# Patient Record
Sex: MALE | Race: WHITE | Employment: OTHER | URBAN - METROPOLITAN AREA
[De-identification: names, ages, dates, MRNs, and addresses within clinical notes are randomized per-mention and may not be internally consistent; named-entity substitution may affect disease eponyms.]

---

## 2017-01-31 ENCOUNTER — APPOINTMENT (OUTPATIENT)
Dept: RADIATION ONCOLOGY | Age: 72
End: 2017-01-31

## 2017-02-07 ENCOUNTER — HOSPITAL ENCOUNTER (OUTPATIENT)
Dept: RADIATION ONCOLOGY | Age: 72
Discharge: HOME | End: 2017-02-07
Payer: MEDICARE

## 2017-02-07 VITALS
HEART RATE: 62 BPM | TEMPERATURE: 97.6 F | WEIGHT: 255 LBS | OXYGEN SATURATION: 99 % | RESPIRATION RATE: 18 BRPM | BODY MASS INDEX: 31.87 KG/M2 | DIASTOLIC BLOOD PRESSURE: 91 MMHG | SYSTOLIC BLOOD PRESSURE: 179 MMHG

## 2017-02-07 DIAGNOSIS — C61 PROSTATE CANCER (HCC): Primary | ICD-10-CM

## 2017-02-07 PROCEDURE — 99211 OFF/OP EST MAY X REQ PHY/QHP: CPT

## 2017-02-07 NOTE — CONSULTS
Patient: Samanta Munoz MRN: [id number]  SSN: [id number]    YOB: 1945  Age: 66 y.o. Sex: male      Other Providers:  Brianda Walters DO    CHIEF COMPLAINT: prostate cancer    DIAGNOSIS: adenocarcinoma of the prostate, T1c, [place] score 3+4 = 7, PSA 5.5      HISTORY OF PRESENT ILLNESS:  Samanta Munoz is a 66 y.o. male who I am seeing at the request of Dr. Barrera Casas regarding the role of radiation therapy and treatment of this favorable intermediate risk prostate cancer. The patient was followed by Dr. Ulysses Angelo and noted to have a rising PSA. PSA on 04/01/16 was 4.7. He had had a prior negative prostate biopsy on 08/29/11. Final pathology showed high-grade PIN in course from the left lateral mid gland, right medial apex and left lateral apex. Atypia was seen in the core from the left medial apex. His prebiopsy PSA was 3.8 and his prostate volume was 52 g. He has a family history of prostate cancer with his father undergoing prostate seed brachytherapy at age 61. PSA on 11/10/16 was 5.5. TRUS guided biopsy on 12/14/16 demonstrated a 49 cc prostate for a PSA density of 0.11. Pathology demonstrated 3/12 cores to contain adenocarcinoma. These included [place] score 3+4 = 7 disease in the left lateral mid gland (30%) and the right base (50%) perineural invasion was identified. In addition, [place] score 3+3 = 6 disease was seen in the left apex (<5%). The patient discussed management options with Dr. Barrera Casas including surgery, radiation therapy, cryotherapy, HIFU, ADT and active surveillance. The patient was leaning toward a radiation based option and is here for discussion of those options. At this time, Mr. Cristian Bennett is doing very well. He has an AUA symptom score of 8/35. He has nocturia ×2-3. He has urinary frequency about half the time. He has occasional urgency and incomplete voiding. He denies urinary leakage or incontinence. He has good bowel function.   He has poor erectile function and considers this to be \"a moderate problem. \"  His overall health is excellent. PAST MEDICAL HISTORY:    Past Medical History   Diagnosis Date    Cancer ([address]      basal cell    Elevated PSA     Erectile dysfunction     Hypertension      daily meds    MVP (mitral valve prolapse)      diagnosed many years ago, pt states does not see cardiologist, in youth    Osteoarthritis      thumbs    S/P colonoscopy      2020       The patient denies history of collagen vascular diseases, pacemaker insertion, prior radiation or prior chemotherapy. PAST SURGICAL HISTORY:   Past Surgical History   Procedure Laterality Date    Hx appendectomy      Biopsy prostate         MEDICATIONS:     Current Outpatient Prescriptions:     amLODIPine (NORVASC) 10 mg tablet, Take 1 Tab by mouth daily. , Disp: 90 Tab, Rfl: 3    tadalafil (CIALIS) 20 mg tablet, Take 1 Tab by mouth as needed. , Disp: 12 Tab, Rfl: 4    atenolol (TENORMIN) 50 mg tablet, Take 1 Tab by mouth two (2) times a day., Disp: 180 Tab, Rfl: 3    ciprofloxacin HCl (CIPRO) 500 mg tablet, Take 500 mg by mouth two (2) times a day., Disp: , Rfl:     OTHER,NON-FORMULARY,, Take  by mouth daily. nicotinamide adenine dinucleotide supplement, Disp: , Rfl:     cloNIDine HCl (CATAPRES) 0.2 mg tablet, Take 1 Tab by mouth three (3) times daily as needed. , Disp: 270 Tab, Rfl: 3    hydrochlorothiazide (HYDRODIURIL) 25 mg tablet, Take 1 Tab by mouth daily. , Disp: 90 Tab, Rfl: 3    olmesartan (BENICAR) 40 mg tablet, Take 1 Tab by mouth daily. am (Patient taking differently: Take 20 mg by mouth two (2) times a day.), Disp: 90 Tab, Rfl: 3    ALLERGIES:   Allergies   Allergen Reactions    Amoxicillin Swelling     Diff breathing, lip swelling       SOCIAL HISTORY:   Social History     Social History    Marital status: SINGLE     Spouse name: N/A    Number of children: N/A    Years of education: N/A     Occupational History    Not on file.      Social History Main Topics    Smoking status: Former Smoker     Quit date: 8/19/1997    Smokeless tobacco: Never Used    Alcohol use Yes      Comment: 15 drinks per wk    Drug use: No    Sexual activity: Not on file     Other Topics Concern    Not on file     Social History Narrative       FAMILY HISTORY:   Family History   Problem Relation Age of Onset    Cancer Father      prostate    Stroke Father     Cancer Paternal Uncle     Heart Disease Mother     Heart Attack Maternal Uncle     Prostate Cancer Sister        REVIEW OF SYSTEMS: Please see the completed review of systems sheet in the chart that I have reviewed today. PHYSICAL EXAMINATION:   ECOG Performance status 0  VITAL SIGNS:   Visit Vitals    BP (!) 179/91    Pulse 62    Temp 97.6 °F (36.4 °C)    Resp 18    Wt 115.7 kg (255 lb)    SpO2 99%    BMI 31.87 kg/m2        GENERAL: The patient is well-developed, ambulatory, alert and in no acute distress. HEENT: Head is normocephalic, atraumatic. Pupils are equal, round and reactive to light and accommodation. Extraocular movement intact. NECK: Neck is supple with no masses. CARDIOVASCULAR: Heart has regular rate and rhythm. There are no murmurs, rubs or gallops. Radial pulses are 2+ RESPIRATORY: Lungs are clear to auscultation and percussion. There is normal respiratory effort. GASTROINTESTINAL: The abdomen is soft, non-tender, nondistended with no hepatospelnomagaly. Digital rectal examination: deferred at patient's request. LYMPHATIC: There is no cervical or supraclavicular  lymphadenopathy bilaterally. MUSCULOSKELETAL: Extremities reveal no cyanosis, clubbing or edema.  is 5+/5. NEURO:  Cranial nerves II-XII grossly intact. Muscular strength and sensation are intact throughout all four extremities. PATHOLOGY:    12/14/16:    DIAGNOSIS   A: \"PROSTATE, RIGHT BASE, BIOPSY\":   PROSTATIC ADENOCARCINOMA, NEREYDA SCORE 3 + 4 = 7 DISCONTINUOUSLY   INVOLVING 50% OF ONE CORE.    B: \"PROSTATE, RIGHT MID, BIOPSY\":   HIGH GRADE PROSTATIC INTRAEPITHELIAL NEOPLASIA. C: \"PROSTATE, RIGHT APEX, BIOPSY\":   HIGH GRADE PROSTATIC INTRAEPITHELIAL NEOPLASIA. D: \"PROSTATE, RIGHT LATERAL BASE, BIOPSY\":   NO CARCINOMA IDENTIFIED. E: \"PROSTATE, RIGHT LATERAL MID, BIOPSY\":   NO CARCINOMA IDENTIFIED. F: \"PROSTATE, RIGHT LATERAL APEX, BIOPSY\":   HIGH GRADE PROSTATIC INTRAEPITHELIAL NEOPLASIA. G: \"PROSTATE, LEFT BASE, BIOPSY\":   NO CARCINOMA IDENTIFIED. H: \"PROSTATE, LEFT MID, BIOPSY\":   NO CARCINOMA IDENTIFIED. I: \"PROSTATE, LEFT APEX, BIOPSY\":   PROSTATIC ADENOCARCINOMA, NEREYDA SCORE 3 + 3 = 6 INVOLVING LESS   THAN 5% OF ONE CORE. HIGH GRADE PROSTATIC INTRAEPITHELIAL NEOPLASIA. J: \"PROSTATE, LEFT LATERAL BASE, BIOPSY\":   NO CARCINOMA IDENTIFIED. K: \"PROSTATE, LEFT LATERAL MID, BIOPSY\":   PROSTATIC ADENOCARCINOMA, NEREYDA SCORE 3 + 4 = 7 INVOLVING 30%   OF ONE CORE. PERINEURAL INVASION IS IDENTIFIED. L: \"PROSTATE, LEFT LATERAL APEX, BIOPSY\":   NO CARCINOMA IDENTIFIED. LABORATORY:   PSA history as detailed in HPI. RADIOLOGY:    No results found. IMPRESSION:  Gena Dandy is a 66 y.o. male with adenocarcinoma of the prostate, T1c, [place] score 3+4 = 7, PSA 5.5. COUNSELING AND COORDINATION OF CARE: I have had a 90 minute consultation with Mr. Ahsan King of which greater than half has been spent counseling him about management options for his favorable intermediate risk prostate cancer. Diagnostically, I have recommended an MRI of the pelvis for completion of staging. According to the [facility] Prediction Tool, his probability of organ confined disease is 51%, of extracapsular extension 49%, of seminal vesicle invasion is 3% and of lymph node involvement is 2%. I have discussed various treatment options with Mr. Ahsan King including active surveillance, surgery, and radiation including external beam and brachytherapy, as well as cryotherapy, HIFU and ADT.  He has a life expectancy of at least 10 years. We have discussed active surveillance in detail. We have also discussed surgery and Mr. Ary Pagan has discussed this in detail with Dr. Rob Pineda. He was told that he is a reasonable surgical candidate and the risks and benefits were covered in detail. However, he is not interested in surgery. We have discussed radiation in its various forms. He is a good candidate for IMRT/IGRT with or without 6 months of ADT, although given his very low burden of Breana score 7 disease, I lean toward omission of ADT. He is also a good candidate for hypofractionated radiation using SBRT. We have discussed recent data demonstrating excellent biochemical control for patients with Breana score 7 prostate cancer treated with this approach. In addition, he is potentially a good brachytherapy candidate, although he was less interested in this approach than SBRT. We also discussed proton therapy, but he is not interested in a consultation and a proton therapy center. We have had a detailed discussion about the use of androgen deprivation therapy as a component of definitive treatment of prostate cancer when combined with radiation. We discussed androgen deprivation therapy and the potential side effects associated with it. These include, but are not limited to, hot flashes, fatigue, weight gain, loss of libido, cognitive and mood changes, decreased bone density and increased fracture risk, altered lipid metabolism, decreased insulin sensitivity, and potential for early death, from cardiac or other unexplained causes. He is adamantly opposed to the use of ADT as a component of his therapy. I have also discussed with him RTOG protocol 0815, a phase 3 prospective randomized trial of dose escalated radiotherapy with or without short-term androgen deprivation therapy for patients with intermediate risk prostate cancer. This trial has closed to accrual, but no results have yet been published.  Given his relatively low burden of pattern 4 disease, I have not encouraged him to pursue ADT. We have discussed the potential side effects of radiation therapy as they pertain to urinary function, bowel function and erectile function. We have also discussed the potential use of SpaceOAR injectable hydrogel in conjunction with radiation therapy in order to protect the rectum from radiation dose. He is very interested in this approach. I will present Mr. Denzel Dixon case at the multidisciplinary conference for discussion of management options. At this time, he is leaning toward treatment with CyberKnife SBRT in conjunction with SpaceOAR. He has also requested pathology review at [facility]. He will return for follow-up with me at [facility] after his MRI, pathology review an presentation at [place] [id number]. I appreciate the opportunity to participate in Mr. Denzel Dixon care.       Ross Angulo MD   February 7, 2017

## 2017-02-07 NOTE — PROGRESS NOTES
Pt here today for initial RT consult for prostate cancer with Dr. Da Cross. 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 and asked if he will be receiving IMRT or SBRT treatment. A brief overview of the process for RT here was given. Pt's PSA on 4/2/16 was 4.7. He has a fairly low AUA symptom  score of 8, and is c/o urinary frequency and nocturia. Pt's urologist is Dr. Smitha Mendez, whom he sees 2/27/17. Per Dr. Da Cross, orders placed for MRI Pelvis to be completed ASAP, forms faxed for both [facility] and [facility], and request faxed to [facility] to send malignant slides from prostate biopsy dated 12/14/16 to [facility] for second review. Pt to have FUP at [facility] on 2/27/17 for probable Cyberknife treatment--pt is aware of appt and new patient packet will be sent from [address] [place] [address].  Per [facility] pathology dept the results on the biopsy slides can take several weeks to come back from [facility]. Pt aware.

## 2017-02-15 ENCOUNTER — HOSPITAL ENCOUNTER (OUTPATIENT)
Dept: MRI IMAGING | Age: 72
Discharge: HOME | End: 2017-02-15
Attending: RADIOLOGY
Payer: MEDICARE

## 2017-02-15 DIAGNOSIS — C61 PROSTATE CANCER (HCC): ICD-10-CM

## 2017-02-15 LAB — CREAT BLD-MCNC: 0.9 MG/DL (ref 0.6–1)

## 2017-02-15 PROCEDURE — 72197 MRI PELVIS W/O & W/DYE: CPT

## 2017-02-15 PROCEDURE — 74011000258 HC RX REV CODE- 258: Performed by: RADIOLOGY

## 2017-02-15 PROCEDURE — 74011250636 HC RX REV CODE- 250/636: Performed by: RADIOLOGY

## 2017-02-15 PROCEDURE — 82565 ASSAY OF CREATININE: CPT

## 2017-02-15 PROCEDURE — A9577 INJ MULTIHANCE: HCPCS | Performed by: RADIOLOGY

## 2017-02-15 RX ORDER — SODIUM CHLORIDE 0.9 % (FLUSH) 0.9 %
10 SYRINGE (ML) INJECTION
Status: COMPLETED | OUTPATIENT
Start: 2017-02-15 | End: 2017-02-15

## 2017-02-15 RX ADMIN — SODIUM CHLORIDE 100 ML: 900 INJECTION, SOLUTION INTRAVENOUS at 13:54

## 2017-02-15 RX ADMIN — Medication 10 ML: at 13:54

## 2017-02-15 RX ADMIN — GADOBENATE DIMEGLUMINE 10 ML: 529 INJECTION, SOLUTION INTRAVENOUS at 13:54

## 2018-05-14 ENCOUNTER — APPOINTMENT (OUTPATIENT)
Dept: GENERAL RADIOLOGY | Age: 73
DRG: 291 | End: 2018-05-14
Attending: EMERGENCY MEDICINE
Payer: MEDICARE

## 2018-05-14 ENCOUNTER — HOSPITAL ENCOUNTER (INPATIENT)
Age: 73
LOS: 3 days | Discharge: HOME | DRG: 291 | End: 2018-05-17
Attending: EMERGENCY MEDICINE | Admitting: HOSPITALIST
Payer: MEDICARE

## 2018-05-14 DIAGNOSIS — I50.9 ACUTE ON CHRONIC CONGESTIVE HEART FAILURE, UNSPECIFIED HEART FAILURE TYPE (HCC): Primary | ICD-10-CM

## 2018-05-14 DIAGNOSIS — I48.91 ATRIAL FIBRILLATION, UNSPECIFIED TYPE (HCC): ICD-10-CM

## 2018-05-14 PROBLEM — I44.7 LBBB (LEFT BUNDLE BRANCH BLOCK): Status: ACTIVE | Noted: 2018-05-14

## 2018-05-14 PROBLEM — I48.0 PAROXYSMAL A-FIB (HCC): Status: ACTIVE | Noted: 2018-05-14

## 2018-05-14 PROBLEM — J96.00 ACUTE RESPIRATORY FAILURE (HCC): Status: ACTIVE | Noted: 2018-05-14

## 2018-05-14 PROBLEM — E87.20 LACTIC ACID ACIDOSIS: Status: ACTIVE | Noted: 2018-05-14

## 2018-05-14 PROBLEM — D72.829 LEUKOCYTOSIS: Status: ACTIVE | Noted: 2018-05-14

## 2018-05-14 LAB
ALBUMIN SERPL-MCNC: 3.7 G/DL (ref 3.2–4.6)
ALBUMIN/GLOB SERPL: 0.9 {RATIO} (ref 1.2–3.5)
ALP SERPL-CCNC: 82 U/L (ref 50–136)
ALT SERPL-CCNC: 64 U/L (ref 12–65)
ANION GAP SERPL CALC-SCNC: 14 MMOL/L (ref 7–16)
ARTERIAL PATENCY WRIST A: POSITIVE
AST SERPL-CCNC: 52 U/L (ref 15–37)
BASE DEFICIT BLDA-SCNC: 0.8 MMOL/L (ref 0–2)
BDY SITE: ABNORMAL
BILIRUB SERPL-MCNC: 0.6 MG/DL (ref 0.2–1.1)
BNP SERPL-MCNC: 281 PG/ML
BUN SERPL-MCNC: 21 MG/DL (ref 8–23)
CALCIUM SERPL-MCNC: 8.7 MG/DL (ref 8.3–10.4)
CHLORIDE SERPL-SCNC: 104 MMOL/L (ref 98–107)
CO2 SERPL-SCNC: 21 MMOL/L (ref 21–32)
COHGB MFR BLD: 0.5 % (ref 0.5–1.5)
CREAT SERPL-MCNC: 1.54 MG/DL (ref 0.8–1.5)
CRP SERPL-MCNC: 1.3 MG/DL (ref 0–0.9)
DO-HGB BLD-MCNC: 2 % (ref 0–5)
ERYTHROCYTE [DISTWIDTH] IN BLOOD BY AUTOMATED COUNT: 13.8 % (ref 11.9–14.6)
GLOBULIN SER CALC-MCNC: 4.2 G/DL (ref 2.3–3.5)
GLUCOSE SERPL-MCNC: 174 MG/DL (ref 65–100)
HCO3 BLDA-SCNC: 21 MMOL/L (ref 22–26)
HCT VFR BLD AUTO: 48.1 % (ref 41.1–50.3)
HGB BLD-MCNC: 16.7 G/DL (ref 13.6–17.2)
HGB BLDMV-MCNC: 15.5 GM/DL (ref 11.7–15)
LACTATE BLD-SCNC: 2.6 MMOL/L (ref 0.5–1.9)
LACTATE SERPL-SCNC: 1.9 MMOL/L (ref 0.4–2)
MAGNESIUM SERPL-MCNC: 1.9 MG/DL (ref 1.8–2.4)
MAGNESIUM SERPL-MCNC: 2.1 MG/DL (ref 1.8–2.4)
MCH RBC QN AUTO: 31.4 PG (ref 26.1–32.9)
MCHC RBC AUTO-ENTMCNC: 34.7 G/DL (ref 31.4–35)
MCV RBC AUTO: 90.4 FL (ref 79.6–97.8)
METHGB MFR BLD: 0.4 % (ref 0–1.5)
OXYHGB MFR BLDA: 96.7 % (ref 94–97)
PCO2 BLDA: 26 MMHG (ref 35–45)
PH BLDA: 7.51 [PH] (ref 7.35–7.45)
PHOSPHATE SERPL-MCNC: 4.4 MG/DL (ref 2.3–3.7)
PLATELET # BLD AUTO: 339 K/UL (ref 150–450)
PMV BLD AUTO: 9.9 FL (ref 10.8–14.1)
POTASSIUM SERPL-SCNC: 3.9 MMOL/L (ref 3.5–5.1)
PROCALCITONIN SERPL-MCNC: 0.1 NG/ML
PROT SERPL-MCNC: 7.9 G/DL (ref 6.3–8.2)
RBC # BLD AUTO: 5.32 M/UL (ref 4.23–5.67)
SAO2 % BLD: 98 % (ref 92–98.5)
SODIUM SERPL-SCNC: 139 MMOL/L (ref 136–145)
TROPONIN I BLD-MCNC: 0 NG/ML (ref 0.02–0.05)
TROPONIN I SERPL-MCNC: <0.02 NG/ML (ref 0.02–0.05)
VENTILATION MODE VENT: ABNORMAL
WBC # BLD AUTO: 13 K/UL (ref 4.3–11.1)

## 2018-05-14 PROCEDURE — 94660 CPAP INITIATION&MGMT: CPT

## 2018-05-14 PROCEDURE — 84484 ASSAY OF TROPONIN QUANT: CPT

## 2018-05-14 PROCEDURE — 74011000258 HC RX REV CODE- 258: Performed by: HOSPITALIST

## 2018-05-14 PROCEDURE — 74011250636 HC RX REV CODE- 250/636: Performed by: HOSPITALIST

## 2018-05-14 PROCEDURE — 71045 X-RAY EXAM CHEST 1 VIEW: CPT

## 2018-05-14 PROCEDURE — 83605 ASSAY OF LACTIC ACID: CPT

## 2018-05-14 PROCEDURE — 82803 BLOOD GASES ANY COMBINATION: CPT

## 2018-05-14 PROCEDURE — 83605 ASSAY OF LACTIC ACID: CPT | Performed by: HOSPITALIST

## 2018-05-14 PROCEDURE — 74011250636 HC RX REV CODE- 250/636: Performed by: EMERGENCY MEDICINE

## 2018-05-14 PROCEDURE — 36600 WITHDRAWAL OF ARTERIAL BLOOD: CPT

## 2018-05-14 PROCEDURE — 84145 PROCALCITONIN (PCT): CPT | Performed by: HOSPITALIST

## 2018-05-14 PROCEDURE — 96375 TX/PRO/DX INJ NEW DRUG ADDON: CPT | Performed by: EMERGENCY MEDICINE

## 2018-05-14 PROCEDURE — 83880 ASSAY OF NATRIURETIC PEPTIDE: CPT | Performed by: EMERGENCY MEDICINE

## 2018-05-14 PROCEDURE — 96365 THER/PROPH/DIAG IV INF INIT: CPT | Performed by: EMERGENCY MEDICINE

## 2018-05-14 PROCEDURE — 74011250637 HC RX REV CODE- 250/637: Performed by: HOSPITALIST

## 2018-05-14 PROCEDURE — 74011000258 HC RX REV CODE- 258: Performed by: EMERGENCY MEDICINE

## 2018-05-14 PROCEDURE — 86140 C-REACTIVE PROTEIN: CPT | Performed by: HOSPITALIST

## 2018-05-14 PROCEDURE — 80053 COMPREHEN METABOLIC PANEL: CPT | Performed by: EMERGENCY MEDICINE

## 2018-05-14 PROCEDURE — 83735 ASSAY OF MAGNESIUM: CPT | Performed by: EMERGENCY MEDICINE

## 2018-05-14 PROCEDURE — 84100 ASSAY OF PHOSPHORUS: CPT | Performed by: HOSPITALIST

## 2018-05-14 PROCEDURE — 99285 EMERGENCY DEPT VISIT HI MDM: CPT | Performed by: EMERGENCY MEDICINE

## 2018-05-14 PROCEDURE — 65660000000 HC RM CCU STEPDOWN

## 2018-05-14 PROCEDURE — 85027 COMPLETE CBC AUTOMATED: CPT | Performed by: EMERGENCY MEDICINE

## 2018-05-14 PROCEDURE — 87040 BLOOD CULTURE FOR BACTERIA: CPT | Performed by: EMERGENCY MEDICINE

## 2018-05-14 PROCEDURE — 36415 COLL VENOUS BLD VENIPUNCTURE: CPT | Performed by: HOSPITALIST

## 2018-05-14 PROCEDURE — 77010033678 HC OXYGEN DAILY

## 2018-05-14 PROCEDURE — 93005 ELECTROCARDIOGRAM TRACING: CPT | Performed by: EMERGENCY MEDICINE

## 2018-05-14 RX ORDER — LOPERAMIDE HYDROCHLORIDE 2 MG/1
2 CAPSULE ORAL
Status: DISCONTINUED | OUTPATIENT
Start: 2018-05-14 | End: 2018-05-17 | Stop reason: HOSPADM

## 2018-05-14 RX ORDER — FUROSEMIDE 10 MG/ML
40 INJECTION INTRAMUSCULAR; INTRAVENOUS ONCE
Status: ACTIVE | OUTPATIENT
Start: 2018-05-14 | End: 2018-05-15

## 2018-05-14 RX ORDER — NITROGLYCERIN 0.4 MG/1
0.4 TABLET SUBLINGUAL
Status: DISCONTINUED | OUTPATIENT
Start: 2018-05-14 | End: 2018-05-17 | Stop reason: HOSPADM

## 2018-05-14 RX ORDER — AMIODARONE HYDROCHLORIDE 200 MG/1
200 TABLET ORAL 2 TIMES DAILY
Status: DISCONTINUED | OUTPATIENT
Start: 2018-05-15 | End: 2018-05-15

## 2018-05-14 RX ORDER — LEVOFLOXACIN 750 MG/1
750 TABLET ORAL DAILY
COMMUNITY
End: 2018-05-17

## 2018-05-14 RX ORDER — ATENOLOL 25 MG/1
50 TABLET ORAL DAILY
Status: DISCONTINUED | OUTPATIENT
Start: 2018-05-15 | End: 2018-05-16

## 2018-05-14 RX ORDER — VANCOMYCIN 2 GRAM/500 ML IN 0.9 % SODIUM CHLORIDE INTRAVENOUS
2000 ONCE
Status: COMPLETED | OUTPATIENT
Start: 2018-05-14 | End: 2018-05-17

## 2018-05-14 RX ORDER — UREA 10 %
1 LOTION (ML) TOPICAL DAILY
Status: DISCONTINUED | OUTPATIENT
Start: 2018-05-15 | End: 2018-05-17 | Stop reason: HOSPADM

## 2018-05-14 RX ORDER — VANCOMYCIN/0.9 % SOD CHLORIDE 1.5G/250ML
1500 PLASTIC BAG, INJECTION (ML) INTRAVENOUS
Status: DISCONTINUED | OUTPATIENT
Start: 2018-05-15 | End: 2018-05-16

## 2018-05-14 RX ORDER — DOCUSATE SODIUM 100 MG/1
100 CAPSULE, LIQUID FILLED ORAL AS NEEDED
Status: DISCONTINUED | OUTPATIENT
Start: 2018-05-14 | End: 2018-05-17 | Stop reason: HOSPADM

## 2018-05-14 RX ORDER — FUROSEMIDE 10 MG/ML
40 INJECTION INTRAMUSCULAR; INTRAVENOUS
Status: COMPLETED | OUTPATIENT
Start: 2018-05-14 | End: 2018-05-14

## 2018-05-14 RX ORDER — ACETAMINOPHEN 325 MG/1
650 TABLET ORAL
Status: DISCONTINUED | OUTPATIENT
Start: 2018-05-14 | End: 2018-05-16

## 2018-05-14 RX ORDER — AMLODIPINE BESYLATE 10 MG/1
10 TABLET ORAL DAILY
Status: DISCONTINUED | OUTPATIENT
Start: 2018-05-15 | End: 2018-05-17 | Stop reason: HOSPADM

## 2018-05-14 RX ORDER — FUROSEMIDE 10 MG/ML
40 INJECTION INTRAMUSCULAR; INTRAVENOUS EVERY 12 HOURS
Status: DISCONTINUED | OUTPATIENT
Start: 2018-05-14 | End: 2018-05-15

## 2018-05-14 RX ORDER — DIPHENHYDRAMINE HCL 25 MG
25 CAPSULE ORAL DAILY PRN
Status: DISCONTINUED | OUTPATIENT
Start: 2018-05-14 | End: 2018-05-17 | Stop reason: HOSPADM

## 2018-05-14 RX ORDER — ONDANSETRON 2 MG/ML
4 INJECTION INTRAMUSCULAR; INTRAVENOUS
Status: DISCONTINUED | OUTPATIENT
Start: 2018-05-14 | End: 2018-05-17 | Stop reason: HOSPADM

## 2018-05-14 RX ORDER — OLMESARTAN MEDOXOMIL 40 MG/1
40 TABLET ORAL DAILY
Status: DISCONTINUED | OUTPATIENT
Start: 2018-05-15 | End: 2018-05-17 | Stop reason: HOSPADM

## 2018-05-14 RX ADMIN — VANCOMYCIN HYDROCHLORIDE 2000 MG: 10 INJECTION, POWDER, LYOPHILIZED, FOR SOLUTION INTRAVENOUS at 23:06

## 2018-05-14 RX ADMIN — TOBRAMYCIN 480 MG: 40 INJECTION, SOLUTION INTRAMUSCULAR; INTRAVENOUS at 21:30

## 2018-05-14 RX ADMIN — FUROSEMIDE 40 MG: 10 INJECTION, SOLUTION INTRAVENOUS at 23:08

## 2018-05-14 RX ADMIN — CEFTRIAXONE SODIUM 2 G: 2 INJECTION, POWDER, FOR SOLUTION INTRAMUSCULAR; INTRAVENOUS at 17:04

## 2018-05-14 RX ADMIN — FUROSEMIDE 40 MG: 10 INJECTION, SOLUTION INTRAMUSCULAR; INTRAVENOUS at 17:44

## 2018-05-14 RX ADMIN — APIXABAN 5 MG: 5 TABLET, FILM COATED ORAL at 23:54

## 2018-05-14 NOTE — IP AVS SNAPSHOT
Summary of Care Report The Summary of Care report has been created to help improve care coordination. Users with access to [employer] or [facility] [address] (Web-based application) may access additional patient information including the Discharge Summary. If you are not currently a [address] user and need more information, please call the number listed below in the [place] [address] section and ask to be connected with Medical Records. Facility Information Name Address Phone [address] [address] [address] [phone] Patient Information Patient Name Sex LIAM Tucker ([id number]) Male 1945 Discharge Information Admitting Provider Service Area Unit Beti Sears MD / [address] Telemetry / [phone] Discharge Provider Discharge Date/Time Discharge Disposition Destination (none) 2018 (Pending) AHR (none) Patient Language Language ENGLISH [13] Hospital Problems as of 2018  Reviewed: 2017 11:17 AM by Tyshawn Latif DO Class Noted - Resolved Last Modified POA Active Problems Hypertension, essential  2016 - Present 2018 by Beti Sears MD Yes Entered by Jeremy Perez MD  
  * (Principal)Acute respiratory failure [facility])  2018 - Present 2018 by Beti Sears MD Yes Entered by Beti Sears MD  
  Acute CHF (congestive heart failure) ([facility] [address]  2018 - Present 2018 by Beti Sears MD Yes Entered by Beti Sears MD  
  Paroxysmal A-fib [facility])  2018 - Present 2018 by Beti Sears MD Yes Entered by Beti Sears MD  
  Lactic acid acidosis  2018 - Present 2018 by Beti Sears MD Yes   Entered by Beti Sears MD  
 LBBB (left bundle branch block)  5/14/2018 - Present 5/14/2018 by Sharon Moore MD Yes Entered by Sharon Moore MD  
  Leukocytosis  5/14/2018 - Present 5/14/2018 by Sharon Moore MD Yes Entered by Sharon Moore MD  
  HAP (hospital-acquired pneumonia)  5/16/2018 - Present 5/16/2018 by Jay Altman NP Unknown Entered by Jay Altman NP Non-Hospital Problems as of 5/17/2018  Reviewed: 12/29/2017 11:17 AM by Mert Burciaga DO Class Noted - Resolved Last Modified Active Problems Medicare annual wellness visit, subsequent (Chronic)  4/1/2016 - Present 4/1/2016 by Demetra Dasilva MD  
  Entered by Demetra Dasilva MD  
  
You are allergic to the following Allergen Reactions Amoxicillin Swelling Diff breathing, lip swelling Levaquin (Levofloxacin) Shortness of Breath Current Discharge Medication List  
  
START taking these medications Dose & Instructions Dispensing Information Comments  
 doxycycline 100 mg tablet Commonly known as:  ADOXA Dose:  100 mg Take 1 Tab by mouth two (2) times a day for 4 days. Quantity:  8 Tab Refills:  0 CONTINUE these medications which have CHANGED Dose & Instructions Dispensing Information Comments  
 amiodarone 100 mg tablet Commonly known as:  Laurie Reynolds What changed:   
- medication strength 
- how much to take Dose:  100 mg Take 1 Tab by mouth two (2) times a day. Quantity:  60 Tab Refills:  0  
   
 atenolol 25 mg tablet Commonly known as:  TENORMIN What changed:   
- medication strength 
- how much to take - when to take this Dose:  25 mg Take 1 Tab by mouth two (2) times a day. Quantity:  60 Tab Refills:  0 CONTINUE these medications which have NOT CHANGED Dose & Instructions Dispensing Information Comments  
 amLODIPine 10 mg tablet Commonly known as:  Marquette Cordial  Dose:  10 mg  
 Take 1 Tab by mouth daily. Quantity:  90 Tab Refills:  3 ANTIHISTAMINE PO Dose:  1 Tab Take 1 Tab by mouth as needed. Refills:  0  
   
 apixaban 5 mg tablet Commonly known as:  Pattricia Boom Dose:  5 mg Take 5 mg by mouth. Refills:  0  
   
 cloNIDine HCl 0.2 mg tablet Commonly known as:  CATAPRES Dose:  0.2 mg Take 1 Tab by mouth three (3) times daily as needed. Quantity:  270 Tab Refills:  3  
   
 olmesartan 40 mg tablet Commonly known as:  [employer] Dose:  40 mg Take 1 Tab by mouth daily. am  
 Quantity:  90 Tab Refills:  3 PROBIOTIC 4X PO Dose:  1 Tab Take 1 Tab by mouth as needed. Refills:  0 STOP taking these medications Comments  
 hydroCHLOROthiazide 25 mg tablet Commonly known as:  HYDRODIURIL  
   
   
 LASIX 20 mg tablet Generic drug:  furosemide  
   
   
 levoFLOXacin 750 mg tablet Commonly known as:  LEVAQUIN  
   
   
 loperamide 2 mg tablet Commonly known as:  IMMODIUM Current Immunizations Name Date Influenza High Dose Vaccine PF 11/11/2016 Pneumococcal Conjugate (PCV-13) 11/10/2016 Pneumococcal Polysaccharide (PPSV-23) 5/19/2011 Follow-up Information Follow up With Details Comments Contact Info [facility]ji Ward MD [facility] [address] [facility] [address] [facility] [address] 
[phone] Jennifer Sol MD On 5/22/2018 at 4:30pm , Hospital follow up [address] [facility] [address] [facility] [address] 
[phone] Discharge Instructions Pneumonia: Care Instructions Your Care Instructions Pneumonia is an infection of the lungs. Most cases are caused by infections from bacteria or viruses. Pneumonia may be mild or very severe. If it is caused by bacteria, you will be treated with antibiotics.  It may take a few weeks to a few months to recover fully from pneumonia, depending on how sick you were and whether your overall health is good. Follow-up care is a key part of your treatment and safety. Be sure to make and go to all appointments, and call your doctor if you are having problems. It's also a good idea to know your test results and keep a list of the medicines you take. How can you care for yourself at home? · Take your antibiotics exactly as directed. Do not stop taking the medicine just because you are feeling better. You need to take the full course of antibiotics. · Take your medicines exactly as prescribed. Call your doctor if you think you are having a problem with your medicine. · Get plenty of rest and sleep. You may feel weak and tired for a while, but your energy level will improve with time. · To prevent dehydration, drink plenty of fluids, enough so that your urine is light yellow or clear like water. Choose water and other caffeine-free clear liquids until you feel better. If you have kidney, heart, or liver disease and have to limit fluids, talk with your doctor before you increase the amount of fluids you drink. · Take care of your cough so you can rest. A cough that brings up mucus from your lungs is common with pneumonia. It is one way your body gets rid of the infection. But if coughing keeps you from resting or causes severe fatigue and chest-wall pain, talk to your doctor. He or she may suggest that you take a medicine to reduce the cough. · Use a vaporizer or humidifier to add moisture to your bedroom. Follow the directions for cleaning the machine. · Do not smoke or allow others to smoke around you. Smoke will make your cough last longer. If you need help quitting, talk to your doctor about stop-smoking programs and medicines. These can increase your chances of quitting for good.  
· Take an over-the-counter pain medicine, such as acetaminophen (Tylenol), ibuprofen (Advil, Motrin), or naproxen (Aleve). Read and follow all instructions on the label. · Do not take two or more pain medicines at the same time unless the doctor told you to. Many pain medicines have acetaminophen, which is Tylenol. Too much acetaminophen (Tylenol) can be harmful. · If you were given a spirometer to measure how well your lungs are working, use it as instructed. This can help your doctor tell how your recovery is going. · To prevent pneumonia in the future, talk to your doctor about getting a flu vaccine (once a year) and a pneumococcal vaccine (one time only for most people). When should you call for help? Call 911 anytime you think you may need emergency care. For example, call if: 
? · You have severe trouble breathing. ?Call your doctor now or seek immediate medical care if: 
? · You cough up dark brown or bloody mucus (sputum). ? · You have new or worse trouble breathing. ? · You are dizzy or lightheaded, or you feel like you may faint. ? Watch closely for changes in your health, and be sure to contact your doctor if: 
? · You have a new or higher fever. ? · You are coughing more deeply or more often. ? · You are not getting better after 2 days (48 hours). ? · You do not get better as expected. Where can you learn more? Go to http://juan carlos-sherry.info/. Enter [id number] in the search box to learn more about \"Pneumonia: Care Instructions. \" Current as of: May 12, 2017 Content Version: 11.4 © 9954-8071 [employer]. Care instructions adapted under license by [employer] (which disclaims liability or warranty for this information). If you have questions about a medical condition or this instruction, always ask your healthcare professional. [address] any warranty or liability for your use of this information. DISCHARGE SUMMARY from Nurse PATIENT INSTRUCTIONS: 
 
 
F-face looks uneven A-arms unable to move or move unevenly S-speech slurred or non-existent T-time-call 911 as soon as signs and symptoms begin-DO NOT go Back to bed or wait to see if you get better-TIME IS BRAIN. Warning Signs of HEART ATTACK Call 911 if you have these symptoms: 
? Chest discomfort. Most heart attacks involve discomfort in the center of the chest that lasts more than a few minutes, or that goes away and comes back. It can feel like uncomfortable pressure, squeezing, fullness, or pain. ? Discomfort in other areas of the upper body. Symptoms can include pain or discomfort in one or both arms, the back, neck, jaw, or stomach. ? Shortness of breath with or without chest discomfort. ? Other signs may include breaking out in a cold sweat, nausea, or lightheadedness. Don't wait more than five minutes to call [address]! Fast action can save your life. Calling 911 is almost always the fastest way to get lifesaving treatment. Emergency Medical Services staff can begin treatment when they arrive  up to an hour sooner than if someone gets to the hospital by car. The discharge information has been reviewed with the patient. The patient verbalized understanding. Discharge medications reviewed with the patient and appropriate educational materials and side effects teaching were provided. ___________________________________________________________________________________________________________________________________ Learning About Atrial Fibrillation What is atrial fibrillation?  
 
Atrial fibrillation (say \"AY-tree-davy uxr-nsnq-WLE-shun\") is the most common type of irregular heartbeat (arrhythmia). Normally, the heart beats in a strong, steady rhythm. In atrial fibrillation, a problem with the heart's electrical system causes the two upper parts of the heart (the atria) to quiver, or fibrillate. Your heart rate also may be faster than normal. 
Atrial fibrillation can be dangerous because if the heartbeat isn't strong and steady, blood can collect, or pool, in the atria. And pooled blood is more likely to form clots. Clots can travel to the brain, block blood flow, and cause a stroke. Atrial fibrillation can also lead to heart failure. Treatment for atrial fibrillation helps prevent stroke and heart failure. It also helps relieve symptoms. Atrial fibrillation is often caused by another heart problem. It may happen after heart surgery. It may also be caused by other problems, such as an overactive thyroid gland or lung disease. Many people with atrial fibrillation are able to live full and active lives. What are the symptoms? Some people feel symptoms when they have episodes of atrial fibrillation. But other people don't notice any symptoms. If you have symptoms, you may feel: · A fluttering, racing, or pounding feeling in your chest called palpitations. · Weak or tired. · Dizzy or lightheaded. · Short of breath. · Chest pain. · Confused. You may notice signs of atrial fibrillation when you check your pulse. Your pulse may seem uneven or fast. 
What can you expect when you have atrial fibrillation? At first, spells of atrial fibrillation may come on suddenly and last a short time. It may go away on its own or it goes away after treatment. This is called paroxysmal atrial fibrillation. Over time, the spells may last longer and occur more often. They often don't go away on their own. How is it treated? Treatments can help you feel better and prevent future problems, especially stroke and heart failure. The main types of treatment slow the heart rate, control the heart rhythm, and help prevent stroke. Your treatment will depend on the cause of your atrial fibrillation, your symptoms, and your risk for stroke. · Heart rate treatment. Medicine may be used to slow your heart rate. Your heartbeat may still be irregular. But these medicines keep your heart from beating too fast. They may also help relieve your symptoms. · Heart rhythm treatment. Different treatments may be used to try to stop atrial fibrillation and keep it from returning. They can also relieve symptoms. These treatments include medicine, electrical cardioversion to shock the heart back to a normal rhythm, a procedure called catheter ablation, and heart surgery. · Stroke prevention. You and your doctor can decide how to lower your risk. You may decide to take a blood-thinning medicine, such as aspirin or an anticoagulant. How can you live well with it? You can live well and help manage atrial fibrillation by having a heart-healthy lifestyle. To have a heart-healthy lifestyle: · Don't smoke. · Eat heart-healthy foods. · Be active. Talk to your doctor about what type and level of exercise is safe for you. · Stay at a healthy weight. Lose weight if you need to. · Manage stress. · Avoid alcohol if it triggers symptoms. · Manage other health problems such as high blood pressure, high cholesterol, and diabetes. · Avoid getting sick from the flu. Get a flu shot every year. Where can you learn more? Go to http://juan carlos-sherry.info/. Enter [phone] in the search box to learn more about \"Learning About Atrial Fibrillation. \" Current as of: September 21, 2016 Content Version: 11.4 © 9027-0068 [employer]. Care instructions adapted under license by [employer] (which disclaims liability or warranty for this information).  If you have questions about a medical condition or this instruction, always ask your healthcare professional. [place] [address] any warranty or liability for your use of this information. Chart Review Routing History Recipient Method Report Sent By Chalo Mckeon DO Phone: [phone] In [employer] IP Auto Routed saperatec, DO [3063] 12/15/2016  8:36 AM 12/15/2016

## 2018-05-14 NOTE — IP AVS SNAPSHOT
[address] 
[phone] Patient: Danilo Diaz MRN: [id number] About your hospitalization You were admitted on:  May 14, 2018 You last received care in the:  [facility] 3 TELEMETRY You were discharged on:  May 17, 2018 Why you were hospitalized Your primary diagnosis was:  Acute Respiratory Failure (Hcc) Your diagnoses also included:  Acute Chf (Congestive Heart Failure) (Hcc), Hypertension, Essential, Paroxysmal A-Fib (Hcc), Lactic Acid Acidosis, Lbbb (Left Bundle Branch Block), Leukocytosis, Hap (Hospital-Acquired Pneumonia) Follow-up Information Follow up With Details Comments Contact Info Audie Day MD Go on 5/29/2018 at 1800 [facility] office [facility] [address] [facility] [address] 
[phone] Erika Lechuga MD On 5/22/2018 at 4:30pm , Hospital follow up [address] [place] [address] 
[phone] Your Scheduled Appointments Tuesday May 22, 2018  4:30 PM EDT Office Visit with Erika Lechuga MD  
[address] [place] [address] ([address] [place] [address]) [address] [place] [address]  
[phone] Tuesday May 29, 2018  1:00 PM EDT HOSPITAL FOLLOW-UP with Audie Day MD  
[address] [facility] ([facility]) 2 [facility] 
[address] [place] [address]  
[phone] Wednesday May 30, 2018 11:15 AM EDT Office Visit with Erika Lechuga MD  
855 [facility] ([address] [place] [address]) [address] [place] [address]  
[phone] Tuesday July 03, 2018  9:15 AM EDT  
BLOOD DRAW with VNW11 BLOOD DRAW [facility] Urology 52 ([facility] UROLOGY) [address] [facility] [address]  
[phone]  Wednesday July 11, 2018 10:10 AM EDT  
 Office Visit with Patrice Berrios DO [facility] Urology 52 ([facility] UROLOGY) [address] [place] [address]  
[phone] Discharge Orders None A check sivakumar indicates which time of day the medication should be taken. My Medications START taking these medications Instructions Each Dose to Equal  
 Morning Noon Evening Bedtime  
 doxycycline 100 mg tablet Commonly known as:  ADOXA Take 1 Tab by mouth two (2) times a day for 4 days. 100 mg CHANGE how you take these medications Instructions Each Dose to Equal  
 Morning Noon Evening Bedtime  
 amiodarone 100 mg tablet Commonly known as:  Almita Paci What changed:   
- medication strength 
- how much to take Your last dose was:  5/17/18 @ 0900 Take 1 Tab by mouth two (2) times a day. 100 mg  
    
  
   
   
  
   
  
 atenolol 25 mg tablet Commonly known as:  TENORMIN What changed:   
- medication strength 
- how much to take - when to take this Take 1 Tab by mouth two (2) times a day. 25 mg CONTINUE taking these medications Instructions Each Dose to Equal  
 Morning Noon Evening Bedtime  
 amLODIPine 10 mg tablet Commonly known as:  Hope Bambi Your last dose was:  5/17/18 @ 0900 Take 1 Tab by mouth daily. 10 mg ANTIHISTAMINE PO Take 1 Tab by mouth as needed. 1 Tab  
    
   
   
   
  
 apixaban 5 mg tablet Commonly known as:  Artice Lucinda Your last dose was:  5/17/18 @ 0900 Take 5 mg by mouth. 5 mg  
    
  
   
   
   
  
 cloNIDine HCl 0.2 mg tablet Commonly known as:  CATAPRES Take 1 Tab by mouth three (3) times daily as needed. 0.2 mg  
    
   
   
   
  
 olmesartan 40 mg tablet Commonly known as:  [employer]ands Your last dose was:  5/17/18 @ 0900 Take 1 Tab by mouth daily.  am  
 40 mg  
    
  
 PROBIOTIC 4X PO Take 1 Tab by mouth as needed. 1 Tab STOP taking these medications   
 hydroCHLOROthiazide 25 mg tablet Commonly known as:  HYDRODIURIL  
   
  
 LASIX 20 mg tablet Generic drug:  furosemide  
   
  
 levoFLOXacin 750 mg tablet Commonly known as:  LEVAQUIN  
   
  
 loperamide 2 mg tablet Commonly known as:  IMMODIUM Where to Get Your Medications These medications were sent to [facility] # [address]ly [address], [address], [address] [place] [address] Phone:  [phone]  
  amiodarone 100 mg tablet  
 atenolol 25 mg tablet  
 doxycycline 100 mg tablet Discharge Instructions Pneumonia: Care Instructions Your Care Instructions Pneumonia is an infection of the lungs. Most cases are caused by infections from bacteria or viruses. Pneumonia may be mild or very severe. If it is caused by bacteria, you will be treated with antibiotics. It may take a few weeks to a few months to recover fully from pneumonia, depending on how sick you were and whether your overall health is good. Follow-up care is a key part of your treatment and safety. Be sure to make and go to all appointments, and call your doctor if you are having problems. It's also a good idea to know your test results and keep a list of the medicines you take. How can you care for yourself at home? · Take your antibiotics exactly as directed. Do not stop taking the medicine just because you are feeling better. You need to take the full course of antibiotics. · Take your medicines exactly as prescribed. Call your doctor if you think you are having a problem with your medicine. · Get plenty of rest and sleep. You may feel weak and tired for a while, but your energy level will improve with time.  
· To prevent dehydration, drink plenty of fluids, enough so that your urine is light yellow or clear like water. Choose water and other caffeine-free clear liquids until you feel better. If you have kidney, heart, or liver disease and have to limit fluids, talk with your doctor before you increase the amount of fluids you drink. · Take care of your cough so you can rest. A cough that brings up mucus from your lungs is common with pneumonia. It is one way your body gets rid of the infection. But if coughing keeps you from resting or causes severe fatigue and chest-wall pain, talk to your doctor. He or she may suggest that you take a medicine to reduce the cough. · Use a vaporizer or humidifier to add moisture to your bedroom. Follow the directions for cleaning the machine. · Do not smoke or allow others to smoke around you. Smoke will make your cough last longer. If you need help quitting, talk to your doctor about stop-smoking programs and medicines. These can increase your chances of quitting for good. · Take an over-the-counter pain medicine, such as acetaminophen (Tylenol), ibuprofen (Advil, Motrin), or naproxen (Aleve). Read and follow all instructions on the label. · Do not take two or more pain medicines at the same time unless the doctor told you to. Many pain medicines have acetaminophen, which is Tylenol. Too much acetaminophen (Tylenol) can be harmful. · If you were given a spirometer to measure how well your lungs are working, use it as instructed. This can help your doctor tell how your recovery is going. · To prevent pneumonia in the future, talk to your doctor about getting a flu vaccine (once a year) and a pneumococcal vaccine (one time only for most people). When should you call for help? Call 911 anytime you think you may need emergency care. For example, call if: 
? · You have severe trouble breathing. ?Call your doctor now or seek immediate medical care if: 
? · You cough up dark brown or bloody mucus (sputum). ? · You have new or worse trouble breathing. ? · You are dizzy or lightheaded, or you feel like you may faint. ? Watch closely for changes in your health, and be sure to contact your doctor if: 
? · You have a new or higher fever. ? · You are coughing more deeply or more often. ? · You are not getting better after 2 days (48 hours). ? · You do not get better as expected. Where can you learn more? Go to http://juan carlos-sherry.info/. Enter [id number] in the search box to learn more about \"Pneumonia: Care Instructions. \" Current as of: May 12, 2017 Content Version: 11.4 © 4852-6023 NAVX. Care instructions adapted under license by [employer]pye (which disclaims liability or warranty for this information). If you have questions about a medical condition or this instruction, always ask your healthcare professional. [place] [address] any warranty or liability for your use of this information. DISCHARGE SUMMARY from Nurse PATIENT INSTRUCTIONS: 
 
 
F-face looks uneven A-arms unable to move or move unevenly S-speech slurred or non-existent T-time-call 911 as soon as signs and symptoms begin-DO NOT go Back to bed or wait to see if you get better-TIME IS BRAIN. Warning Signs of HEART ATTACK Call 911 if you have these symptoms: 
? Chest discomfort. Most heart attacks involve discomfort in the center of the chest that lasts more than a few minutes, or that goes away and comes back. It can feel like uncomfortable pressure, squeezing, fullness, or pain. ? Discomfort in other areas of the upper body. Symptoms can include pain or discomfort in one or both arms, the back, neck, jaw, or stomach. ? Shortness of breath with or without chest discomfort. ? Other signs may include breaking out in a cold sweat, nausea, or lightheadedness. Don't wait more than five minutes to call [address]! Fast action can save your life. Calling 911 is almost always the fastest way to get lifesaving treatment. Emergency Medical Services staff can begin treatment when they arrive  up to an hour sooner than if someone gets to the hospital by car. The discharge information has been reviewed with the patient. The patient verbalized understanding. Discharge medications reviewed with the patient and appropriate educational materials and side effects teaching were provided. ___________________________________________________________________________________________________________________________________ Learning About Atrial Fibrillation What is atrial fibrillation? Atrial fibrillation (say \"AY-tree-davy pzq-ofvv-AKY-shun\") is the most common type of irregular heartbeat (arrhythmia). Normally, the heart beats in a strong, steady rhythm. In atrial fibrillation, a problem with the heart's electrical system causes the two upper parts of the heart (the atria) to quiver, or fibrillate. Your heart rate also may be faster than normal. 
Atrial fibrillation can be dangerous because if the heartbeat isn't strong and steady, blood can collect, or pool, in the atria. And pooled blood is more likely to form clots. Clots can travel to the brain, block blood flow, and cause a stroke. Atrial fibrillation can also lead to heart failure. Treatment for atrial fibrillation helps prevent stroke and heart failure. It also helps relieve symptoms. Atrial fibrillation is often caused by another heart problem. It may happen after heart surgery. It may also be caused by other problems, such as an overactive thyroid gland or lung disease. Many people with atrial fibrillation are able to live full and active lives. What are the symptoms? Some people feel symptoms when they have episodes of atrial fibrillation. But other people don't notice any symptoms. If you have symptoms, you may feel: · A fluttering, racing, or pounding feeling in your chest called palpitations. · Weak or tired. · Dizzy or lightheaded. · Short of breath. · Chest pain. · Confused. You may notice signs of atrial fibrillation when you check your pulse. Your pulse may seem uneven or fast. 
What can you expect when you have atrial fibrillation? At first, spells of atrial fibrillation may come on suddenly and last a short time. It may go away on its own or it goes away after treatment. This is called paroxysmal atrial fibrillation. Over time, the spells may last longer and occur more often. They often don't go away on their own. How is it treated? Treatments can help you feel better and prevent future problems, especially stroke and heart failure. The main types of treatment slow the heart rate, control the heart rhythm, and help prevent stroke. Your treatment will depend on the cause of your atrial fibrillation, your symptoms, and your risk for stroke. · Heart rate treatment. Medicine may be used to slow your heart rate. Your heartbeat may still be irregular. But these medicines keep your heart from beating too fast. They may also help relieve your symptoms. · Heart rhythm treatment. Different treatments may be used to try to stop atrial fibrillation and keep it from returning. They can also relieve symptoms. These treatments include medicine, electrical cardioversion to shock the heart back to a normal rhythm, a procedure called catheter ablation, and heart surgery. · Stroke prevention. You and your doctor can decide how to lower your risk. You may decide to take a blood-thinning medicine, such as aspirin or an anticoagulant. How can you live well with it?  
You can live well and help manage atrial fibrillation by having a heart-healthy lifestyle. To have a heart-healthy lifestyle: · Don't smoke. · Eat heart-healthy foods. · Be active. Talk to your doctor about what type and level of exercise is safe for you. · Stay at a healthy weight. Lose weight if you need to. · Manage stress. · Avoid alcohol if it triggers symptoms. · Manage other health problems such as high blood pressure, high cholesterol, and diabetes. · Avoid getting sick from the flu. Get a flu shot every year. Where can you learn more? Go to http://juan carlosLockheed Martinsherry.info/. Enter [phone] in the search box to learn more about \"Learning About Atrial Fibrillation. \" Current as of: September 21, 2016 Content Version: 11.4 © 5769-3548 Joule Unlimited. Care instructions adapted under license by T-Networks (which disclaims liability or warranty for this information). If you have questions about a medical condition or this instruction, always ask your healthcare professional. [place] [address] any warranty or liability for your use of this information. ACO Transitions of Care Introducing [employer] [address] offers a voluntary care coordination program to provide high quality service and care to [facility] fee-for-service beneficiaries. Orly Sudhir was designed to help you enhance your health and well-being through the following services: ? Transitions of Care  support for individuals who are transitioning from one care setting to another (example: Hospital to home). ? Chronic and Complex Care Coordination  support for individuals and caregivers of those with serious or chronic illnesses or with more than one chronic (ongoing) condition and those who take a number of different medications.   
 
 
If you meet specific medical criteria, a [address] [place] [address] Rd may call you directly to coordinate your care with your primary care physician and your other care providers. For questions about the [facility] programs, please, contact your physicians office. For general questions or additional information about Accountable Care Organizations: 
Please visit www.medicare.gov/acos. html or call 1-800-MEDICARE (7-752.519.7364) TTY users should call 1-933.798.2117. [employer] Announcement We are excited to announce that we are making your provider's discharge notes available to you in [employer]. You will see these notes when they are completed and signed by the physician that discharged you from your recent hospital stay. If you have any questions or concerns about any information you see in [employer], please call the Health Information Department where you were seen or reach out to your Primary Care Provider for more information about your plan of care. Introducing [facility]! Dear Iona Gardner: 
Thank you for requesting a [employer] account. Our records indicate that you already have an active [employer] account. You can access your account anytime at [email or web address] Did you know that you can access your hospital and ER discharge instructions at any time in [employer]? You can also review all of your test results from your hospital stay or ER visit. Additional Information If you have questions, please visit the Frequently Asked Questions section of the [employer] website at [email or web address] Remember, [employer] is NOT to be used for urgent needs. For medical emergencies, dial 911. Now available from your iPhone and Android! Introducing Simeon Valenzuela As a [email or web address] patient, I wanted to make you aware of our electronic visit tool called Simeon Valenzuela. [address]Native [address] 24/7 allows you to connect within minutes with a medical provider 24 hours a day, seven days a week via a mobile device or tablet or logging into a secure website from your computer. You can access ieCrowd from anywhere in the [place]. A virtual visit might be right for you when you have a simple condition and feel like you just dont want to get out of bed, or cant get away from work for an appointment, when your regular [facility] provider is not available (evenings, weekends or holidays), or when youre out of town and need minor care. Electronic visits cost only $49 and if the ePrivateHire 24/[facility] provider determines a prescription is needed to treat your condition, one can be electronically transmitted to a nearby pharmacy*. Please take a moment to enroll today if you have not already done so. The enrollment process is free and takes just a few minutes. To enroll, please download the [employer] brandan to your tablet or phone, or visit [email or web address] to enroll on your computer. And, as an [address] [place] [address] patient with a "[employer] account, the results of your visits will be scanned into your electronic medical record and your primary care provider will be able to view the scanned results. We urge you to continue to see your regular [facility] provider for your ongoing medical care. And while your primary care provider may not be the one available when you seek a ieCrowd virtual visit, the peace of mind you get from getting a real diagnosis real time can be priceless. For more information on ieCrowd, view our Frequently Asked Questions (FAQs) at [email or web address]. Sincerely, 
 
Cherelle Kramer MD 
Chief Medical Officer Juliana8 Thais Glez *:  certain medications cannot be prescribed via ieCrowd Unresulted Labs-Please follow up with your PCP about these lab tests Order Current Status CULTURE, BLOOD Preliminary result CULTURE, BLOOD Preliminary result Providers Seen During Your Hospitalization Provider Specialty Primary office phone Don Driver MD Emergency Medicine [phone] Krishan Roth MD Internal Medicine [phone] Your Primary Care Physician (PCP) Primary Care Physician Office Phone Office Fax Sylvia Saleem [phone] You are allergic to the following Allergen Reactions Amoxicillin Swelling Diff breathing, lip swelling Levaquin (Levofloxacin) Shortness of Breath Recent Documentation Height Weight BMI Smoking Status 1.905 m 108.4 kg 29.87 kg/m2 Former Smoker Emergency Contacts Name Discharge Info Relation Home Work Mobile Evelyn Bryant DISCHARGE CAREGIVER [3] Life Partner [7] [phone] Jimbo Singletary  Son [22] [phone] Patient Belongings The following personal items are in your possession at time of discharge: 
  Dental Appliances: None  Visual Aid: Glasses, At bedside      Home Medications: Kept at bedside   Jewelry: None  Clothing: Shirt, Pants, Socks, Undergarments, Footwear    Other Valuables: None Please provide this summary of care documentation to your next provider. Signatures-by signing, you are acknowledging that this After Visit Summary has been reviewed with you and you have received a copy. Patient Signature:  ____________________________________________________________ Date:  ____________________________________________________________  
  
Arna Jayne Provider Signature:  ____________________________________________________________ Date:  ____________________________________________________________

## 2018-05-14 NOTE — IP AVS SNAPSHOT
[address] 
[phone] Patient: Bahman Cary MRN: [id number] A check sivakumar indicates which time of day the medication should be taken. My Medications START taking these medications Instructions Each Dose to Equal  
 Morning Noon Evening Bedtime  
 doxycycline 100 mg tablet Commonly known as:  ADOXA Take 1 Tab by mouth two (2) times a day for 4 days. 100 mg CHANGE how you take these medications Instructions Each Dose to Equal  
 Morning Noon Evening Bedtime  
 amiodarone 100 mg tablet Commonly known as:  Efraín Metz What changed:   
- medication strength 
- how much to take Your last dose was:  5/17/18 @ 0900 Take 1 Tab by mouth two (2) times a day. 100 mg  
    
  
   
   
  
   
  
 atenolol 25 mg tablet Commonly known as:  TENORMIN What changed:   
- medication strength 
- how much to take - when to take this Take 1 Tab by mouth two (2) times a day. 25 mg CONTINUE taking these medications Instructions Each Dose to Equal  
 Morning Noon Evening Bedtime  
 amLODIPine 10 mg tablet Commonly known as:  Sundra Terral Your last dose was:  5/17/18 @ 0900 Take 1 Tab by mouth daily. 10 mg ANTIHISTAMINE PO Take 1 Tab by mouth as needed. 1 Tab  
    
   
   
   
  
 apixaban 5 mg tablet Commonly known as:  Jorge Alberto Franz Your last dose was:  5/17/18 @ 0900 Take 5 mg by mouth. 5 mg  
    
  
   
   
   
  
 cloNIDine HCl 0.2 mg tablet Commonly known as:  CATAPRES Take 1 Tab by mouth three (3) times daily as needed. 0.2 mg  
    
   
   
   
  
 olmesartan 40 mg tablet Commonly known as:  Limited [employer]ands Your last dose was:  5/17/18 @ 0900 Take 1 Tab by mouth daily. am  
 40 mg  PROBIOTIC 4X PO  
   
 Take 1 Tab by mouth as needed. 1 Tab STOP taking these medications   
 hydroCHLOROthiazide 25 mg tablet Commonly known as:  HYDRODIURIL  
   
  
 LASIX 20 mg tablet Generic drug:  furosemide  
   
  
 levoFLOXacin 750 mg tablet Commonly known as:  LEVAQUIN  
   
  
 loperamide 2 mg tablet Commonly known as:  IMMODIUM Where to Get Your Medications These medications were sent to [facility] # [address], [address], [address] [place], [place] [address] Phone:  [phone]  
  amiodarone 100 mg tablet  
 atenolol 25 mg tablet  
 doxycycline 100 mg tablet

## 2018-05-14 NOTE — ED NOTES
TRANSFER - OUT REPORT:    Verbal report given to Elvira Espinoza RN (name) on Berna Resides  being transferred to 323 (unit) for routine progression of care       Report consisted of patients Situation, Background, Assessment and   Recommendations(SBAR). Information from the following report(s) SBAR, ED Summary, Intake/Output, MAR, Recent Results and Cardiac Rhythm A Fib  was reviewed with the receiving nurse. Lines:   Peripheral IV 05/14/18 Right Hand (Active)   Site Assessment Clean, dry, & intact 5/14/2018  3:35 PM   Phlebitis Assessment 0 5/14/2018  3:35 PM   Infiltration Assessment 0 5/14/2018  3:35 PM   Dressing Status Clean, dry, & intact 5/14/2018  3:35 PM       Peripheral IV 05/14/18 Right Antecubital (Active)   Site Assessment Clean, dry, & intact 5/14/2018  3:36 PM   Phlebitis Assessment 0 5/14/2018  3:36 PM   Infiltration Assessment 0 5/14/2018  3:36 PM   Dressing Status Clean, dry, & intact 5/14/2018  3:36 PM       Peripheral IV 05/14/18 Right Antecubital (Active)   Site Assessment Clean, dry, & intact 5/14/2018  4:25 PM   Phlebitis Assessment 0 5/14/2018  4:25 PM   Infiltration Assessment 0 5/14/2018  4:25 PM   Dressing Status Clean, dry, & intact 5/14/2018  4:25 PM   Dressing Type Transparent 5/14/2018  4:25 PM        Opportunity for questions and clarification was provided.       Patient transported with:   Monitor  Registered Nurse   O2 @ 3L

## 2018-05-14 NOTE — ED TRIAGE NOTES
Patient brought in by [facility]. Picked up at home with difficulty breathing (around 40 respirations per minute). Patient did not lose consciousness. EMS heard wet lung sounds and placed him on CPAP with duo-neb. Currently breathing around 30 respirations per minute. Nitro paste placed by EMS, 1 inch L chest. Pt recently dx with pneumonia. Pt is non compliant with antibiotics at home. Pt also recently had cardioversion for A Fib.

## 2018-05-14 NOTE — ED PROVIDER NOTES
HPI Comments: Patient is a 68-year-old male who reports 6 weeks ago he was hospitalized with pneumonia and what appears to be congestive heart failure. States he was diagnosed with A. Fib at this time. He has since been started on Eliquis. He reports today he was not feeling well and somewhat short of breath. EMS was called and found himwith severe difficulty breathing. They gave him a DuoNeb and placed him on BiPAP. As a placed Nitropaste. Patient denying any significant chest pain. Patient is mostly taking the Levaquin but states he is not taking it. He also states he only occasionally takes Lasix. Patient  Recently had cardioversion for A. Fib and states he is due for another cardioversion soon. Denies any significant abdominal pain, fevers or leg swelling. Patient is a 74 y.o. male presenting with shortness of breath. The history is provided by the patient. No  was used. Shortness of Breath   Pertinent negatives include no fever, no headaches, no sore throat, no cough, no vomiting, no abdominal pain and no leg swelling. Past Medical History:   Diagnosis Date    Cancer ([address]     basal cell    Elevated PSA     Erectile dysfunction     Hypertension     daily meds    MVP (mitral valve prolapse)     diagnosed many years ago, pt states does not see cardiologist, in youth    Osteoarthritis     thumbs    Prostate cancer ([address]     S/P colonoscopy     2020       Past Surgical History:   Procedure Laterality Date    BIOPSY PROSTATE      HX APPENDECTOMY           Family History:   Problem Relation Age of Onset    Cancer Father      prostate    Stroke Father     Cancer Paternal Uncle     Heart Disease Mother     Heart Attack Maternal Uncle     Prostate Cancer Sister        Social History     Social History    Marital status: SINGLE     Spouse name: N/A    Number of children: N/A    Years of education: N/A     Occupational History    Not on file.      Social History Main Topics    Smoking status: Former Smoker     Quit date: 8/19/1997    Smokeless tobacco: Never Used    Alcohol use Yes      Comment: 15 drinks per wk    Drug use: No    Sexual activity: Not on file     Other Topics Concern    Not on file     Social History Narrative         ALLERGIES: Amoxicillin    Review of Systems   Constitutional: Negative for fatigue and fever. HENT: Negative for sore throat. Respiratory: Positive for shortness of breath. Negative for cough and chest tightness. Cardiovascular: Negative for leg swelling. Gastrointestinal: Negative for abdominal pain, nausea and vomiting. Genitourinary: Negative for dysuria. Musculoskeletal: Negative for back pain. Neurological: Negative for syncope and headaches. Psychiatric/Behavioral: Negative for confusion. Vitals:    05/14/18 1546 05/14/18 1603 05/14/18 1616 05/14/18 1617   BP: 119/63 95/54 (!) 84/52    Pulse: 64 64 66    Resp: 19 24 18    Temp:    97.7 °F (36.5 °C)   SpO2: 97% 97% 97%    Weight:       Height:                Physical Exam   Constitutional: He is oriented to person, place, and time. He appears well-developed. Patient appears distressed and is on BiPAP    HENT:   Head: Normocephalic and atraumatic. Eyes: Conjunctivae and EOM are normal. Pupils are equal, round, and reactive to light. Neck: Normal range of motion. Neck supple. Cardiovascular: Normal rate, regular rhythm and normal heart sounds. Pulmonary/Chest: He is in respiratory distress. He has no wheezes. He has no rales. decreased breath sounds bilaterally   Abdominal: Soft. He exhibits no distension. There is no tenderness. There is no rebound. Musculoskeletal: Normal range of motion. He exhibits no edema or tenderness. Neurological: He is alert and oriented to person, place, and time. Skin: Skin is warm and dry. No rash noted. He is not diaphoretic. Psychiatric: He has a normal mood and affect.  His behavior is normal. Vitals reviewed. MDM  Number of Diagnoses or Management Options  Acute on chronic congestive heart failure, unspecified heart failure type [facility]): new and requires workup  Atrial fibrillation, unspecified type [facility]): new and requires workup  Diagnosis management comments: Patient improved significantly with BiPap, lasix and was transitioned off bipap. Patient given ceftriaxone here in the ED for possible pneumonia elevated white blood cell count elevated lactic. Patient currently nontoxic candidate for fluid resuscitation given evidence of significant fluid overload. Patient will be admitted and stable condition. Cardiology consult. Shiv Rollins MD; 5/14/2018 @11:31 PM Voice dictation software was used during the making of this note. This software is not perfect and grammatical and other typographical errors may be present.   This note has not been proofread for errors.  ===================================================================         Amount and/or Complexity of Data Reviewed  Clinical lab tests: reviewed and ordered (Results for orders placed or performed during the hospital encounter of 05/14/18  -MAGNESIUM       Result                                            Value                         Ref Range                       Magnesium                                         2.1                           1.8 - 2.4 mg/dL            -BNP       Result                                            Value                         Ref Range                       BNP                                               281                           pg/mL                      -CBC W/O DIFF       Result                                            Value                         Ref Range                       WBC                                               13.0 (H)                      4.3 - 11.1 K/uL                 RBC                                               5.32                          4.23 - 5.67 M/uL                HGB                                               16.7                          13.6 - 17.2 g/dL                HCT                                               48.1                          41.1 - 50.3 %                   MCV                                               90.4                          79.6 - 97.8 FL                  MCH                                               31.4                          26.1 - 32.9 PG                  MCHC                                              34.7                          31.4 - 35.0 g/dL                RDW                                               13.8                          11.9 - 14.6 %                   PLATELET                                          339                           150 - 450 K/uL                  MPV                                               9.9 (L)                       10.8 - 14.1 FL             -METABOLIC PANEL, COMPREHENSIVE       Result                                            Value                         Ref Range                       Sodium                                            139                           136 - 145 mmol/L                Potassium                                         3.9                           3.5 - 5.1 mmol/L                Chloride                                          104                           98 - 107 mmol/L                 CO2                                               21                            21 - 32 mmol/L                  Anion gap                                         14                            7 - 16 mmol/L                   Glucose                                           174 (H)                       65 - 100 mg/dL                  BUN                                               21                            8 - 23 MG/DL                    Creatinine                                        1. 54 (H)                      0.8 - 1.5 MG/DL GFR est AA                                        57 (L)                        >60 ml/min/1.73m2               GFR est non-AA                                    47 (L)                        >60 ml/min/1.73m2               Calcium                                           8.7                           8.3 - 10.4 MG/DL                Bilirubin, total                                  0.6                           0.2 - 1.1 MG/DL                 ALT (SGPT)                                        64                            12 - 65 U/L                     AST (SGOT)                                        52 (H)                        15 - 37 U/L                     Alk. phosphatase                                  82                            50 - 136 U/L                    Protein, total                                    7.9                           6.3 - 8.2 g/dL                  Albumin                                           3.7                           3.2 - 4.6 g/dL                  Globulin                                          4.2 (H)                       2.3 - 3.5 g/dL                  A-G Ratio                                         0.9 (L)                       1.2 - 3.5                  -MAGNESIUM       Result                                            Value                         Ref Range                       Magnesium                                         1.9                           1.8 - 2.4 mg/dL            -BLOOD GAS, ARTERIAL       Result                                            Value                         Ref Range                       pH                                                7.51 (H)                      7.35 - 7.45                     PCO2                                              26 (L)                        35 - 45 mmHg                    BICARBONATE                                       21 (L)                        22 - 26 mmol/L                  BASE DEFICIT                                      0.8                           0 - 2 mmol/L                    TOTAL HEMOGLOBIN                                  15.5 (H)                      11.7 - 15.0 GM/DL               O2 SAT                                            98                            92 - 98.5 %                     Arterial O2 Hgb                                   96.7                          94 - 97 %                       CARBOXYHEMOGLOBIN                                 0.5                           0.5 - 1.5 %                     METHEMOGLOBIN                                     0.4                           0.0 - 1.5 %                     DEOXYHEMOGLOBIN                                   2                             0.0 - 5.0 %                     SITE                                              RR                                                            ALLENS TEST                                       POSITIVE                                                      MODE                                              BIPAP 12 6                                               -PROCALCITONIN       Result                                            Value                         Ref Range                       Procalcitonin                                     0.1                           ng/mL                      -C REACTIVE PROTEIN, QT       Result                                            Value                         Ref Range                       C-Reactive protein                                1.3 (H)                       0.0 - 0.9 mg/dL            -PHOSPHORUS       Result                                            Value                         Ref Range                       Phosphorus                                        4.4 (H)                       2.3 - 3.7 MG/DL            -TROPONIN I       Result                                            Value                         Ref Range Troponin-I, Qt.                                   <0.02 (L)                     0.02 - 0.05 NG/ML          -LACTIC ACID       Result                                            Value                         Ref Range                       Lactic acid                                       1.9                           0.4 - 2.0 MMOL/L           -POC TROPONIN-I       Result                                            Value                         Ref Range                       Troponin-I (POC)                                  0 (L)                         0.02 - 0.05 ng/ml          -POC LACTIC ACID       Result                                            Value                         Ref Range                       Lactic Acid (POC)                                 2.6 (H)                       0.5 - 1.9 mmol/L           -EKG, 12 LEAD, INITIAL       Result                                            Value                         Ref Range                       Ventricular Rate                                  62                            BPM                             Atrial Rate                                       250                           BPM                             QRS Duration                                      170                           ms                              Q-T Interval                                      480                           ms                              QTC Calculation (Bezet)                           487                           ms                              Calculated R Axis                                 79                            degrees                         Calculated T Axis                                 135                           degrees                         Diagnosis                                                                                                   !! AGE AND GENDER SPECIFIC ECG ANALYSIS !!    Atrial fibrillation   Left bundle branch block   Abnormal ECG   No previous ECGs available     )  Tests in the radiology section of CPT®: ordered and reviewed (Xr Chest Port    Result Date: 5/14/2018  CHEST X-RAY, single portable view  5/14/2018 History: Difficulty breathing. Technique: Single frontal view of the chest. Comparison: 8/23/2016 Findings: The cardiac silhouette is normal in respect to size. Evolving basilar infiltrates are seen. IMPRESSION: 1. Evolving bibasilar infiltrates which may represent pulmonary edema or sequela of atypical pneumonia.     )  Review and summarize past medical records: yes  Discuss the patient with other providers: yes  Independent visualization of images, tracings, or specimens: yes    Risk of Complications, Morbidity, and/or Mortality  Presenting problems: high  Diagnostic procedures: moderate  Management options: moderate    Patient Progress  Patient progress: improved        ED Course   Comment By Time   Patient's blood pressure dropped significantly. Instructed nurses to remove Nitropaste. Lou Vizcaino MD 05/14 1883   Elevated lactic but hesitant to give fluids at this time because chest x-ray demonstrates what appears pulmonary edema as well as elevated BNP.  Lou Vizcaino MD 05/14 6191       Procedures

## 2018-05-15 LAB
ALBUMIN SERPL-MCNC: 3.4 G/DL (ref 3.2–4.6)
ALBUMIN/GLOB SERPL: 1 {RATIO} (ref 1.2–3.5)
ALP SERPL-CCNC: 70 U/L (ref 50–136)
ALT SERPL-CCNC: 56 U/L (ref 12–65)
ANION GAP SERPL CALC-SCNC: 13 MMOL/L (ref 7–16)
AST SERPL-CCNC: 32 U/L (ref 15–37)
ATRIAL RATE: 250 BPM
BILIRUB SERPL-MCNC: 0.7 MG/DL (ref 0.2–1.1)
BUN SERPL-MCNC: 22 MG/DL (ref 8–23)
CALCIUM SERPL-MCNC: 8.2 MG/DL (ref 8.3–10.4)
CALCULATED R AXIS, ECG10: 79 DEGREES
CALCULATED T AXIS, ECG11: 135 DEGREES
CHLORIDE SERPL-SCNC: 103 MMOL/L (ref 98–107)
CO2 SERPL-SCNC: 25 MMOL/L (ref 21–32)
CREAT SERPL-MCNC: 1.28 MG/DL (ref 0.8–1.5)
DIAGNOSIS, 93000: NORMAL
GLOBULIN SER CALC-MCNC: 3.4 G/DL (ref 2.3–3.5)
GLUCOSE SERPL-MCNC: 111 MG/DL (ref 65–100)
MAGNESIUM SERPL-MCNC: 1.8 MG/DL (ref 1.8–2.4)
POTASSIUM SERPL-SCNC: 3.1 MMOL/L (ref 3.5–5.1)
PROT SERPL-MCNC: 6.8 G/DL (ref 6.3–8.2)
Q-T INTERVAL, ECG07: 480 MS
QRS DURATION, ECG06: 170 MS
QTC CALCULATION (BEZET), ECG08: 487 MS
SODIUM SERPL-SCNC: 141 MMOL/L (ref 136–145)
TROPONIN I SERPL-MCNC: <0.02 NG/ML (ref 0.02–0.05)
VENTRICULAR RATE, ECG03: 62 BPM

## 2018-05-15 PROCEDURE — 74011250637 HC RX REV CODE- 250/637: Performed by: NURSE PRACTITIONER

## 2018-05-15 PROCEDURE — 83735 ASSAY OF MAGNESIUM: CPT | Performed by: HOSPITALIST

## 2018-05-15 PROCEDURE — 74011000250 HC RX REV CODE- 250: Performed by: INTERNAL MEDICINE

## 2018-05-15 PROCEDURE — 65660000000 HC RM CCU STEPDOWN

## 2018-05-15 PROCEDURE — 74011000250 HC RX REV CODE- 250

## 2018-05-15 PROCEDURE — 84484 ASSAY OF TROPONIN QUANT: CPT | Performed by: HOSPITALIST

## 2018-05-15 PROCEDURE — 80053 COMPREHEN METABOLIC PANEL: CPT | Performed by: HOSPITALIST

## 2018-05-15 PROCEDURE — 74011250636 HC RX REV CODE- 250/636

## 2018-05-15 PROCEDURE — 36415 COLL VENOUS BLD VENIPUNCTURE: CPT | Performed by: HOSPITALIST

## 2018-05-15 PROCEDURE — 74011250636 HC RX REV CODE- 250/636: Performed by: HOSPITALIST

## 2018-05-15 PROCEDURE — 74011250637 HC RX REV CODE- 250/637: Performed by: HOSPITALIST

## 2018-05-15 RX ORDER — CLONIDINE HYDROCHLORIDE 0.1 MG/1
0.2 TABLET ORAL
Status: DISCONTINUED | OUTPATIENT
Start: 2018-05-15 | End: 2018-05-17 | Stop reason: HOSPADM

## 2018-05-15 RX ORDER — FUROSEMIDE 40 MG/1
40 TABLET ORAL
Status: DISCONTINUED | OUTPATIENT
Start: 2018-05-15 | End: 2018-05-17 | Stop reason: HOSPADM

## 2018-05-15 RX ORDER — POTASSIUM CHLORIDE 20 MEQ/1
40 TABLET, EXTENDED RELEASE ORAL EVERY 4 HOURS
Status: COMPLETED | OUTPATIENT
Start: 2018-05-15 | End: 2018-05-15

## 2018-05-15 RX ORDER — SODIUM CHLORIDE 0.9 % (FLUSH) 0.9 %
5 SYRINGE (ML) INJECTION AS NEEDED
Status: DISCONTINUED | OUTPATIENT
Start: 2018-05-15 | End: 2018-05-17 | Stop reason: HOSPADM

## 2018-05-15 RX ORDER — ENALAPRILAT 1.25 MG/ML
0.62 INJECTION INTRAVENOUS
Status: DISCONTINUED | OUTPATIENT
Start: 2018-05-15 | End: 2018-05-17 | Stop reason: HOSPADM

## 2018-05-15 RX ORDER — AMIODARONE HYDROCHLORIDE 200 MG/1
100 TABLET ORAL 2 TIMES DAILY
Status: DISCONTINUED | OUTPATIENT
Start: 2018-05-15 | End: 2018-05-17 | Stop reason: HOSPADM

## 2018-05-15 RX ADMIN — POTASSIUM CHLORIDE 40 MEQ: 20 TABLET, EXTENDED RELEASE ORAL at 11:34

## 2018-05-15 RX ADMIN — ACETAMINOPHEN 650 MG: 325 TABLET, FILM COATED ORAL at 14:36

## 2018-05-15 RX ADMIN — AMIODARONE HYDROCHLORIDE 100 MG: 200 TABLET ORAL at 17:11

## 2018-05-15 RX ADMIN — OLMESARTAN MEDOXOMIL 40 MG: 40 TABLET, FILM COATED ORAL at 08:56

## 2018-05-15 RX ADMIN — Medication 5 ML: at 05:49

## 2018-05-15 RX ADMIN — LACTOBACILLUS TAB 1 TABLET: TAB at 08:56

## 2018-05-15 RX ADMIN — ENALAPRILAT 0.62 MG: 1.25 INJECTION INTRAVENOUS at 22:24

## 2018-05-15 RX ADMIN — POTASSIUM CHLORIDE 40 MEQ: 20 TABLET, EXTENDED RELEASE ORAL at 21:54

## 2018-05-15 RX ADMIN — FUROSEMIDE 40 MG: 40 TABLET ORAL at 16:10

## 2018-05-15 RX ADMIN — APIXABAN 5 MG: 5 TABLET, FILM COATED ORAL at 08:55

## 2018-05-15 RX ADMIN — AMIODARONE HYDROCHLORIDE 200 MG: 200 TABLET ORAL at 08:55

## 2018-05-15 RX ADMIN — AMLODIPINE BESYLATE 10 MG: 10 TABLET ORAL at 08:55

## 2018-05-15 RX ADMIN — POTASSIUM CHLORIDE 40 MEQ: 20 TABLET, EXTENDED RELEASE ORAL at 16:10

## 2018-05-15 RX ADMIN — FUROSEMIDE 40 MG: 10 INJECTION, SOLUTION INTRAVENOUS at 08:56

## 2018-05-15 RX ADMIN — VANCOMYCIN HYDROCHLORIDE 1500 MG: 10 INJECTION, POWDER, LYOPHILIZED, FOR SOLUTION INTRAVENOUS at 15:24

## 2018-05-15 RX ADMIN — ATENOLOL 50 MG: 25 TABLET ORAL at 08:55

## 2018-05-15 RX ADMIN — APIXABAN 5 MG: 5 TABLET, FILM COATED ORAL at 21:54

## 2018-05-15 NOTE — PROGRESS NOTES
Problem: Falls - Risk of  Goal: *Absence of Falls  Document Helene Fall Risk and appropriate interventions in the flowsheet.    Outcome: Progressing Towards Goal  Fall Risk Interventions:  Mobility Interventions: Patient to call before getting OOB         Medication Interventions: Teach patient to arise slowly, Patient to call before getting OOB    Elimination Interventions: Call light in reach, Patient to call for help with toileting needs, Urinal in reach

## 2018-05-15 NOTE — PROGRESS NOTES
Pharmacokinetic Consult to Pharmacist    Sunny Reba is a 74 y.o. male being treated for sepsis with vancomycin and tobramycin    Allergies   Allergen Reactions    Amoxicillin Swelling     Diff breathing, lip swelling       Height: 6' 3\" (190.5 cm)  Weight: 111.6 kg (246 lb)  Lab Results   Component Value Date/Time    BUN 21 05/14/2018 03:34 PM    Creatinine 1.54 (H) 05/14/2018 03:34 PM    WBC 13.0 (H) 05/14/2018 03:34 PM    Lactic Acid (POC) 2.6 (H) 05/14/2018 04:07 PM      Estimated Creatinine Clearance: 56.7 mL/min (based on Cr of 1.54). CULTURES:  5/14 BCx: pending    Day 1 of vancomycin. Goal trough is 15-30. Vancomycin dose initiated at 2g x1 load, then 1.5g q 18 hours. Will check trough before 4th dose. Day 1 of tobramycin. Tobra initiated at 480mg q 36hr (5 mg/kg). Will check 10 hour random levels and plot against standard nomogram for dosage adjustments. Will continue to follow patient.       Thank you,  Jessica Elias, PharmD  Clinical Pharmacist  [phone]

## 2018-05-15 NOTE — PROGRESS NOTES
Care Management Interventions  PCP Verified by CM: Yes (month ago )  Palliative Care Criteria Met (RRAT>21 & CHF Dx)?:  (RRAT 12 Dx CHF)  Transition of Care Consult (CM Consult): Discharge Planning  Discharge Durable Medical Equipment: No  Physical Therapy Consult: No  Occupational Therapy Consult: No  Speech Therapy Consult: No  Current Support Network: Lives with Spouse  Confirm Follow Up Transport: Self  Plan discussed with Pt/Family/Caregiver: Yes  Freedom of Choice Offered: Yes  Discharge Location  Discharge Placement: Home  Met with patient for d/c planning. Patient alert and oriented x 3, independent of ADL's and lives with his wife. He is able to drive and requires no DME at home. He obtains his medications at [facility] without difficulty. PCP is Dr. Carson Love who he saw last month. He plans to return home when medically stable. D/C plan is home with wife.

## 2018-05-15 NOTE — PROGRESS NOTES
IP consult to Cardiac Rehab. Chart review completed. I will follow the chart for a qualifying EF result and see the pt as appropriate.

## 2018-05-15 NOTE — PROGRESS NOTES
Problem: Patient Education: Go to Patient Education Activity  Goal: Patient/Family Education  Nutrition:  Nutrition Consult for diet education. (Dr. Sarah Maki)  Assessment:  Anthropometrics:   Ht - 6'3\", wgt - 108.9 kg (3rd floor SFD standing scale 5/15/18), BMI 30 c/w overweight in a person >72years of age, edema - trace BLEs. Macronutrient Needs Assessment:  Estimated calorie needs - 4781-9178 carlton/day (15-20 carlton/kg/day)   Estimated protein needs -  gm pro/day (0.8-1.2 gm pro/kgIBW/day) (GFR 58 ml/min)  Intake/Comparative Standards:   No data recorded. Pertinent Labs:   Potassium 3.1. Pertinent Medications:              Lasix. Diet:   Cardiac CCHO. Food/Nutrition History:   74year old gentleman with a h/o CHF admitted with acute respiratory failure. The patient and his wife are interviewed in room 323. The patient interacted minimally, however the wife reports that she reads labels and chooses low sodium food items when there is a choice. She is an educated food  and preparer. Diagnosis:  Food and nutrition related knowledge deficit related to lack of prior exposure to information as evidenced by new diagnosis of CHF. Intervention:  Meals and Snacks: Continue cardiac diet, discontinue CCHO (no h/o diabetes). Nutrition Education: The patient and wife were educated on a sodium restriction and how it related to CHF. They were given the following information: Salt: Where does the sodium in our diet come from? And Heart Failure and Sodium. Nutrition Discharge Plan: Too soon to determine. Jennifer Chau.  Maurizio Daisha  [phone]

## 2018-05-15 NOTE — PROGRESS NOTES
TRANSFER - IN REPORT:    Verbal report received from [address] RN(name) on Jolene Acosta  being received from ED(unit) for routine progression of care      Report consisted of patients Situation, Background, Assessment and   Recommendations(SBAR). Information from the following report(s) SBAR, ED Summary and Cardiac Rhythm Atrial Fibrillation, controlled rate, 50's was reviewed with the receiving nurse. Opportunity for questions and clarification was provided. Assessment completed upon patients arrival to unit and care assumed. Received to CV-Telemetry Unit, via stretcher from Emergency Room. Telemetry Skyler applied. Admitted to room 323, oriented to room, surroundings and staff, voiced understanding. Instructed and demonstrated use of hand held call-light and Bed rail controls. Assisted out of street clothing and dressed in [facility] [address], applied nonskid socks to feet. Head to toe skin assessment completed. Skin warm, dry and intact. No skin abrasions, no skin tears or break-down noted.

## 2018-05-15 NOTE — PROGRESS NOTES
Progress Note    5/15/2018  Admit Date: 2018  3:33 PM   NAME: Seng Khan   :  1945   MRN:  [id number]   Attending: Delmer Wasserman MD  PCP:  Homero Van MD  Treatment Team: Attending Provider: Delmer Wasserman MD; Consulting Provider: Rodney Smith MD; Utilization Review: Maria Silverman RN; Care Manager: Margaret Guadarrama RN    Full Code   SUBJECTIVE:   Mr. Aguila Hicks is a 75 yo male with newly recently dx PAF, MVP, HTN, prostate cancer who presented with c/o SOB. He was at [facility] 3 weeks ago for afib and underwent cardioversion but only stayed in afib for approx 8 hours. He was started on amiodarone 400mg BID originally at [facility] however didn't like the way it made him feel therefore he decreased it to 200mg BID. He was found to have wet lung sounds, increased respirations and was started on CPAP. He also was recently treated with levaquin for pneumonia. Pt found to have elevated lactic acid 2.6 which has resolved. CXR showings evolving bibasilar infiltrates which may represent pulmonary edema or sequela of atypical pneumonia. Pt does report had increased in LE edema over the last few days prior to admission but currently resolved. He reports a trip to [facility], returning in February after having a respiratory illness and has not been well since returning home. Also c/o several year hx of diarrhea worse over the last 4 months, takes immodium daily. Denies CP, SOB, n/v/d, abd pain currently. Started on Vanc and Tobramycin on admission.          Past Medical History:   Diagnosis Date    Cancer ([address]     basal cell    Elevated PSA     Erectile dysfunction     Hypertension     daily meds    MVP (mitral valve prolapse)     diagnosed many years ago, pt states does not see cardiologist, in youth    Osteoarthritis     thumbs    Prostate cancer ([address]     S/P colonoscopy          Recent Results (from the past 24 hour(s))   POC LACTIC ACID    Collection Time: 18  4:07 PM Result Value Ref Range    Lactic Acid (POC) 2.6 (H) 0.5 - 1.9 mmol/L   CULTURE, BLOOD    Collection Time: 05/14/18  5:05 PM   Result Value Ref Range    Special Requests: RIGHT  HAND        Culture result: NO GROWTH AFTER 13 HOURS     CULTURE, BLOOD    Collection Time: 05/14/18  5:05 PM   Result Value Ref Range    Special Requests: RIGHT  Antecubital        Culture result: NO GROWTH AFTER 13 HOURS     BLOOD GAS, ARTERIAL    Collection Time: 05/14/18  6:10 PM   Result Value Ref Range    pH 7.51 (H) 7.35 - 7.45      PCO2 26 (L) 35 - 45 mmHg    BICARBONATE 21 (L) 22 - 26 mmol/L    BASE DEFICIT 0.8 0 - 2 mmol/L    TOTAL HEMOGLOBIN 15.5 (H) 11.7 - 15.0 GM/DL    O2 SAT 98 92 - 98.5 %    Arterial O2 Hgb 96.7 94 - 97 %    CARBOXYHEMOGLOBIN 0.5 0.5 - 1.5 %    METHEMOGLOBIN 0.4 0.0 - 1.5 %    DEOXYHEMOGLOBIN 2 0.0 - 5.0 %    SITE RR     ALLENS TEST POSITIVE      MODE BIPAP 12 6    PROCALCITONIN    Collection Time: 05/14/18  9:15 PM   Result Value Ref Range    Procalcitonin 0.1 ng/mL   C REACTIVE PROTEIN, QT    Collection Time: 05/14/18  9:15 PM   Result Value Ref Range    C-Reactive protein 1.3 (H) 0.0 - 0.9 mg/dL   PHOSPHORUS    Collection Time: 05/14/18  9:15 PM   Result Value Ref Range    Phosphorus 4.4 (H) 2.3 - 3.7 MG/DL   TROPONIN I    Collection Time: 05/14/18  9:15 PM   Result Value Ref Range    Troponin-I, Qt. <0.02 (L) 0.02 - 0.05 NG/ML   LACTIC ACID    Collection Time: 05/14/18  9:15 PM   Result Value Ref Range    Lactic acid 1.9 0.4 - 2.0 MMOL/L   METABOLIC PANEL, COMPREHENSIVE    Collection Time: 05/15/18  3:50 AM   Result Value Ref Range    Sodium 141 136 - 145 mmol/L    Potassium 3.1 (L) 3.5 - 5.1 mmol/L    Chloride 103 98 - 107 mmol/L    CO2 25 21 - 32 mmol/L    Anion gap 13 7 - 16 mmol/L    Glucose 111 (H) 65 - 100 mg/dL    BUN 22 8 - 23 MG/DL    Creatinine 1.28 0.8 - 1.5 MG/DL    GFR est AA >60 >60 ml/min/1.73m2    GFR est non-AA 58 (L) >60 ml/min/1.73m2    Calcium 8.2 (L) 8.3 - 10.4 MG/DL    Bilirubin, total 0.7 0.2 - 1.1 MG/DL    ALT (SGPT) 56 12 - 65 U/L    AST (SGOT) 32 15 - 37 U/L    Alk.  phosphatase 70 50 - 136 U/L    Protein, total 6.8 6.3 - 8.2 g/dL    Albumin 3.4 3.2 - 4.6 g/dL    Globulin 3.4 2.3 - 3.5 g/dL    A-G Ratio 1.0 (L) 1.2 - 3.5     MAGNESIUM    Collection Time: 05/15/18  3:50 AM   Result Value Ref Range    Magnesium 1.8 1.8 - 2.4 mg/dL   TROPONIN I    Collection Time: 05/15/18  3:50 AM   Result Value Ref Range    Troponin-I, Qt. <0.02 (L) 0.02 - 0.05 NG/ML     Allergies   Allergen Reactions    Amoxicillin Swelling     Diff breathing, lip swelling     Current Facility-Administered Medications   Medication Dose Route Frequency Provider Last Rate Last Dose    saline peripheral flush soln 5 mL  5 mL InterCATHeter PRN Maribell Borja MD   5 mL at 05/15/18 0549    potassium chloride (K-DUR, KLOR-CON) SR tablet 40 mEq  40 mEq Oral Q4H See Cardenas NP   40 mEq at 05/15/18 1134    furosemide (LASIX) tablet 40 mg  40 mg Oral ACB&D See Cardenas NP        amiodarone (CORDARONE) tablet 200 mg  200 mg Oral BID Maribell Borja MD   200 mg at 05/15/18 2135    apixaban (ELIQUIS) tablet 5 mg  5 mg Oral BID Maribell Borja MD   5 mg at 05/15/18 6423    diphenhydrAMINE (BENADRYL) capsule 25 mg  25 mg Oral DAILY PRN Maribell Borja MD        atenolol (TENORMIN) tablet 50 mg  50 mg Oral DAILY Maribell Borja MD   50 mg at 05/15/18 2638    Lactobacillus Acidoph & Bulgar [facility]) tablet 1 Tab  1 Tab Oral DAILY Maribell Borja MD   1 Tab at 05/15/18 0856    amLODIPine (NORVASC) tablet 10 mg  10 mg Oral DAILY Maribell Borja MD   10 mg at 05/15/18 0855    olmesartan (BENICAR) tablet 40 mg  40 mg Oral DAILY Maribell Borja MD   40 mg at 05/15/18 6597    loperamide (IMODIUM) capsule 2 mg  2 mg Oral QID PRN Maribell Borja MD        ondansetron [facility]) injection 4 mg  4 mg IntraVENous Q6H PRN Maribell Borja MD        acetaminophen (TYLENOL) tablet 650 mg  650 mg Oral Q6H PRN Antoni Donnelly MD   650 mg at 05/15/18 1436    nitroglycerin (NITROSTAT) tablet 0.4 mg  0.4 mg SubLINGual Q5MIN PRN Antoni Donnelly MD        docusate sodium (COLACE) capsule 100 mg  100 mg Oral PRN Antoni Donnelly MD        tobramycin (NEBCIN) 480 mg in 0.9% sodium chloride 100 mL IVPB  480 mg IntraVENous Q36H Antoni Donnelly  mL/hr at 18 2130 480 mg at 18 2130    vancomycin (VANCOCIN) 1500 mg in  ml infusion  1,500 mg IntraVENous Q18H Antoni Donnelly .3 mL/hr at 05/15/18 1524 1,500 mg at 05/15/18 1524       Review of Systems negative with exception of pertinent positives noted above  PHYSICAL EXAM     Visit Vitals    /79    Pulse 74    Temp 97.6 °F (36.4 °C)    Resp 18    Ht 6' 3\" (1.905 m)    Wt 108.9 kg (240 lb)    SpO2 98%    BMI 30 kg/m2      Temp (24hrs), Av.8 °F (36.6 °C), Min:97 °F (36.1 °C), Max:98.6 °F (37 °C)    Oxygen Therapy  O2 Sat (%): 98 % (05/15/18 1327)  Pulse via Oximetry: 61 beats per minute (18 1931)  O2 Device: Nasal cannula (05/15/18 0855)  O2 Flow Rate (L/min): 2 l/min (05/15/18 0855)  FIO2 (%): 28 % (18 1827)    Intake/Output Summary (Last 24 hours) at 05/15/18 1605  Last data filed at 05/15/18 0320   Gross per 24 hour   Intake                0 ml   Output             2425 ml   Net            -2425 ml      General: No acute distress    Lungs: Very scant left lower lobe crackles otherwise CTA bilaterally. Resp even and non labored  Heart:  Irregularly irregular. Rate controlled. No edema. 2/6 CARMEN  Abdomen: Soft, non tender, non distended. BS present  Extremities: No cyanosis, moves ext spontaneously  Neurologic:  A/O X4.  No focal deficits     Results summary of Diagnostic Studies/Procedures copied from within [facility] EMR:         Garcia Carmona 96 Problems    Diagnosis Date Noted    Acute respiratory failure ([facility] 2018    Acute CHF (congestive heart failure) (HCC) 2018    Paroxysmal A-fib ([facility] 05/14/2018    Lactic acid acidosis 05/14/2018    LBBB (left bundle branch block) 05/14/2018    Leukocytosis 05/14/2018    Hypertension, essential 01/13/2016     Plan:    Acute respiratory failure: PCT 0.1. Lactic acidosis cleared. Continue Vanc and Tobramycin for now. Wean O2 to keep sats >95%. CBC in AM    Acute CHF:  Awaiting echo. Continue Lasix.  -2425 output yesterday. Cardiology following    PAF:  Awaiting echo. Rate controlled currently. Continue amiodarone, atenolol. Cardiology following.  On eliquis    HTN:  Chronic, controlled, continue current regimen      Notes, labs, VS, diagnostic testing reviewed  Case discussed with pt, care team, Dr. Reggie Little, wife at bedside  Time spent with pt 20 min        DVT Prophylaxis: vinny Dickinson, ARIC

## 2018-05-15 NOTE — H&P
Hospitalist H&P/Consult Note     Admit Date:  2018  3:33 PM   Name:  Danilo Diaz   Age:  74 y.o.  :  1945   MRN:  [id number]   PCP:  Erika Lechuga MD  Treatment Team: Attending Provider: Angeli Gallo MD    HPI:   Mr Patricio Calderón is a [pleasant 75 y/o gentleman with recent diagnosis of paroxysmal atrial fibrillation, MVP, HTN and prostate cancer. He underwent a cardioversion at [facility] about 3 weeks ago but he only stayed in sinus rhythm for 8 hours before going back into Afib. Recently had new Cardiology appointment with Dr Marino Silverman a week ago and was started on amiodarone. Was supposed to be on 400 mg BID but patient cut back to 200 BID because he didn't like the way it made him feel. He became particularly short of breath today and had to call EMS. He had increased RR of 40 per minute, wet sounding lungs and was started on CPAP. He was also recently treated for pneumonia with levaquin. He denies any chest pain but can tell he is still in afib as confirmed by his EKG. BNP elevated to 281, lactic acid elevated to 2.6. Chest xray reveals evolving bibasilar infiltrates which may represent pulmonary edema or sequela of atypical pneumonia. He states he has been ill ever since returning from a trip to [facility] in February where he had a respiratory illness as well as two bouts of food poisoning. He had no prior history of cardiac problems before then. Will admit him to telemetry for further treatment of CHF as well as possibility of HCAP. Cardiology to be consulted. 10 systems reviewed and negative except as noted in HPI.   Past Medical History:   Diagnosis Date    Cancer ([address]     basal cell    Elevated PSA     Erectile dysfunction     Hypertension     daily meds    MVP (mitral valve prolapse)     diagnosed many years ago, pt states does not see cardiologist, in youth    Osteoarthritis     thumbs    Prostate cancer ([address]     S/P colonoscopy           Past Surgical History: Procedure Laterality Date    BIOPSY PROSTATE      HX APPENDECTOMY        Prior to Admission Medications   Prescriptions Last Dose Informant Patient Reported? Taking? B infantis/B ani/B taco/B bifid (PROBIOTIC 4X PO)   Yes No   Sig: Take 1 Tab by mouth as needed. amLODIPine (NORVASC) 10 mg tablet   No No   Sig: Take 1 Tab by mouth daily. amiodarone (PACERONE) 200 mg tablet   No No   Sig: Take 1 Tab by mouth two (2) times a day. apixaban (ELIQUIS) 5 mg tablet   Yes No   Sig: Take 5 mg by mouth. atenolol (TENORMIN) 50 mg tablet   Yes No   Sig: Take 50 mg by mouth daily. cloNIDine HCl (CATAPRES) 0.2 mg tablet   No No   Sig: Take 1 Tab by mouth three (3) times daily as needed. diphenhydramine HCl (ANTIHISTAMINE PO)   Yes No   Sig: Take 1 Tab by mouth as needed. furosemide (LASIX) 20 mg tablet   Yes No   Sig: Take 20 mg by mouth daily as needed. hydroCHLOROthiazide (HYDRODIURIL) 25 mg tablet   No No   Sig: Take 1 Tab by mouth daily. levoFLOXacin (LEVAQUIN) 750 mg tablet   Yes Yes   Sig: Take 750 mg by mouth daily. loperamide (IMMODIUM) 2 mg tablet   Yes No   Sig: Take 2 mg by mouth four (4) times daily as needed for Diarrhea. olmesartan (BENICAR) 40 mg tablet   No No   Sig: Take 1 Tab by mouth daily.  am      Facility-Administered Medications: None     Allergies   Allergen Reactions    Amoxicillin Swelling     Diff breathing, lip swelling      Social History   Substance Use Topics    Smoking status: Former Smoker     Quit date: 8/19/1997    Smokeless tobacco: Never Used    Alcohol use Yes      Comment: 15 drinks per wk      Family History   Problem Relation Age of Onset    Cancer Father      prostate    Stroke Father     Cancer Paternal Uncle     Heart Disease Mother     Heart Attack Maternal Uncle     Prostate Cancer Sister       Immunization History   Administered Date(s) Administered    Influenza High Dose Vaccine PF 11/11/2016    Pneumococcal Conjugate (PCV-13) 11/10/2016    Pneumococcal Polysaccharide (PPSV-23) 05/19/2011       Objective:     Patient Vitals for the past 24 hrs:   Temp Pulse Resp BP SpO2   05/14/18 2005 97 °F (36.1 °C) 61 16 147/78 95 %   05/14/18 1931 - 63 16 141/72 92 %   05/14/18 1916 - 60 12 157/72 92 %   05/14/18 1901 - (!) 56 12 156/77 94 %   05/14/18 1846 - 71 18 158/87 95 %   05/14/18 1830 - (!) 56 11 157/85 93 %   05/14/18 1827 - - - - 95 %   05/14/18 1816 - (!) 55 12 146/78 100 %   05/14/18 1801 - (!) 59 13 122/66 98 %   05/14/18 1746 - 66 12 125/71 100 %   05/14/18 1731 - (!) 59 25 120/72 98 %   05/14/18 1716 - (!) 58 13 117/66 100 %   05/14/18 1701 - 64 10 107/63 99 %   05/14/18 1645 - 67 11 101/59 97 %   05/14/18 1631 - 71 19 (!) 85/52 96 %   05/14/18 1617 97.7 °F (36.5 °C) - - - -   05/14/18 1616 - 66 18 (!) 84/52 97 %   05/14/18 1603 - 64 24 95/54 97 %   05/14/18 1546 - 64 19 119/63 97 %   05/14/18 1543 - 67 22 123/76 97 %   05/14/18 1531 - 89 (!) 36 186/86 92 %     Oxygen Therapy  O2 Sat (%): 95 % (on 3liters of O2) (05/14/18 2005)  Pulse via Oximetry: 61 beats per minute (05/14/18 1931)  O2 Device: Nasal cannula (05/14/18 1827)  O2 Flow Rate (L/min): 2 l/min (05/14/18 1827)  FIO2 (%): 28 % (05/14/18 1827)    Intake/Output Summary (Last 24 hours) at 05/14/18 2100  Last data filed at 05/14/18 1900   Gross per 24 hour   Intake                0 ml   Output              300 ml   Net             -300 ml       Physical Exam:  General:    Well nourished. Alert. Appears ill/dyspneic   Eyes:   Normal sclera. Extraocular movements intact. ENT:  Normocephalic, atraumatic. Moist mucous membranes  CV:   Irregular, bradycardic. No murmur, rub, or gallop. Lungs:  Bilateral rales  Abdomen: Soft, nontender, nondistended. Bowel sounds normal.   Extremities: Warm and dry. No cyanosis or edema. Neurologic: CN II-XII grossly intact. Sensation intact. Skin:     No rashes or jaundice. No wounds. Psych:  Normal mood and affect.     I reviewed the labs, imaging, EKGs, telemetry, and other studies done this admission. Data Review:   Recent Results (from the past 24 hour(s))   POC TROPONIN-I    Collection Time: 05/14/18  3:32 PM   Result Value Ref Range    Troponin-I (POC) 0 (L) 0.02 - 0.05 ng/ml   MAGNESIUM    Collection Time: 05/14/18  3:34 PM   Result Value Ref Range    Magnesium 2.1 1.8 - 2.4 mg/dL   BNP    Collection Time: 05/14/18  3:34 PM   Result Value Ref Range     pg/mL   CBC W/O DIFF    Collection Time: 05/14/18  3:34 PM   Result Value Ref Range    WBC 13.0 (H) 4.3 - 11.1 K/uL    RBC 5.32 4.23 - 5.67 M/uL    HGB 16.7 13.6 - 17.2 g/dL    HCT 48.1 41.1 - 50.3 %    MCV 90.4 79.6 - 97.8 FL    MCH 31.4 26.1 - 32.9 PG    MCHC 34.7 31.4 - 35.0 g/dL    RDW 13.8 11.9 - 14.6 %    PLATELET 609 183 - 033 K/uL    MPV 9.9 (L) 10.8 - 03.3 FL   METABOLIC PANEL, COMPREHENSIVE    Collection Time: 05/14/18  3:34 PM   Result Value Ref Range    Sodium 139 136 - 145 mmol/L    Potassium 3.9 3.5 - 5.1 mmol/L    Chloride 104 98 - 107 mmol/L    CO2 21 21 - 32 mmol/L    Anion gap 14 7 - 16 mmol/L    Glucose 174 (H) 65 - 100 mg/dL    BUN 21 8 - 23 MG/DL    Creatinine 1.54 (H) 0.8 - 1.5 MG/DL    GFR est AA 57 (L) >60 ml/min/1.73m2    GFR est non-AA 47 (L) >60 ml/min/1.73m2    Calcium 8.7 8.3 - 10.4 MG/DL    Bilirubin, total 0.6 0.2 - 1.1 MG/DL    ALT (SGPT) 64 12 - 65 U/L    AST (SGOT) 52 (H) 15 - 37 U/L    Alk.  phosphatase 82 50 - 136 U/L    Protein, total 7.9 6.3 - 8.2 g/dL    Albumin 3.7 3.2 - 4.6 g/dL    Globulin 4.2 (H) 2.3 - 3.5 g/dL    A-G Ratio 0.9 (L) 1.2 - 3.5     MAGNESIUM    Collection Time: 05/14/18  3:34 PM   Result Value Ref Range    Magnesium 1.9 1.8 - 2.4 mg/dL   EKG, 12 LEAD, INITIAL    Collection Time: 05/14/18  3:46 PM   Result Value Ref Range    Ventricular Rate 62 BPM    Atrial Rate 250 BPM    QRS Duration 170 ms    Q-T Interval 480 ms    QTC Calculation (Bezet) 487 ms    Calculated R Axis 79 degrees    Calculated T Axis 135 degrees    Diagnosis       !! AGE AND GENDER SPECIFIC ECG ANALYSIS !! Atrial fibrillation  Left bundle branch block  Abnormal ECG  No previous ECGs available     POC LACTIC ACID    Collection Time: 05/14/18  4:07 PM   Result Value Ref Range    Lactic Acid (POC) 2.6 (H) 0.5 - 1.9 mmol/L   BLOOD GAS, ARTERIAL    Collection Time: 05/14/18  6:10 PM   Result Value Ref Range    pH 7.51 (H) 7.35 - 7.45      PCO2 26 (L) 35 - 45 mmHg    BICARBONATE 21 (L) 22 - 26 mmol/L    BASE DEFICIT 0.8 0 - 2 mmol/L    TOTAL HEMOGLOBIN 15.5 (H) 11.7 - 15.0 GM/DL    O2 SAT 98 92 - 98.5 %    Arterial O2 Hgb 96.7 94 - 97 %    CARBOXYHEMOGLOBIN 0.5 0.5 - 1.5 %    METHEMOGLOBIN 0.4 0.0 - 1.5 %    DEOXYHEMOGLOBIN 2 0.0 - 5.0 %    SITE RR     ALLENS TEST POSITIVE      MODE BIPAP 12 6        Imaging Studies:  CXR Results  (Last 48 hours)               05/14/18 1619  XR CHEST PORT Final result    Impression:  IMPRESSION:    1. Evolving bibasilar infiltrates which may represent pulmonary edema or   sequela of atypical pneumonia. Narrative:  CHEST X-RAY, single portable view  5/14/2018       History: Difficulty breathing. Technique: Single frontal view of the chest.       Comparison: 8/23/2016       Findings: The cardiac silhouette is normal in respect to size. Evolving basilar   infiltrates are seen.                CT Results  (Last 48 hours)    None          Assessment and Plan:     Hospital Problems as of 5/14/2018  Date Reviewed: 12/29/2017          Codes Class Noted - Resolved POA    * (Principal)Acute respiratory failure ([facility] [address] ICD-10-CM: J96.00  ICD-9-CM: 518.81  5/14/2018 - Present Yes        Acute CHF (congestive heart failure) ([facility] [address] ICD-10-CM: I50.9  ICD-9-CM: 428.0  5/14/2018 - Present Yes        Paroxysmal A-fib (HCC) ICD-10-CM: I48.0  ICD-9-CM: 427.31  5/14/2018 - Present Yes        Lactic acid acidosis ICD-10-CM: E87.2  ICD-9-CM: 276.2  5/14/2018 - Present Yes        LBBB (left bundle branch block) ICD-10-CM: I44.7  ICD-9-CM: 426.3  5/14/2018 - Present Yes        Leukocytosis ICD-10-CM: X48.953  ICD-9-CM: 288.60  5/14/2018 - Present Yes        Hypertension, essential ICD-10-CM: I10  ICD-9-CM: 401.9  1/13/2016 - Present Yes              PLAN:  · Admit to telemetry, inpatient  · CHF careset utilized  · Serial troponin. Re-check BMP in am  · Diurese with IV lasix BID  · Daily BMP. Check Mg, Phos  · Cannot r/o HCAP with xray, lactic acidosis and elevated WBC ct,  · Will check procalcitonin, CRP.  In meantime start vancomycin/tobramycin  · Consult Cardiology--He was scheduled for cardioversion tomorrow but in light of acute CHF may need to be postponed  · Will continue amiodarone and anticoagulation with Eliquis  · Discussed with spouse at bedside      Estimated LOS: 2 or more midnights     Signed:  Jalen Villela MD

## 2018-05-15 NOTE — CONSULTS
[facility] Cardiology Consult    Attending Cardiologist:DR Cristhian Simons    Primary Cardiologist:Dr. Chayito Kemp    Primary Care Physician:Dr. Christian                             Referring--Dr. Archana Knight --CHF, AFib    Subjective:     Ari Moore is a 74 y.o. male with several week history of shortness of breath ever since he went to [facility]. He was seen and admitted for sepsis, pneumonia, afib at [facility] in March. He underwent cardioversion but only stayed in NSR for 8 hours. He saw Dr. Chayito Kemp on 5/8 and was placed on amiodarone 200 mg bid but he cut it to 100 mg bid due to intolerance. He presented to ER last night with acute respiratory distress, bibasilar infiltrates, elevated lactic acid, mildly elevated BNP. Pt relates he has not felt well really since he returned from [facility] with recurrent diarrhea, abdominal bloating.  Noted significant ETOH use in past.     Past Medical History:   Diagnosis Date    Cancer ([facility] [address]     basal cell    Elevated PSA     Erectile dysfunction     Hypertension     daily meds    MVP (mitral valve prolapse)     diagnosed many years ago, pt states does not see cardiologist, in youth    Osteoarthritis     thumbs    Prostate cancer ([facility] [address]     S/P colonoscopy     2020      Past Surgical History:   Procedure Laterality Date    BIOPSY PROSTATE      HX APPENDECTOMY        Current Facility-Administered Medications   Medication Dose Route Frequency    saline peripheral flush soln 5 mL  5 mL InterCATHeter PRN    amiodarone (CORDARONE) tablet 200 mg  200 mg Oral BID    apixaban (ELIQUIS) tablet 5 mg  5 mg Oral BID    diphenhydrAMINE (BENADRYL) capsule 25 mg  25 mg Oral DAILY PRN    atenolol (TENORMIN) tablet 50 mg  50 mg Oral DAILY    Lactobacillus Acidoph & Bulgar (FLORANEX) tablet 1 Tab  1 Tab Oral DAILY    amLODIPine (NORVASC) tablet 10 mg  10 mg Oral DAILY    olmesartan (BENICAR) tablet 40 mg  40 mg Oral DAILY    loperamide (IMODIUM) capsule 2 mg  2 mg Oral QID PRN    ondansetron [facility]) injection 4 mg  4 mg IntraVENous Q6H PRN    acetaminophen (TYLENOL) tablet 650 mg  650 mg Oral Q6H PRN    nitroglycerin (NITROSTAT) tablet 0.4 mg  0.4 mg SubLINGual Q5MIN PRN    furosemide (LASIX) injection 40 mg  40 mg IntraVENous Q12H    docusate sodium (COLACE) capsule 100 mg  100 mg Oral PRN    tobramycin (NEBCIN) 480 mg in 0.9% sodium chloride 100 mL IVPB  480 mg IntraVENous Q36H    vancomycin (VANCOCIN) 1500 mg in  ml infusion  1,500 mg IntraVENous Q18H     Allergies   Allergen Reactions    Amoxicillin Swelling     Diff breathing, lip swelling      Social History   Substance Use Topics    Smoking status: Former Smoker     Quit date: 8/19/1997    Smokeless tobacco: Never Used    Alcohol use Yes      Comment: 15 drinks per wk      Family History   Problem Relation Age of Onset    Cancer Father      prostate    Stroke Father     Cancer Paternal Uncle     Heart Disease Mother     Heart Attack Maternal Uncle     Prostate Cancer Sister         Review of Systems  Gen: Denies fever, chills,+++ malaise or fatigue. Appetite good. HEENT: Denies frequent headaches, dizzyness, visual disturbances, Neck pain or swallowing difficulty  Lungs: as above, quit smoking 1997--no hx of COPD, no hx of recurrent pneumonia   Cardiovascular: irregular HB   GI: recurrent diarrhea   : Denies dysuria, no complaints of frequency, nocturia  Heme: No prior bleeding disorders, no prior Cancer  Neuro: Denies prior CVA, TIA. Endocrine: no diabetes, thyroid disorders  Psychiatric: Denies anxiety, or other psychiatric illnesses. Objective:     Visit Vitals    /77    Pulse 71    Temp 97.6 °F (36.4 °C)    Resp 18    Ht 6' 3\" (1.905 m)    Wt 108.9 kg (240 lb)    SpO2 98%    BMI 30 kg/m2     General:ill appearing   Head: Normocephalic, without obvious abnormality, atraumatic. Eyes: Conjunctivae/corneas clear. PERRL, EOMs intact  Nose:Nares normal. Septum midline.  Mucosa normal. No drainage or sinus tenderness. Throat: Lips, mucosa, and tongue normal. Teeth and gums normal.   Neck: Supple, symmetrical, trachea midline,  no carotid bruit and no JVD. Lungs:Clear to auscultation bilaterally. Chest wall: No tenderness or deformity. Heart: irregular, irregular, + CARMEN. Abdomen:Soft, non-tender. Bowel sounds normal. No masses, No organomegaly. Extremities: Extremities normal, atraumatic, no cyanosis or edema. Pulses: 2+ and symmetric all extremities. Skin: Skin color, texture, turgor normal. No rashes or lesions  Lymph nodes: Cervical, supraclavicular, and axillary nodes normal  Neurologic:No focal deficits identified                 ECG: afib with controlled vent response.      Data Review:     Recent Results (from the past 24 hour(s))   POC TROPONIN-I    Collection Time: 05/14/18  3:32 PM   Result Value Ref Range    Troponin-I (POC) 0 (L) 0.02 - 0.05 ng/ml   MAGNESIUM    Collection Time: 05/14/18  3:34 PM   Result Value Ref Range    Magnesium 2.1 1.8 - 2.4 mg/dL   BNP    Collection Time: 05/14/18  3:34 PM   Result Value Ref Range     pg/mL   CBC W/O DIFF    Collection Time: 05/14/18  3:34 PM   Result Value Ref Range    WBC 13.0 (H) 4.3 - 11.1 K/uL    RBC 5.32 4.23 - 5.67 M/uL    HGB 16.7 13.6 - 17.2 g/dL    HCT 48.1 41.1 - 50.3 %    MCV 90.4 79.6 - 97.8 FL    MCH 31.4 26.1 - 32.9 PG    MCHC 34.7 31.4 - 35.0 g/dL    RDW 13.8 11.9 - 14.6 %    PLATELET 347 773 - 804 K/uL    MPV 9.9 (L) 10.8 - 21.9 FL   METABOLIC PANEL, COMPREHENSIVE    Collection Time: 05/14/18  3:34 PM   Result Value Ref Range    Sodium 139 136 - 145 mmol/L    Potassium 3.9 3.5 - 5.1 mmol/L    Chloride 104 98 - 107 mmol/L    CO2 21 21 - 32 mmol/L    Anion gap 14 7 - 16 mmol/L    Glucose 174 (H) 65 - 100 mg/dL    BUN 21 8 - 23 MG/DL    Creatinine 1.54 (H) 0.8 - 1.5 MG/DL    GFR est AA 57 (L) >60 ml/min/1.73m2    GFR est non-AA 47 (L) >60 ml/min/1.73m2    Calcium 8.7 8.3 - 10.4 MG/DL    Bilirubin, total 0.6 0.2 - 1.1 MG/DL ALT (SGPT) 64 12 - 65 U/L    AST (SGOT) 52 (H) 15 - 37 U/L    Alk. phosphatase 82 50 - 136 U/L    Protein, total 7.9 6.3 - 8.2 g/dL    Albumin 3.7 3.2 - 4.6 g/dL    Globulin 4.2 (H) 2.3 - 3.5 g/dL    A-G Ratio 0.9 (L) 1.2 - 3.5     MAGNESIUM    Collection Time: 05/14/18  3:34 PM   Result Value Ref Range    Magnesium 1.9 1.8 - 2.4 mg/dL   EKG, 12 LEAD, INITIAL    Collection Time: 05/14/18  3:46 PM   Result Value Ref Range    Ventricular Rate 62 BPM    Atrial Rate 250 BPM    QRS Duration 170 ms    Q-T Interval 480 ms    QTC Calculation (Bezet) 487 ms    Calculated R Axis 79 degrees    Calculated T Axis 135 degrees    Diagnosis       !! AGE AND GENDER SPECIFIC ECG ANALYSIS !!   Atrial fibrillation  Left bundle branch block  Abnormal ECG  No previous ECGs available     POC LACTIC ACID    Collection Time: 05/14/18  4:07 PM   Result Value Ref Range    Lactic Acid (POC) 2.6 (H) 0.5 - 1.9 mmol/L   CULTURE, BLOOD    Collection Time: 05/14/18  5:05 PM   Result Value Ref Range    Special Requests: RIGHT  HAND        Culture result: NO GROWTH AFTER 13 HOURS     CULTURE, BLOOD    Collection Time: 05/14/18  5:05 PM   Result Value Ref Range    Special Requests: RIGHT  Antecubital        Culture result: NO GROWTH AFTER 13 HOURS     BLOOD GAS, ARTERIAL    Collection Time: 05/14/18  6:10 PM   Result Value Ref Range    pH 7.51 (H) 7.35 - 7.45      PCO2 26 (L) 35 - 45 mmHg    BICARBONATE 21 (L) 22 - 26 mmol/L    BASE DEFICIT 0.8 0 - 2 mmol/L    TOTAL HEMOGLOBIN 15.5 (H) 11.7 - 15.0 GM/DL    O2 SAT 98 92 - 98.5 %    Arterial O2 Hgb 96.7 94 - 97 %    CARBOXYHEMOGLOBIN 0.5 0.5 - 1.5 %    METHEMOGLOBIN 0.4 0.0 - 1.5 %    DEOXYHEMOGLOBIN 2 0.0 - 5.0 %    SITE RR     ALLENS TEST POSITIVE      MODE BIPAP 12 6    PROCALCITONIN    Collection Time: 05/14/18  9:15 PM   Result Value Ref Range    Procalcitonin 0.1 ng/mL   C REACTIVE PROTEIN, QT    Collection Time: 05/14/18  9:15 PM   Result Value Ref Range    C-Reactive protein 1.3 (H) 0.0 - 0.9 mg/dL   PHOSPHORUS    Collection Time: 05/14/18  9:15 PM   Result Value Ref Range    Phosphorus 4.4 (H) 2.3 - 3.7 MG/DL   TROPONIN I    Collection Time: 05/14/18  9:15 PM   Result Value Ref Range    Troponin-I, Qt. <0.02 (L) 0.02 - 0.05 NG/ML   LACTIC ACID    Collection Time: 05/14/18  9:15 PM   Result Value Ref Range    Lactic acid 1.9 0.4 - 2.0 MMOL/L   METABOLIC PANEL, COMPREHENSIVE    Collection Time: 05/15/18  3:50 AM   Result Value Ref Range    Sodium 141 136 - 145 mmol/L    Potassium 3.1 (L) 3.5 - 5.1 mmol/L    Chloride 103 98 - 107 mmol/L    CO2 25 21 - 32 mmol/L    Anion gap 13 7 - 16 mmol/L    Glucose 111 (H) 65 - 100 mg/dL    BUN 22 8 - 23 MG/DL    Creatinine 1.28 0.8 - 1.5 MG/DL    GFR est AA >60 >60 ml/min/1.73m2    GFR est non-AA 58 (L) >60 ml/min/1.73m2    Calcium 8.2 (L) 8.3 - 10.4 MG/DL    Bilirubin, total 0.7 0.2 - 1.1 MG/DL    ALT (SGPT) 56 12 - 65 U/L    AST (SGOT) 32 15 - 37 U/L    Alk. phosphatase 70 50 - 136 U/L    Protein, total 6.8 6.3 - 8.2 g/dL    Albumin 3.4 3.2 - 4.6 g/dL    Globulin 3.4 2.3 - 3.5 g/dL    A-G Ratio 1.0 (L) 1.2 - 3.5     MAGNESIUM    Collection Time: 05/15/18  3:50 AM   Result Value Ref Range    Magnesium 1.8 1.8 - 2.4 mg/dL   TROPONIN I    Collection Time: 05/15/18  3:50 AM   Result Value Ref Range    Troponin-I, Qt. <0.02 (L) 0.02 - 0.05 NG/ML         Assessment / Plan     Principal Problem:    Acute respiratory failure (HCC) (5/14/2018)--with noted bibasilar infiltrates --on tobramycin, vanco.    Active Problems:    Hypertension, essential (1/13/2016)--monitor on current meds. Acute CHF (congestive heart failure) ([facility]) (5/14/2018)--will repeat echo with prior study of poor quality, + CARMEN      Paroxysmal A-fib ([facility]) (5/14/2018)--will rate control, anticoagulate with eliquis, hold off on CV for now given acute illness.        Lactic acid acidosis (5/14/2018)      LBBB (left bundle branch block) (5/14/2018)      Leukocytosis (5/14/2018)              Kathi Dotson, NP

## 2018-05-16 PROBLEM — J18.9 HAP (HOSPITAL-ACQUIRED PNEUMONIA): Status: ACTIVE | Noted: 2018-05-16
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
## 2018-05-16 LAB
ANION GAP SERPL CALC-SCNC: 12 MMOL/L (ref 7–16)
BASOPHILS # BLD: 0 K/UL (ref 0–0.2)
BASOPHILS NFR BLD: 0 % (ref 0–2)
BUN SERPL-MCNC: 16 MG/DL (ref 8–23)
CALCIUM SERPL-MCNC: 8.8 MG/DL (ref 8.3–10.4)
CHLORIDE SERPL-SCNC: 105 MMOL/L (ref 98–107)
CO2 SERPL-SCNC: 23 MMOL/L (ref 21–32)
CREAT SERPL-MCNC: 1.19 MG/DL (ref 0.8–1.5)
DIFFERENTIAL METHOD BLD: ABNORMAL
EOSINOPHIL # BLD: 0.1 K/UL (ref 0–0.8)
EOSINOPHIL NFR BLD: 1 % (ref 0.5–7.8)
ERYTHROCYTE [DISTWIDTH] IN BLOOD BY AUTOMATED COUNT: 13.5 % (ref 11.9–14.6)
GLUCOSE SERPL-MCNC: 123 MG/DL (ref 65–100)
HCT VFR BLD AUTO: 45.5 % (ref 41.1–50.3)
HGB BLD-MCNC: 15.8 G/DL (ref 13.6–17.2)
IMM GRANULOCYTES # BLD: 0 K/UL (ref 0–0.5)
IMM GRANULOCYTES NFR BLD AUTO: 0 % (ref 0–5)
LYMPHOCYTES # BLD: 2.6 K/UL (ref 0.5–4.6)
LYMPHOCYTES NFR BLD: 24 % (ref 13–44)
MAGNESIUM SERPL-MCNC: 1.9 MG/DL (ref 1.8–2.4)
MCH RBC QN AUTO: 30.7 PG (ref 26.1–32.9)
MCHC RBC AUTO-ENTMCNC: 34.7 G/DL (ref 31.4–35)
MCV RBC AUTO: 88.3 FL (ref 79.6–97.8)
MONOCYTES # BLD: 1.2 K/UL (ref 0.1–1.3)
MONOCYTES NFR BLD: 11 % (ref 4–12)
NEUTS SEG # BLD: 7 K/UL (ref 1.7–8.2)
NEUTS SEG NFR BLD: 64 % (ref 43–78)
PLATELET # BLD AUTO: 234 K/UL (ref 150–450)
PMV BLD AUTO: 10.5 FL (ref 10.8–14.1)
POTASSIUM SERPL-SCNC: 3.9 MMOL/L (ref 3.5–5.1)
RBC # BLD AUTO: 5.15 M/UL (ref 4.23–5.67)
SODIUM SERPL-SCNC: 140 MMOL/L (ref 136–145)
WBC # BLD AUTO: 11 K/UL (ref 4.3–11.1)

## 2018-05-16 PROCEDURE — 80048 BASIC METABOLIC PNL TOTAL CA: CPT | Performed by: NURSE PRACTITIONER

## 2018-05-16 PROCEDURE — 83735 ASSAY OF MAGNESIUM: CPT | Performed by: NURSE PRACTITIONER

## 2018-05-16 PROCEDURE — 74011250637 HC RX REV CODE- 250/637: Performed by: NURSE PRACTITIONER

## 2018-05-16 PROCEDURE — 85025 COMPLETE CBC W/AUTO DIFF WBC: CPT | Performed by: NURSE PRACTITIONER

## 2018-05-16 PROCEDURE — 74011250637 HC RX REV CODE- 250/637: Performed by: INTERNAL MEDICINE

## 2018-05-16 PROCEDURE — 74011250637 HC RX REV CODE- 250/637: Performed by: HOSPITALIST

## 2018-05-16 PROCEDURE — 74011000258 HC RX REV CODE- 258: Performed by: HOSPITALIST

## 2018-05-16 PROCEDURE — 65660000000 HC RM CCU STEPDOWN

## 2018-05-16 PROCEDURE — 74011250636 HC RX REV CODE- 250/636: Performed by: HOSPITALIST

## 2018-05-16 PROCEDURE — 36415 COLL VENOUS BLD VENIPUNCTURE: CPT | Performed by: NURSE PRACTITIONER

## 2018-05-16 RX ORDER — ACETAMINOPHEN 325 MG/1
650 TABLET ORAL
Status: DISCONTINUED | OUTPATIENT
Start: 2018-05-16 | End: 2018-05-17 | Stop reason: HOSPADM

## 2018-05-16 RX ORDER — ATENOLOL 25 MG/1
25 TABLET ORAL 2 TIMES DAILY
Status: DISCONTINUED | OUTPATIENT
Start: 2018-05-16 | End: 2018-05-17 | Stop reason: HOSPADM

## 2018-05-16 RX ADMIN — ATENOLOL 25 MG: 25 TABLET ORAL at 12:23

## 2018-05-16 RX ADMIN — OLMESARTAN MEDOXOMIL 40 MG: 40 TABLET, FILM COATED ORAL at 12:23

## 2018-05-16 RX ADMIN — ATENOLOL 25 MG: 25 TABLET ORAL at 16:38

## 2018-05-16 RX ADMIN — FUROSEMIDE 40 MG: 40 TABLET ORAL at 16:38

## 2018-05-16 RX ADMIN — TOBRAMYCIN 480 MG: 40 INJECTION, SOLUTION INTRAMUSCULAR; INTRAVENOUS at 08:44

## 2018-05-16 RX ADMIN — LACTOBACILLUS TAB 1 TABLET: TAB at 08:14

## 2018-05-16 RX ADMIN — AMLODIPINE BESYLATE 10 MG: 10 TABLET ORAL at 12:22

## 2018-05-16 RX ADMIN — VANCOMYCIN HYDROCHLORIDE 1500 MG: 10 INJECTION, POWDER, LYOPHILIZED, FOR SOLUTION INTRAVENOUS at 10:07

## 2018-05-16 RX ADMIN — ACETAMINOPHEN 650 MG: 325 TABLET, FILM COATED ORAL at 20:39

## 2018-05-16 RX ADMIN — APIXABAN 5 MG: 5 TABLET, FILM COATED ORAL at 21:17

## 2018-05-16 RX ADMIN — AMIODARONE HYDROCHLORIDE 100 MG: 200 TABLET ORAL at 17:45

## 2018-05-16 RX ADMIN — FUROSEMIDE 40 MG: 40 TABLET ORAL at 08:14

## 2018-05-16 RX ADMIN — Medication 5 ML: at 21:20

## 2018-05-16 RX ADMIN — CLONIDINE HYDROCHLORIDE 0.2 MG: 0.1 TABLET ORAL at 08:21

## 2018-05-16 RX ADMIN — Medication 5 ML: at 08:13

## 2018-05-16 RX ADMIN — AMIODARONE HYDROCHLORIDE 100 MG: 200 TABLET ORAL at 08:14

## 2018-05-16 RX ADMIN — APIXABAN 5 MG: 5 TABLET, FILM COATED ORAL at 08:14

## 2018-05-16 RX ADMIN — LEVOFLOXACIN 750 MG: 500 TABLET, FILM COATED ORAL at 13:28

## 2018-05-16 NOTE — PROGRESS NOTES
Progress Note    2018  Admit Date: 2018  3:33 PM   NAME: Elizabeth Rdorigues   :  1945   MRN:  [id number]   Attending: Henrietta Hester MD  PCP:  Guy Durant MD  Treatment Team: Attending Provider: Henrietta Hester MD; Consulting Provider: Viridiana Monroy MD; Utilization Review: Velvet Cortés RN; Care Manager: Jose Finn RN    Full Code   SUBJECTIVE:   Mr. Kristen Mccarthy is a 75 yo male with newly recently dx PAF, MVP, HTN, prostate cancer who presented with c/o SOB. He was at [facility] 3 weeks ago for afib and underwent cardioversion but only stayed in afib for approx 8 hours. He was started on amiodarone 400mg BID originally at [facility] however didn't like the way it made him feel therefore he decreased it to 200mg BID. He was found to have wet lung sounds, increased respirations and was started on CPAP. He also was recently treated with levaquin for pneumonia X3 days per pt. Pt found to have elevated lactic acid 2.6 which has resolved. CXR showings evolving bibasilar infiltrates which may represent pulmonary edema or sequela of atypical pneumonia. Pt does report had increased in LE edema over the last few days prior to admission but currently resolved. He reports a trip to [facility], returning in February after having a respiratory illness and has not been well since returning home. Also c/o several year hx of diarrhea worse over the last 4 months, takes immodium daily. Denies CP, SOB, n/v/d, abd pain currently. Started on Vanc and Tobramycin on admission. Although documented on 1 L NC pt reports took O2 off last night around midnight and has been on RA since. Cardiology following, plans for cardioversion once acute illness resolved. Is on eliquis.  Echo showing EF  50-55%.         Past Medical History:   Diagnosis Date    Cancer ([facility] [address]     basal cell    Elevated PSA     Erectile dysfunction     Hypertension     daily meds    MVP (mitral valve prolapse)     diagnosed many years ago, pt states does not see cardiologist, in youth    Osteoarthritis     thumbs    Prostate cancer ([facility] [address]     S/P colonoscopy     2020     Recent Results (from the past 24 hour(s))   CBC WITH AUTOMATED DIFF    Collection Time: 05/16/18  4:20 AM   Result Value Ref Range    WBC 11.0 4.3 - 11.1 K/uL    RBC 5.15 4.23 - 5.67 M/uL    HGB 15.8 13.6 - 17.2 g/dL    HCT 45.5 41.1 - 50.3 %    MCV 88.3 79.6 - 97.8 FL    MCH 30.7 26.1 - 32.9 PG    MCHC 34.7 31.4 - 35.0 g/dL    RDW 13.5 11.9 - 14.6 %    PLATELET 606 649 - 590 K/uL    MPV 10.5 (L) 10.8 - 14.1 FL    DF AUTOMATED      NEUTROPHILS 64 43 - 78 %    LYMPHOCYTES 24 13 - 44 %    MONOCYTES 11 4.0 - 12.0 %    EOSINOPHILS 1 0.5 - 7.8 %    BASOPHILS 0 0.0 - 2.0 %    IMMATURE GRANULOCYTES 0 0.0 - 5.0 %    ABS. NEUTROPHILS 7.0 1.7 - 8.2 K/UL    ABS. LYMPHOCYTES 2.6 0.5 - 4.6 K/UL    ABS. MONOCYTES 1.2 0.1 - 1.3 K/UL    ABS. EOSINOPHILS 0.1 0.0 - 0.8 K/UL    ABS. BASOPHILS 0.0 0.0 - 0.2 K/UL    ABS. IMM.  GRANS. 0.0 0.0 - 0.5 K/UL   METABOLIC PANEL, BASIC    Collection Time: 05/16/18  4:20 AM   Result Value Ref Range    Sodium 140 136 - 145 mmol/L    Potassium 3.9 3.5 - 5.1 mmol/L    Chloride 105 98 - 107 mmol/L    CO2 23 21 - 32 mmol/L    Anion gap 12 7 - 16 mmol/L    Glucose 123 (H) 65 - 100 mg/dL    BUN 16 8 - 23 MG/DL    Creatinine 1.19 0.8 - 1.5 MG/DL    GFR est AA >60 >60 ml/min/1.73m2    GFR est non-AA >60 >60 ml/min/1.73m2    Calcium 8.8 8.3 - 10.4 MG/DL   MAGNESIUM    Collection Time: 05/16/18  4:20 AM   Result Value Ref Range    Magnesium 1.9 1.8 - 2.4 mg/dL     Allergies   Allergen Reactions    Amoxicillin Swelling     Diff breathing, lip swelling     Current Facility-Administered Medications   Medication Dose Route Frequency Provider Last Rate Last Dose    [START ON 5/17/2018] Vancomycin trough reminder   Other Yuliana Amador MD        saline peripheral flush soln 5 mL  5 mL InterCATHeter PRN Krishan Roth MD   5 mL at 05/16/18 0813    furosemide (LASIX) tablet 40 mg  40 mg Oral ACB&D Adalberto Valladares NP   40 mg at 05/16/18 3964    amiodarone (CORDARONE) tablet 100 mg  100 mg Oral BID Adalberto Valladares NP   100 mg at 05/16/18 1322    enalaprilat (VASOTEC) injection 0.625 mg  0.625 mg IntraVENous Q6H PRN Davina Quijano MD   0.625 mg at 05/15/18 2224    cloNIDine HCl (CATAPRES) tablet 0.2 mg  0.2 mg Oral TID PRN Davina Quijano MD   0.2 mg at 05/16/18 8216    apixaban (ELIQUIS) tablet 5 mg  5 mg Oral BID Blue Griffith MD   5 mg at 05/16/18 5591    diphenhydrAMINE (BENADRYL) capsule 25 mg  25 mg Oral DAILY PRN Blue Griffith MD        atenolol (TENORMIN) tablet 50 mg  50 mg Oral DAILY Blue Griffith MD   50 mg at 05/15/18 0728    Lactobacillus Acidoph & Bulgar [facility]) tablet 1 Tab  1 Tab Oral DAILY Blue Griffith MD   1 Tab at 05/16/18 9639    amLODIPine (NORVASC) tablet 10 mg  10 mg Oral DAILY Blue Griffith MD   10 mg at 05/15/18 0855    olmesartan (BENICAR) tablet 40 mg  40 mg Oral DAILY Blue Griffith MD   40 mg at 05/15/18 7527    loperamide (IMODIUM) capsule 2 mg  2 mg Oral QID PRN Blue Griffith MD        ondansetron [facility]) injection 4 mg  4 mg IntraVENous Q6H PRN Blue Griffith MD        acetaminophen (TYLENOL) tablet 650 mg  650 mg Oral Q6H PRN Blue Griffith MD   650 mg at 05/15/18 1436    nitroglycerin (NITROSTAT) tablet 0.4 mg  0.4 mg SubLINGual Q5MIN PRN Blue Griffith MD        docusate sodium (COLACE) capsule 100 mg  100 mg Oral PRN Blue Griffith MD        tobramycin (NEBCIN) 480 mg in 0.9% sodium chloride 100 mL IVPB  480 mg IntraVENous Q36H Blue Griffith  mL/hr at 05/16/18 0844 480 mg at 05/16/18 0844    vancomycin (VANCOCIN) 1500 mg in  ml infusion  1,500 mg IntraVENous Q18H Blue Griffith .3 mL/hr at 05/16/18 1007 1,500 mg at 05/16/18 1007       Review of Systems negative with exception of pertinent positives noted above  PHYSICAL EXAM     Visit Vitals    BP 128/70    Pulse 71    Temp 97.2 °F (36.2 °C)    Resp 18    Ht 6' 3\" (1.905 m)    Wt 108 kg (238 lb)    SpO2 93%    BMI 29.75 kg/m2      Temp (24hrs), Av.8 °F (36.6 °C), Min:97.2 °F (36.2 °C), Max:98.6 °F (37 °C)    Oxygen Therapy  O2 Sat (%): 93 % (18 0900)  Pulse via Oximetry: 61 beats per minute (18 1931)  O2 Device: Nasal cannula (18 1130)  O2 Flow Rate (L/min): 1 l/min (18 1130)  FIO2 (%): 28 % (18 1827)    Intake/Output Summary (Last 24 hours) at 18 1155  Last data filed at 18 1012   Gross per 24 hour   Intake              650 ml   Output             3200 ml   Net            -2550 ml      General:                 No acute distress    Lungs:                     CTA bilaterally. Resp even and non labored  Heart:                                Irregularly irregular. Rate controlled. No edema. 2/6 CARMEN  Abdomen:              Soft, non tender, non distended. BS present  Extremities:           No cyanosis, moves ext spontaneously  Neurologic:            A/O X4. No focal deficits     Results summary of Diagnostic Studies/Procedures copied from within [facility] EMR:         [address] Problems    Diagnosis Date Noted    Acute respiratory failure ([facility] [address] 2018    Acute CHF (congestive heart failure) ([facility] [address] 2018    Paroxysmal A-fib (HCC) 2018    Lactic acid acidosis 2018    LBBB (left bundle branch block) 2018    Leukocytosis 2018    Hypertension, essential 2016     Plan:  Acute respiratory failure: PCT 0.1. Lactic acidosis cleared. stop Vanc and Tobra. Start PO Levaquin. on RA currently,  O2 to keep sats >95%.    Acute CHF:  Echo showing EF 50-55%. Continue Lasix. Cardiology following     PAF:  Rate controlled currently. Continue amiodarone, atenolol. Cardiology following.  On eliquis     HTN:  Chronic, controlled, continue current regimen        Likely DC in AM      Notes, labs, VS, diagnostic testing reviewed  Case discussed with pt, care team, Dr. Kevin Connelly, wife at bedside  Time spent with pt 20 min           DVT Prophylaxis: vinny Brock, NP

## 2018-05-16 NOTE — PROGRESS NOTES
Bedside and Verbal shift change report given to Pavel Jeffrey RN (oncoming nurse) by self Creig Peacemaker nurse). Report included the following information SBAR, Kardex, MAR and Recent Results.

## 2018-05-16 NOTE — PROGRESS NOTES
IP consult to Cardiac Rehab. Pt EF=55-60% on Echo completed 5/15/18. EF must be 35% or less to qualify for Cardiac Rehab with diagnosis of chronic heart failure. We will contact the patient if a qualifying referral is received.      Thank you,  BENJAMIN Branham, RN  Cardiac Rehab Nurse Liaison

## 2018-05-16 NOTE — PROGRESS NOTES
Patient resting quietly in bed. Slept at long intervals throughout the shift, no distress noted during hourly bedside checks. No request or needs at present. Shift change report given to oncoming Primary Nurse Franny Velez RN.

## 2018-05-16 NOTE — PROGRESS NOTES
Bedside and Verbal shift change report given to self (oncoming nurse) by CLAUDIA Moran (offgoing nurse). Report included the following information SBAR, Kardex, MAR and Recent Results.

## 2018-05-16 NOTE — PROGRESS NOTES
Bedside and Verbal shift change report given to Esteban Aviles RN(oncoming nurse) by self (offgoing nurse). Report included the following information SBAR, Kardex, MAR and Recent Results.

## 2018-05-16 NOTE — CDMP QUERY
Please clarify if this patient is being treated/managed for:    PNEUMONIA in the setting of  71yo with recent pneumonia and admitted with acute respiratory failure/CHF, cxr with evolving bibasilar infiltrates,  leukocytosis, noted \"possible HCAP\" on H&P, Cardiology documented \"PNA\" being managed with Nebcin IV, Vanco IV=> Levaquin PO.      =>Other Explanation of clinical findings  =>Unable to Determine (no explanation of clinical findings)    The medical record reflects the following:    Risk Factors:   71yo with recent pneumonia, acute respiratory failure    Clinical Indicators: cxr with evolving bibasilar infiltrates,  leukocytosis, noted \"possible HCAP\" on H&P, Cardiology documented \"PNA\"    Treatment: Nebcin IV, Vanco IV=> Levaquin PO    Please clarify and document your clinical opinion in the progress notes and discharge summary including the definitive and/or presumptive diagnosis, (suspected or probable), related to the above clinical findings. Please include clinical findings supporting your diagnosis.     Thank you,  Elyse Flores RN, [address] [facility], [facility]

## 2018-05-16 NOTE — PROGRESS NOTES
Bedside and Verbal shift change report given to self (oncoming nurse) by Lj Cox RN (offgoing nurse). Report included the following information SBAR, Kardex, MAR and Recent Results.

## 2018-05-16 NOTE — PROGRESS NOTES
Bedside and Verbal shift change report given to Self (oncoming nurse) by Vidyha Schulte RN (offgoing nurse). Report included the following information Kardex, Recent Results and Cardiac Rhythm A-fib, controlled rate. Michelle Cano

## 2018-05-16 NOTE — PROGRESS NOTES
Bedside and Verbal shift change report given to Kev Pires RN (oncoming nurse) by Natalie Albright (offgoing nurse). Report included the following information Kardex, Intake/Output, MAR and Cardiac Rhythm a-fib, controlled rate. Nikunj Taylor

## 2018-05-16 NOTE — PROGRESS NOTES
Problem: Falls - Risk of  Goal: *Absence of Falls  Document Helene Fall Risk and appropriate interventions in the flowsheet.    Outcome: Progressing Towards Goal  Fall Risk Interventions:  Mobility Interventions: Patient to call before getting OOB         Medication Interventions: Patient to call before getting OOB    Elimination Interventions: Call light in reach

## 2018-05-17 VITALS
HEART RATE: 67 BPM | RESPIRATION RATE: 19 BRPM | TEMPERATURE: 98.1 F | SYSTOLIC BLOOD PRESSURE: 139 MMHG | OXYGEN SATURATION: 97 % | WEIGHT: 239 LBS | BODY MASS INDEX: 29.72 KG/M2 | HEIGHT: 75 IN | DIASTOLIC BLOOD PRESSURE: 75 MMHG

## 2018-05-17 PROCEDURE — 74011250637 HC RX REV CODE- 250/637: Performed by: NURSE PRACTITIONER

## 2018-05-17 PROCEDURE — 74011250637 HC RX REV CODE- 250/637: Performed by: INTERNAL MEDICINE

## 2018-05-17 PROCEDURE — 74011250637 HC RX REV CODE- 250/637: Performed by: HOSPITALIST

## 2018-05-17 RX ORDER — DOXYCYCLINE 100 MG/1
100 TABLET ORAL 2 TIMES DAILY
Qty: 8 TAB | Refills: 0 | Status: SHIPPED | OUTPATIENT
Start: 2018-05-17 | End: 2018-05-21

## 2018-05-17 RX ORDER — AMIODARONE HYDROCHLORIDE 100 MG/1
100 TABLET ORAL 2 TIMES DAILY
Qty: 60 TAB | Refills: 0 | Status: SHIPPED | OUTPATIENT
Start: 2018-05-17 | End: 2018-07-23 | Stop reason: SDUPTHER

## 2018-05-17 RX ORDER — ATENOLOL 25 MG/1
25 TABLET ORAL 2 TIMES DAILY
Qty: 60 TAB | Refills: 0 | Status: SHIPPED | OUTPATIENT
Start: 2018-05-17 | End: 2018-07-06 | Stop reason: SDUPTHER

## 2018-05-17 RX ADMIN — AMLODIPINE BESYLATE 10 MG: 10 TABLET ORAL at 09:14

## 2018-05-17 RX ADMIN — APIXABAN 5 MG: 5 TABLET, FILM COATED ORAL at 09:14

## 2018-05-17 RX ADMIN — CLONIDINE HYDROCHLORIDE 0.2 MG: 0.1 TABLET ORAL at 09:19

## 2018-05-17 RX ADMIN — Medication 5 ML: at 05:20

## 2018-05-17 RX ADMIN — FUROSEMIDE 40 MG: 40 TABLET ORAL at 07:30

## 2018-05-17 RX ADMIN — AMIODARONE HYDROCHLORIDE 100 MG: 200 TABLET ORAL at 09:14

## 2018-05-17 RX ADMIN — LACTOBACILLUS TAB 1 TABLET: TAB at 09:15

## 2018-05-17 RX ADMIN — OLMESARTAN MEDOXOMIL 40 MG: 40 TABLET, FILM COATED ORAL at 09:14

## 2018-05-17 NOTE — PROGRESS NOTES
Discharge instructions reviewed with patient. Prescriptions sent to [address] [place], [place] [address] and med info sheets provided for all new medications. Opportunity for questions provided. Patient voiced understanding of all discharge instructions. IV and telemetry monitor removed by primary RN.

## 2018-05-17 NOTE — PROGRESS NOTES
Care Management Interventions  PCP Verified by CM: Yes (month ago )  Palliative Care Criteria Met (RRAT>21 & CHF Dx)?:  (RRAT 12 Dx CHF)  Mode of Transport at Discharge: Other (see comment) (wife)  Transition of Care Consult (CM Consult): Discharge Planning  Discharge Durable Medical Equipment: No  Physical Therapy Consult: No  Occupational Therapy Consult: No  Speech Therapy Consult: No  Current Support Network: Lives with Spouse  Confirm Follow Up Transport: Self  Plan discussed with Pt/Family/Caregiver: Yes  Freedom of Choice Offered: Yes  Discharge Location  Discharge Placement: Home  Patient ready for d/c today. Important Medicare letter signed and copy placed in chart about d/c. Patient agreeable to d/c and states \"ready to get out of here\". Patient declined home health but was agreeable to [facility] following up. Provided with her name and number and also notified Nancy of referral. Patient voices no needs or concerns. Patient d/c home with wife.

## 2018-05-17 NOTE — DISCHARGE INSTRUCTIONS
Pneumonia: Care Instructions  Your Care Instructions    Pneumonia is an infection of the lungs. Most cases are caused by infections from bacteria or viruses. Pneumonia may be mild or very severe. If it is caused by bacteria, you will be treated with antibiotics. It may take a few weeks to a few months to recover fully from pneumonia, depending on how sick you were and whether your overall health is good. Follow-up care is a key part of your treatment and safety. Be sure to make and go to all appointments, and call your doctor if you are having problems. It's also a good idea to know your test results and keep a list of the medicines you take. How can you care for yourself at home? · Take your antibiotics exactly as directed. Do not stop taking the medicine just because you are feeling better. You need to take the full course of antibiotics. · Take your medicines exactly as prescribed. Call your doctor if you think you are having a problem with your medicine. · Get plenty of rest and sleep. You may feel weak and tired for a while, but your energy level will improve with time. · To prevent dehydration, drink plenty of fluids, enough so that your urine is light yellow or clear like water. Choose water and other caffeine-free clear liquids until you feel better. If you have kidney, heart, or liver disease and have to limit fluids, talk with your doctor before you increase the amount of fluids you drink. · Take care of your cough so you can rest. A cough that brings up mucus from your lungs is common with pneumonia. It is one way your body gets rid of the infection. But if coughing keeps you from resting or causes severe fatigue and chest-wall pain, talk to your doctor. He or she may suggest that you take a medicine to reduce the cough. · Use a vaporizer or humidifier to add moisture to your bedroom. Follow the directions for cleaning the machine. · Do not smoke or allow others to smoke around you.  Smoke will make your cough last longer. If you need help quitting, talk to your doctor about stop-smoking programs and medicines. These can increase your chances of quitting for good. · Take an over-the-counter pain medicine, such as acetaminophen (Tylenol), ibuprofen (Advil, Motrin), or naproxen (Aleve). Read and follow all instructions on the label. · Do not take two or more pain medicines at the same time unless the doctor told you to. Many pain medicines have acetaminophen, which is Tylenol. Too much acetaminophen (Tylenol) can be harmful. · If you were given a spirometer to measure how well your lungs are working, use it as instructed. This can help your doctor tell how your recovery is going. · To prevent pneumonia in the future, talk to your doctor about getting a flu vaccine (once a year) and a pneumococcal vaccine (one time only for most people). When should you call for help? Call 911 anytime you think you may need emergency care. For example, call if:  ? · You have severe trouble breathing. ?Call your doctor now or seek immediate medical care if:  ? · You cough up dark brown or bloody mucus (sputum). ? · You have new or worse trouble breathing. ? · You are dizzy or lightheaded, or you feel like you may faint. ? Watch closely for changes in your health, and be sure to contact your doctor if:  ? · You have a new or higher fever. ? · You are coughing more deeply or more often. ? · You are not getting better after 2 days (48 hours). ? · You do not get better as expected. Where can you learn more? Go to http://juan carlos-sherry.info/. Enter [id number] in the search box to learn more about \"Pneumonia: Care Instructions. \"  Current as of: May 12, 2017  Content Version: 11.4  © 6312-1878 [employer]. Care instructions adapted under license by [employer] (which disclaims liability or warranty for this information).  If you have questions about a medical condition or this instruction, always ask your healthcare professional. [place] [address] any warranty or liability for your use of this information. DISCHARGE SUMMARY from Nurse    PATIENT INSTRUCTIONS:    After general anesthesia or intravenous sedation, for 24 hours or while taking prescription Narcotics:  · Limit your activities  · Do not drive and operate hazardous machinery  · Do not make important personal or business decisions  · Do  not drink alcoholic beverages  · If you have not urinated within 8 hours after discharge, please contact your surgeon on call. Report the following to your surgeon:  · Excessive pain, swelling, redness or odor of or around the surgical area  · Temperature over 100.5  · Nausea and vomiting lasting longer than 4 hours or if unable to take medications  · Any signs of decreased circulation or nerve impairment to extremity: change in color, persistent  numbness, tingling, coldness or increase pain  · Any questions    What to do at Home:  Recommended activity: Activity as tolerated    If you experience any of the following symptoms chest pain, shortness of breath, feeling faint or dizzy, please follow up with [facility] cardiology. *  Please give a list of your current medications to your Primary Care Provider. *  Please update this list whenever your medications are discontinued, doses are      changed, or new medications (including over-the-counter products) are added. *  Please carry medication information at all times in case of emergency situations. These are general instructions for a healthy lifestyle:    No smoking/ No tobacco products/ Avoid exposure to second hand smoke  Surgeon General's Warning:  Quitting smoking now greatly reduces serious risk to your health.     Obesity, smoking, and sedentary lifestyle greatly increases your risk for illness    A healthy diet, regular physical exercise & weight monitoring are important for maintaining a healthy lifestyle    You may be retaining fluid if you have a history of heart failure or if you experience any of the following symptoms:  Weight gain of 3 pounds or more overnight or 5 pounds in a week, increased swelling in our hands or feet or shortness of breath while lying flat in bed. Please call your doctor as soon as you notice any of these symptoms; do not wait until your next office visit. Recognize signs and symptoms of STROKE:    F-face looks uneven    A-arms unable to move or move unevenly    S-speech slurred or non-existent    T-time-call 911 as soon as signs and symptoms begin-DO NOT go       Back to bed or wait to see if you get better-TIME IS BRAIN. Warning Signs of HEART ATTACK     Call 911 if you have these symptoms:   Chest discomfort. Most heart attacks involve discomfort in the center of the chest that lasts more than a few minutes, or that goes away and comes back. It can feel like uncomfortable pressure, squeezing, fullness, or pain.  Discomfort in other areas of the upper body. Symptoms can include pain or discomfort in one or both arms, the back, neck, jaw, or stomach.  Shortness of breath with or without chest discomfort.  Other signs may include breaking out in a cold sweat, nausea, or lightheadedness. Don't wait more than five minutes to call 911 - MINUTES MATTER! Fast action can save your life. Calling 911 is almost always the fastest way to get lifesaving treatment. Emergency Medical Services staff can begin treatment when they arrive -- up to an hour sooner than if someone gets to the hospital by car. The discharge information has been reviewed with the patient. The patient verbalized understanding. Discharge medications reviewed with the patient and appropriate educational materials and side effects teaching were provided.   ___________________________________________________________________________________________________________________________________  Learning About Atrial Fibrillation  What is atrial fibrillation? Atrial fibrillation (say \"AY-tree-davy can-xcxy-DIK-shun\") is the most common type of irregular heartbeat (arrhythmia). Normally, the heart beats in a strong, steady rhythm. In atrial fibrillation, a problem with the heart's electrical system causes the two upper parts of the heart (the atria) to quiver, or fibrillate. Your heart rate also may be faster than normal.  Atrial fibrillation can be dangerous because if the heartbeat isn't strong and steady, blood can collect, or pool, in the atria. And pooled blood is more likely to form clots. Clots can travel to the brain, block blood flow, and cause a stroke. Atrial fibrillation can also lead to heart failure. Treatment for atrial fibrillation helps prevent stroke and heart failure. It also helps relieve symptoms. Atrial fibrillation is often caused by another heart problem. It may happen after heart surgery. It may also be caused by other problems, such as an overactive thyroid gland or lung disease. Many people with atrial fibrillation are able to live full and active lives. What are the symptoms? Some people feel symptoms when they have episodes of atrial fibrillation. But other people don't notice any symptoms. If you have symptoms, you may feel:  · A fluttering, racing, or pounding feeling in your chest called palpitations. · Weak or tired. · Dizzy or lightheaded. · Short of breath. · Chest pain. · Confused. You may notice signs of atrial fibrillation when you check your pulse. Your pulse may seem uneven or fast.  What can you expect when you have atrial fibrillation? At first, spells of atrial fibrillation may come on suddenly and last a short time. It may go away on its own or it goes away after treatment. This is called paroxysmal atrial fibrillation. Over time, the spells may last longer and occur more often. They often don't go away on their own. How is it treated?   Treatments can help you feel better and prevent future problems, especially stroke and heart failure. The main types of treatment slow the heart rate, control the heart rhythm, and help prevent stroke. Your treatment will depend on the cause of your atrial fibrillation, your symptoms, and your risk for stroke. · Heart rate treatment. Medicine may be used to slow your heart rate. Your heartbeat may still be irregular. But these medicines keep your heart from beating too fast. They may also help relieve your symptoms. · Heart rhythm treatment. Different treatments may be used to try to stop atrial fibrillation and keep it from returning. They can also relieve symptoms. These treatments include medicine, electrical cardioversion to shock the heart back to a normal rhythm, a procedure called catheter ablation, and heart surgery. · Stroke prevention. You and your doctor can decide how to lower your risk. You may decide to take a blood-thinning medicine, such as aspirin or an anticoagulant. How can you live well with it? You can live well and help manage atrial fibrillation by having a heart-healthy lifestyle. To have a heart-healthy lifestyle:  · Don't smoke. · Eat heart-healthy foods. · Be active. Talk to your doctor about what type and level of exercise is safe for you. · Stay at a healthy weight. Lose weight if you need to. · Manage stress. · Avoid alcohol if it triggers symptoms. · Manage other health problems such as high blood pressure, high cholesterol, and diabetes. · Avoid getting sick from the flu. Get a flu shot every year. Where can you learn more? Go to http://juan carlos-sherry.info/. Enter [phone] in the search box to learn more about \"Learning About Atrial Fibrillation. \"  Current as of: September 21, 2016  Content Version: 11.4  © 0767-2710 [employer]. Care instructions adapted under license by [employer] (which disclaims liability or warranty for this information).  If you have questions about a medical condition or this instruction, always ask your healthcare professional. [place] [address] any warranty or liability for your use of this information.

## 2018-05-17 NOTE — PROGRESS NOTES
Bedside and Verbal shift change report given to Dominique Gonzalez RN (oncoming nurse) by self (offgoing nurse). Report included the following information SBAR, Kardex, MAR and Recent Results.

## 2018-05-17 NOTE — DISCHARGE SUMMARY
Discharge Summary   Patient ID:  Gwen Fisher  [id number]  43 y.o.  1945  Admit date: 5/14/2018  3:33 PM  Discharge date and time: 5/17/2018  Attending: Naila Hammond MD  PCP:  Lillian Bryson MD  Treatment Team: Attending Provider: Naila Hammond MD; Utilization Review: Yue Rivera RN; Care Manager: Negrita Núñez RN  Principal Diagnosis Acute respiratory failure [facility])   Principal Problem:    Acute respiratory failure ([address] (5/14/2018)    Active Problems:    Hypertension, essential (1/13/2016)      Acute CHF (congestive heart failure) ([address] (5/14/2018)      Paroxysmal A-fib ([facility] (5/14/2018)      Lactic acid acidosis (5/14/2018)      LBBB (left bundle branch block) (5/14/2018)      Leukocytosis (5/14/2018)      HAP (hospital-acquired pneumonia) (5/16/2018)       * Admission Diagnoses: Acute respiratory failure ([facility]  Acute CHF (congestive heart failure) ([facility]  * Discharge Diagnoses:    Hospital Problems as of 5/17/2018  Date Reviewed: 12/29/2017          Codes Class Noted - Resolved POA    HAP (hospital-acquired pneumonia) ICD-10-CM: J18.9  ICD-9-CM: 486  5/16/2018 - Present Unknown        * (Principal)Acute respiratory failure ([facility] ICD-10-CM: J96.00  ICD-9-CM: 518.81  5/14/2018 - Present Yes        Acute CHF (congestive heart failure) ([facility] ICD-10-CM: I50.9  ICD-9-CM: 428.0  5/14/2018 - Present Yes        Paroxysmal A-fib ([facility] ICD-10-CM: I48.0  ICD-9-CM: 427.31  5/14/2018 - Present Yes        Lactic acid acidosis ICD-10-CM: E87.2  ICD-9-CM: 276.2  5/14/2018 - Present Yes        LBBB (left bundle branch block) ICD-10-CM: I44.7  ICD-9-CM: 426.3  5/14/2018 - Present Yes        Leukocytosis ICD-10-CM: D86.393  ICD-9-CM: 288.60  5/14/2018 - Present Yes        Hypertension, essential ICD-10-CM: I10  ICD-9-CM: 401.9  1/13/2016 - Present Yes                Hospital Course:    Mr. Fidencio Groves is a 75 yo male with newly recently dx PAF, MVP, HTN, prostate cancer who presented with c/o SOB.  He was at [facility] 3 weeks ago for afib and underwent cardioversion but only stayed in afib for approx 8 hours. He was started on amiodarone 400mg BID originally at [facility] however didn't like the way it made him feel therefore he decreased it to 200mg BID.  He was found to have wet lung sounds, increased respirations and was started on CPAP on arrival. Shabana Enamorado also was recently treated with levaquin for pneumonia X3 days per pt.  Pt found to have elevated lactic acid 2.6 which has resolved. Parag Flash evolving bibasilar infiltrates which may represent pulmonary edema or sequela of atypical pneumonia.  Pt does report had increased in LE edema over the last few days prior to admission but currently resolved. Shabana Enamorado reports a trip to [facility], returning in February after having a respiratory illness and has not been well since returning home. Wannetta Other c/o several year hx of diarrhea worse over the last 4 months, takes immodium daily.   Denies CP, SOB, n/v/d, abd pain currently. Started on Vanc and Tobramycin on admission. Changed to Levaquin PO however reports SOB and \"feeling funny\" after taking levaquin  Cardiology following, plans for cardioversion once acute illness resolved. Is on eliquis. Echo showing EF  50-55%.      Diagnostic Study/Procedure results summary copied from within [facility] EMR:    CHEST X-RAY, single portable view  5/14/2018     History: Difficulty breathing.     Technique: Single frontal view of the chest.     Comparison: 8/23/2016     Findings: The cardiac silhouette is normal in respect to size. Evolving basilar  infiltrates are seen.     IMPRESSION  IMPRESSION:   1.   Evolving bibasilar infiltrates which may represent pulmonary edema or  sequela of atypical pneumonia.            Labs: Results:       Chemistry Recent Labs      05/16/18   0420  05/15/18   0350  05/14/18   1534   GLU  123*  111*  174*   NA  140  141  139   K  3.9  3.1*  3.9   CL  105  103  104   CO2  23  25  21   BUN  16  22  21   CREA  1.19 1. 28  1.54*   CA  8.8  8.2*  8.7   AGAP  12  13  14   TBILI   --   0.7  0.6   ALT   --   56  64   AP   --   70  82   TP   --   6.8  7.9   ALB   --   3.4  3.7   GLOB   --   3.4  4.2*   AGRAT   --   1.0*  0.9*      CBC w/Diff Recent Labs      05/16/18   0420  05/14/18   1534   WBC  11.0  13.0*   RBC  5.15  5.32   HGB  15.8  16.7   HCT  45.5  48.1   PLT  234  339   GRANS  64   --    LYMPH  24   --    EOS  1   --       Cardiac Enzymes No results for input(s): CPK, CKND1, AKIN in the last 72 hours. No lab exists for component: CKRMB, TROIP   Coagulation No results for input(s): PTP, INR, APTT in the last 72 hours. No lab exists for component: INREXT    Lipid Panel @BRIEFLAB(CHOL,CHOLPOCT,209051,515098,[id number],CHOLX,CHOLP,CHLST,CHOLV,702802,HDL,HDLPOC,HDLPOCT,023264,NHDLCT,[id number],HDLC,HDLP,LDL,LDLPOCT,LDLCPOC,126297,NLDLCT,DLDL,LDLC,DLDLP,285252,VLDLC,VLDL,TGL,TGLX,TRIGL,[id number],TRIGP,TGLPOCT,290416,253816,CHHD,CHHDX)@   BNP No results for input(s): BNPP in the last 72 hours. Liver Enzymes Recent Labs      05/15/18   0350   TP  6.8   ALB  3.4   AP  70   SGOT  32      Thyroid Studies No results found for: T4, T3U, TSH, TSHEXT         Discharge Exam:  Visit Vitals    /85    Pulse 80    Temp 98.2 °F (36.8 °C)    Resp 18    Ht 6' 3\" (1.905 m)    Wt 108.4 kg (239 lb)    SpO2 94%    BMI 29.87 kg/m2     General:                 No acute distress    Lungs:                     CTA bilaterally. Resp even and non labored  Heart:                                Irregularly irregular.  Rate controlled. No edema.  2/6 CARMEN  Abdomen:              Soft, non tender, non distended. BS present  Extremities:           No cyanosis, moves ext spontaneously  Neurologic:            A/O X4.  No focal deficits         Disposition: home  Discharge Condition: stable  Patient Instructions:   Current Discharge Medication List      START taking these medications    Details   doxycycline (ADOXA) 100 mg tablet Take 1 Tab by mouth two (2) times a day for 4 days. Qty: 8 Tab, Refills: 0         CONTINUE these medications which have CHANGED    Details   amiodarone (PACERONE) 100 mg tablet Take 1 Tab by mouth two (2) times a day. Qty: 60 Tab, Refills: 0      atenolol (TENORMIN) 25 mg tablet Take 1 Tab by mouth two (2) times a day. Qty: 60 Tab, Refills: 0         CONTINUE these medications which have NOT CHANGED    Details   B infantis/B ani/B taco/B bifid (PROBIOTIC 4X PO) Take 1 Tab by mouth as needed. diphenhydramine HCl (ANTIHISTAMINE PO) Take 1 Tab by mouth as needed. apixaban (ELIQUIS) 5 mg tablet Take 5 mg by mouth.      cloNIDine HCl (CATAPRES) 0.2 mg tablet Take 1 Tab by mouth three (3) times daily as needed. Qty: 270 Tab, Refills: 3    Associated Diagnoses: Benign essential hypertension      olmesartan (BENICAR) 40 mg tablet Take 1 Tab by mouth daily. am  Qty: 90 Tab, Refills: 3    Associated Diagnoses: Benign essential hypertension      amLODIPine (NORVASC) 10 mg tablet Take 1 Tab by mouth daily.   Qty: 90 Tab, Refills: 3         STOP taking these medications       levoFLOXacin (LEVAQUIN) 750 mg tablet Comments:   Reason for Stopping:         furosemide (LASIX) 20 mg tablet Comments:   Reason for Stopping:         loperamide (IMMODIUM) 2 mg tablet Comments:   Reason for Stopping:         hydroCHLOROthiazide (HYDRODIURIL) 25 mg tablet Comments:   Reason for Stopping:               Activity: Activity as tolerated  Diet: Cardiac Diet  Wound Care: None needed      Full Code   Surrogate decision maker:  Wife    Pneumonia and flu vaccine to be administered at discharge per hospital protocol   Follow-up  ·   FU PCP 2 days  · FU Cardiology as scheduled  · Rx for Doxycycline to complete abx course  · Levaquin added to allergy list      Notes, labs, VS, diagnostic testing reviewed  Case discussed with pt, care team, Dr. Raffi Carrillo      Time spent to discharge patient 45  min  Signed:  Roseann Peres NP  5/17/2018  11:02 AM

## 2018-05-17 NOTE — PROGRESS NOTES
Pt administered PO Levaquin at 1328. Pt states he started to feel bad around 1530 with symptoms of SOB, headache, overall just not feeling good. States he felt good while on IV antibiotics but once started on PO Levaquin, symptoms started. Pt states was prescribed PO Levaquin at Urgent care, after taking 3 doses, pt went to ED at [facility] and was admitted. Pt states he does not want to be prescribed PO levaquin at discharge.

## 2018-05-17 NOTE — PROGRESS NOTES
Problem: Falls - Risk of  Goal: *Absence of Falls  Document Helene Fall Risk and appropriate interventions in the flowsheet. Outcome: Progressing Towards Goal  Pt progressing towards goal. No falls since admission. Bed low and locked. Call light within reach. Side rails x 2. Gripper socks applied. Personal belongings within reach. Pt verbalizes understanding to call for assistance.      Fall Risk Interventions:  Mobility Interventions: Patient to call before getting OOB         Medication Interventions: Patient to call before getting OOB    Elimination Interventions: Call light in reach

## 2018-05-17 NOTE — PROGRESS NOTES
5/17/2018 8:45 AM    Admit Date: 5/14/2018    Admit Diagnosis: Acute respiratory failure ([facility] Acute CHF (congestive heart*      Subjective:   No cp or sob      Objective:      Visit Vitals    /79    Pulse 70    Temp 98.3 °F (36.8 °C)    Resp 18    Ht 6' 3\" (1.905 m)    Wt 108.4 kg (239 lb)    SpO2 92%    BMI 29.87 kg/m2       Physical Exam:  Anne-Marie Lighter, Well Nourished, No Acute Distress, Alert & Oriented x 3, appropriate mood. Neck- supple, no JVD  CV- irregular rate and rhythm no MRG  Lung- clear bilaterally  Abd- soft, nontender, nondistended  Ext- no edema bilaterally. Skin- warm and dry        Data Review:   Recent Labs      05/16/18   0420   NA  140   K  3.9   BUN  16   CREA  1.19   WBC  11.0   HGB  15.8   HCT  45.5   PLT  234       Assessment/Plan:     Principal Problem:    Acute respiratory failure ([facility] (5/14/2018)    Active Problems:    Hypertension, essential (1/13/2016)Stable. Continue current medical therapy.       Acute CHF (congestive heart failure) ([facility] (5/14/2018)      Paroxysmal A-fib ([facility] (5/14/2018)- in af- rate ok- on appropriate meds - we will arrange OP fu with us- will sign off      Lactic acid acidosis (5/14/2018)      LBBB (left bundle branch block) (5/14/2018)      Leukocytosis (5/14/2018)      HAP (hospital-acquired pneumonia) (5/16/2018)

## 2018-05-17 NOTE — PROGRESS NOTES
Bedside and Verbal shift change report given to self (oncoming nurse) by Elsi Reyes RN (offgoing nurse). Report included the following information SBAR, Kardex, MAR and Recent Results.

## 2018-05-18 ENCOUNTER — PATIENT OUTREACH (OUTPATIENT)
Dept: CASE MANAGEMENT | Age: 73
End: 2018-05-18

## 2018-05-18 NOTE — PROGRESS NOTES
This note will not be viewable in [address] Date/Time of Call:   05/18/2018 10:34am   What was the patient hospitalized for? Acute Respiratory Failure  Acute CHF    Hx :admission to [facility] 3 weeks prior for Dx AFib with cardioversion  HX :PAF/MVP/HTN/Prostate Cancer   Does the patient understand his/her diagnosis and/or treatment and what happened during the hospitalization? Spoke with patient, who verbalized understanding of Dx and Tx plan. Reports he is doing well, denies any SOB, CP or other complaints. Did the patient receive discharge instructions? yes   CM Assessed Risk for Readmission:       Patient stated Risk for Readmission:       Moderate to High risk for readmission due to complications from multiple comorbidities. Per patient, problems with my heart or breathing. Review any discharge instructions (see discharge instructions/AVS in [facility]). Ask patient if they understand these. Do they have any questions? Discharge instructions reviewed with patient, who verbalized understanding. Focused on education, follow up appointments and med compliance. Were home services ordered (nursing, PT, OT, ST, etc.)? No, patient refused HH. If so, has the first visit occurred? If not, why? (Assist with coordination of services if necessary.)   N/A   Was any DME ordered? No   If so, has it been received? If not, why?  (Assist patient in obtaining DME orders &/or equipment if necessary.) N/A   Complete a review of all medications (new, continued and discontinued meds per the D/C instructions and medication tab in [facility]). Medications reviewed with patient, who verbalized understanding. Were all new prescriptions filled? If not, why?  (Assist patient in obtaining medications if necessary  escalate for CCM &/or SW if ongoing issues are verbalized by pt or anticipated)   Yes, new prescriptions were filled and in the home.     Patient denies any problems with obtaining medications. Does the patient understand the purpose and dosing instructions for all medications? (If patient has questions, provide explanation and education.)   Patient was able to verbalize purpose and dosing for medications. Does the patient have any problems in performing ADLs? (If patient is unable to perform ADLs  what is the limiting factor(s)? Do they have a support system that can assist? If no support system is present, discuss possible assistance that they may be able to obtain. Escalate for CCM/SW if ongoing issues are verbalized by pt or anticipated)   Patient is normally independent with ADLs and drives. Lives with life partner who is available to assist if needed. Does the patient have all follow-up appointments scheduled? 7 day f/up with PCP?   (f/up with PCP may be w/in 14 days if patient has a f/up with their specialist w/in 7 days)    7-14 day f/up with specialist?   (or per discharge instructions)    If f/up has not been made  what actions has the care coordinator made to accomplish this? Has transportation been arranged? Patient has follow up appointment with Dr. Kiki Ordonez on 5/22/18    Also has follow up with Dr. Michelle Velez on 5/29/18        Patient denies any problems with transportation to visits. Any other questions or concerns expressed by the patient? No other questions or concerns verbalized. Schedule next appointment with JORDEN HATCH Coordinator or refer to RN Case Manager/ per the workflow guidelines. When is care coordinators next follow-up call scheduled? If referred for CCM  what RN care manager was the referral assigned? Verified per Sarah Beth Hardy, patient was referred to [facility] prior to discharge and will be followed by Israel Ortega with planned call today to schedule home visit. No further LUANNE outreach by Care Coordinator indicated.     Patient has Care Coordinator contact information and was advised to call for any new concerns or needs.    LUANNE Call Completed By: John Smith LPN, [facility] Coordinator

## 2018-05-18 NOTE — PROGRESS NOTES
HC calls pt to follow up post discharge CHF. Inpatient he agreed to [address] Program to [place]. Pt answers phone, after introducing myself pt states that this is not a good time, please call back Monday. Will follow up next week. Joy Figueroa, Paramedic, Health .

## 2018-05-19 LAB
BACTERIA SPEC CULT: NORMAL
BACTERIA SPEC CULT: NORMAL
SERVICE CMNT-IMP: NORMAL
SERVICE CMNT-IMP: NORMAL

## 2018-05-22 ENCOUNTER — HOSPITAL ENCOUNTER (OUTPATIENT)
Dept: GENERAL RADIOLOGY | Age: 73
Discharge: HOME | End: 2018-05-22
Attending: FAMILY MEDICINE
Payer: MEDICARE

## 2018-05-22 DIAGNOSIS — R07.9 CHEST PAIN, UNSPECIFIED TYPE: ICD-10-CM

## 2018-05-22 DIAGNOSIS — J18.9 PNEUMONIA OF BOTH LOWER LOBES DUE TO INFECTIOUS ORGANISM: ICD-10-CM

## 2018-05-22 PROCEDURE — 71046 X-RAY EXAM CHEST 2 VIEWS: CPT

## 2018-06-06 NOTE — PROGRESS NOTES
Patient pre-assessment complete for Cardioversion with Dr Romy Shelley scheduled for 18 at 8am, arrival time 7am. Patient verified using . Patient instructed to bring all home medications in labeled bottles on the day of procedure. NPO status reinforced. Instructed they can take all other medications excluding vitamins & supplements. Patient verbalizes understanding of all instructions & denies any questions at this time.

## 2018-06-06 NOTE — PROGRESS NOTES
Called to pre-assess for Cardioversion with Dr Arin Reynolds , Scheduled 6/7/18. No answer & message left.

## 2018-06-07 ENCOUNTER — HOSPITAL ENCOUNTER (OUTPATIENT)
Dept: CARDIAC CATH/INVASIVE PROCEDURES | Age: 73
Discharge: HOME | End: 2018-06-07
Attending: INTERNAL MEDICINE | Admitting: INTERNAL MEDICINE
Payer: MEDICARE

## 2018-06-07 VITALS
OXYGEN SATURATION: 93 % | HEIGHT: 75 IN | WEIGHT: 230 LBS | SYSTOLIC BLOOD PRESSURE: 101 MMHG | DIASTOLIC BLOOD PRESSURE: 64 MMHG | HEART RATE: 50 BPM | BODY MASS INDEX: 28.6 KG/M2 | RESPIRATION RATE: 24 BRPM

## 2018-06-07 LAB
ANION GAP SERPL CALC-SCNC: 12 MMOL/L (ref 7–16)
ATRIAL RATE: 138 BPM
ATRIAL RATE: 49 BPM
BUN SERPL-MCNC: 17 MG/DL (ref 8–23)
CALCIUM SERPL-MCNC: 8.6 MG/DL (ref 8.3–10.4)
CALCULATED P AXIS, ECG09: 46 DEGREES
CALCULATED R AXIS, ECG10: 18 DEGREES
CALCULATED R AXIS, ECG10: 5 DEGREES
CALCULATED T AXIS, ECG11: 102 DEGREES
CALCULATED T AXIS, ECG11: 98 DEGREES
CHLORIDE SERPL-SCNC: 107 MMOL/L (ref 98–107)
CO2 SERPL-SCNC: 22 MMOL/L (ref 21–32)
CREAT SERPL-MCNC: 1.28 MG/DL (ref 0.8–1.5)
DIAGNOSIS, 93000: NORMAL
DIAGNOSIS, 93000: NORMAL
ERYTHROCYTE [DISTWIDTH] IN BLOOD BY AUTOMATED COUNT: 13.5 % (ref 11.9–14.6)
GLUCOSE SERPL-MCNC: 128 MG/DL (ref 65–100)
HCT VFR BLD AUTO: 42.2 % (ref 41.1–50.3)
HGB BLD-MCNC: 14.3 G/DL (ref 13.6–17.2)
INR PPP: 1.4
MAGNESIUM SERPL-MCNC: 2.1 MG/DL (ref 1.8–2.4)
MCH RBC QN AUTO: 30.1 PG (ref 26.1–32.9)
MCHC RBC AUTO-ENTMCNC: 33.9 G/DL (ref 31.4–35)
MCV RBC AUTO: 88.8 FL (ref 79.6–97.8)
P-R INTERVAL, ECG05: 222 MS
PLATELET # BLD AUTO: 264 K/UL (ref 150–450)
PMV BLD AUTO: 9.6 FL (ref 10.8–14.1)
POTASSIUM SERPL-SCNC: 4.3 MMOL/L (ref 3.5–5.1)
PROTHROMBIN TIME: 17 SEC (ref 11.5–14.5)
Q-T INTERVAL, ECG07: 494 MS
Q-T INTERVAL, ECG07: 548 MS
QRS DURATION, ECG06: 166 MS
QRS DURATION, ECG06: 168 MS
QTC CALCULATION (BEZET), ECG08: 495 MS
QTC CALCULATION (BEZET), ECG08: 501 MS
RBC # BLD AUTO: 4.75 M/UL (ref 4.23–5.67)
SODIUM SERPL-SCNC: 141 MMOL/L (ref 136–145)
VENTRICULAR RATE, ECG03: 49 BPM
VENTRICULAR RATE, ECG03: 62 BPM
WBC # BLD AUTO: 6.4 K/UL (ref 4.3–11.1)

## 2018-06-07 PROCEDURE — 99152 MOD SED SAME PHYS/QHP 5/>YRS: CPT

## 2018-06-07 PROCEDURE — 85610 PROTHROMBIN TIME: CPT | Performed by: INTERNAL MEDICINE

## 2018-06-07 PROCEDURE — 80048 BASIC METABOLIC PNL TOTAL CA: CPT | Performed by: INTERNAL MEDICINE

## 2018-06-07 PROCEDURE — 83735 ASSAY OF MAGNESIUM: CPT | Performed by: INTERNAL MEDICINE

## 2018-06-07 PROCEDURE — 74011250636 HC RX REV CODE- 250/636

## 2018-06-07 PROCEDURE — 93005 ELECTROCARDIOGRAM TRACING: CPT | Performed by: INTERNAL MEDICINE

## 2018-06-07 PROCEDURE — 92960 CARDIOVERSION ELECTRIC EXT: CPT

## 2018-06-07 PROCEDURE — 85027 COMPLETE CBC AUTOMATED: CPT | Performed by: INTERNAL MEDICINE

## 2018-06-07 RX ORDER — FENTANYL CITRATE 50 UG/ML
25-100 INJECTION, SOLUTION INTRAMUSCULAR; INTRAVENOUS
Status: DISCONTINUED | OUTPATIENT
Start: 2018-06-07 | End: 2018-06-07 | Stop reason: HOSPADM

## 2018-06-07 RX ORDER — MIDAZOLAM HYDROCHLORIDE 1 MG/ML
.5-5 INJECTION, SOLUTION INTRAMUSCULAR; INTRAVENOUS
Status: DISCONTINUED | OUTPATIENT
Start: 2018-06-07 | End: 2018-06-07 | Stop reason: HOSPADM

## 2018-06-07 RX ORDER — SODIUM CHLORIDE 9 MG/ML
75 INJECTION, SOLUTION INTRAVENOUS CONTINUOUS
Status: DISCONTINUED | OUTPATIENT
Start: 2018-06-07 | End: 2018-06-07 | Stop reason: HOSPADM

## 2018-06-07 RX ADMIN — MIDAZOLAM HYDROCHLORIDE 2 MG: 1 INJECTION, SOLUTION INTRAMUSCULAR; INTRAVENOUS at 07:58

## 2018-06-07 RX ADMIN — MIDAZOLAM HYDROCHLORIDE 2 MG: 1 INJECTION, SOLUTION INTRAMUSCULAR; INTRAVENOUS at 08:02

## 2018-06-07 RX ADMIN — FENTANYL CITRATE 50 MCG: 50 INJECTION, SOLUTION INTRAMUSCULAR; INTRAVENOUS at 08:00

## 2018-06-07 RX ADMIN — MIDAZOLAM HYDROCHLORIDE 2 MG: 1 INJECTION, SOLUTION INTRAMUSCULAR; INTRAVENOUS at 08:00

## 2018-06-07 NOTE — DISCHARGE INSTRUCTIONS
Electrical Cardioversion: Care Instructions  Your Care Instructions    Electrical cardioversion is a treatment for an abnormal heartbeat. For example, it may be used to treat atrial fibrillation. In cardioversion, a brief electric shock is given to the heart to reset its rhythm. The shock comes through patches that are put on the outside of your chest. Cardioversion most often restores the heartbeat to normal.  After the procedure, you may have redness where the patches were. (This may look like a sunburn.) Do not drive until the day after a cardioversion. You can eat and drink when you feel ready to. Your doctor may have you take medicines daily to help the heart beat in a normal way and to prevent blood clots. Your doctor may give you medicine before and after cardioversion. This is to help keep your heart in a normal rhythm after the procedure. Instead of electric cardioversion, your doctor may try to change your heartbeat to a normal rhythm by giving you medicine. This is most often done in a clinic or hospital.  You may have had a sedative to help you relax. You may be unsteady after having sedation. It can take a few hours for the medicine's effects to wear off. Common side effects of sedation include nausea, vomiting, and feeling sleepy or tired. The doctor has checked you carefully, but problems can develop later. If you notice any problems or new symptoms, get medical treatment right away. Follow-up care is a key part of your treatment and safety. Be sure to make and go to all appointments, and call your doctor if you are having problems. It's also a good idea to know your test results and keep a list of the medicines you take. How can you care for yourself at home? Medicines  ? · If the doctor gave you a sedative:  ¨ For 24 hours, don't do anything that requires attention to detail. It takes time for the medicine's effects to completely wear off.   ¨ For your safety, do not drive or operate any machinery that could be dangerous. Wait until the medicine wears off and you can think clearly and react easily. ? · Take your medicines exactly as prescribed. Call your doctor if you think you are having a problem with your medicine. You may take some of the following medicines:  ¨ Antiarrhythmic medicines such as amiodarone (Cordarone). ¨ Beta-blockers such as propranolol (Inderal), metoprolol (Lopressor), or carvedilol (Coreg). ¨ Calcium channel blockers such as diltiazem (Cardizem) or verapamil (Calan). ¨ Digoxin (Lanoxin). You will get more details on the specific medicines your doctor prescribes. ? · If you take a blood thinner, be sure you get instructions about how to take your medicine safely. Blood thinners can cause serious bleeding problems. ? · Do not take any vitamins, over-the-counter medicines, or herbal products without talking to your doctor first.   ? Lifestyle changes  ? · Do not smoke. Smoking increases your risk of stroke and heart attack. If you need help quitting, talk to your doctor about stop-smoking programs and medicines. These can increase your chances of quitting for good. ? · Eat heart-healthy foods. ? · Limit alcohol to 2 drinks a day for men and 1 drink a day for women. Activity  ? · If your doctor recommends it, get more exercise. Walking is a good choice. Bit by bit, increase the amount you walk every day. Try for 30 minutes on most days of the week. You also may want to swim, bike, or do other activities. ? · When you exercise, watch for signs that your heart is working too hard. You are pushing too hard if you cannot talk while you are exercising. If you become short of breath or dizzy or have chest pain, sit down and rest immediately. ? · Check your pulse regularly. Place two fingers on the artery at the palm side of your wrist in line with your thumb. If your heartbeat seems uneven or fast, talk to your doctor. When should you call for help?   Call [phone] anytime you think you may need emergency care. For example, call if:  ? · You have trouble breathing. ? · You passed out (lost consciousness). ? · You cough up pink, foamy mucus and you have trouble breathing. ? · You have symptoms of a heart attack. These may include:  ¨ Chest pain or pressure, or a strange feeling in the chest.  ¨ Sweating. ¨ Shortness of breath. ¨ Nausea or vomiting. ¨ Pain, pressure, or a strange feeling in the back, neck, jaw, or upper belly or in one or both shoulders or arms. ¨ Lightheadedness or sudden weakness. ¨ A fast or irregular heartbeat. After you call 911, the  may tell you to chew 1 adult-strength or 2 to 4 low-dose aspirin. Wait for an ambulance. Do not try to drive yourself. ? · You have symptoms of a stroke. These may include:  ¨ Sudden numbness, tingling, weakness, or loss of movement in your face, arm, or leg, especially on only one side of your body. ¨ Sudden vision changes. ¨ Sudden trouble speaking. ¨ Sudden confusion or trouble understanding simple statements. ¨ Sudden problems with walking or balance. ¨ A sudden, severe headache that is different from past headaches. ?Call your doctor now or seek immediate medical care if:  ? · You have new or worse nausea or vomiting. ? · You have new or increased shortness of breath. ? · You are dizzy or lightheaded, or you feel like you may faint. ? · You have sudden weight gain, such as more than 2 to 3 pounds in a day or 5 pounds in a week. ? · You have increased swelling in your legs, ankles, or feet. ? Watch closely for changes in your health, and be sure to contact your doctor if you have any problems. Where can you learn more? Go to http://juan carlos-sherry.info/. Enter S279 in the search box to learn more about \"Electrical Cardioversion: Care Instructions. \"  Current as of: September 21, 2016  Content Version: 11.4  © 1909-6381 [employer].  Care instructions adapted under license by [employer] (which disclaims liability or warranty for this information). If you have questions about a medical condition or this instruction, always ask your healthcare professional. [address] any warranty or liability for your use of this information.

## 2018-06-07 NOTE — PROGRESS NOTES
Patient received to [address] [place] [address] room # 8  Ambulatory from [facility]. Patient scheduled for CVN today with Dr Nicolasa SALAZAR [facility]. Procedure reviewed & questions answered, voiced good understanding consent obtained & placed on chart. All medications and medical history reviewed. Will prep patient per orders. Patient & family updated on plan of care. The patient has a fraility score of 3-MANAGING WELL, based on patient's ability to perform ADL's independently, patient A&Ox3, patient able to ambulate to room without difficulty.

## 2018-06-07 NOTE — PROGRESS NOTES
Report received from [address] [place] [address] for continue of care post cardioversion. Patient easily arousible with no complaints. Wife at bedside.

## 2018-06-07 NOTE — IP AVS SNAPSHOT
[place] [facility] 
 
 
 [address] [facility] [address] [facility] 
[phone] Patient: Pierce Dave MRN: [id number] YOF:[id number] Discharge Summary 6/7/2018 Pierce Dave MRN[de-identified]  [id number] Admission Information Provider Pager Service Admission Date Expected D/C Date Agnes Lancaster MD  CARDIAC CATH LAB 6/7/2018 Actual LOS Patient Class 0 days OUTPATIENT Follow-up Information Follow up With Details Comments Contact Info Agnes Lancaster MD On 6/13/2018 at 12:15 in the [place] office for cardioversion recheck [address] [facility] [address] 
[phone] My Medications ASK your physician about these medications Instructions Each Dose to Equal  
 Morning Noon Evening Bedtime  
 amiodarone 100 mg tablet Commonly known as:  Pat Mass Your last dose was: Your next dose is: Take 1 Tab by mouth two (2) times a day. 100 mg  
    
   
   
   
  
 amLODIPine 10 mg tablet Commonly known as:  Sidney Katie Your last dose was: Your next dose is: Take 1 Tab by mouth daily. 10 mg ANTIHISTAMINE PO Your last dose was: Your next dose is: Take 1 Tab by mouth as needed. 1 Tab  
    
   
   
   
  
 apixaban 5 mg tablet Commonly known as:  Verl Nailer Your last dose was: Your next dose is: Take 5 mg by mouth two (2) times a day. 5 mg  
    
   
   
   
  
 atenolol 25 mg tablet Commonly known as:  TENORMIN Your last dose was: Your next dose is: Take 1 Tab by mouth two (2) times a day. 25 mg  
    
   
   
   
  
 cloNIDine HCl 0.2 mg tablet Commonly known as:  CATAPRES Your last dose was: Your next dose is: Take 0.2 mg by mouth three (3) times daily as needed.   
 0.2 mg  
    
   
   
   
  
 olmesartan 40 mg tablet Commonly known as:  [employer] Your last dose was: Your next dose is: Take 1 Tab by mouth daily. am  
 40 mg PROBIOTIC 4X PO Your last dose was: Your next dose is: Take 1 Tab by mouth as needed. 1 Tab General Information Please provide this summary of care documentation to your next provider. Allergies Unspecified:  Amoxicillin;  Levaquin [Levofloxacin] Current Immunizations  Reviewed on 11/11/2016 Name Date Influenza High Dose Vaccine PF 11/11/2016 Pneumococcal Conjugate (PCV-13) 11/10/2016 Pneumococcal Polysaccharide (PPSV-23) 5/19/2011 Discharge Instructions Discharge Instructions Electrical Cardioversion: Care Instructions Your Care Instructions Electrical cardioversion is a treatment for an abnormal heartbeat. For example, it may be used to treat atrial fibrillation. In cardioversion, a brief electric shock is given to the heart to reset its rhythm. The shock comes through patches that are put on the outside of your chest. Cardioversion most often restores the heartbeat to normal. 
After the procedure, you may have redness where the patches were. (This may look like a sunburn.) Do not drive until the day after a cardioversion. You can eat and drink when you feel ready to. Your doctor may have you take medicines daily to help the heart beat in a normal way and to prevent blood clots. Your doctor may give you medicine before and after cardioversion. This is to help keep your heart in a normal rhythm after the procedure. Instead of electric cardioversion, your doctor may try to change your heartbeat to a normal rhythm by giving you medicine. This is most often done in a clinic or hospital. 
You may have had a sedative to help you relax.  You may be unsteady after having sedation. It can take a few hours for the medicine's effects to wear off. Common side effects of sedation include nausea, vomiting, and feeling sleepy or tired. The doctor has checked you carefully, but problems can develop later. If you notice any problems or new symptoms, get medical treatment right away. Follow-up care is a key part of your treatment and safety. Be sure to make and go to all appointments, and call your doctor if you are having problems. It's also a good idea to know your test results and keep a list of the medicines you take. How can you care for yourself at home? Medicines ? · If the doctor gave you a sedative: ¨ For 24 hours, don't do anything that requires attention to detail. It takes time for the medicine's effects to completely wear off. ¨ For your safety, do not drive or operate any machinery that could be dangerous. Wait until the medicine wears off and you can think clearly and react easily. ? · Take your medicines exactly as prescribed. Call your doctor if you think you are having a problem with your medicine. You may take some of the following medicines: ¨ Antiarrhythmic medicines such as amiodarone (Cordarone). ¨ Beta-blockers such as propranolol (Inderal), metoprolol (Lopressor), or carvedilol (Coreg). ¨ Calcium channel blockers such as diltiazem (Cardizem) or verapamil (Calan). ¨ Digoxin (Lanoxin). You will get more details on the specific medicines your doctor prescribes. ? · If you take a blood thinner, be sure you get instructions about how to take your medicine safely. Blood thinners can cause serious bleeding problems. ? · Do not take any vitamins, over-the-counter medicines, or herbal products without talking to your doctor first. ? Lifestyle changes ? · Do not smoke. Smoking increases your risk of stroke and heart attack.  If you need help quitting, talk to your doctor about stop-smoking programs and medicines. These can increase your chances of quitting for good. ? · Eat heart-healthy foods. ? · Limit alcohol to 2 drinks a day for men and 1 drink a day for women. Activity ? · If your doctor recommends it, get more exercise. Walking is a good choice. Bit by bit, increase the amount you walk every day. Try for 30 minutes on most days of the week. You also may want to swim, bike, or do other activities. ? · When you exercise, watch for signs that your heart is working too hard. You are pushing too hard if you cannot talk while you are exercising. If you become short of breath or dizzy or have chest pain, sit down and rest immediately. ? · Check your pulse regularly. Place two fingers on the artery at the palm side of your wrist in line with your thumb. If your heartbeat seems uneven or fast, talk to your doctor. When should you call for help? Call 911 anytime you think you may need emergency care. For example, call if: 
? · You have trouble breathing. ? · You passed out (lost consciousness). ? · You cough up pink, foamy mucus and you have trouble breathing. ? · You have symptoms of a heart attack. These may include: ¨ Chest pain or pressure, or a strange feeling in the chest. 
¨ Sweating. ¨ Shortness of breath. ¨ Nausea or vomiting. ¨ Pain, pressure, or a strange feeling in the back, neck, jaw, or upper belly or in one or both shoulders or arms. ¨ Lightheadedness or sudden weakness. ¨ A fast or irregular heartbeat. After you call 911, the  may tell you to chew 1 adult-strength or 2 to 4 low-dose aspirin. Wait for an ambulance. Do not try to drive yourself. ? · You have symptoms of a stroke. These may include: 
¨ Sudden numbness, tingling, weakness, or loss of movement in your face, arm, or leg, especially on only one side of your body. ¨ Sudden vision changes. ¨ Sudden trouble speaking. ¨ Sudden confusion or trouble understanding simple statements. ¨ Sudden problems with walking or balance. ¨ A sudden, severe headache that is different from past headaches. ?Call your doctor now or seek immediate medical care if: 
? · You have new or worse nausea or vomiting. ? · You have new or increased shortness of breath. ? · You are dizzy or lightheaded, or you feel like you may faint. ? · You have sudden weight gain, such as more than 2 to 3 pounds in a day or 5 pounds in a week. ? · You have increased swelling in your legs, ankles, or feet. ? Watch closely for changes in your health, and be sure to contact your doctor if you have any problems. Where can you learn more? Go to http://juan carlos-sherry.info/. Enter R658 in the search box to learn more about \"Electrical Cardioversion: Care Instructions. \" Current as of: September 21, 2016 Content Version: 11.4 © 1759-1074 [employer]. Care instructions adapted under license by [employer] (which disclaims liability or warranty for this information). If you have questions about a medical condition or this instruction, always ask your healthcare professional. [address] any warranty or liability for your use of this information. Discharge Orders None  
  
` Patient Signature:  ____________________________________________________________ Date:  ____________________________________________________________  
  
 [place] Provider Signature:  ____________________________________________________________ Date:  ____________________________________________________________

## 2018-06-07 NOTE — PROGRESS NOTES
Cardioversion done by Dr Lucas Humphrey. ASA 2 Mallampati 2. Pt now SB. Versed 6mg fentanyl 50mcg.  Pt tolerated procedure well

## 2018-07-25 ENCOUNTER — HOSPITAL ENCOUNTER (OUTPATIENT)
Dept: SLEEP MEDICINE | Age: 73
Discharge: HOME | End: 2018-07-25
Payer: MEDICARE

## 2018-07-25 PROBLEM — A04.72 C. DIFFICILE COLITIS: Status: ACTIVE | Noted: 2018-07-25

## 2018-07-25 PROCEDURE — 95810 POLYSOM 6/> YRS 4/> PARAM: CPT

## 2018-09-11 ENCOUNTER — HOSPITAL ENCOUNTER (OUTPATIENT)
Dept: SLEEP MEDICINE | Age: 73
Discharge: HOME | End: 2018-09-11
Attending: INTERNAL MEDICINE
Payer: MEDICARE

## 2018-09-11 PROCEDURE — 95811 POLYSOM 6/>YRS CPAP 4/> PARM: CPT

## 2018-11-15 PROBLEM — G47.33 OSA (OBSTRUCTIVE SLEEP APNEA): Status: ACTIVE | Noted: 2018-11-15

## 2018-11-15 PROBLEM — Z72.821 INADEQUATE SLEEP HYGIENE: Status: ACTIVE | Noted: 2018-11-15

## 2019-01-11 ENCOUNTER — HOSPITAL ENCOUNTER (OUTPATIENT)
Dept: GENERAL RADIOLOGY | Age: 74
Discharge: HOME | End: 2019-01-11
Attending: FAMILY MEDICINE
Payer: MEDICARE

## 2019-01-11 DIAGNOSIS — M25.552 LEFT HIP PAIN: ICD-10-CM

## 2019-01-11 PROCEDURE — 73502 X-RAY EXAM HIP UNI 2-3 VIEWS: CPT

## 2019-11-09 ENCOUNTER — HOSPITAL ENCOUNTER (INPATIENT)
Age: 74
LOS: 1 days | Discharge: HOME | DRG: 981 | End: 2019-11-10
Attending: EMERGENCY MEDICINE | Admitting: INTERNAL MEDICINE
Payer: MEDICARE

## 2019-11-09 ENCOUNTER — APPOINTMENT (OUTPATIENT)
Dept: GENERAL RADIOLOGY | Age: 74
DRG: 981 | End: 2019-11-09
Attending: EMERGENCY MEDICINE
Payer: MEDICARE

## 2019-11-09 DIAGNOSIS — R09.02 HYPOXEMIA: ICD-10-CM

## 2019-11-09 DIAGNOSIS — I50.9 ACUTE ON CHRONIC CONGESTIVE HEART FAILURE, UNSPECIFIED HEART FAILURE TYPE (HCC): Primary | ICD-10-CM

## 2019-11-09 PROBLEM — I50.42 CHRONIC COMBINED SYSTOLIC AND DIASTOLIC CONGESTIVE HEART FAILURE (HCC): Status: ACTIVE | Noted: 2018-05-14

## 2019-11-09 PROBLEM — G47.33 OSA (OBSTRUCTIVE SLEEP APNEA): Chronic | Status: ACTIVE | Noted: 2018-11-15

## 2019-11-09 PROBLEM — I48.0 PAROXYSMAL A-FIB (HCC): Chronic | Status: ACTIVE | Noted: 2018-05-14

## 2019-11-09 PROBLEM — I50.42 CHRONIC COMBINED SYSTOLIC AND DIASTOLIC CONGESTIVE HEART FAILURE (HCC): Chronic | Status: ACTIVE | Noted: 2018-05-14

## 2019-11-09 PROBLEM — J96.01 ACUTE RESPIRATORY FAILURE WITH HYPOXIA (HCC): Status: ACTIVE | Noted: 2019-11-09

## 2019-11-09 LAB
ALBUMIN SERPL-MCNC: 3.2 G/DL (ref 3.2–4.6)
ALBUMIN/GLOB SERPL: 0.8 {RATIO} (ref 1.2–3.5)
ALP SERPL-CCNC: 83 U/L (ref 50–136)
ALT SERPL-CCNC: 22 U/L (ref 12–65)
ANION GAP SERPL CALC-SCNC: 11 MMOL/L (ref 7–16)
AST SERPL-CCNC: 18 U/L (ref 15–37)
ATRIAL RATE: 288 BPM
BASOPHILS # BLD: 0.1 K/UL (ref 0–0.2)
BASOPHILS NFR BLD: 1 % (ref 0–2)
BILIRUB SERPL-MCNC: 0.4 MG/DL (ref 0.2–1.1)
BNP SERPL-MCNC: 741 PG/ML
BUN SERPL-MCNC: 22 MG/DL (ref 8–23)
CALCIUM SERPL-MCNC: 8.5 MG/DL (ref 8.3–10.4)
CALCULATED R AXIS, ECG10: 64 DEGREES
CALCULATED T AXIS, ECG11: -171 DEGREES
CHLORIDE SERPL-SCNC: 102 MMOL/L (ref 98–107)
CO2 SERPL-SCNC: 25 MMOL/L (ref 21–32)
CREAT SERPL-MCNC: 1.2 MG/DL (ref 0.8–1.5)
DIAGNOSIS, 93000: NORMAL
DIFFERENTIAL METHOD BLD: ABNORMAL
EOSINOPHIL # BLD: 0.3 K/UL (ref 0–0.8)
EOSINOPHIL NFR BLD: 3 % (ref 0.5–7.8)
ERYTHROCYTE [DISTWIDTH] IN BLOOD BY AUTOMATED COUNT: 13.3 % (ref 11.9–14.6)
GLOBULIN SER CALC-MCNC: 3.9 G/DL (ref 2.3–3.5)
GLUCOSE SERPL-MCNC: 170 MG/DL (ref 65–100)
HCT VFR BLD AUTO: 40.1 % (ref 41.1–50.3)
HGB BLD-MCNC: 13.8 G/DL (ref 13.6–17.2)
IMM GRANULOCYTES # BLD AUTO: 0.1 K/UL (ref 0–0.5)
IMM GRANULOCYTES NFR BLD AUTO: 1 % (ref 0–5)
LACTATE BLD-SCNC: 1.82 MMOL/L (ref 0.5–1.9)
LYMPHOCYTES # BLD: 1.5 K/UL (ref 0.5–4.6)
LYMPHOCYTES NFR BLD: 18 % (ref 13–44)
MCH RBC QN AUTO: 31.4 PG (ref 26.1–32.9)
MCHC RBC AUTO-ENTMCNC: 34.4 G/DL (ref 31.4–35)
MCV RBC AUTO: 91.1 FL (ref 79.6–97.8)
MONOCYTES # BLD: 0.7 K/UL (ref 0.1–1.3)
MONOCYTES NFR BLD: 8 % (ref 4–12)
NEUTS SEG # BLD: 6 K/UL (ref 1.7–8.2)
NEUTS SEG NFR BLD: 70 % (ref 43–78)
NRBC # BLD: 0 K/UL (ref 0–0.2)
PLATELET # BLD AUTO: 292 K/UL (ref 150–450)
PMV BLD AUTO: 9.4 FL (ref 9.4–12.3)
POTASSIUM SERPL-SCNC: 3.7 MMOL/L (ref 3.5–5.1)
PROCALCITONIN SERPL-MCNC: <0.1 NG/ML
PROT SERPL-MCNC: 7.1 G/DL (ref 6.3–8.2)
Q-T INTERVAL, ECG07: 432 MS
QRS DURATION, ECG06: 162 MS
QTC CALCULATION (BEZET), ECG08: 486 MS
RBC # BLD AUTO: 4.4 M/UL (ref 4.23–5.6)
SODIUM SERPL-SCNC: 138 MMOL/L (ref 136–145)
TROPONIN I BLD-MCNC: 0.03 NG/ML (ref 0.02–0.05)
VENTRICULAR RATE, ECG03: 76 BPM
WBC # BLD AUTO: 8.5 K/UL (ref 4.3–11.1)

## 2019-11-09 PROCEDURE — 96374 THER/PROPH/DIAG INJ IV PUSH: CPT | Performed by: EMERGENCY MEDICINE

## 2019-11-09 PROCEDURE — 93005 ELECTROCARDIOGRAM TRACING: CPT | Performed by: EMERGENCY MEDICINE

## 2019-11-09 PROCEDURE — 74011000250 HC RX REV CODE- 250: Performed by: EMERGENCY MEDICINE

## 2019-11-09 PROCEDURE — 99285 EMERGENCY DEPT VISIT HI MDM: CPT | Performed by: EMERGENCY MEDICINE

## 2019-11-09 PROCEDURE — 94640 AIRWAY INHALATION TREATMENT: CPT

## 2019-11-09 PROCEDURE — 77030013032 HC MSK BPAP/CPAP FISP -B

## 2019-11-09 PROCEDURE — 94660 CPAP INITIATION&MGMT: CPT

## 2019-11-09 PROCEDURE — 74011250637 HC RX REV CODE- 250/637: Performed by: INTERNAL MEDICINE

## 2019-11-09 PROCEDURE — 65270000029 HC RM PRIVATE

## 2019-11-09 PROCEDURE — 83880 ASSAY OF NATRIURETIC PEPTIDE: CPT

## 2019-11-09 PROCEDURE — 85025 COMPLETE CBC W/AUTO DIFF WBC: CPT

## 2019-11-09 PROCEDURE — 94664 DEMO&/EVAL PT USE INHALER: CPT

## 2019-11-09 PROCEDURE — 94760 N-INVAS EAR/PLS OXIMETRY 1: CPT

## 2019-11-09 PROCEDURE — 84484 ASSAY OF TROPONIN QUANT: CPT

## 2019-11-09 PROCEDURE — 77030021668 HC NEB PREFIL KT VYRM -A

## 2019-11-09 PROCEDURE — 77030012793 HC CIRC VNTLTR FISP -B

## 2019-11-09 PROCEDURE — 74011000258 HC RX REV CODE- 258: Performed by: INTERNAL MEDICINE

## 2019-11-09 PROCEDURE — 77030020256 HC SOL INJ NACL 0.9%  500ML

## 2019-11-09 PROCEDURE — 80053 COMPREHEN METABOLIC PANEL: CPT

## 2019-11-09 PROCEDURE — 83605 ASSAY OF LACTIC ACID: CPT

## 2019-11-09 PROCEDURE — 74011250636 HC RX REV CODE- 250/636: Performed by: INTERNAL MEDICINE

## 2019-11-09 PROCEDURE — 87040 BLOOD CULTURE FOR BACTERIA: CPT

## 2019-11-09 PROCEDURE — 71045 X-RAY EXAM CHEST 1 VIEW: CPT

## 2019-11-09 PROCEDURE — 74011250636 HC RX REV CODE- 250/636: Performed by: EMERGENCY MEDICINE

## 2019-11-09 PROCEDURE — 74011000250 HC RX REV CODE- 250: Performed by: INTERNAL MEDICINE

## 2019-11-09 PROCEDURE — 84145 PROCALCITONIN (PCT): CPT

## 2019-11-09 RX ORDER — OLMESARTAN MEDOXOMIL 20 MG/1
40 TABLET ORAL DAILY
Status: DISCONTINUED | OUTPATIENT
Start: 2019-11-10 | End: 2019-11-10 | Stop reason: HOSPADM

## 2019-11-09 RX ORDER — AMLODIPINE BESYLATE 10 MG/1
10 TABLET ORAL DAILY
Status: DISCONTINUED | OUTPATIENT
Start: 2019-11-10 | End: 2019-11-10 | Stop reason: HOSPADM

## 2019-11-09 RX ORDER — CLONIDINE HYDROCHLORIDE 0.1 MG/1
0.1 TABLET ORAL 4 TIMES DAILY
COMMUNITY
End: 2019-11-25

## 2019-11-09 RX ORDER — CLONIDINE HYDROCHLORIDE 0.1 MG/1
0.2 TABLET ORAL 2 TIMES DAILY
Status: DISCONTINUED | OUTPATIENT
Start: 2019-11-09 | End: 2019-11-10 | Stop reason: HOSPADM

## 2019-11-09 RX ORDER — GUAIFENESIN 600 MG/1
1200 TABLET, EXTENDED RELEASE ORAL 2 TIMES DAILY
Status: DISCONTINUED | OUTPATIENT
Start: 2019-11-09 | End: 2019-11-10 | Stop reason: HOSPADM

## 2019-11-09 RX ORDER — FUROSEMIDE 40 MG/1
40 TABLET ORAL 2 TIMES DAILY
Status: DISCONTINUED | OUTPATIENT
Start: 2019-11-09 | End: 2019-11-10 | Stop reason: HOSPADM

## 2019-11-09 RX ORDER — CARVEDILOL 6.25 MG/1
6.25 TABLET ORAL 2 TIMES DAILY WITH MEALS
Status: DISCONTINUED | OUTPATIENT
Start: 2019-11-10 | End: 2019-11-10 | Stop reason: HOSPADM

## 2019-11-09 RX ORDER — ONDANSETRON 2 MG/ML
4 INJECTION INTRAMUSCULAR; INTRAVENOUS
Status: DISCONTINUED | OUTPATIENT
Start: 2019-11-09 | End: 2019-11-10 | Stop reason: HOSPADM

## 2019-11-09 RX ORDER — HYDROCODONE BITARTRATE AND ACETAMINOPHEN 5; 325 MG/1; MG/1
1 TABLET ORAL
Status: DISCONTINUED | OUTPATIENT
Start: 2019-11-09 | End: 2019-11-10 | Stop reason: HOSPADM

## 2019-11-09 RX ORDER — AZITHROMYCIN 250 MG/1
250 TABLET, FILM COATED ORAL DAILY
Status: DISCONTINUED | OUTPATIENT
Start: 2019-11-10 | End: 2019-11-10 | Stop reason: HOSPADM

## 2019-11-09 RX ORDER — FUROSEMIDE 10 MG/ML
80 INJECTION INTRAMUSCULAR; INTRAVENOUS
Status: COMPLETED | OUTPATIENT
Start: 2019-11-09 | End: 2019-11-09

## 2019-11-09 RX ORDER — SODIUM CHLORIDE 0.9 % (FLUSH) 0.9 %
5-40 SYRINGE (ML) INJECTION EVERY 8 HOURS
Status: DISCONTINUED | OUTPATIENT
Start: 2019-11-09 | End: 2019-11-10 | Stop reason: HOSPADM

## 2019-11-09 RX ORDER — BISACODYL 5 MG
10 TABLET, DELAYED RELEASE (ENTERIC COATED) ORAL DAILY PRN
Status: DISCONTINUED | OUTPATIENT
Start: 2019-11-09 | End: 2019-11-10 | Stop reason: HOSPADM

## 2019-11-09 RX ORDER — DIPHENHYDRAMINE HCL 25 MG
25 CAPSULE ORAL
Status: DISCONTINUED | OUTPATIENT
Start: 2019-11-09 | End: 2019-11-10 | Stop reason: HOSPADM

## 2019-11-09 RX ORDER — IPRATROPIUM BROMIDE AND ALBUTEROL SULFATE 2.5; .5 MG/3ML; MG/3ML
3 SOLUTION RESPIRATORY (INHALATION)
Status: COMPLETED | OUTPATIENT
Start: 2019-11-09 | End: 2019-11-09

## 2019-11-09 RX ORDER — SODIUM CHLORIDE 0.9 % (FLUSH) 0.9 %
5-40 SYRINGE (ML) INJECTION AS NEEDED
Status: DISCONTINUED | OUTPATIENT
Start: 2019-11-09 | End: 2019-11-10 | Stop reason: HOSPADM

## 2019-11-09 RX ORDER — ZOLPIDEM TARTRATE 5 MG/1
5 TABLET ORAL
Status: DISCONTINUED | OUTPATIENT
Start: 2019-11-09 | End: 2019-11-10 | Stop reason: HOSPADM

## 2019-11-09 RX ORDER — LORAZEPAM 0.5 MG/1
0.5 TABLET ORAL
Status: DISCONTINUED | OUTPATIENT
Start: 2019-11-09 | End: 2019-11-10 | Stop reason: HOSPADM

## 2019-11-09 RX ORDER — ACETAMINOPHEN 325 MG/1
650 TABLET ORAL
Status: DISCONTINUED | OUTPATIENT
Start: 2019-11-09 | End: 2019-11-10 | Stop reason: HOSPADM

## 2019-11-09 RX ORDER — BUDESONIDE 0.5 MG/2ML
500 INHALANT ORAL
Status: DISCONTINUED | OUTPATIENT
Start: 2019-11-09 | End: 2019-11-10 | Stop reason: HOSPADM

## 2019-11-09 RX ORDER — IPRATROPIUM BROMIDE AND ALBUTEROL SULFATE 2.5; .5 MG/3ML; MG/3ML
3 SOLUTION RESPIRATORY (INHALATION)
Status: DISCONTINUED | OUTPATIENT
Start: 2019-11-09 | End: 2019-11-10 | Stop reason: HOSPADM

## 2019-11-09 RX ORDER — AZITHROMYCIN 250 MG/1
500 TABLET, FILM COATED ORAL
Status: COMPLETED | OUTPATIENT
Start: 2019-11-09 | End: 2019-11-09

## 2019-11-09 RX ORDER — NALOXONE HYDROCHLORIDE 0.4 MG/ML
0.4 INJECTION, SOLUTION INTRAMUSCULAR; INTRAVENOUS; SUBCUTANEOUS AS NEEDED
Status: DISCONTINUED | OUTPATIENT
Start: 2019-11-09 | End: 2019-11-10 | Stop reason: HOSPADM

## 2019-11-09 RX ADMIN — IPRATROPIUM BROMIDE AND ALBUTEROL SULFATE 3 ML: .5; 3 SOLUTION RESPIRATORY (INHALATION) at 16:29

## 2019-11-09 RX ADMIN — Medication 5 ML: at 21:09

## 2019-11-09 RX ADMIN — BUDESONIDE 500 MCG: 0.5 INHALANT RESPIRATORY (INHALATION) at 20:51

## 2019-11-09 RX ADMIN — FUROSEMIDE 40 MG: 40 TABLET ORAL at 21:11

## 2019-11-09 RX ADMIN — CEFTRIAXONE 1 G: 1 INJECTION, POWDER, FOR SOLUTION INTRAMUSCULAR; INTRAVENOUS at 21:07

## 2019-11-09 RX ADMIN — AZITHROMYCIN MONOHYDRATE 500 MG: 250 TABLET ORAL at 21:08

## 2019-11-09 RX ADMIN — FUROSEMIDE 80 MG: 10 INJECTION, SOLUTION INTRAMUSCULAR; INTRAVENOUS at 16:29

## 2019-11-09 RX ADMIN — APIXABAN 5 MG: 5 TABLET, FILM COATED ORAL at 21:08

## 2019-11-09 RX ADMIN — IPRATROPIUM BROMIDE AND ALBUTEROL SULFATE 3 ML: .5; 3 SOLUTION RESPIRATORY (INHALATION) at 20:51

## 2019-11-09 RX ADMIN — CLONIDINE HYDROCHLORIDE 0.2 MG: 0.1 TABLET ORAL at 21:08

## 2019-11-09 RX ADMIN — GUAIFENESIN 1200 MG: 600 TABLET ORAL at 21:08

## 2019-11-09 NOTE — PROGRESS NOTES
Patient placed on BiPAP:       11/09/19 1629   Oxygen Therapy   O2 Sat (%) 93 %   Pulse via Oximetry 77 beats per minute   O2 Device BIPAP   FIO2 (%) 40 %   Respiratory   Respiratory (WDL) X   CPAP/BIPAP   CPAP/BIPAP Start/Stop On   Device Mode BIPAP;S/T   Mask Type and Size Full face; Large   Skin Condition Intact   PIP Observed 12 cm H20   IPAP (cm H2O) 12 cm H2O   EPAP (cm H2O) 8 cm H2O   Inspiratory Time (sec) 1 seconds   Vt Spont (ml) 849 ml   Ve Observed (l/min) 16.3 l/min   Backup Rate 16   Total RR (Spontaneous) 19 breaths per minute   Insp Rise Time (sec) 2   Alarm Settings   High Pressure 40   Low Pressure 2   Apnea 30   Low Ve 2   High Rate 40   Low Rate 8   Pulmonary Toilet   Pulmonary Toilet H. O.B elevated

## 2019-11-09 NOTE — H&P
Hospitalist Note     Admit Date:  2019  4:02 PM   Name:  Lisa Akhtar   Age:  66 y.o.  :  1945   MRN:  [id number]   PCP:  Ananya Martinez MD  Treatment Team: Attending Provider: Mayda Ray MD; Primary Nurse: Denice Smith RN    HPI/Subjective:   Pt is a 65 y/o M with HTN, PAF, MARISOL, former smoker 25 years ago, mild CHF EF 45-50% on previous echo, who presented with worsening SOB. Symptoms progressing over 2 weeks. Worse with exertion. A/w dry cough, malaise, fatigue. sinus congestion, watery eyes, occasional rhinorrhea for which he has been taking OTC meds. No edema, orthopnea, CP, fevers. Has chronic diarrhea ongoing for over a year, followed by GI. No abd pain, urinary changes. no fluid intake or medication changes. 10 systems reviewed and negative except as noted in HPI.   Past Medical History:   Diagnosis Date    Arrhythmia     Cancer ([address]     basal cell    Elevated PSA     Erectile dysfunction     Hypertension     daily meds    MVP (mitral valve prolapse)     diagnosed many years ago, pt states does not see cardiologist, in youth    MARISOL (obstructive sleep apnea) 11/15/2018    Osteoarthritis     thumbs    Prostate cancer ([address]     S/P colonoscopy     due in       Past Surgical History:   Procedure Laterality Date    BIOPSY PROSTATE      HX APPENDECTOMY        Allergies   Allergen Reactions    Amoxicillin Swelling     Diff breathing, lip swelling    Levaquin [Levofloxacin] Shortness of Breath      Social History     Tobacco Use    Smoking status: Former Smoker     Last attempt to quit: 1997     Years since quittin.2    Smokeless tobacco: Never Used   Substance Use Topics    Alcohol use: Yes     Comment: 15 drinks per wk      Family History   Problem Relation Age of Onset    Cancer Father         prostate    Stroke Father     Cancer Paternal Uncle     Heart Disease Mother     Heart Attack Maternal Uncle     Prostate Cancer Sister Immunization History   Administered Date(s) Administered    Influenza High Dose Vaccine PF 11/11/2016, 11/02/2018    Pneumococcal Conjugate (PCV-13) 11/10/2016    Pneumococcal Polysaccharide (PPSV-23) 05/19/2011     PTA Medications:  Prior to Admission Medications   Prescriptions Last Dose Informant Patient Reported? Taking? B infantis/B ani/B taco/B bifid (PROBIOTIC 4X PO)   Yes No   Sig: Take 1 Tab by mouth as needed. Blood Pressure Kit-Extra Large kit   No No   Sig: Use to check blood pressure. DISABLED PLACARD (DISABLED PLACARD) DMV   No No   Sig: Apply to car   amLODIPine (NORVASC) 10 mg tablet   Yes No   Sig: Take 10 mg by mouth daily. apixaban (ELIQUIS) 5 mg tablet   No No   Sig: Take 1 Tab by mouth two (2) times a day. carvedilol (COREG) 6.25 mg tablet   No No   Sig: Take 1 Tab by mouth two (2) times daily (with meals). cloNIDine HCl (CATAPRES) 0.2 mg tablet   No No   Sig: Take 1 Tab by mouth two (2) times a day. furosemide (LASIX) 40 mg tablet   No No   Sig: Take 1 Tab by mouth two (2) times a day. miscellaneous medical supply misc   Yes No   Sig: by Does Not Apply route. olmesartan (BENICAR) 40 mg tablet   No No   Sig: Take 1 Tab by mouth daily. am      Facility-Administered Medications: None       Objective:     Patient Vitals for the past 24 hrs:   Pulse Resp BP SpO2   11/09/19 1736    92 %   11/09/19 1703 67 10  97 %   11/09/19 1629    93 %   11/09/19 1607 81 23 123/74 93 %     Oxygen Therapy  O2 Sat (%): 92 % (11/09/19 1736)  Pulse via Oximetry: 66 beats per minute (11/09/19 1736)  O2 Device: Nasal cannula (11/09/19 1736)  O2 Flow Rate (L/min): 4 l/min (11/09/19 1736)  FIO2 (%): 40 % (11/09/19 1629)    Estimated body mass index is 29.25 kg/m² as calculated from the following:    Height as of 10/10/19: 6' 3\" (1.905 m). Weight as of 10/10/19: 106.1 kg (234 lb).     No intake or output data in the 24 hours ending 11/09/19 2588    *Note that automatically entered I/Os may not be accurate; dependent on patient compliance with collection and accurate  by assistants. Physical Exam:  General:    Well nourished. Alert. Appears SOB, fatigued  Eyes:   Normal sclerae. Extraocular movements intact. HENT:  Normocephalic, atraumatic. Moist mucous membranes  CV:   RRR. No m/r/g. No JVD  Lungs:  Bibasilar rales, no wheezing. Rhonchi LLL  Abdomen: Soft, nontender, nondistended. Extremities: Warm and dry. No cyanosis or edema. Neurologic: CN II-XII grossly intact. Sensation intact. Skin:     No rashes or jaundice. Normal coloration  Psych:  Normal mood and affect. I reviewed the labs, imaging, EKGs, telemetry, and other studies done this admission. Data Review:   Recent Results (from the past 24 hour(s))   CBC WITH AUTOMATED DIFF    Collection Time: 11/09/19  4:13 PM   Result Value Ref Range    WBC 8.5 4.3 - 11.1 K/uL    RBC 4.40 4.23 - 5.6 M/uL    HGB 13.8 13.6 - 17.2 g/dL    HCT 40.1 (L) 41.1 - 50.3 %    MCV 91.1 79.6 - 97.8 FL    MCH 31.4 26.1 - 32.9 PG    MCHC 34.4 31.4 - 35.0 g/dL    RDW 13.3 11.9 - 14.6 %    PLATELET 154 630 - 621 K/uL    MPV 9.4 9.4 - 12.3 FL    ABSOLUTE NRBC 0.00 0.0 - 0.2 K/uL    DF AUTOMATED      NEUTROPHILS 70 43 - 78 %    LYMPHOCYTES 18 13 - 44 %    MONOCYTES 8 4.0 - 12.0 %    EOSINOPHILS 3 0.5 - 7.8 %    BASOPHILS 1 0.0 - 2.0 %    IMMATURE GRANULOCYTES 1 0.0 - 5.0 %    ABS. NEUTROPHILS 6.0 1.7 - 8.2 K/UL    ABS. LYMPHOCYTES 1.5 0.5 - 4.6 K/UL    ABS. MONOCYTES 0.7 0.1 - 1.3 K/UL    ABS. EOSINOPHILS 0.3 0.0 - 0.8 K/UL    ABS. BASOPHILS 0.1 0.0 - 0.2 K/UL    ABS. IMM.  GRANS. 0.1 0.0 - 0.5 K/UL   METABOLIC PANEL, COMPREHENSIVE    Collection Time: 11/09/19  4:13 PM   Result Value Ref Range    Sodium 138 136 - 145 mmol/L    Potassium 3.7 3.5 - 5.1 mmol/L    Chloride 102 98 - 107 mmol/L    CO2 25 21 - 32 mmol/L    Anion gap 11 7 - 16 mmol/L    Glucose 170 (H) 65 - 100 mg/dL    BUN 22 8 - 23 MG/DL    Creatinine 1.20 0.8 - 1.5 MG/DL    GFR est AA >60 >60 ml/min/1.73m2    GFR est non-AA >60 >60 ml/min/1.73m2    Calcium 8.5 8.3 - 10.4 MG/DL    Bilirubin, total 0.4 0.2 - 1.1 MG/DL    ALT (SGPT) 22 12 - 65 U/L    AST (SGOT) 18 15 - 37 U/L    Alk. phosphatase 83 50 - 136 U/L    Protein, total 7.1 6.3 - 8.2 g/dL    Albumin 3.2 3.2 - 4.6 g/dL    Globulin 3.9 (H) 2.3 - 3.5 g/dL    A-G Ratio 0.8 (L) 1.2 - 3.5     POC TROPONIN-I    Collection Time: 11/09/19  4:15 PM   Result Value Ref Range    Troponin-I (POC) 0.03 0.02 - 0.05 ng/ml   EKG, 12 LEAD, INITIAL    Collection Time: 11/09/19  4:16 PM   Result Value Ref Range    Ventricular Rate 76 BPM    Atrial Rate 288 BPM    QRS Duration 162 ms    Q-T Interval 432 ms    QTC Calculation (Bezet) 486 ms    Calculated R Axis 64 degrees    Calculated T Axis -171 degrees    Diagnosis       Atrial flutter with variable A-V block  Left bundle branch block  Abnormal ECG  When compared with ECG of 07-JUN-2018 08:13,  Atrial flutter has replaced Sinus rhythm  Vent. rate has increased BY  27 BPM  Questionable change in QRS axis  T wave inversion more evident in Lateral leads     NT-PRO BNP    Collection Time: 11/09/19  4:30 PM   Result Value Ref Range    NT pro- (H) <450 PG/ML       All Micro Results     Procedure Component Value Units Date/Time    CULTURE, BLOOD [[id number]]     Order Status:  Sent Specimen:  Blood     CULTURE, BLOOD [[id number]]     Order Status:  Sent Specimen:  Blood           Current Facility-Administered Medications   Medication Dose Route Frequency    furosemide (LASIX) injection 80 mg  80 mg IntraVENous NOW     Current Outpatient Medications   Medication Sig    amLODIPine (NORVASC) 10 mg tablet Take 10 mg by mouth daily.  apixaban (ELIQUIS) 5 mg tablet Take 1 Tab by mouth two (2) times a day.  furosemide (LASIX) 40 mg tablet Take 1 Tab by mouth two (2) times a day.  cloNIDine HCl (CATAPRES) 0.2 mg tablet Take 1 Tab by mouth two (2) times a day.     olmesartan (BENICAR) 40 mg tablet Take 1 Tab by mouth daily. am    carvedilol (COREG) 6.25 mg tablet Take 1 Tab by mouth two (2) times daily (with meals).  miscellaneous medical supply misc by Does Not Apply route.  Blood Pressure Kit-Extra Large kit Use to check blood pressure.  DISABLED PLACARD (DISABLED PLACARD) DMV Apply to car    B infantis/B ani/B taco/B bifid (PROBIOTIC 4X PO) Take 1 Tab by mouth as needed. Other Studies:  Xr Chest Port    Result Date: 11/9/2019  CHEST X-RAY, one view. HISTORY:  Shortness of breath TECHNIQUE:  AP portable view. COMPARISON: May 2018     FINDINGS / IMPRESSION:  Bibasilar infiltrates in the lower lung zones with obscuration left hemidiaphragm. Heart size probably normal.       Assessment and Plan:     Hospital Problems as of 11/9/2019 Date Reviewed: 10/3/2019          Codes Class Noted - Resolved POA    * (Principal) Acute respiratory failure with hypoxia ([address] ICD-10-CM: J96.01  ICD-9-CM: 518.81  11/9/2019 - Present Yes        MARISOL (obstructive sleep apnea) (Chronic) ICD-10-CM: G47.33  ICD-9-CM: 327.23  11/15/2018 - Present Yes        Chronic combined systolic and diastolic congestive heart failure (HCC) (Chronic) ICD-10-CM: I50.42  ICD-9-CM: 428.42, 428.0  5/14/2018 - Present Yes    Overview Signed 11/9/2019  5:54 PM by Soniya Liao MD     EF 45-50%              Paroxysmal A-fib [facility]) (Chronic) ICD-10-CM: I48.0  ICD-9-CM: 427.31  5/14/2018 - Present Yes        Hypertension, essential (Chronic) ICD-10-CM: I10  ICD-9-CM: 401.9  1/13/2016 - Present Yes              Plan:  · Inpatient  · Pt appears to have a viral or bacterial bronchitis type picture with sinus symptoms, dry cough, malaise, fatigue, SOB. Rhonchi LLL. Pro-BNP not elevated for his age group, no leg swelling, no orthopnea which lends itself away from CHF diagnosis. On the other hand his lungs do have some bibasilar rales/coarse BS so OK to give the dose of lasix ordered by ER 80mg and re-assess.    · Start azithromycin and rocephin in case of early PNA; rhonchi LLL  · Nebs  · mucinex  · No wheezing currently, hold off on systemic steroids  · Wean oxygen as able, currently on 4L. Does not use at home.   · Home CPAP    Discharge planning:  Home when improved  DVT ppx: home eliquis  Code status:  Full  Estimated LOS:  Greater than 2 midnights  Risk:  high    Signed:  Nii Lizarraga MD

## 2019-11-09 NOTE — PROGRESS NOTES
Pt removed from BiPAP and placed on nasal cannula per Destinee Ponce MD.       11/09/19 5438   Oxygen Therapy   O2 Sat (%) 92 %   Pulse via Oximetry 66 beats per minute   O2 Device Nasal cannula   O2 Flow Rate (L/min) 4 l/min

## 2019-11-09 NOTE — ED PROVIDER NOTES
28-year-old gentleman with history of CHF presents with acute shortness of breath. Sats were about 75% on room air when EMS picked him up. They instituted BiPAP in route with oxygen, but no nebulizer and no Lasix. Patient states he is actually felt poorly for about a week with some mild dyspnea and nonproductive cough, he has a trace of swelling in his legs which is no worse than usual.  He states he has been compliant with his Lasix and other medicines as prescribed. Today he was doing some yard work when he became acutely more congested and dyspneic. Started developing some bloody phlegm coughed. Eventually he became short of breath enough that he called EMS. There is been no chest pain/heaviness/tightness.            Past Medical History:   Diagnosis Date    Arrhythmia     Cancer ([address]     basal cell    Elevated PSA     Erectile dysfunction     Hypertension     daily meds    MVP (mitral valve prolapse)     diagnosed many years ago, pt states does not see cardiologist, in youth    MARISOL (obstructive sleep apnea) 11/15/2018    Osteoarthritis     thumbs    Prostate cancer ([facility]     S/P colonoscopy 2015    due in 2020       Past Surgical History:   Procedure Laterality Date    BIOPSY PROSTATE      HX APPENDECTOMY           Family History:   Problem Relation Age of Onset    Cancer Father         prostate    Stroke Father     Cancer Paternal Uncle     Heart Disease Mother     Heart Attack Maternal Uncle     Prostate Cancer Sister        Social History     Socioeconomic History    Marital status: LIFE PARTNER     Spouse name: Not on file    Number of children: Not on file    Years of education: Not on file    Highest education level: Not on file   Occupational History    Not on file   Social Needs    Financial resource strain: Not on file    Food insecurity:     Worry: Not on file     Inability: Not on file    Transportation needs:     Medical: Not on file     Non-medical: Not on file Tobacco Use    Smoking status: Former Smoker     Last attempt to quit: 1997     Years since quittin.2    Smokeless tobacco: Never Used   Substance and Sexual Activity    Alcohol use: Yes     Comment: 15 drinks per wk    Drug use: No    Sexual activity: Not on file   Lifestyle    Physical activity:     Days per week: Not on file     Minutes per session: Not on file    Stress: Not on file   Relationships    Social connections:     Talks on phone: Not on file     Gets together: Not on file     Attends [employer] service: Not on file     Active member of club or organization: Not on file     Attends meetings of clubs or organizations: Not on file     Relationship status: Not on file    Intimate partner violence:     Fear of current or ex partner: Not on file     Emotionally abused: Not on file     Physically abused: Not on file     Forced sexual activity: Not on file   Other Topics Concern    Not on file   Social History Narrative    Not on file         ALLERGIES: Amoxicillin and Levaquin [levofloxacin]    Review of Systems   Constitutional: Negative for chills and fever. HENT: Negative for rhinorrhea and sore throat. Eyes: Negative for discharge and redness. Respiratory: Positive for cough, shortness of breath and wheezing. Cardiovascular: Positive for leg swelling. Negative for chest pain and palpitations. Gastrointestinal: Negative for abdominal pain, nausea and vomiting. Musculoskeletal: Negative for arthralgias and back pain. Skin: Negative for rash. Neurological: Negative for dizziness and headaches. All other systems reviewed and are negative. There were no vitals filed for this visit. Physical Exam   Constitutional: He is oriented to person, place, and time. He appears well-developed and well-nourished. Non-toxic appearance. He does not appear ill. No distress.    Talking in reasonably complete sentences despite EMS BiPAP mask with valve   HENT:   Head: Normocephalic and atraumatic. Eyes: Pupils are equal, round, and reactive to light. Conjunctivae are normal. Right eye exhibits no discharge. Left eye exhibits no discharge. No scleral icterus. Neck: Normal range of motion. Neck supple. Cardiovascular: Normal rate, regular rhythm and normal heart sounds. Exam reveals no gallop. No murmur heard. Pulmonary/Chest: Effort normal. No respiratory distress. He has wheezes in the right upper field, the right middle field, the right lower field, the left upper field, the left middle field and the left lower field. He has rales in the right middle field, the right lower field and the left lower field. Abdominal: Soft. Bowel sounds are normal. There is no tenderness. There is no guarding. Musculoskeletal: Normal range of motion. He exhibits no edema. Neurological: He is alert and oriented to person, place, and time. He exhibits normal muscle tone. cni 2-12 grossly   Skin: Skin is warm and dry. He is not diaphoretic. Psychiatric: He has a normal mood and affect. His behavior is normal.   Nursing note and vitals reviewed. MDM  Number of Diagnoses or Management Options  Acute on chronic congestive heart failure, unspecified heart failure type [facility]): Hypoxemia:   Diagnosis management comments: Medical decision making note:  Dyspnea with hypoxemia and CHF  This concludes the \"medical decision making note\" part of this emergency department visit note.            Procedures

## 2019-11-09 NOTE — ED NOTES
TRANSFER - OUT REPORT:    Verbal report given to CLAUDIA Chan Cha on Jamie Lara  being transferred to [address] [place] [address] for routine progression of care       Report consisted of patients Situation, Background, Assessment and   Recommendations(SBAR). Information from the following report(s) SBAR, ED Summary, Intake/Output, MAR and Cardiac Rhythm NSR was reviewed with the receiving nurse. Lines:   Peripheral IV 11/09/19 Left Forearm (Active)   Site Assessment Clean, dry, & intact 11/9/2019  4:06 PM   Phlebitis Assessment 0 11/9/2019  4:06 PM   Infiltration Assessment 0 11/9/2019  4:06 PM   Dressing Status Clean, dry, & intact 11/9/2019  4:06 PM        Opportunity for questions and clarification was provided.       Patient transported with:   Kickboard

## 2019-11-09 NOTE — ED TRIAGE NOTES
Pt was brought in by EMS with shortness of breath. States he was in his yard and got short of breath and felt bad. O2 sat on arrival 74%. Extensive cardiac hx with CHF and was coughing pink frothy sputum per EMS. IV intact placed by ems.

## 2019-11-10 ENCOUNTER — APPOINTMENT (OUTPATIENT)
Dept: GENERAL RADIOLOGY | Age: 74
DRG: 981 | End: 2019-11-10
Attending: EMERGENCY MEDICINE
Payer: MEDICARE

## 2019-11-10 ENCOUNTER — APPOINTMENT (OUTPATIENT)
Dept: GENERAL RADIOLOGY | Age: 74
DRG: 981 | End: 2019-11-10
Attending: INTERNAL MEDICINE
Payer: MEDICARE

## 2019-11-10 ENCOUNTER — HOSPITAL ENCOUNTER (INPATIENT)
Age: 74
LOS: 15 days | Discharge: NURSING, REHAB OR LONG-TERM CARE | DRG: 981 | End: 2019-11-25
Attending: EMERGENCY MEDICINE | Admitting: INTERNAL MEDICINE
Payer: MEDICARE

## 2019-11-10 VITALS
BODY MASS INDEX: 30.71 KG/M2 | HEIGHT: 75 IN | TEMPERATURE: 97.6 F | OXYGEN SATURATION: 94 % | SYSTOLIC BLOOD PRESSURE: 145 MMHG | DIASTOLIC BLOOD PRESSURE: 72 MMHG | RESPIRATION RATE: 16 BRPM | HEART RATE: 89 BPM | WEIGHT: 247 LBS

## 2019-11-10 DIAGNOSIS — J81.0 ACUTE PULMONARY EDEMA (HCC): Primary | ICD-10-CM

## 2019-11-10 DIAGNOSIS — E87.20 LACTIC ACIDOSIS: ICD-10-CM
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
## 2019-11-10 DIAGNOSIS — J18.9 HAP (HOSPITAL-ACQUIRED PNEUMONIA): ICD-10-CM

## 2019-11-10 DIAGNOSIS — I50.9 ACUTE HEART FAILURE, UNSPECIFIED HEART FAILURE TYPE (HCC): ICD-10-CM

## 2019-11-10 DIAGNOSIS — I50.42 CHRONIC COMBINED SYSTOLIC AND DIASTOLIC CONGESTIVE HEART FAILURE (HCC): Chronic | ICD-10-CM

## 2019-11-10 DIAGNOSIS — A04.72 C. DIFFICILE COLITIS: ICD-10-CM

## 2019-11-10 DIAGNOSIS — J96.01 ACUTE RESPIRATORY FAILURE WITH HYPOXIA (HCC): ICD-10-CM

## 2019-11-10 DIAGNOSIS — R91.8 PULMONARY INFILTRATES: ICD-10-CM

## 2019-11-10 DIAGNOSIS — J96.00 ACUTE RESPIRATORY FAILURE, UNSPECIFIED WHETHER WITH HYPOXIA OR HYPERCAPNIA (HCC): ICD-10-CM

## 2019-11-10 DIAGNOSIS — J80 ARDS (ADULT RESPIRATORY DISTRESS SYNDROME) (HCC): ICD-10-CM

## 2019-11-10 DIAGNOSIS — N17.9 AKI (ACUTE KIDNEY INJURY) (HCC): ICD-10-CM

## 2019-11-10 DIAGNOSIS — G47.33 OSA (OBSTRUCTIVE SLEEP APNEA): Chronic | ICD-10-CM

## 2019-11-10 DIAGNOSIS — I48.0 PAROXYSMAL A-FIB (HCC): Chronic | ICD-10-CM

## 2019-11-10 LAB
ALBUMIN SERPL-MCNC: 2.9 G/DL (ref 3.2–4.6)
ALBUMIN/GLOB SERPL: 0.8 {RATIO} (ref 1.2–3.5)
ALP SERPL-CCNC: 80 U/L (ref 50–136)
ALT SERPL-CCNC: 23 U/L (ref 12–65)
ANION GAP SERPL CALC-SCNC: 9 MMOL/L (ref 7–16)
APPEARANCE UR: CLEAR
ARTERIAL PATENCY WRIST A: ABNORMAL
ARTERIAL PATENCY WRIST A: YES
AST SERPL-CCNC: 31 U/L (ref 15–37)
BACTERIA URNS QL MICRO: ABNORMAL /HPF
BASE DEFICIT BLD-SCNC: 2 MMOL/L
BASE EXCESS BLD CALC-SCNC: 2 MMOL/L
BASOPHILS # BLD: 0.1 K/UL (ref 0–0.2)
BASOPHILS NFR BLD: 0 % (ref 0–2)
BDY SITE: ABNORMAL
BDY SITE: ABNORMAL
BILIRUB SERPL-MCNC: 0.5 MG/DL (ref 0.2–1.1)
BILIRUB UR QL: NEGATIVE
BNP SERPL-MCNC: 584 PG/ML
BODY TEMPERATURE: 98.6
BODY TEMPERATURE: 98.6
BUN SERPL-MCNC: 17 MG/DL (ref 8–23)
CA-I BLD-MCNC: 1.21 MMOL/L (ref 1.12–1.32)
CALCIUM SERPL-MCNC: 8.1 MG/DL (ref 8.3–10.4)
CHLORIDE SERPL-SCNC: 105 MMOL/L (ref 98–107)
CO2 BLD-SCNC: 28 MMOL/L
CO2 SERPL-SCNC: 24 MMOL/L (ref 21–32)
COLLECT TIME,HTIME: 1844
COLLECT TIME,HTIME: 2253
COLOR UR: YELLOW
CREAT SERPL-MCNC: 1.5 MG/DL (ref 0.8–1.5)
DIFFERENTIAL METHOD BLD: ABNORMAL
EOSINOPHIL # BLD: 0.3 K/UL (ref 0–0.8)
EOSINOPHIL NFR BLD: 3 % (ref 0.5–7.8)
ERYTHROCYTE [DISTWIDTH] IN BLOOD BY AUTOMATED COUNT: 13.5 % (ref 11.9–14.6)
EXHALED MINUTE VOLUME, VE: 12.1 L/MIN
EXHALED MINUTE VOLUME, VE: 9.7 L/MIN
GAS FLOW.O2 O2 DELIVERY SYS: ABNORMAL L/MIN
GAS FLOW.O2 O2 DELIVERY SYS: ABNORMAL L/MIN
GAS FLOW.O2 SETTING OXYMISER: 20 BPM
GAS FLOW.O2 SETTING OXYMISER: 24 BPM
GLOBULIN SER CALC-MCNC: 3.8 G/DL (ref 2.3–3.5)
GLUCOSE BLD STRIP.AUTO-MCNC: 234 MG/DL (ref 65–100)
GLUCOSE SERPL-MCNC: 227 MG/DL (ref 65–100)
GLUCOSE UR STRIP.AUTO-MCNC: NEGATIVE MG/DL
HCO3 BLD-SCNC: 26.6 MMOL/L (ref 22–26)
HCO3 BLD-SCNC: 27 MMOL/L (ref 22–26)
HCT VFR BLD AUTO: 42.2 % (ref 41.1–50.3)
HGB BLD-MCNC: 13.9 G/DL (ref 13.6–17.2)
HGB UR QL STRIP: NEGATIVE
IMM GRANULOCYTES # BLD AUTO: 0.1 K/UL (ref 0–0.5)
IMM GRANULOCYTES NFR BLD AUTO: 1 % (ref 0–5)
INSPIRATION.DURATION SETTING TIME VENT: 0.8 SEC
INSPIRATION.DURATION SETTING TIME VENT: 1 SEC
KETONES UR QL STRIP.AUTO: NEGATIVE MG/DL
LACTATE BLD-SCNC: 3.33 MMOL/L (ref 0.5–1.9)
LEUKOCYTE ESTERASE UR QL STRIP.AUTO: NEGATIVE
LYMPHOCYTES # BLD: 2.2 K/UL (ref 0.5–4.6)
LYMPHOCYTES NFR BLD: 18 % (ref 13–44)
MCH RBC QN AUTO: 31.2 PG (ref 26.1–32.9)
MCHC RBC AUTO-ENTMCNC: 32.9 G/DL (ref 31.4–35)
MCV RBC AUTO: 94.8 FL (ref 79.6–97.8)
MONOCYTES # BLD: 0.8 K/UL (ref 0.1–1.3)
MONOCYTES NFR BLD: 7 % (ref 4–12)
NEUTS SEG # BLD: 8.8 K/UL (ref 1.7–8.2)
NEUTS SEG NFR BLD: 72 % (ref 43–78)
NITRITE UR QL STRIP.AUTO: NEGATIVE
NRBC # BLD: 0 K/UL (ref 0–0.2)
O2/TOTAL GAS SETTING VFR VENT: 100 %
O2/TOTAL GAS SETTING VFR VENT: 80 %
OTHER OBSERVATIONS,UCOM: ABNORMAL
PCO2 BLD: 42.2 MMHG (ref 35–45)
PCO2 BLD: 61.7 MMHG (ref 35–45)
PEEP RESPIRATORY: 10 CMH2O
PEEP RESPIRATORY: 8 CMH2O
PH BLD: 7.25 [PH] (ref 7.35–7.45)
PH BLD: 7.41 [PH] (ref 7.35–7.45)
PH UR STRIP: 5.5 [PH] (ref 5–9)
PLATELET # BLD AUTO: 288 K/UL (ref 150–450)
PMV BLD AUTO: 9.7 FL (ref 9.4–12.3)
PO2 BLD: 123 MMHG (ref 75–100)
PO2 BLD: 77 MMHG (ref 75–100)
POTASSIUM BLD-SCNC: 3.5 MMOL/L (ref 3.5–5.1)
POTASSIUM SERPL-SCNC: 4.5 MMOL/L (ref 3.5–5.1)
PROCALCITONIN SERPL-MCNC: 0.1 NG/ML
PROT SERPL-MCNC: 6.7 G/DL (ref 6.3–8.2)
PROT UR STRIP-MCNC: 30 MG/DL
RBC # BLD AUTO: 4.45 M/UL (ref 4.23–5.6)
RBC #/AREA URNS HPF: ABNORMAL /HPF
SAO2 % BLD: 92 % (ref 95–98)
SAO2 % BLD: 99 % (ref 95–98)
SERVICE CMNT-IMP: ABNORMAL
SODIUM BLD-SCNC: 138 MMOL/L (ref 136–145)
SODIUM SERPL-SCNC: 138 MMOL/L (ref 136–145)
SP GR UR REFRACTOMETRY: 1.01 (ref 1–1.02)
SPECIMEN TYPE: ABNORMAL
SPECIMEN TYPE: ABNORMAL
TROPONIN I SERPL-MCNC: 0.04 NG/ML (ref 0.02–0.05)
TROPONIN I SERPL-MCNC: <0.02 NG/ML (ref 0.02–0.05)
UROBILINOGEN UR QL STRIP.AUTO: 0.2 EU/DL (ref 0.2–1)
VENTILATION MODE VENT: ABNORMAL
VENTILATION MODE VENT: ABNORMAL
VT SETTING VENT: 500 ML
VT SETTING VENT: 500 ML
WBC # BLD AUTO: 12.3 K/UL (ref 4.3–11.1)
WBC URNS QL MICRO: ABNORMAL /HPF

## 2019-11-10 PROCEDURE — 87040 BLOOD CULTURE FOR BACTERIA: CPT

## 2019-11-10 PROCEDURE — 74018 RADEX ABDOMEN 1 VIEW: CPT

## 2019-11-10 PROCEDURE — 81001 URINALYSIS AUTO W/SCOPE: CPT

## 2019-11-10 PROCEDURE — 74011250637 HC RX REV CODE- 250/637: Performed by: INTERNAL MEDICINE

## 2019-11-10 PROCEDURE — 74011250636 HC RX REV CODE- 250/636: Performed by: EMERGENCY MEDICINE

## 2019-11-10 PROCEDURE — 0T9B70Z DRAINAGE OF BLADDER WITH DRAINAGE DEVICE, VIA NATURAL OR ARTIFICIAL OPENING: ICD-10-PCS | Performed by: EMERGENCY MEDICINE

## 2019-11-10 PROCEDURE — 74011250636 HC RX REV CODE- 250/636: Performed by: INTERNAL MEDICINE

## 2019-11-10 PROCEDURE — 77010033678 HC OXYGEN DAILY

## 2019-11-10 PROCEDURE — 96365 THER/PROPH/DIAG IV INF INIT: CPT | Performed by: EMERGENCY MEDICINE

## 2019-11-10 PROCEDURE — 96375 TX/PRO/DX INJ NEW DRUG ADDON: CPT | Performed by: EMERGENCY MEDICINE

## 2019-11-10 PROCEDURE — 85025 COMPLETE CBC W/AUTO DIFF WBC: CPT

## 2019-11-10 PROCEDURE — 80053 COMPREHEN METABOLIC PANEL: CPT

## 2019-11-10 PROCEDURE — 82803 BLOOD GASES ANY COMBINATION: CPT

## 2019-11-10 PROCEDURE — 84145 PROCALCITONIN (PCT): CPT

## 2019-11-10 PROCEDURE — 99285 EMERGENCY DEPT VISIT HI MDM: CPT | Performed by: EMERGENCY MEDICINE

## 2019-11-10 PROCEDURE — 74011000250 HC RX REV CODE- 250: Performed by: INTERNAL MEDICINE

## 2019-11-10 PROCEDURE — 94760 N-INVAS EAR/PLS OXIMETRY 1: CPT

## 2019-11-10 PROCEDURE — 82947 ASSAY GLUCOSE BLOOD QUANT: CPT

## 2019-11-10 PROCEDURE — 84484 ASSAY OF TROPONIN QUANT: CPT

## 2019-11-10 PROCEDURE — 77030005518 HC CATH URETH FOL 2W BARD -B

## 2019-11-10 PROCEDURE — 93005 ELECTROCARDIOGRAM TRACING: CPT | Performed by: EMERGENCY MEDICINE

## 2019-11-10 PROCEDURE — 36415 COLL VENOUS BLD VENIPUNCTURE: CPT

## 2019-11-10 PROCEDURE — 65620000000 HC RM CCU GENERAL

## 2019-11-10 PROCEDURE — 77030008771 HC TU NG SALEM SUMP -A

## 2019-11-10 PROCEDURE — 94640 AIRWAY INHALATION TREATMENT: CPT

## 2019-11-10 PROCEDURE — 77030040361 HC SLV COMPR DVT MDII -B

## 2019-11-10 PROCEDURE — 99223 1ST HOSP IP/OBS HIGH 75: CPT | Performed by: INTERNAL MEDICINE

## 2019-11-10 PROCEDURE — 87633 RESP VIRUS 12-25 TARGETS: CPT

## 2019-11-10 PROCEDURE — 87070 CULTURE OTHR SPECIMN AEROBIC: CPT

## 2019-11-10 PROCEDURE — 83605 ASSAY OF LACTIC ACID: CPT

## 2019-11-10 PROCEDURE — 71045 X-RAY EXAM CHEST 1 VIEW: CPT

## 2019-11-10 PROCEDURE — 94002 VENT MGMT INPAT INIT DAY: CPT

## 2019-11-10 PROCEDURE — 74011000258 HC RX REV CODE- 258: Performed by: INTERNAL MEDICINE

## 2019-11-10 PROCEDURE — 36600 WITHDRAWAL OF ARTERIAL BLOOD: CPT

## 2019-11-10 PROCEDURE — 83880 ASSAY OF NATRIURETIC PEPTIDE: CPT

## 2019-11-10 PROCEDURE — 51702 INSERT TEMP BLADDER CATH: CPT | Performed by: EMERGENCY MEDICINE

## 2019-11-10 RX ORDER — AZITHROMYCIN 250 MG/1
250 TABLET, FILM COATED ORAL DAILY
Status: DISCONTINUED | OUTPATIENT
Start: 2019-11-11 | End: 2019-11-11

## 2019-11-10 RX ORDER — CARVEDILOL 6.25 MG/1
6.25 TABLET ORAL 2 TIMES DAILY WITH MEALS
Status: DISCONTINUED | OUTPATIENT
Start: 2019-11-11 | End: 2019-11-12

## 2019-11-10 RX ORDER — FUROSEMIDE 10 MG/ML
40 INJECTION INTRAMUSCULAR; INTRAVENOUS
Status: COMPLETED | OUTPATIENT
Start: 2019-11-10 | End: 2019-11-10

## 2019-11-10 RX ORDER — HYDRALAZINE HYDROCHLORIDE 20 MG/ML
10 INJECTION INTRAMUSCULAR; INTRAVENOUS
Status: ACTIVE | OUTPATIENT
Start: 2019-11-10 | End: 2019-11-11

## 2019-11-10 RX ORDER — SODIUM CHLORIDE 0.9 % (FLUSH) 0.9 %
5-40 SYRINGE (ML) INJECTION AS NEEDED
Status: DISCONTINUED | OUTPATIENT
Start: 2019-11-10 | End: 2019-11-25 | Stop reason: HOSPADM

## 2019-11-10 RX ORDER — MIDAZOLAM HYDROCHLORIDE 1 MG/ML
2 INJECTION, SOLUTION INTRAMUSCULAR; INTRAVENOUS ONCE
Status: COMPLETED | OUTPATIENT
Start: 2019-11-10 | End: 2019-11-10

## 2019-11-10 RX ORDER — CEPHALEXIN 500 MG/1
500 CAPSULE ORAL 3 TIMES DAILY
Qty: 15 CAP | Refills: 0 | Status: SHIPPED | OUTPATIENT
Start: 2019-11-10 | End: 2019-11-25

## 2019-11-10 RX ORDER — FUROSEMIDE 10 MG/ML
40 INJECTION INTRAMUSCULAR; INTRAVENOUS DAILY
Status: DISCONTINUED | OUTPATIENT
Start: 2019-11-11 | End: 2019-11-11

## 2019-11-10 RX ORDER — FENTANYL CITRATE 50 UG/ML
100 INJECTION, SOLUTION INTRAMUSCULAR; INTRAVENOUS
Status: COMPLETED | OUTPATIENT
Start: 2019-11-10 | End: 2019-11-10

## 2019-11-10 RX ORDER — AZITHROMYCIN 250 MG/1
250 TABLET, FILM COATED ORAL DAILY
Qty: 5 TAB | Refills: 0 | Status: SHIPPED | OUTPATIENT
Start: 2019-11-10 | End: 2019-11-25

## 2019-11-10 RX ORDER — NALOXONE HYDROCHLORIDE 0.4 MG/ML
1 INJECTION, SOLUTION INTRAMUSCULAR; INTRAVENOUS; SUBCUTANEOUS
Status: DISCONTINUED | OUTPATIENT
Start: 2019-11-10 | End: 2019-11-10

## 2019-11-10 RX ORDER — FENTANYL CITRATE-0.9 % NACL/PF 25 MCG/ML
25-200 PLASTIC BAG, INJECTION (ML) INJECTION
Status: DISCONTINUED | OUTPATIENT
Start: 2019-11-10 | End: 2019-11-12

## 2019-11-10 RX ORDER — SODIUM CHLORIDE 0.9 % (FLUSH) 0.9 %
5-40 SYRINGE (ML) INJECTION EVERY 8 HOURS
Status: DISCONTINUED | OUTPATIENT
Start: 2019-11-10 | End: 2019-11-16

## 2019-11-10 RX ORDER — AMLODIPINE BESYLATE 10 MG/1
10 TABLET ORAL DAILY
Status: DISCONTINUED | OUTPATIENT
Start: 2019-11-11 | End: 2019-11-12

## 2019-11-10 RX ORDER — PROPOFOL 10 MG/ML
0-50 VIAL (ML) INTRAVENOUS
Status: DISCONTINUED | OUTPATIENT
Start: 2019-11-10 | End: 2019-11-11

## 2019-11-10 RX ADMIN — FUROSEMIDE 40 MG: 40 TABLET ORAL at 09:33

## 2019-11-10 RX ADMIN — GUAIFENESIN 1200 MG: 600 TABLET ORAL at 09:32

## 2019-11-10 RX ADMIN — FAMOTIDINE 20 MG: 10 INJECTION INTRAVENOUS at 22:38

## 2019-11-10 RX ADMIN — CEFTRIAXONE 1 G: 1 INJECTION, POWDER, FOR SOLUTION INTRAMUSCULAR; INTRAVENOUS at 22:38

## 2019-11-10 RX ADMIN — CARVEDILOL 6.25 MG: 6.25 TABLET, FILM COATED ORAL at 09:33

## 2019-11-10 RX ADMIN — Medication 25 MCG/HR: at 21:39

## 2019-11-10 RX ADMIN — SODIUM CHLORIDE 2 MG/HR: 900 INJECTION, SOLUTION INTRAVENOUS at 21:38

## 2019-11-10 RX ADMIN — APIXABAN 5 MG: 5 TABLET, FILM COATED ORAL at 23:48

## 2019-11-10 RX ADMIN — IPRATROPIUM BROMIDE AND ALBUTEROL SULFATE 3 ML: .5; 3 SOLUTION RESPIRATORY (INHALATION) at 09:36

## 2019-11-10 RX ADMIN — PROPOFOL 25 MCG/KG/MIN: 10 INJECTION, EMULSION INTRAVENOUS at 18:43

## 2019-11-10 RX ADMIN — BUDESONIDE 500 MCG: 0.5 INHALANT RESPIRATORY (INHALATION) at 09:36

## 2019-11-10 RX ADMIN — CLONIDINE HYDROCHLORIDE 0.2 MG: 0.1 TABLET ORAL at 09:33

## 2019-11-10 RX ADMIN — OLMESARTAN MEDOXOMIL 40 MG: 20 TABLET, FILM COATED ORAL at 09:32

## 2019-11-10 RX ADMIN — MIDAZOLAM 2 MG: 1 INJECTION INTRAMUSCULAR; INTRAVENOUS at 21:04

## 2019-11-10 RX ADMIN — Medication 10 ML: at 22:45

## 2019-11-10 RX ADMIN — FENTANYL CITRATE 100 MCG: 50 INJECTION, SOLUTION INTRAMUSCULAR; INTRAVENOUS at 19:30

## 2019-11-10 RX ADMIN — APIXABAN 5 MG: 5 TABLET, FILM COATED ORAL at 09:33

## 2019-11-10 RX ADMIN — Medication 10 ML: at 05:24

## 2019-11-10 RX ADMIN — FUROSEMIDE 40 MG: 10 INJECTION, SOLUTION INTRAMUSCULAR; INTRAVENOUS at 19:17

## 2019-11-10 RX ADMIN — AMLODIPINE BESYLATE 10 MG: 10 TABLET ORAL at 09:33

## 2019-11-10 NOTE — PHYSICIAN ADVISORY
Letter of Status Determination:  
Recommend hospitalization status INPATIENT  to INPATIENT Status Pt Name:  Liliane Shepherd MR#  
RISHABH # [id number] / 
[id number] [place] / Plan: [facility] MEDICARE PART A AND B / Product Type: Medicare /   
ANIYA#  [id number] [facility]  364/01  @ [address] [place] [address] Hospitalization date  11/9/2019  4:02 PM  
Current Attending Physician  No att. providers found Principal diagnosis  Acute respiratory failure with hypoxia ([facility] [J96.01] Suspected acute bronchitis Clinicals  66 y.o. y.o  male hospitalized with above diagnosis Hypoxic in the ED, 92% on 4 L via NC. He received iv lasix in the ED. He was started on azithromycin and rocephin. He improved much more quickly than expected and is ambulating on room air today without SOB. Patient experienced rapid clinical improvement. [place] (MCG) criteria Does  NOT apply See above STATUS DETERMINATION  Inpatient. Additional comments [place] / [place] / Product Type: Medicare /   
  
 
 
Milton Pascual M.D. Physician Advisor [address] C: [phone] [email or web address]

## 2019-11-10 NOTE — PROGRESS NOTES
Admission assessment complete. A&OX4. Lungs diminished in the bases. Respirations present. Even and unlabored. No s/s of distress. No c/o pain at this time. Call light within reach. Encouraged patient to call for assistance. Patient verbalizes understanding. See Doc Flowsheet for assessment details. Patient resting in bed. Will continue to monitor.

## 2019-11-10 NOTE — PROGRESS NOTES
Discharge instructions reviewed with patient. Opportunity for questions given. Pt instructed to call on Monday for follow up appt PCP, pt voiced understanding. Pt able to leave once ride arrives.

## 2019-11-10 NOTE — ED TRIAGE NOTES
Patient here via EMS from . Per EMS patient was ambulatory into the clinic shouting \"intubate me. \" Patient discharged yesterday for pneumonia. Patient arrives intubated. Patient was 74% on room air. C-pap 89-90%. Given in route 125 mg solumedrol. 2 grams mag, 1 duo-neb pre-intubation, 1 bagged albuterol treatment, 30 mg etomidate, 150 mg succs, 5 mg versed, 10 mg vecc. Dr Lanning Simmonds at bedside.

## 2019-11-10 NOTE — ED PROVIDER NOTES
Patient presents to the ER via EMS for shortness of breath. Apparently patient was discharged from the hospital earlier today for pneumonia heart failure. He reports he began to experience worsening shortness of breath today, was initially seen at urgent care, found to be hypoxic and tachypneic. EMS was called, CPAP was initiated in route with difficulty maintaining stable saturations. Patient was intubated in route. The history is provided by the EMS personnel. The history is limited by the condition of the patient. Respiratory Distress   This is a new problem. The problem occurs frequently. The current episode started 6 to 12 hours ago. The problem has been gradually worsening. Associated symptoms include sputum production. Associated medical issues include pneumonia and heart failure.         Past Medical History:   Diagnosis Date    Arrhythmia     Cancer ([address]     basal cell    Elevated PSA     Erectile dysfunction     Hypertension     daily meds    MVP (mitral valve prolapse)     diagnosed many years ago, pt states does not see cardiologist, in youth    MARISOL (obstructive sleep apnea) 11/15/2018    Osteoarthritis     thumbs    Prostate cancer ([address]     S/P colonoscopy 2015    due in 2020       Past Surgical History:   Procedure Laterality Date    BIOPSY PROSTATE      HX APPENDECTOMY           Family History:   Problem Relation Age of Onset    Cancer Father         prostate    Stroke Father     Cancer Paternal Uncle     Heart Disease Mother     Heart Attack Maternal Uncle     Prostate Cancer Sister        Social History     Socioeconomic History    Marital status: LIFE PARTNER     Spouse name: Not on file    Number of children: Not on file    Years of education: Not on file    Highest education level: Not on file   Occupational History    Not on file   Social Needs    Financial resource strain: Not on file    Food insecurity:     Worry: Not on file     Inability: Not on file   [facility] Transportation needs:     Medical: Not on file     Non-medical: Not on file   Tobacco Use    Smoking status: Former Smoker     Last attempt to quit: 1997     Years since quittin.2    Smokeless tobacco: Never Used   Substance and Sexual Activity    Alcohol use: Yes     Comment: 15 drinks per wk    Drug use: No    Sexual activity: Not on file   Lifestyle    Physical activity:     Days per week: Not on file     Minutes per session: Not on file    Stress: Not on file   Relationships    Social connections:     Talks on phone: Not on file     Gets together: Not on file     Attends Judaism service: Not on file     Active member of club or organization: Not on file     Attends meetings of clubs or organizations: Not on file     Relationship status: Not on file    Intimate partner violence:     Fear of current or ex partner: Not on file     Emotionally abused: Not on file     Physically abused: Not on file     Forced sexual activity: Not on file   Other Topics Concern    Not on file   Social History Narrative    Not on file         ALLERGIES: Amoxicillin and Levaquin [levofloxacin]    Review of Systems   Unable to perform ROS: Acuity of condition   Respiratory: Positive for sputum production. Skin: Negative for color change and pallor. All other systems reviewed and are negative. Vitals:    11/10/19 1832   BP: (!) 152/98   Pulse: 77   Resp: 20   SpO2: 94%   Weight: 112 kg (247 lb)   Height: 6' 3\" (1.905 m)            Physical Exam   Constitutional: He is intubated. HENT:   Head: Normocephalic and atraumatic. Eyes: Pupils are equal, round, and reactive to light. Conjunctivae are normal.   Neck: Normal range of motion. Neck supple. No tracheal deviation present. No thyromegaly present. Cardiovascular: Normal rate and regular rhythm. Pulmonary/Chest: He is intubated. He has rales. Abdominal: Soft. Bowel sounds are normal. He exhibits no distension.    Musculoskeletal: He exhibits no edema.   Vitals reviewed. MDM  Number of Diagnoses or Management Options  Acute pulmonary edema [facility]):   Acute respiratory failure with hypoxia [facility]):   Diagnosis management comments: Obtain portable chest x-ray, basic labs. Continue to monitor symptoms    7:12 PM  ABG shows a pH of 7.25, PaCO2 of 67, PaO2 of 77    Chest x-ray shows worsening bilateral pulmonary edema, good endotracheal tube placement. Patient treated here with IV Lasix. Blood cultures have been obtained    7:44 PM  Patient became increasingly agitated, trying to pull it endotracheal tube, propofol was at max dosing.   Patient was initially given a dose of fentanyl, however followed by some hypotension, will hold sedation, possibly given Narcan if pressures trend down           Amount and/or Complexity of Data Reviewed  Clinical lab tests: ordered and reviewed  Tests in the radiology section of CPT®: ordered and reviewed  Discuss the patient with other providers: yes    Risk of Complications, Morbidity, and/or Mortality  Presenting problems: high  Diagnostic procedures: moderate  Management options: moderate    Critical Care  Total time providing critical care: 30-74 minutes (Critical Care Time 60 Minutes: Excluding all billable procedures, time spent at the bedside directing patients resuscitation, updating family, and coordinating care with consultants  )         Procedures          Results Include:    Recent Results (from the past 24 hour(s))   POC LACTIC ACID    Collection Time: 11/10/19  6:37 PM   Result Value Ref Range    Lactic Acid (POC) 3.33 (H) 0.5 - 1.9 mmol/L   CBC WITH AUTOMATED DIFF    Collection Time: 11/10/19  6:39 PM   Result Value Ref Range    WBC 12.3 (H) 4.3 - 11.1 K/uL    RBC 4.45 4.23 - 5.6 M/uL    HGB 13.9 13.6 - 17.2 g/dL    HCT 42.2 41.1 - 50.3 %    MCV 94.8 79.6 - 97.8 FL    MCH 31.2 26.1 - 32.9 PG    MCHC 32.9 31.4 - 35.0 g/dL    RDW 13.5 11.9 - 14.6 %    PLATELET 136 044 - 779 K/uL    MPV 9.7 9.4 - 12.3 FL ABSOLUTE NRBC 0.00 0.0 - 0.2 K/uL    DF AUTOMATED      NEUTROPHILS 72 43 - 78 %    LYMPHOCYTES 18 13 - 44 %    MONOCYTES 7 4.0 - 12.0 %    EOSINOPHILS 3 0.5 - 7.8 %    BASOPHILS 0 0.0 - 2.0 %    IMMATURE GRANULOCYTES 1 0.0 - 5.0 %    ABS. NEUTROPHILS 8.8 (H) 1.7 - 8.2 K/UL    ABS. LYMPHOCYTES 2.2 0.5 - 4.6 K/UL    ABS. MONOCYTES 0.8 0.1 - 1.3 K/UL    ABS. EOSINOPHILS 0.3 0.0 - 0.8 K/UL    ABS. BASOPHILS 0.1 0.0 - 0.2 K/UL    ABS. IMM. GRANS. 0.1 0.0 - 0.5 K/UL   METABOLIC PANEL, COMPREHENSIVE    Collection Time: 11/10/19  6:39 PM   Result Value Ref Range    Sodium 138 136 - 145 mmol/L    Potassium 4.5 3.5 - 5.1 mmol/L    Chloride 105 98 - 107 mmol/L    CO2 24 21 - 32 mmol/L    Anion gap 9 7 - 16 mmol/L    Glucose 227 (H) 65 - 100 mg/dL    BUN 17 8 - 23 MG/DL    Creatinine 1.50 0.8 - 1.5 MG/DL    GFR est AA 59 (L) >60 ml/min/1.73m2    GFR est non-AA 48 (L) >60 ml/min/1.73m2    Calcium 8.1 (L) 8.3 - 10.4 MG/DL    Bilirubin, total 0.5 0.2 - 1.1 MG/DL    ALT (SGPT) 23 12 - 65 U/L    AST (SGOT) 31 15 - 37 U/L    Alk.  phosphatase 80 50 - 136 U/L    Protein, total 6.7 6.3 - 8.2 g/dL    Albumin 2.9 (L) 3.2 - 4.6 g/dL    Globulin 3.8 (H) 2.3 - 3.5 g/dL    A-G Ratio 0.8 (L) 1.2 - 3.5     PROCALCITONIN    Collection Time: 11/10/19  6:39 PM   Result Value Ref Range    Procalcitonin 0.1 ng/mL   NT-PRO BNP    Collection Time: 11/10/19  6:39 PM   Result Value Ref Range    NT pro- (H) <450 PG/ML   TROPONIN I    Collection Time: 11/10/19  6:39 PM   Result Value Ref Range    Troponin-I, Qt. <0.02 (L) 0.02 - 0.05 NG/ML   POC CG8I    Collection Time: 11/10/19  6:47 PM   Result Value Ref Range    Device: VENT      FIO2 (POC) 100 %    pH (POC) 7.250 (L) 7.35 - 7.45      pCO2 (POC) 61.7 (HH) 35 - 45 MMHG    pO2 (POC) 77 75 - 100 MMHG    HCO3 (POC) 27.0 (H) 22 - 26 MMOL/L    sO2 (POC) 92 (L) 95 - 98 %    Base deficit (POC) 2 mmol/L    Mode Pressure regulated volume control      Tidal volume 500 ml    Set Rate 20 bpm    PEEP/CPAP (POC) 8 cmH2O    Allens test (POC) NOT APPLICABLE      Inspiratory Time 1.0 sec    Site RIGHT BRACHIAL      Patient temp. 98.6      Specimen type (POC) ARTERIAL      Sodium,  136 - 145 MMOL/L    Potassium, POC 3.5 3.5 - 5.1 MMOL/L    Glucose,  (H) 65 - 100 MG/DL    Calcium, ionized (POC) 1.21 1.12 - 1.32 mmol/L    Performed by Vasquez     Critical value read back 18:50     Exhaled minute volume 9.70 L/min    COLLECT TIME 1,844     URINALYSIS W/ RFLX MICROSCOPIC    Collection Time: 11/10/19  7:10 PM   Result Value Ref Range    Color YELLOW      Appearance CLEAR      Specific gravity 1.015 1.001 - 1.023      pH (UA) 5.5 5.0 - 9.0      Protein 30 (A) NEG mg/dL    Glucose NEGATIVE  mg/dL    Ketone NEGATIVE  NEG mg/dL    Bilirubin NEGATIVE  NEG      Blood NEGATIVE  NEG      Urobilinogen 0.2 0.2 - 1.0 EU/dL    Nitrites NEGATIVE  NEG      Leukocyte Esterase NEGATIVE  NEG       Voice dictation software was used during the making of this note. This software is not perfect and grammatical and other typographical errors may be present. This note has been proofread, but may still contain errors.   Quincy Rush MD; 11/10/2019 @8:38 PM   ===================================================================

## 2019-11-10 NOTE — PROGRESS NOTES
Chart screened by  for discharge planning. No needs identified at this time. Please consult  if any new issues arise.     Shaye Fuentes, [id number] [facility]    [address] [facility]  [email or web address]

## 2019-11-10 NOTE — PROGRESS NOTES
Shift assessment complete. Pt pleasant, alert and oriented x4. Respirations even and unlabored. Lung sounds clear on room air. HR irregular. Abdomen soft with active bowel sounds heard in all four quadrants. Skin intact, no edema noted. IVs patent and capped. Pt denies pain, nausea, SOB. All needs met at this time. Safety measures in place, call light within reach. Will continue to monitor.

## 2019-11-10 NOTE — DISCHARGE INSTRUCTIONS
DISCHARGE SUMMARY from Nurse    PATIENT INSTRUCTIONS:    After general anesthesia or intravenous sedation, for 24 hours or while taking prescription Narcotics:  · Limit your activities  · Do not drive and operate hazardous machinery  · Do not make important personal or business decisions  · Do  not drink alcoholic beverages  · If you have not urinated within 8 hours after discharge, please contact your surgeon on call. Report the following to your surgeon:  · Excessive pain, swelling, redness or odor of or around the surgical area  · Temperature over 100.5  · Nausea and vomiting lasting longer than 4 hours or if unable to take medications  · Any signs of decreased circulation or nerve impairment to extremity: change in color, persistent  numbness, tingling, coldness or increase pain  · Any questions    What to do at Home:  *  Please give a list of your current medications to your Primary Care Provider. *  Please update this list whenever your medications are discontinued, doses are      changed, or new medications (including over-the-counter products) are added. *  Please carry medication information at all times in case of emergency situations. These are general instructions for a healthy lifestyle:    No smoking/ No tobacco products/ Avoid exposure to second hand smoke  Surgeon General's Warning:  Quitting smoking now greatly reduces serious risk to your health. Obesity, smoking, and sedentary lifestyle greatly increases your risk for illness    A healthy diet, regular physical exercise & weight monitoring are important for maintaining a healthy lifestyle    You may be retaining fluid if you have a history of heart failure or if you experience any of the following symptoms:  Weight gain of 3 pounds or more overnight or 5 pounds in a week, increased swelling in our hands or feet or shortness of breath while lying flat in bed.   Please call your doctor as soon as you notice any of these symptoms; do not wait until your next office visit. The discharge information has been reviewed with the patient. The patient verbalized understanding. Discharge medications reviewed with the patient and appropriate educational materials and side effects teaching were provided.   ___________________________________________________________________________________________________________________________________

## 2019-11-10 NOTE — DISCHARGE SUMMARY
Hospitalist Discharge Summary     Admit Date:  2019  4:02 PM   Name:  Jojo Montanez   Age:  66 y.o.  :  1945   MRN:  [id number]   PCP:  Vanesa Bang MD  Treatment Team: Attending Provider: Viktoria Negron MD; Utilization Review: [facility]    Problem List for this Hospitalization:  Hospital Problems as of 11/10/2019 Date Reviewed: 10/3/2019          Codes Class Noted - Resolved POA    Pneumonia of lower lobe of lung ([facility] [address] ICD-10-CM: J18.1  ICD-9-CM: 486  11/10/2019 - Present Yes        MARISOL (obstructive sleep apnea) (Chronic) ICD-10-CM: M09.23  ICD-9-CM: 327.23  11/15/2018 - Present Yes        Chronic combined systolic and diastolic congestive heart failure ([facility] [address] (Chronic) ICD-10-CM: I50.42  ICD-9-CM: 428.42, 428.0  2018 - Present Yes    Overview Signed 2019  5:54 PM by Viktoria Negron MD     EF 45-50%              Paroxysmal A-fib [facility]) (Chronic) ICD-10-CM: I48.0  ICD-9-CM: 427.31  2018 - Present Yes        Hypertension, essential (Chronic) ICD-10-CM: I10  ICD-9-CM: 401.9  2016 - Present Yes        * (Principal) RESOLVED: Acute respiratory failure with hypoxia [facility]) ICD-10-CM: J96.01  ICD-9-CM: 518.81  2019 - 11/10/2019 Yes                Admission HPI from 2019:    \"Pt is a 67 y/o M with HTN, PAF, MARISOL, former smoker 25 years ago, mild CHF EF 45-50% on previous echo, who presented with worsening SOB. Symptoms progressing over 2 weeks. Worse with exertion. A/w dry cough, malaise, fatigue. also sinus congestion, watery eyes, occasional rhinorrhea for which he has been taking OTC meds. No edema, orthopnea, CP, fevers. Has chronic diarrhea ongoing for over a year, followed by GI. No abd pain, urinary changes. no fluid intake or medication changes. \"    Hospital Course:  Pt admitted to inpatient given the fact he was on bipap en route and 4L oxygen in the ER.    I suspect a viral or bacterial bronchitis type picture that may have progressed to basilar PNA. Pro-BNP not elevated for his age group, no leg swelling, no orthopnea which lends itself away from CHF diagnosis. He was started on azithromycin and rocephin. He improved much more quickly than expected and is ambulating on room air today without SOB. Says he is feeling much better and would like to go home. It is somewhat unusual that he improved that quickly but given his improvement and unremarkable labs I can find no objections to discharge. He has follow up arranged with Dr Lizzeth Pradhan cardiology for EKG and echo and he will call PCP if he does not continue to improve. Disposition: Home or Self Care  Activity: Activity as tolerated  Diet: DIET REGULAR  Code Status: Full Code      Follow up instructions, discharge meds at bottom of this note. Plan was discussed with pt. All questions answered. Patient was stable at time of discharge. Patient will call a physician or return if any concerns. Diagnostic Imaging/Tests:   Xr Chest Port    Result Date: 11/9/2019  CHEST X-RAY, one view. HISTORY:  Shortness of breath TECHNIQUE:  AP portable view. COMPARISON: May 2018     FINDINGS / IMPRESSION:  Bibasilar infiltrates in the lower lung zones with obscuration left hemidiaphragm. Heart size probably normal.       Echocardiogram results:  No results found for this visit on 11/09/19.     Procedures done this admission:  * No surgery found *    All Micro Results     Procedure Component Value Units Date/Time    CULTURE, BLOOD [[id number]] Collected:  11/09/19 1844    Order Status:  Completed Specimen:  Blood Updated:  11/10/19 0918     Special Requests: --        NO SPECIAL REQUESTS  LEFT  HAND       Culture result: NO GROWTH AFTER 13 HOURS       CULTURE, BLOOD [[id number]] Collected:  11/09/19 1845    Order Status:  Completed Specimen:  Blood Updated:  11/10/19 0918     Special Requests: --        NO SPECIAL REQUESTS  RIGHT  HAND       Culture result: NO GROWTH AFTER 13 HOURS             Labs: Results:       BMP, Mg, Phos Recent Labs     11/09/19  1613      K 3.7      CO2 25   AGAP 11   BUN 22   CREA 1.20   CA 8.5   *      CBC Recent Labs     11/09/19  1613   WBC 8.5   RBC 4.40   HGB 13.8   HCT 40.1*      GRANS 70   LYMPH 18   EOS 3   MONOS 8   BASOS 1   IG 1   ANEU 6.0   ABL 1.5   DARELL 0.3   ABM 0.7   ABB 0.1   AIG 0.1      LFT Recent Labs     11/09/19  1613   SGOT 18   ALT 22   AP 83   TP 7.1   ALB 3.2   GLOB 3.9*   AGRAT 0.8*      Cardiac Testing Lab Results   Component Value Date/Time     05/14/2018 03:34 PM    Troponin-I, Qt. <0.02 (L) 05/15/2018 03:50 AM    Troponin-I, Qt. <0.02 (L) 05/14/2018 09:15 PM      Coagulation Tests Lab Results   Component Value Date/Time    Prothrombin time 17.0 (H) 06/07/2018 07:37 AM    INR 1.4 06/07/2018 07:37 AM      A1c No results found for: HBA1C, HGBE8, ACI7QWDO   Lipid Panel No results found for: CHOL, CHOLPOCT, CHOLX, CHLST, CHOLV, 403831, HDL, HDLP, LDL, LDLC, DLDLP, 271286, VLDLC, VLDL, TGLX, TRIGL, TRIGP, TGLPOCT, CHHD, CHHDX   Thyroid Panel No results found for: TSH, T4, FT4, TT3, T3U, TSHEXT     Most Recent UA Lab Results   Component Value Date/Time    Color YELLOW 12/14/2016 11:45 AM    Appearance CLEAR 12/14/2016 11:45 AM    Specific gravity 1.011 12/14/2016 11:45 AM    pH (UA) 6.5 12/14/2016 11:45 AM    Protein NEGATIVE  12/14/2016 11:45 AM    Glucose NEGATIVE  12/14/2016 11:45 AM    Ketone NEGATIVE  12/14/2016 11:45 AM    Bilirubin NEGATIVE  12/14/2016 11:45 AM    Blood NEGATIVE  12/14/2016 11:45 AM    Urobilinogen 0.2 12/14/2016 11:45 AM    Nitrites NEGATIVE  12/14/2016 11:45 AM    Leukocyte Esterase TRACE (A) 12/14/2016 11:45 AM    WBC 0-3 12/14/2016 11:45 AM    RBC 0 12/14/2016 11:45 AM    Epithelial cells 0 12/14/2016 11:45 AM    Bacteria 0 12/14/2016 11:45 AM    Casts 0 12/14/2016 11:45 AM        Allergies   Allergen Reactions    Amoxicillin Swelling     Diff breathing, lip swelling    Levaquin [Levofloxacin] Shortness of Breath     Immunization History   Administered Date(s) Administered    Influenza High Dose Vaccine PF 11/11/2016, 11/02/2018    Pneumococcal Conjugate (PCV-13) 11/10/2016    Pneumococcal Polysaccharide (PPSV-23) 05/19/2011       All Labs from Last 24 Hrs:  Recent Results (from the past 24 hour(s))   PROCALCITONIN    Collection Time: 11/09/19  4:12 PM   Result Value Ref Range    Procalcitonin <0.1 ng/mL   CBC WITH AUTOMATED DIFF    Collection Time: 11/09/19  4:13 PM   Result Value Ref Range    WBC 8.5 4.3 - 11.1 K/uL    RBC 4.40 4.23 - 5.6 M/uL    HGB 13.8 13.6 - 17.2 g/dL    HCT 40.1 (L) 41.1 - 50.3 %    MCV 91.1 79.6 - 97.8 FL    MCH 31.4 26.1 - 32.9 PG    MCHC 34.4 31.4 - 35.0 g/dL    RDW 13.3 11.9 - 14.6 %    PLATELET 222 761 - 539 K/uL    MPV 9.4 9.4 - 12.3 FL    ABSOLUTE NRBC 0.00 0.0 - 0.2 K/uL    DF AUTOMATED      NEUTROPHILS 70 43 - 78 %    LYMPHOCYTES 18 13 - 44 %    MONOCYTES 8 4.0 - 12.0 %    EOSINOPHILS 3 0.5 - 7.8 %    BASOPHILS 1 0.0 - 2.0 %    IMMATURE GRANULOCYTES 1 0.0 - 5.0 %    ABS. NEUTROPHILS 6.0 1.7 - 8.2 K/UL    ABS. LYMPHOCYTES 1.5 0.5 - 4.6 K/UL    ABS. MONOCYTES 0.7 0.1 - 1.3 K/UL    ABS. EOSINOPHILS 0.3 0.0 - 0.8 K/UL    ABS. BASOPHILS 0.1 0.0 - 0.2 K/UL    ABS. IMM. GRANS. 0.1 0.0 - 0.5 K/UL   METABOLIC PANEL, COMPREHENSIVE    Collection Time: 11/09/19  4:13 PM   Result Value Ref Range    Sodium 138 136 - 145 mmol/L    Potassium 3.7 3.5 - 5.1 mmol/L    Chloride 102 98 - 107 mmol/L    CO2 25 21 - 32 mmol/L    Anion gap 11 7 - 16 mmol/L    Glucose 170 (H) 65 - 100 mg/dL    BUN 22 8 - 23 MG/DL    Creatinine 1.20 0.8 - 1.5 MG/DL    GFR est AA >60 >60 ml/min/1.73m2    GFR est non-AA >60 >60 ml/min/1.73m2    Calcium 8.5 8.3 - 10.4 MG/DL    Bilirubin, total 0.4 0.2 - 1.1 MG/DL    ALT (SGPT) 22 12 - 65 U/L    AST (SGOT) 18 15 - 37 U/L    Alk.  phosphatase 83 50 - 136 U/L    Protein, total 7.1 6.3 - 8.2 g/dL    Albumin 3.2 3.2 - 4.6 g/dL    Globulin 3.9 (H) 2.3 - 3.5 g/dL    A-G Ratio 0.8 (L) 1.2 - 3.5     POC TROPONIN-I    Collection Time: 11/09/19  4:15 PM   Result Value Ref Range    Troponin-I (POC) 0.03 0.02 - 0.05 ng/ml   EKG, 12 LEAD, INITIAL    Collection Time: 11/09/19  4:16 PM   Result Value Ref Range    Ventricular Rate 76 BPM    Atrial Rate 288 BPM    QRS Duration 162 ms    Q-T Interval 432 ms    QTC Calculation (Bezet) 486 ms    Calculated R Axis 64 degrees    Calculated T Axis -171 degrees    Diagnosis       Atrial flutter with variable A-V block  Left bundle branch block  Abnormal ECG  When compared with ECG of 07-JUN-2018 08:13,  Atrial flutter has replaced Sinus rhythm  Vent.  rate has increased BY  27 BPM  Questionable change in QRS axis  T wave inversion more evident in Lateral leads  Confirmed by Hakan Mak ([id number]) on 11/9/2019 7:11:54 PM     NT-PRO BNP    Collection Time: 11/09/19  4:30 PM   Result Value Ref Range    NT pro- (H) <450 PG/ML   POC LACTIC ACID    Collection Time: 11/09/19  6:32 PM   Result Value Ref Range    Lactic Acid (POC) 1.82 0.5 - 1.9 mmol/L   CULTURE, BLOOD    Collection Time: 11/09/19  6:44 PM   Result Value Ref Range    Special Requests: NO SPECIAL REQUESTS  LEFT  HAND        Culture result: NO GROWTH AFTER 13 HOURS     CULTURE, BLOOD    Collection Time: 11/09/19  6:45 PM   Result Value Ref Range    Special Requests: NO SPECIAL REQUESTS  RIGHT  HAND        Culture result: NO GROWTH AFTER 13 HOURS         Current Med List in Hospital:   Current Facility-Administered Medications   Medication Dose Route Frequency    amLODIPine (NORVASC) tablet 10 mg  10 mg Oral DAILY    apixaban (ELIQUIS) tablet 5 mg  5 mg Oral Q12H    carvedilol (COREG) tablet 6.25 mg  6.25 mg Oral BID WITH MEALS    cloNIDine HCl (CATAPRES) tablet 0.2 mg  0.2 mg Oral BID    furosemide (LASIX) tablet 40 mg  40 mg Oral BID    olmesartan (BENICAR) tablet 40 mg  40 mg Oral DAILY    azithromycin (ZITHROMAX) tablet 250 mg  250 mg Oral DAILY    sodium chloride (NS) flush 5-40 mL  5-40 mL IntraVENous Q8H    sodium chloride (NS) flush 5-40 mL  5-40 mL IntraVENous PRN    acetaminophen (TYLENOL) tablet 650 mg  650 mg Oral Q4H PRN    HYDROcodone-acetaminophen (NORCO) 5-325 mg per tablet 1 Tab  1 Tab Oral Q4H PRN    naloxone (NARCAN) injection 0.4 mg  0.4 mg IntraVENous PRN    diphenhydrAMINE (BENADRYL) capsule 25 mg  25 mg Oral Q4H PRN    ondansetron (ZOFRAN) injection 4 mg  4 mg IntraVENous Q4H PRN    bisacodyl (DULCOLAX) tablet 10 mg  10 mg Oral DAILY PRN    LORazepam (ATIVAN) tablet 0.5 mg  0.5 mg Oral Q4H PRN    zolpidem (AMBIEN) tablet 5 mg  5 mg Oral QHS PRN    guaiFENesin ER (MUCINEX) tablet 1,200 mg  1,200 mg Oral BID    albuterol-ipratropium (DUO-NEB) 2.5 MG-0.5 MG/3 ML  3 mL Nebulization Q4H PRN    budesonide (PULMICORT) 500 mcg/2 ml nebulizer suspension  500 mcg Nebulization BID RT    cefTRIAXone (ROCEPHIN) 1 g in 0.9% sodium chloride (MBP/ADV) 50 mL  1 g IntraVENous Q24H       Discharge Exam:  Patient Vitals for the past 24 hrs:   Temp Pulse Resp BP SpO2   11/10/19 0932     94 %   11/10/19 0805 97.6 °F (36.4 °C) 89 16 145/72 95 %   11/10/19 0330 98.2 °F (36.8 °C) 72 16 136/70 93 %   11/09/19 2321 98 °F (36.7 °C) 70 16 138/72 92 %   11/09/19 2052     94 %   11/09/19 1928 98.2 °F (36.8 °C) 67 18 160/58 95 %   11/09/19 1900     92 %   11/09/19 1859    155/76 91 %   11/09/19 1750  66 16  95 %   11/09/19 1736     92 %   11/09/19 1726  65 17  96 %   11/09/19 1703  67 10  97 %   11/09/19 1652  67 16  98 %   11/09/19 1629     93 %   11/09/19 1607  81 23 123/74 93 %     Oxygen Therapy  O2 Sat (%): 94 % (11/10/19 0932)  Pulse via Oximetry: 83 beats per minute (11/10/19 0932)  O2 Device: Room air (11/10/19 0932)  O2 Flow Rate (L/min): 0 l/min (11/10/19 0932)  FIO2 (%): 21 % (11/10/19 0932)    Estimated body mass index is 30.87 kg/m² as calculated from the following:    Height as of this encounter: 6' 3\" (1.905 m).     Weight as of this encounter: 112 kg (247 lb). No intake or output data in the 24 hours ending 11/10/19 1030    *Note that automatically entered I/Os may not be accurate; dependent on patient compliance with collection and accurate  by assistants. General:    Well nourished. Alert. Eyes:   Normal sclerae. Extraocular movements intact. ENT:  Normocephalic, atraumatic. Moist mucous membranes  CV:   Regular rate and rhythm. No murmur, rub, or gallop. Lungs:  slightly diminished at bases  Abdomen: Soft, nontender, nondistended. Bowel sounds normal.   Extremities: Warm and dry. No cyanosis or edema. Neurologic: CN II-XII grossly intact. Sensation intact. Skin:     No rashes or jaundice. Psych:  Normal mood and affect. Discharge Info:   Current Discharge Medication List      START taking these medications    Details   cephALEXin (KEFLEX) 500 mg capsule Take 1 Cap by mouth three (3) times daily for 5 days. Qty: 15 Cap, Refills: 0      azithromycin (ZITHROMAX) 250 mg tablet Take 1 Tab by mouth daily for 5 days. Qty: 5 Tab, Refills: 0         CONTINUE these medications which have NOT CHANGED    Details   !! cloNIDine HCl (CATAPRES) 0.1 mg tablet Take 0.1 mg by mouth four (4) times daily. Indications: high blood pressure      amLODIPine (NORVASC) 10 mg tablet Take 10 mg by mouth daily. apixaban (ELIQUIS) 5 mg tablet Take 1 Tab by mouth two (2) times a day. Qty: 180 Tab, Refills: 1      furosemide (LASIX) 40 mg tablet Take 1 Tab by mouth two (2) times a day. Qty: 180 Tab, Refills: 3      olmesartan (BENICAR) 40 mg tablet Take 1 Tab by mouth daily. am  Qty: 90 Tab, Refills: 3    Associated Diagnoses: Benign essential hypertension      carvedilol (COREG) 6.25 mg tablet Take 1 Tab by mouth two (2) times daily (with meals).   Qty: 180 Tab, Refills: 3    Associated Diagnoses: Benign essential hypertension; Acute congestive heart failure, unspecified heart failure type ([facility] [address]      miscellaneous medical supply misc by Does Not Apply route. Blood Pressure Kit-Extra Large kit Use to check blood pressure. Qty: 1 Kit, Refills: 0      DISABLED PLACARD (DISABLED PLACARD) DMV Apply to car  Qty: 1 Each, Refills: 0    Associated Diagnoses: Acute congestive heart failure, unspecified heart failure type ([facility] [address]      ! ! cloNIDine HCl (CATAPRES) 0.2 mg tablet Take 1 Tab by mouth two (2) times a day. Qty: 180 Tab, Refills: 3      B infantis/B ani/B taco/B bifid (PROBIOTIC 4X PO) Take 1 Tab by mouth as needed. !! - Potential duplicate medications found. Please discuss with provider. Follow Up Orders:  No orders of the defined types were placed in this encounter. Follow-up Information     Follow up With Specialties Details Why Contact Info    Carlyn Guerrero MD [facility] Practice Call As needed [address] [facility] [address]  [phone]            Time spent in patient discharge planning and coordination 35 minutes.     Signed:  Marilyn Mcnamara MD

## 2019-11-10 NOTE — PROGRESS NOTES
Pt up ad humberto, ambulating in hallway. Pt tolerating walk on room air. No visual signs of distress. Will continue to monitor.

## 2019-11-11 ENCOUNTER — APPOINTMENT (OUTPATIENT)
Dept: GENERAL RADIOLOGY | Age: 74
DRG: 981 | End: 2019-11-11
Attending: INTERNAL MEDICINE
Payer: MEDICARE

## 2019-11-11 ENCOUNTER — PATIENT OUTREACH (OUTPATIENT)
Dept: CASE MANAGEMENT | Age: 74
End: 2019-11-11

## 2019-11-11 PROBLEM — N17.9 AKI (ACUTE KIDNEY INJURY) (HCC): Status: RESOLVED | Noted: 2019-11-10 | Resolved: 2019-11-11

## 2019-11-11 PROBLEM — E87.20 LACTIC ACIDOSIS: Status: RESOLVED | Noted: 2019-11-10 | Resolved: 2019-11-11

## 2019-11-11 LAB
ANION GAP SERPL CALC-SCNC: 8 MMOL/L (ref 7–16)
ARTERIAL PATENCY WRIST A: YES
ATRIAL RATE: 250 BPM
ATRIAL RATE: 90 BPM
BASE EXCESS BLD CALC-SCNC: 0 MMOL/L
BASOPHILS # BLD: 0 K/UL (ref 0–0.2)
BASOPHILS NFR BLD: 0 % (ref 0–2)
BDY SITE: ABNORMAL
BODY TEMPERATURE: 98.6
BUN SERPL-MCNC: 19 MG/DL (ref 8–23)
CALCIUM SERPL-MCNC: 8.2 MG/DL (ref 8.3–10.4)
CALCULATED P AXIS, ECG09: -112 DEGREES
CALCULATED R AXIS, ECG10: 71 DEGREES
CALCULATED R AXIS, ECG10: 8 DEGREES
CALCULATED T AXIS, ECG11: -154 DEGREES
CALCULATED T AXIS, ECG11: 136 DEGREES
CHLORIDE SERPL-SCNC: 106 MMOL/L (ref 98–107)
CO2 BLD-SCNC: 25 MMOL/L
CO2 SERPL-SCNC: 26 MMOL/L (ref 21–32)
COLLECT TIME,HTIME: 308
CREAT SERPL-MCNC: 1.17 MG/DL (ref 0.8–1.5)
DIAGNOSIS, 93000: NORMAL
DIAGNOSIS, 93000: NORMAL
DIFFERENTIAL METHOD BLD: ABNORMAL
EOSINOPHIL # BLD: 0 K/UL (ref 0–0.8)
EOSINOPHIL NFR BLD: 0 % (ref 0.5–7.8)
ERYTHROCYTE [DISTWIDTH] IN BLOOD BY AUTOMATED COUNT: 13.5 % (ref 11.9–14.6)
EXHALED MINUTE VOLUME, VE: 12.1 L/MIN
FLUAV AG NPH QL IA: NEGATIVE
FLUBV AG NPH QL IA: NEGATIVE
GAS FLOW.O2 O2 DELIVERY SYS: ABNORMAL L/MIN
GAS FLOW.O2 SETTING OXYMISER: 24 BPM
GLUCOSE SERPL-MCNC: 196 MG/DL (ref 65–100)
HCO3 BLD-SCNC: 24.1 MMOL/L (ref 22–26)
HCT VFR BLD AUTO: 38.2 % (ref 41.1–50.3)
HGB BLD-MCNC: 12.9 G/DL (ref 13.6–17.2)
IMM GRANULOCYTES # BLD AUTO: 0.1 K/UL (ref 0–0.5)
IMM GRANULOCYTES NFR BLD AUTO: 1 % (ref 0–5)
INSPIRATION.DURATION SETTING TIME VENT: 0.8 SEC
LYMPHOCYTES # BLD: 0.6 K/UL (ref 0.5–4.6)
LYMPHOCYTES NFR BLD: 5 % (ref 13–44)
MCH RBC QN AUTO: 31.3 PG (ref 26.1–32.9)
MCHC RBC AUTO-ENTMCNC: 33.8 G/DL (ref 31.4–35)
MCV RBC AUTO: 92.7 FL (ref 79.6–97.8)
MONOCYTES # BLD: 0.3 K/UL (ref 0.1–1.3)
MONOCYTES NFR BLD: 3 % (ref 4–12)
NEUTS SEG # BLD: 10 K/UL (ref 1.7–8.2)
NEUTS SEG NFR BLD: 92 % (ref 43–78)
NRBC # BLD: 0 K/UL (ref 0–0.2)
O2/TOTAL GAS SETTING VFR VENT: 60 %
PCO2 BLD: 35.9 MMHG (ref 35–45)
PEEP RESPIRATORY: 10 CMH2O
PH BLD: 7.43 [PH] (ref 7.35–7.45)
PLATELET # BLD AUTO: 213 K/UL (ref 150–450)
PMV BLD AUTO: 9.7 FL (ref 9.4–12.3)
PO2 BLD: 118 MMHG (ref 75–100)
POTASSIUM SERPL-SCNC: 3.9 MMOL/L (ref 3.5–5.1)
Q-T INTERVAL, ECG07: 418 MS
Q-T INTERVAL, ECG07: 428 MS
QRS DURATION, ECG06: 160 MS
QRS DURATION, ECG06: 164 MS
QTC CALCULATION (BEZET), ECG08: 517 MS
QTC CALCULATION (BEZET), ECG08: 525 MS
RBC # BLD AUTO: 4.12 M/UL (ref 4.23–5.6)
SAO2 % BLD: 99 % (ref 95–98)
SERVICE CMNT-IMP: ABNORMAL
SERVICE CMNT-IMP: ABNORMAL
SODIUM SERPL-SCNC: 140 MMOL/L (ref 136–145)
SPECIMEN SOURCE: NORMAL
SPECIMEN TYPE: ABNORMAL
TSH SERPL DL<=0.005 MIU/L-ACNC: 1.23 UIU/ML (ref 0.36–3.74)
VENTILATION MODE VENT: ABNORMAL
VENTRICULAR RATE, ECG03: 88 BPM
VENTRICULAR RATE, ECG03: 95 BPM
VT SETTING VENT: 500 ML
WBC # BLD AUTO: 11 K/UL (ref 4.3–11.1)

## 2019-11-11 PROCEDURE — 74011000250 HC RX REV CODE- 250

## 2019-11-11 PROCEDURE — 65620000000 HC RM CCU GENERAL

## 2019-11-11 PROCEDURE — 74011000258 HC RX REV CODE- 258: Performed by: INTERNAL MEDICINE

## 2019-11-11 PROCEDURE — 87804 INFLUENZA ASSAY W/OPTIC: CPT

## 2019-11-11 PROCEDURE — 74011250636 HC RX REV CODE- 250/636

## 2019-11-11 PROCEDURE — 71045 X-RAY EXAM CHEST 1 VIEW: CPT

## 2019-11-11 PROCEDURE — 99291 CRITICAL CARE FIRST HOUR: CPT | Performed by: INTERNAL MEDICINE

## 2019-11-11 PROCEDURE — 36600 WITHDRAWAL OF ARTERIAL BLOOD: CPT

## 2019-11-11 PROCEDURE — 80048 BASIC METABOLIC PNL TOTAL CA: CPT

## 2019-11-11 PROCEDURE — 74011250637 HC RX REV CODE- 250/637: Performed by: INTERNAL MEDICINE

## 2019-11-11 PROCEDURE — 85025 COMPLETE CBC W/AUTO DIFF WBC: CPT

## 2019-11-11 PROCEDURE — 94003 VENT MGMT INPAT SUBQ DAY: CPT

## 2019-11-11 PROCEDURE — 74011000250 HC RX REV CODE- 250: Performed by: INTERNAL MEDICINE

## 2019-11-11 PROCEDURE — 74011250636 HC RX REV CODE- 250/636: Performed by: INTERNAL MEDICINE

## 2019-11-11 PROCEDURE — 82803 BLOOD GASES ANY COMBINATION: CPT

## 2019-11-11 PROCEDURE — 84443 ASSAY THYROID STIM HORMONE: CPT

## 2019-11-11 PROCEDURE — C8929 TTE W OR WO FOL WCON,DOPPLER: HCPCS

## 2019-11-11 PROCEDURE — 36415 COLL VENOUS BLD VENIPUNCTURE: CPT

## 2019-11-11 PROCEDURE — 93005 ELECTROCARDIOGRAM TRACING: CPT | Performed by: PHYSICIAN ASSISTANT

## 2019-11-11 RX ORDER — LABETALOL HYDROCHLORIDE 5 MG/ML
20 INJECTION, SOLUTION INTRAVENOUS ONCE
Status: COMPLETED | OUTPATIENT
Start: 2019-11-11 | End: 2019-11-11

## 2019-11-11 RX ORDER — DIPHENHYDRAMINE HYDROCHLORIDE 50 MG/ML
12.5 INJECTION, SOLUTION INTRAMUSCULAR; INTRAVENOUS ONCE
Status: DISPENSED | OUTPATIENT
Start: 2019-11-11 | End: 2019-11-12

## 2019-11-11 RX ORDER — LORAZEPAM 2 MG/ML
INJECTION INTRAMUSCULAR
Status: COMPLETED
Start: 2019-11-11 | End: 2019-11-11

## 2019-11-11 RX ORDER — LABETALOL HYDROCHLORIDE 5 MG/ML
INJECTION, SOLUTION INTRAVENOUS
Status: COMPLETED
Start: 2019-11-11 | End: 2019-11-11

## 2019-11-11 RX ORDER — LORAZEPAM 2 MG/ML
1 INJECTION INTRAMUSCULAR ONCE
Status: COMPLETED | OUTPATIENT
Start: 2019-11-11 | End: 2019-11-11

## 2019-11-11 RX ORDER — FUROSEMIDE 10 MG/ML
40 INJECTION INTRAMUSCULAR; INTRAVENOUS ONCE
Status: COMPLETED | OUTPATIENT
Start: 2019-11-11 | End: 2019-11-11

## 2019-11-11 RX ORDER — MORPHINE SULFATE 2 MG/ML
2 INJECTION, SOLUTION INTRAMUSCULAR; INTRAVENOUS ONCE
Status: COMPLETED | OUTPATIENT
Start: 2019-11-12 | End: 2019-11-11

## 2019-11-11 RX ORDER — FUROSEMIDE 10 MG/ML
40 INJECTION INTRAMUSCULAR; INTRAVENOUS EVERY 12 HOURS
Status: DISCONTINUED | OUTPATIENT
Start: 2019-11-11 | End: 2019-11-25

## 2019-11-11 RX ADMIN — MORPHINE SULFATE 2 MG: 2 INJECTION, SOLUTION INTRAMUSCULAR; INTRAVENOUS at 23:21

## 2019-11-11 RX ADMIN — AZITHROMYCIN 250 MG: 250 TABLET, FILM COATED ORAL at 17:12

## 2019-11-11 RX ADMIN — FUROSEMIDE 40 MG: 10 INJECTION, SOLUTION INTRAMUSCULAR; INTRAVENOUS at 22:49

## 2019-11-11 RX ADMIN — Medication 10 ML: at 21:19

## 2019-11-11 RX ADMIN — CARVEDILOL 6.25 MG: 6.25 TABLET, FILM COATED ORAL at 16:41

## 2019-11-11 RX ADMIN — FUROSEMIDE 40 MG: 10 INJECTION, SOLUTION INTRAMUSCULAR; INTRAVENOUS at 21:18

## 2019-11-11 RX ADMIN — PERFLUTREN 1 ML: 6.52 INJECTION, SUSPENSION INTRAVENOUS at 09:00

## 2019-11-11 RX ADMIN — FUROSEMIDE 40 MG: 10 INJECTION, SOLUTION INTRAMUSCULAR; INTRAVENOUS at 08:27

## 2019-11-11 RX ADMIN — APIXABAN 5 MG: 5 TABLET, FILM COATED ORAL at 08:27

## 2019-11-11 RX ADMIN — AMLODIPINE BESYLATE 10 MG: 10 TABLET ORAL at 10:16

## 2019-11-11 RX ADMIN — LABETALOL HYDROCHLORIDE 20 MG: 5 INJECTION, SOLUTION INTRAVENOUS at 17:11

## 2019-11-11 RX ADMIN — LORAZEPAM 1 MG: 2 INJECTION, SOLUTION INTRAMUSCULAR; INTRAVENOUS at 22:35

## 2019-11-11 RX ADMIN — Medication 10 ML: at 06:31

## 2019-11-11 RX ADMIN — Medication 10 ML: at 15:04

## 2019-11-11 RX ADMIN — NITROGLYCERIN 1 INCH: 20 OINTMENT TOPICAL at 17:47

## 2019-11-11 RX ADMIN — LORAZEPAM 1 MG: 2 INJECTION INTRAMUSCULAR at 22:35

## 2019-11-11 RX ADMIN — LABETALOL HYDROCHLORIDE 20 MG: 5 INJECTION INTRAVENOUS at 17:11

## 2019-11-11 RX ADMIN — FAMOTIDINE 20 MG: 10 INJECTION INTRAVENOUS at 08:27

## 2019-11-11 RX ADMIN — FAMOTIDINE 20 MG: 10 INJECTION INTRAVENOUS at 21:18

## 2019-11-11 RX ADMIN — CEFTRIAXONE 1 G: 1 INJECTION, POWDER, FOR SOLUTION INTRAMUSCULAR; INTRAVENOUS at 21:14

## 2019-11-11 NOTE — PROGRESS NOTES
Initial visit made to patient and a prayer was provided. A  card was left. Patient is vented and alert.         Kaelyn Calle MDIV

## 2019-11-11 NOTE — PROGRESS NOTES
Patient is orally intubated with a # 7.0 ET Tube secured at the 25 cm sivakumar at the lip. Breath sounds are coarse and diminished. Trachea is midline. Negative for subcutaneous air, chest excursion is symmetric. Negative for pitting edema. All alarms are set and audible. Resuscitation bag is at the head of the bed.       Ventilator Settings  Mode FIO2 Rate Tidal Volume Pressure PEEP I:E Ratio   PRVC  45%   20 500      8cmH20 1:2.0       Peak airway pressure: 22cm H2O   Minute ventilation: 10.4 l/min       Attempted PS wean but pt Bradypnic when on PS of 14, 12 and 10.  Placed back on rate until more alert

## 2019-11-11 NOTE — PROGRESS NOTES
Patient arrives intubated by EMS and placed on the ventilator on documented settings. Patient is orally intubated with a # 7.0 ET Tube secured at the 24 cm sivakumar at the lip. Breath sounds are coarse and crackles. Trachea is midline. Negative for subcutaneous air, chest excursion is symmetric. Negative for pitting edema. All alarms are set and audible. Resuscitation bag is at the head of the bed.       Ventilator Settings  Mode FIO2 Rate Tidal Volume Pressure PEEP I:E Ratio   PRVC  100 %   24       12 cm H20(increased from 8 post ABG)  1:2.0      Peak airway pressure: 38 cm H2O   Minute ventilation: 12.5 l/min       Bear Keith RT

## 2019-11-11 NOTE — CONSULTS
[facility] Cardiology Consultation        Date of  Admission: 11/10/2019  6:35 PM     Primary Care Physician: Dr. Andrei Ratliff  Primary Cardiologist: Dr. Yousuf Franks  Referring Physician: Dr. Carlos So  Consulting Physician: Dr. Carlene Mcduffie    CC/Reason for consult: pulmonary edema    HPI:  Geovany Reynolds is a 66 y.o. WM with h/o PAF on Eliquis, HTN, MVP and MARISOL who presented on 11/10 with respiratory failure. He was just discharged from [facility] yesterday. He reports feeling SOB for weeks and noted he was back in AF last week. On Saturday, he was blowing leaves and became very SOB. He was seen at [facility] and it was originally thought to have PNA but the patient improved after just 24 hours of treatment with lasix and abx and he was discharged home. He states he went home and took a nap on his CPAP and woke up unable to breath well. His SBP was in the 180-190's and he noted cough with pink frothy sputum. He presented to [facility] and EMS was called. He was intubated in route to [facility] ED. He denied fever, chills, LE edema or chest pain. In the [facility] ED, CXR with B infiltrates c/w edema, Pro . He was admitted by pulmonary and given IV lasix with good UOP. He has been extubated. He is in AF with LBBB. Echo shows normal EF which was previously 45% in 2018.  He has never had ischemic w/u with [facility], but reports h/o nuclear stress test that was normal.       Intake/Output Summary (Last 24 hours) at 11/11/2019 1654  Last data filed at 11/11/2019 1510  Gross per 24 hour   Intake 167.8 ml   Output 2025 ml   Net -1857.2 ml        Past Medical History:   Diagnosis Date    Arrhythmia     Cancer ([address]     basal cell    Elevated PSA     Erectile dysfunction     Hypertension     daily meds    MVP (mitral valve prolapse)     diagnosed many years ago, pt states does not see cardiologist, in youth    MARISOL (obstructive sleep apnea) 11/15/2018    Osteoarthritis     thumbs    Prostate cancer ([address]     S/P colonoscopy 2015 due in       Past Surgical History:   Procedure Laterality Date    BIOPSY PROSTATE      HX APPENDECTOMY         Allergies   Allergen Reactions    Amoxicillin Swelling     Diff breathing, lip swelling    Levaquin [Levofloxacin] Shortness of Breath      Social History     Socioeconomic History    Marital status: LIFE PARTNER     Spouse name: Not on file    Number of children: Not on file    Years of education: Not on file    Highest education level: Not on file   Occupational History    Not on file   Social Needs    Financial resource strain: Not on file    Food insecurity:     Worry: Not on file     Inability: Not on file    Transportation needs:     Medical: Not on file     Non-medical: Not on file   Tobacco Use    Smoking status: Former Smoker     Last attempt to quit: 1997     Years since quittin.2    Smokeless tobacco: Never Used   Substance and Sexual Activity    Alcohol use: Yes     Comment: 15 drinks per wk    Drug use: No    Sexual activity: Not on file   Lifestyle    Physical activity:     Days per week: Not on file     Minutes per session: Not on file    Stress: Not on file   Relationships    Social connections:     Talks on phone: Not on file     Gets together: Not on file     Attends Muslim service: Not on file     Active member of club or organization: Not on file     Attends meetings of clubs or organizations: Not on file     Relationship status: Not on file    Intimate partner violence:     Fear of current or ex partner: Not on file     Emotionally abused: Not on file     Physically abused: Not on file     Forced sexual activity: Not on file   Other Topics Concern    Not on file   Social History Narrative    Not on file     Family History   Problem Relation Age of Onset    Cancer Father         prostate    Stroke Father     Cancer Paternal Uncle     Heart Disease Mother     Heart Attack Maternal Uncle     Prostate Cancer Sister         Current Facility-Administered Medications   Medication Dose Route Frequency    furosemide (LASIX) injection 40 mg  40 mg IntraVENous Q12H    fentaNYL in normal saline (pf) 25 mcg/mL infusion   mcg/hr IntraVENous TITRATE    amLODIPine (NORVASC) tablet 10 mg  10 mg Oral DAILY    apixaban (ELIQUIS) tablet 5 mg  5 mg Oral Q12H    carvedilol (COREG) tablet 6.25 mg  6.25 mg Oral BID WITH MEALS    azithromycin (ZITHROMAX) tablet 250 mg  250 mg Oral DAILY    cefTRIAXone (ROCEPHIN) 1 g in 0.9% sodium chloride (MBP/ADV) 50 mL  1 g IntraVENous Q24H    sodium chloride (NS) flush 5-40 mL  5-40 mL IntraVENous Q8H    sodium chloride (NS) flush 5-40 mL  5-40 mL IntraVENous PRN    famotidine (PF) (PEPCID) 20 mg in sodium chloride 0.9% 10 mL injection  20 mg IntraVENous Q12H       Review of symptoms:  General: no recent weight loss/gain, +weakness, +fatigue, no fever or chills   Skin: no rashes, lumps, or other skin changes   HEENT: no headache, dizziness, lightheadedness, vision changes, hearing changes, tinnitus, vertigo, sinus pressure/pain, bleeding gums, sore throat, or hoarseness   Neck: no swollen glands, goiter, pain or stiffness   Respiratory: +cough, +sputum, hemoptysis, +dyspnea,+ wheezing   Cardiovascular: no chest pain or discomfort, +palpitations, +dyspnea, +orthopnea, +paroxysmal nocturnal dyspnea, no peripheral edema   Gastrointestinal: no trouble swallowing, heartburn, change of appetite, nausea, change in bowel habits, pain with defecation, rectal bleeding or black/tarry stools, hemorrhoids, constipation, diarrhea, abdominal pain, jaundice, liver or gallbladder problems   Urinary: no frequency, urgency , hematuria, burning/pain with urination, recent flank pain, polyuria, nocturia, or difficulty urinating   Peripheral Vascular: no claudication, leg cramps, prior DVTs, swelling of calves, legs, or feet, color change, or swelling with redness or tenderness   Musculoskeletal: no muscle or joint pain/stiffness, joint swelling, erythema of joints, or back pain   Psychiatric: no depression, mental disorders, or excessive stress   Neurological: no history of CVA, dizziness, no sensory or motor loss, seizures, syncope, tremors, numbness, tingling, no changes in mood, attention, or speech, no changes in orientation, memory, insight, or judgment. no headache, vertigo. Hematologic: no anemia, easy bruising or bleeding   Endocrine: no diabetes, thyroid problems, heat or cold intolerance, excessive sweating, polyuria, polydipsia        Subjective:   Physical Exam    Visit Vitals  /69   Pulse 88   Temp 97.9 °F (36.6 °C)   Resp 19   Ht 6' 3\" (1.905 m)   Wt 113.6 kg (250 lb 7.1 oz)   SpO2 93%   BMI 31.30 kg/m²     General Appearance:  Well developed, well nourished,alert and oriented x 3, and individual in no acute distress. Ears/Nose/Mouth/Throat:   Hearing grossly normal.         Neck: Supple. Chest:   Lungs crackles bilaterally. Cardiovascular:  Irregular rate and rhythm, S1, S2, 2/6 murmur. Abdomen:   Soft, non-tender, bowel sounds are active. Extremities: No edema bilaterally. Skin: Warm and dry. Labs:   Recent Results (from the past 24 hour(s))   EKG, 12 LEAD, INITIAL    Collection Time: 11/10/19  6:35 PM   Result Value Ref Range    Ventricular Rate 95 BPM    Atrial Rate 250 BPM    QRS Duration 164 ms    Q-T Interval 418 ms    QTC Calculation (Bezet) 525 ms    Calculated P Axis -112 degrees    Calculated R Axis 71 degrees    Calculated T Axis -154 degrees    Diagnosis       !! AGE AND GENDER SPECIFIC ECG ANALYSIS !!   Atrial flutter with variable A-V block with premature ventricular or   aberrantly conducted complexes  Left bundle branch block  Abnormal ECG  When compared with ECG of 09-NOV-2019 16:16,  No significant change was found  Confirmed by CLEMENTINA YOUSSEF (), Raquel Day ([id number]) on 11/11/2019 7:53:11 AM     POC LACTIC ACID    Collection Time: 11/10/19  6:37 PM   Result Value Ref Range    Lactic Acid (POC) 3.33 (H) 0.5 - 1.9 mmol/L   CBC WITH AUTOMATED DIFF    Collection Time: 11/10/19  6:39 PM   Result Value Ref Range    WBC 12.3 (H) 4.3 - 11.1 K/uL    RBC 4.45 4.23 - 5.6 M/uL    HGB 13.9 13.6 - 17.2 g/dL    HCT 42.2 41.1 - 50.3 %    MCV 94.8 79.6 - 97.8 FL    MCH 31.2 26.1 - 32.9 PG    MCHC 32.9 31.4 - 35.0 g/dL    RDW 13.5 11.9 - 14.6 %    PLATELET 856 580 - 164 K/uL    MPV 9.7 9.4 - 12.3 FL    ABSOLUTE NRBC 0.00 0.0 - 0.2 K/uL    DF AUTOMATED      NEUTROPHILS 72 43 - 78 %    LYMPHOCYTES 18 13 - 44 %    MONOCYTES 7 4.0 - 12.0 %    EOSINOPHILS 3 0.5 - 7.8 %    BASOPHILS 0 0.0 - 2.0 %    IMMATURE GRANULOCYTES 1 0.0 - 5.0 %    ABS. NEUTROPHILS 8.8 (H) 1.7 - 8.2 K/UL    ABS. LYMPHOCYTES 2.2 0.5 - 4.6 K/UL    ABS. MONOCYTES 0.8 0.1 - 1.3 K/UL    ABS. EOSINOPHILS 0.3 0.0 - 0.8 K/UL    ABS. BASOPHILS 0.1 0.0 - 0.2 K/UL    ABS. IMM. GRANS. 0.1 0.0 - 0.5 K/UL   METABOLIC PANEL, COMPREHENSIVE    Collection Time: 11/10/19  6:39 PM   Result Value Ref Range    Sodium 138 136 - 145 mmol/L    Potassium 4.5 3.5 - 5.1 mmol/L    Chloride 105 98 - 107 mmol/L    CO2 24 21 - 32 mmol/L    Anion gap 9 7 - 16 mmol/L    Glucose 227 (H) 65 - 100 mg/dL    BUN 17 8 - 23 MG/DL    Creatinine 1.50 0.8 - 1.5 MG/DL    GFR est AA 59 (L) >60 ml/min/1.73m2    GFR est non-AA 48 (L) >60 ml/min/1.73m2    Calcium 8.1 (L) 8.3 - 10.4 MG/DL    Bilirubin, total 0.5 0.2 - 1.1 MG/DL    ALT (SGPT) 23 12 - 65 U/L    AST (SGOT) 31 15 - 37 U/L    Alk.  phosphatase 80 50 - 136 U/L    Protein, total 6.7 6.3 - 8.2 g/dL    Albumin 2.9 (L) 3.2 - 4.6 g/dL    Globulin 3.8 (H) 2.3 - 3.5 g/dL    A-G Ratio 0.8 (L) 1.2 - 3.5     PROCALCITONIN    Collection Time: 11/10/19  6:39 PM   Result Value Ref Range    Procalcitonin 0.1 ng/mL   NT-PRO BNP    Collection Time: 11/10/19  6:39 PM   Result Value Ref Range    NT pro- (H) <450 PG/ML   TROPONIN I    Collection Time: 11/10/19  6:39 PM   Result Value Ref Range    Troponin-I, Qt. <0.02 (L) 0.02 - 0.05 NG/ML   POC CG8I Collection Time: 11/10/19  6:47 PM   Result Value Ref Range    Device: VENT      FIO2 (POC) 100 %    pH (POC) 7.250 (L) 7.35 - 7.45      pCO2 (POC) 61.7 (HH) 35 - 45 MMHG    pO2 (POC) 77 75 - 100 MMHG    HCO3 (POC) 27.0 (H) 22 - 26 MMOL/L    sO2 (POC) 92 (L) 95 - 98 %    Base deficit (POC) 2 mmol/L    Mode Pressure regulated volume control      Tidal volume 500 ml    Set Rate 20 bpm    PEEP/CPAP (POC) 8 cmH2O    Allens test (POC) NOT APPLICABLE      Inspiratory Time 1.0 sec    Site RIGHT BRACHIAL      Patient temp.  98.6      Specimen type (POC) ARTERIAL      Sodium,  136 - 145 MMOL/L    Potassium, POC 3.5 3.5 - 5.1 MMOL/L    Glucose,  (H) 65 - 100 MG/DL    Calcium, ionized (POC) 1.21 1.12 - 1.32 mmol/L    Performed by Vasquez     Critical value read back 18:50     Exhaled minute volume 9.70 L/min    COLLECT TIME 1,844     CULTURE, BLOOD    Collection Time: 11/10/19  7:07 PM   Result Value Ref Range    Special Requests: LEFT  Antecubital        Culture result: NO GROWTH AFTER 11 HOURS     CULTURE, BLOOD    Collection Time: 11/10/19  7:07 PM   Result Value Ref Range    Special Requests: RIGHT  FOREARM        Culture result: NO GROWTH AFTER 11 HOURS     URINALYSIS W/ RFLX MICROSCOPIC    Collection Time: 11/10/19  7:10 PM   Result Value Ref Range    Color YELLOW      Appearance CLEAR      Specific gravity 1.015 1.001 - 1.023      pH (UA) 5.5 5.0 - 9.0      Protein 30 (A) NEG mg/dL    Glucose NEGATIVE  mg/dL    Ketone NEGATIVE  NEG mg/dL    Bilirubin NEGATIVE  NEG      Blood NEGATIVE  NEG      Urobilinogen 0.2 0.2 - 1.0 EU/dL    Nitrites NEGATIVE  NEG      Leukocyte Esterase NEGATIVE  NEG      WBC 0-3 0 /hpf    RBC 0-3 0 /hpf    Bacteria TRACE 0 /hpf    Other observations RESULTS VERIFIED MANUALLY     CULTURE, RESPIRATORY/SPUTUM/BRONCH W GRAM STAIN    Collection Time: 11/10/19  9:55 PM   Result Value Ref Range    Special Requests: NO SPECIAL REQUESTS      GRAM STAIN 0 TO 30 WBCS/OIF     GRAM STAIN NO EPITHELIAL CELLS SEEN      GRAM STAIN FEW GRAM POSITIVE COCCI      GRAM STAIN 4+ MUCUS PRESENT      Culture result:        NO GROWTH AFTER SHORT PERIOD OF INCUBATION. FURTHER RESULTS TO FOLLOW AFTER OVERNIGHT INCUBATION. TROPONIN I    Collection Time: 11/10/19 10:07 PM   Result Value Ref Range    Troponin-I, Qt. 0.04 0.02 - 0.05 NG/ML   POC G3    Collection Time: 11/10/19 10:54 PM   Result Value Ref Range    Device: VENT      FIO2 (POC) 80 %    pH (POC) 7.407 7.35 - 7.45      pCO2 (POC) 42.2 35 - 45 MMHG    pO2 (POC) 123 (H) 75 - 100 MMHG    HCO3 (POC) 26.6 (H) 22 - 26 MMOL/L    sO2 (POC) 99 (H) 95 - 98 %    Base excess (POC) 2 mmol/L    Mode Pressure regulated volume control      Tidal volume 500 ml    Set Rate 24 bpm    PEEP/CPAP (POC) 10 cmH2O    Allens test (POC) YES      Inspiratory Time 0.8 sec    Site RIGHT RADIAL      Patient temp.  98.6      Specimen type (POC) ARTERIAL      Performed by Mio     CO2, POC 28 MMOL/L    Respiratory comment: NurseNotified     Exhaled minute volume 12.10 L/min    COLLECT TIME 8,818     METABOLIC PANEL, BASIC    Collection Time: 11/11/19  2:44 AM   Result Value Ref Range    Sodium 140 136 - 145 mmol/L    Potassium 3.9 3.5 - 5.1 mmol/L    Chloride 106 98 - 107 mmol/L    CO2 26 21 - 32 mmol/L    Anion gap 8 7 - 16 mmol/L    Glucose 196 (H) 65 - 100 mg/dL    BUN 19 8 - 23 MG/DL    Creatinine 1.17 0.8 - 1.5 MG/DL    GFR est AA >60 >60 ml/min/1.73m2    GFR est non-AA >60 >60 ml/min/1.73m2    Calcium 8.2 (L) 8.3 - 10.4 MG/DL   CBC WITH AUTOMATED DIFF    Collection Time: 11/11/19  2:44 AM   Result Value Ref Range    WBC 11.0 4.3 - 11.1 K/uL    RBC 4.12 (L) 4.23 - 5.6 M/uL    HGB 12.9 (L) 13.6 - 17.2 g/dL    HCT 38.2 (L) 41.1 - 50.3 %    MCV 92.7 79.6 - 97.8 FL    MCH 31.3 26.1 - 32.9 PG    MCHC 33.8 31.4 - 35.0 g/dL    RDW 13.5 11.9 - 14.6 %    PLATELET 519 035 - 683 K/uL    MPV 9.7 9.4 - 12.3 FL    ABSOLUTE NRBC 0.00 0.0 - 0.2 K/uL    DF AUTOMATED      NEUTROPHILS 92 (H) 43 - 78 %    LYMPHOCYTES 5 (L) 13 - 44 %    MONOCYTES 3 (L) 4.0 - 12.0 %    EOSINOPHILS 0 (L) 0.5 - 7.8 %    BASOPHILS 0 0.0 - 2.0 %    IMMATURE GRANULOCYTES 1 0.0 - 5.0 %    ABS. NEUTROPHILS 10.0 (H) 1.7 - 8.2 K/UL    ABS. LYMPHOCYTES 0.6 0.5 - 4.6 K/UL    ABS. MONOCYTES 0.3 0.1 - 1.3 K/UL    ABS. EOSINOPHILS 0.0 0.0 - 0.8 K/UL    ABS. BASOPHILS 0.0 0.0 - 0.2 K/UL    ABS. IMM. GRANS. 0.1 0.0 - 0.5 K/UL   POC G3    Collection Time: 11/11/19  3:09 AM   Result Value Ref Range    Device: VENT      FIO2 (POC) 60 %    pH (POC) 7.434 7.35 - 7.45      pCO2 (POC) 35.9 35 - 45 MMHG    pO2 (POC) 118 (H) 75 - 100 MMHG    HCO3 (POC) 24.1 22 - 26 MMOL/L    sO2 (POC) 99 (H) 95 - 98 %    Base excess (POC) 0 mmol/L    Mode Pressure regulated volume control      Tidal volume 500 ml    Set Rate 24 bpm    PEEP/CPAP (POC) 10 cmH2O    Allens test (POC) YES      Inspiratory Time 0.8 sec    Site RIGHT RADIAL      Patient temp. 98.6      Specimen type (POC) ARTERIAL      Performed by Gertrude     CO2, POC 25 MMOL/L    Respiratory comment: NurseNotified     Exhaled minute volume 12.10 L/min    COLLECT TIME 308     INFLUENZA A & B AG (RAPID TEST)    Collection Time: 11/11/19  6:41 AM   Result Value Ref Range    Influenza A Ag NEGATIVE  NEG      Influenza B Ag NEGATIVE  NEG      Source NASOPHARYNGEAL         Pt has been seen and examined by Dr. Imelda Miller. He agrees with the following assessment and plan. Assessment/Plan:        Diagnosis    Pneumonia of lower lobe of lung ([address]- antibiotics per primary team    Acute respiratory failure with hypoxia ([address]- extubated, improving    Acute pulmonary edema likely secondary to HTN urgency and AF, possibly with bronchitis/PNA component as well- IV lasix with good UOP, continue IV lasix, Coreg, monitor I/O, renal function and electrolytes, NPO after MN and hold Eliquis for LHC, HIRO/eCV in AM. Further recommendations based upon finding of LHC.  Normal EF on echo    MARISOL (obstructive sleep apnea)- CPAP per primary team    Paroxysmal A-fib ([address]- in AF- HIRO/eCV in AM, hold Eliquis tonight with plan for LHC in AM, check TSH    LBBB (left bundle branch block)- chronic    Hypertension- control BP, continue meds titrate as needed       Thank you for consulting [facility] and allowing us to participate in the care of this patient. We will continue to follow along with you.     Carolina Hay PA-C

## 2019-11-11 NOTE — PROGRESS NOTES
TRANSFER - IN REPORT:    Verbal report received from [address] on Valerie Arshad  being received from ED (unit) for change in patient condition(intubated)      Report consisted of patients Situation, Background, Assessment and   Recommendations(SBAR). Information from the following report(s) SBAR, Kardex, ED Summary, Intake/Output, MAR, Recent Results and Alarm Parameters  was reviewed with the receiving nurse. Opportunity for questions and clarification was provided. Assessment will be completed upon patients arrival to unit when care is assumed.

## 2019-11-11 NOTE — PROGRESS NOTES
Changed pt to [facility], and placed on CPAP of 8 and 35%. Pt tolerating well, with Vt >1L. Pt does still go apneic when sleeping per use of home CPAP. Placed on backup ventilation of 500, 16, +8.

## 2019-11-11 NOTE — ROUTINE PROCESS
TRANSFER - OUT REPORT: 
 
Verbal report given to CLAUDIA Patel on Amy Eddy  being transferred to [id number] for routine progression of care Report consisted of patients Situation, Background, Assessment and  
Recommendations(SBAR). Information from the following report(s) SBAR, ED Summary, [facility] and Recent Results was reviewed with the receiving nurse. Lines:  
Peripheral IV 11/10/19 Left Antecubital (Active) Site Assessment Clean, dry, & intact 11/10/2019  6:36 PM  
Phlebitis Assessment 0 11/10/2019  6:36 PM  
Infiltration Assessment 0 11/10/2019  6:36 PM  
Dressing Status Clean, dry, & intact 11/10/2019  6:36 PM  
Hub Color/Line Status Green 11/10/2019  6:36 PM  
   
Peripheral IV 11/10/19 Right Antecubital (Active) Site Assessment Clean, dry, & intact 11/10/2019  6:37 PM  
Phlebitis Assessment 0 11/10/2019  6:37 PM  
Infiltration Assessment 0 11/10/2019  6:37 PM  
Dressing Status Clean, dry, & intact 11/10/2019  6:37 PM  
Hub Color/Line Status Pink 11/10/2019  6:37 PM  
   
Peripheral IV 11/10/19 Right Forearm (Active) Site Assessment Clean, dry, & intact 11/10/2019  6:39 PM  
Phlebitis Assessment 0 11/10/2019  6:39 PM  
Infiltration Assessment 0 11/10/2019  6:39 PM  
Dressing Status Clean, dry, & intact 11/10/2019  6:39 PM  
Hub Color/Line Status Pink 11/10/2019  6:39 PM  
   
Peripheral IV 11/10/19 Left Forearm (Active) Site Assessment Clean, dry, & intact 11/10/2019  8:13 PM  
Phlebitis Assessment 0 11/10/2019  8:13 PM  
Infiltration Assessment 0 11/10/2019  8:13 PM  
Dressing Status Clean, dry, & intact 11/10/2019  8:13 PM  
  
 
Opportunity for questions and clarification was provided. Patient transported with: 
 Monitor Registered Nurse RT

## 2019-11-11 NOTE — PROGRESS NOTES
Dino Houston  Admission Date: 11/10/2019             Daily Progress Note: 11/11/2019     Patient is a 66 y.o.  male presented with respiratory failure. He was just discharged from [facility] today. He presented there with increased SOB of cough. No fever, chills, LE edema, or chest pain. It was originally suspected to represent PNA but the patient improved after just 24 hours of treatment with lasix and abx and was discharged.      He was resting at home  per wife and awoke suddenly very SOB with BP in the 180-190's. Cough with pink frothy sputum. Went to urgent care and was transported here via EMS. Was intubated en route and has had copious pink frothy sputum from ETT since. CXR with B infiltrates c/w edema. Pro BNP better than yesterday but still elevated. No fever or chills still reported. Subjective: Intubated on PS, echo ordered.   Given lasix, diuresed only ~ 800 cc    Current Facility-Administered Medications   Medication Dose Route Frequency    furosemide (LASIX) injection 40 mg  40 mg IntraVENous Q12H    fentaNYL in normal saline (pf) 25 mcg/mL infusion   mcg/hr IntraVENous TITRATE    amLODIPine (NORVASC) tablet 10 mg  10 mg Oral DAILY    apixaban (ELIQUIS) tablet 5 mg  5 mg Oral Q12H    carvedilol (COREG) tablet 6.25 mg  6.25 mg Oral BID WITH MEALS    azithromycin (ZITHROMAX) tablet 250 mg  250 mg Oral DAILY    cefTRIAXone (ROCEPHIN) 1 g in 0.9% sodium chloride (MBP/ADV) 50 mL  1 g IntraVENous Q24H    sodium chloride (NS) flush 5-40 mL  5-40 mL IntraVENous Q8H    sodium chloride (NS) flush 5-40 mL  5-40 mL IntraVENous PRN    famotidine (PF) (PEPCID) 20 mg in sodium chloride 0.9% 10 mL injection  20 mg IntraVENous Q12H         Objective:     Vitals:    11/11/19 0831 11/11/19 0846 11/11/19 0901 11/11/19 0915   BP: 131/68 135/73 124/57 132/62   Pulse: 74 83 62 81   Resp: 11 17 11 13   Temp:       SpO2: 97% 98% 93% 100%   Weight:       Height:         Intake and Output:   11/09 1901 - 11/11 0700  In: 167.8 [I.V.:127.8]  Out: 975 [Urine:975]  No intake/output data recorded.       Intake/Output Summary (Last 24 hours) at 11/11/2019 0929  Last data filed at 11/11/2019 0631  Gross per 24 hour   Intake 167.8 ml   Output 975 ml   Net -807.2 ml       Physical Exam:          GEN: well developed and in no acute distress, Oxygen per vent  HEENT: ETT  NECK:  no JVD, no retractions, no thyromegaly or masses,   LUNGS:  CTA on vent  HEART:  RRR with no M,G,R;  ABDOMEN:  soft with no tenderness; positive bowel sounds present  EXTREMITIES:  warm with no cyanosis, no lower leg edema  SKIN:  no jaundice or ecchymosis   NEURO:  alert and oriented, grossly non-focal    LINES/DRAINS:  DRIPS: fentanyl  ACTIVITY: on vent  NUTRITION: NPO    Ventilator Settings  Mode FIO2 Rate Tidal Volume Pressure PEEP   PRVC  45 %(Post ABG)    500 ml     10 cm H20      Peak airway pressure: 24.4 cm H2O   Minute ventilation: 12 l/min       LAB  Recent Labs     11/11/19  0244 11/10/19  1839 11/09/19  1613   WBC 11.0 12.3* 8.5   HGB 12.9* 13.9 13.8   HCT 38.2* 42.2 40.1*    288 292     Recent Labs     11/11/19  0244 11/10/19  2207 11/10/19  1839 11/09/19  1613     --  138 138   K 3.9  --  4.5 3.7     --  105 102   CO2 26  --  24 25   *  --  227* 170*   BUN 19  --  17 22   CREA 1.17  --  1.50 1.20   TROIQ  --  0.04 <0.02*  --            Hospital Problems  Date Reviewed: 11/10/2019          Codes Class Noted POA    Acute respiratory failure with hypoxia ([facility] ICD-10-CM: J96.01  ICD-9-CM: 518.81  11/10/2019 Yes        * (Principal) Acute pulmonary edema ([facility] ICD-10-CM: J81.0  ICD-9-CM: 518.4  11/10/2019 Yes        Acute heart failure (HCC) ICD-10-CM: I50.9  ICD-9-CM: 428.9  11/10/2019 Yes                Assessment:         Acute pulmonary edema ([facility] (11/10/2019)  On lasix, improved      Acute respiratory failure with hypoxia (HCC) (11/10/2019)  On CPAP      Acute heart failure (HCC) (11/10/2019)  Echo pending, lasix added    PLAN:  -- given lasix IV 40 mg BID, ~ 800 cc diuresis  -- stop fentanyl, weaning trials and hope to extubate   -- review echo when able  -- on rocephin and zithromax, de-escalate soon  --HTN now controlled    More than 50% of time documented was spent in face-to-face contact with the patient and in the care of the patient on the floor/unit where the patient is located. Charly Mccarthy, NP    Lungs:  Clear, frothy pink sputum in tube, 40%, CPAP 8, turned down to 3  Heart:  RRR with no Murmur/Rubs/Gallops    Additional Comments:  Diuresed another 700cc with lasix thus far this morning. Patient awake, alert, following commands. Extubate this morning if still looks good in 10 minutes on low PEEP with pulmonary edema. Echo repeat pending read. Check PCT in AM, stop antibiotics tomorrow if still negative. CXR in AM. Monitor HTN as concern for hypertensive emergency causing pulmonary edema. I have spoken with and examined the patient. I agree with the above assessment and plan as documented.     Jesse Arshad MD

## 2019-11-11 NOTE — PROGRESS NOTES
Pt arrived to unit and placed on monitor. A flutter (rate in mid 60s), VSS. Opens eyes spontaneously and nods appropriately, moving all extremities purposefully, denies pain or discomfort. Previously intubated in ED, RT at bedside. Dual skin assessment completed with Tegan Morse RN. Skin intact and without injury. CHG bath completed and Allevyn placed.

## 2019-11-11 NOTE — H&P
HISTORY AND PHYSICAL      Kisha Rich    11/10/2019    Date of Admission:  11/10/2019    The patient's chart is reviewed and the patient is discussed with the staff. Subjective:     Patient is a 66 y.o.  male presents with respiratory failure. He was just discharged from [facility] today. He presented there yesterday with increased SOB of cough. No fever, chills, LE edema, or chest pain. It was originally suspected to represent PNA but the patient improved after just 24 hours of treatment with lasix and abx and was discharged. He was resting at home today per wife and awoke suddenly very SOB with BP in the 180-190's. Cough with pink frothy sputum. Went to urgent care and was transported here via EMS. Was intubated en route and has had copious pink frothy sputum from ETT since. CXR with B infiltrates c/w edema. Pro BNP better than yesterday but still elevated. No fever or chills still reported. No vaping reported by wife. Review of Systems  A comprehensive review of systems was negative except for that written in the HPI. Patient Active Problem List   Diagnosis Code    Hypertension, essential I10    Medicare annual wellness visit, subsequent Z00.00    Acute respiratory failure ([facility] J96.00    Chronic combined systolic and diastolic congestive heart failure (HCC) I50.42    Paroxysmal A-fib (HCC) I48.0    Lactic acidosis E87.2    LBBB (left bundle branch block) I44.7    Leukocytosis D72.829    HAP (hospital-acquired pneumonia) J18.9, Y95    C. difficile colitis A04.72    MARISOL (obstructive sleep apnea) G47.33    Inadequate sleep hygiene Z72.821    Pneumonia of lower lobe of lung (HCC) J18.1    Acute respiratory failure with hypoxia (HCC) J96.01    Acute pulmonary edema (HCC) J81.0    Acute heart failure (HCC) I50.9    SAMSON (acute kidney injury) ([facility] N17.9       Prior to Admission Medications   Prescriptions Last Dose Informant Patient Reported? Taking? B infantis/B ani/B taco/B bifid (PROBIOTIC 4X PO)   Yes No   Sig: Take 1 Tab by mouth as needed. Blood Pressure Kit-Extra Large kit   No No   Sig: Use to check blood pressure. DISABLED PLACARD (DISABLED PLACARD) DMV   No No   Sig: Apply to car   amLODIPine (NORVASC) 10 mg tablet   Yes No   Sig: Take 10 mg by mouth daily. apixaban (ELIQUIS) 5 mg tablet   No No   Sig: Take 1 Tab by mouth two (2) times a day. azithromycin (ZITHROMAX) 250 mg tablet   No No   Sig: Take 1 Tab by mouth daily for 5 days. carvedilol (COREG) 6.25 mg tablet   No No   Sig: Take 1 Tab by mouth two (2) times daily (with meals). cephALEXin (KEFLEX) 500 mg capsule   No No   Sig: Take 1 Cap by mouth three (3) times daily for 5 days. cloNIDine HCl (CATAPRES) 0.1 mg tablet   Yes No   Sig: Take 0.1 mg by mouth four (4) times daily. Indications: high blood pressure   cloNIDine HCl (CATAPRES) 0.2 mg tablet   No No   Sig: Take 1 Tab by mouth two (2) times a day. furosemide (LASIX) 40 mg tablet   No No   Sig: Take 1 Tab by mouth two (2) times a day. miscellaneous medical supply misc   Yes No   Sig: by Does Not Apply route. olmesartan (BENICAR) 40 mg tablet   No No   Sig: Take 1 Tab by mouth daily.  am      Facility-Administered Medications: None       Past Medical History:   Diagnosis Date    Arrhythmia     Cancer ([address]     basal cell    Elevated PSA     Erectile dysfunction     Hypertension     daily meds    MVP (mitral valve prolapse)     diagnosed many years ago, pt states does not see cardiologist, in youth    MARISOL (obstructive sleep apnea) 11/15/2018    Osteoarthritis     thumbs    Prostate cancer ([address]     S/P colonoscopy 2015    due in 2020     Past Surgical History:   Procedure Laterality Date    BIOPSY PROSTATE      HX APPENDECTOMY       Social History     Socioeconomic History    Marital status: LIFE PARTNER     Spouse name: Not on file    Number of children: Not on file    Years of education: Not on file  Highest education level: Not on file   Occupational History    Not on file   Social Needs    Financial resource strain: Not on file    Food insecurity:     Worry: Not on file     Inability: Not on file    Transportation needs:     Medical: Not on file     Non-medical: Not on file   Tobacco Use    Smoking status: Former Smoker     Last attempt to quit: 1997     Years since quittin.2    Smokeless tobacco: Never Used   Substance and Sexual Activity    Alcohol use: Yes     Comment: 15 drinks per wk    Drug use: No    Sexual activity: Not on file   Lifestyle    Physical activity:     Days per week: Not on file     Minutes per session: Not on file    Stress: Not on file   Relationships    Social connections:     Talks on phone: Not on file     Gets together: Not on file     Attends Sikhism service: Not on file     Active member of club or organization: Not on file     Attends meetings of clubs or organizations: Not on file     Relationship status: Not on file    Intimate partner violence:     Fear of current or ex partner: Not on file     Emotionally abused: Not on file     Physically abused: Not on file     Forced sexual activity: Not on file   Other Topics Concern    Not on file   Social History Narrative    Not on file     Family History   Problem Relation Age of Onset    Cancer Father         prostate    Stroke Father     Cancer Paternal Uncle     Heart Disease Mother     Heart Attack Maternal Uncle     Prostate Cancer Sister      Allergies   Allergen Reactions    Amoxicillin Swelling     Diff breathing, lip swelling    Levaquin [Levofloxacin] Shortness of Breath       Current Facility-Administered Medications   Medication Dose Route Frequency    propofol (DIPRIVAN) infusion  0-50 mcg/kg/min IntraVENous TITRATE    hydrALAZINE (APRESOLINE) 20 mg/mL injection 10 mg  10 mg IntraVENous NOW    naloxone (NARCAN) injection 1 mg  1 mg IntraVENous NOW    midazolam (VERSED) 100 mg in 0.9% sodium chloride 100 mL infusion  1-10 mg/hr IntraVENous TITRATE    fentaNYL in normal saline (pf) 25 mcg/mL infusion   mcg/hr IntraVENous TITRATE    midazolam (VERSED) injection 2 mg  2 mg IntraVENous ONCE     Current Outpatient Medications   Medication Sig    cephALEXin (KEFLEX) 500 mg capsule Take 1 Cap by mouth three (3) times daily for 5 days.  azithromycin (ZITHROMAX) 250 mg tablet Take 1 Tab by mouth daily for 5 days.  cloNIDine HCl (CATAPRES) 0.1 mg tablet Take 0.1 mg by mouth four (4) times daily. Indications: high blood pressure    amLODIPine (NORVASC) 10 mg tablet Take 10 mg by mouth daily.  apixaban (ELIQUIS) 5 mg tablet Take 1 Tab by mouth two (2) times a day.  furosemide (LASIX) 40 mg tablet Take 1 Tab by mouth two (2) times a day.  cloNIDine HCl (CATAPRES) 0.2 mg tablet Take 1 Tab by mouth two (2) times a day.  olmesartan (BENICAR) 40 mg tablet Take 1 Tab by mouth daily. am    carvedilol (COREG) 6.25 mg tablet Take 1 Tab by mouth two (2) times daily (with meals).  miscellaneous medical supply misc by Does Not Apply route.  Blood Pressure Kit-Extra Large kit Use to check blood pressure.  DISABLED PLACARD (DISABLED PLACARD) DMV Apply to car    B infantis/B ani/B taco/B bifid (PROBIOTIC 4X PO) Take 1 Tab by mouth as needed. Objective:     Vitals:    11/10/19 2033 11/10/19 2039 11/10/19 2043 11/10/19 2048   BP: 192/83 145/70 132/64 135/71   Pulse: (!) 102 64 73 73   Resp: 17 22 25 20   SpO2: 99% 99% 99% 99%   Weight:       Height:           PHYSICAL EXAM     Constitutional:  the patient is well developed and in no acute distress  EENMT:  Sclera clear, pupils equal, oral mucosa moist  Respiratory: B crackles and rhonchi. ETT in place with pink, frothy sputum present. Cardiovascular:  RRR without M,G,R  Gastrointestinal: soft and non-tender; with positive bowel sounds. Musculoskeletal: warm without cyanosis. There is trace lower extremity edema.   Skin: no jaundice or rashes, no wounds   Neurologic: no gross neuro deficits     Psychiatric:  alert and nodding to questions. CXR:  11/10/19  1. Relative high positioning of endotracheal tube. This could be advanced  approximately 3 cm. 2. Extensive bilateral airspace opacities. Recent Labs     11/10/19  1839 11/09/19  1613   WBC 12.3* 8.5   HGB 13.9 13.8   HCT 42.2 40.1*    292     Recent Labs     11/10/19  1839 11/09/19  1613    138   K 4.5 3.7    102   * 170*   CO2 24 25   BUN 17 22   CREA 1.50 1.20   CA 8.1* 8.5   TROIQ <0.02*  --    ALB 2.9* 3.2   TBILI 0.5 0.4   ALT 23 22   SGOT 31 18     Recent Labs     11/10/19  1847   PHI 7.250*   PCO2I 61.7*   PO2I 77   HCO3I 27.0*     No results for input(s): LCAD, LAC in the last 72 hours. Assessment:  (Medical Decision Making)     Hospital Problems  Date Reviewed: 11/10/2019          Codes Class Noted POA    Lactic acidosis ICD-10-CM: E87.2  ICD-9-CM: 276.2  11/10/2019 Unknown        Acute respiratory failure with hypoxia [facility]) ICD-10-CM: J96.01  ICD-9-CM: 518.81  11/10/2019 Unknown        * (Principal) Acute pulmonary edema ([facility] ICD-10-CM: J81.0  ICD-9-CM: 518.4  11/10/2019 Unknown        Acute heart failure ([facility] [address] ICD-10-CM: I50.9  ICD-9-CM: 428.9  11/10/2019 Unknown        SAMSON (acute kidney injury) ([facility] ICD-10-CM: N17.9  ICD-9-CM: 584.9  11/10/2019 Unknown            Given the clinical picture this represents acute pulmonary edema until proven otherwise. Very little suspicion for infection, particularly bacterial process. Viral infection is possible but again seems less likely than edema. Plan:  (Medical Decision Making)     --Will admit for further medical management to the ICU. -will check ETT aspirate bacterial culture and respiratory viral pcr.   -flu swab. -will cont cef/azithro started at [facility] but would d/c quickly if cultures are negative. Pct has been low x 2 checks.   -continue diuresis started in ER.  Monitor I/O and daily BMP with mild murali on BMP. -needs TTE and will ask cardiology to see in the am.   -BP currently normal to low with propofol. May need to change to versed/fentanyl to maintain adequate sedation while requires ETT. Hypertensive emergency likely contributed to his acute pulmonary edema.   -hold arb, clonidine. Cont rest of home bp meds if BP tolerates. -lactate likely due to respiratory distress. Repeat in am.   -troponin check now. -repeat abg in 1 hour.   -protonix  -eliquis and scd's    More than 50% of the time documented was spent in face-to-face contact with the patient and in the care of the patient on the floor/unit where the patient is located.     Will Lino MD

## 2019-11-11 NOTE — PROGRESS NOTES
Suctioned moderate thick pink tinged secretions from ETT. Extubated to 3L NC per Dr. Silvano Macias. Pt orally suctioned large thick clear/yellow secretions after extubation. Sats > 95%.  No distress noted

## 2019-11-11 NOTE — PROGRESS NOTES
Pt seen in CCU s/p admission acute pulmonary edema and intubation. Now extubated. Alert and oriented. LP Terrell Khan at bedside. Confirms demographics. Discussed poss needs for d/c HH vs STR. No needs voiced at present. CM to follow for any d/c needs per MD.     Care Management Interventions  PCP Verified by CM: Yes(confirms Dr. Hang Stevenson)  Mode of Transport at Discharge:  Other (see comment)  Transition of Care Consult (CM Consult): Discharge Planning  Discharge Durable Medical Equipment: (CPAP)  Current Support Network: Lives with Spouse, Own Home(lives with life partner, [phone])  Confirm Follow Up Transport: Self  Plan discussed with Pt/Family/Caregiver: Yes  Freedom of Choice Offered: Yes  [address] Provided?: (confirms M[employer] supplemental, able to get rx, pt is a )  Discharge Location  Discharge Placement: Unable to determine at this time

## 2019-11-11 NOTE — PROGRESS NOTES
This note will not be viewable in [address] Opened chart for [facility] call. Patient is noted to be current inpatient. No Further LUANNE calls needed at this time. Patient to be reasigned pending discharge dispostition.

## 2019-11-12 ENCOUNTER — APPOINTMENT (OUTPATIENT)
Dept: GENERAL RADIOLOGY | Age: 74
DRG: 981 | End: 2019-11-12
Attending: INTERNAL MEDICINE
Payer: MEDICARE

## 2019-11-12 LAB
ANION GAP SERPL CALC-SCNC: 7 MMOL/L (ref 7–16)
ARTERIAL PATENCY WRIST A: YES
BASE EXCESS BLD CALC-SCNC: 0 MMOL/L
BDY SITE: ABNORMAL
BODY TEMPERATURE: 98.6
BUN SERPL-MCNC: 27 MG/DL (ref 8–23)
CALCIUM SERPL-MCNC: 8 MG/DL (ref 8.3–10.4)
CHLORIDE SERPL-SCNC: 106 MMOL/L (ref 98–107)
CO2 BLD-SCNC: 25 MMOL/L
CO2 SERPL-SCNC: 28 MMOL/L (ref 21–32)
COLLECT TIME,HTIME: 335
CREAT SERPL-MCNC: 1.29 MG/DL (ref 0.8–1.5)
CRP SERPL-MCNC: 4.2 MG/DL (ref 0–0.9)
ERYTHROCYTE [SEDIMENTATION RATE] IN BLOOD: 23 MM/HR (ref 0–20)
EXHALED MINUTE VOLUME, VE: 14.1 L/MIN
GAS FLOW.O2 O2 DELIVERY SYS: ABNORMAL L/MIN
GAS FLOW.O2 SETTING OXYMISER: 16 BPM
GLUCOSE SERPL-MCNC: 151 MG/DL (ref 65–100)
HCO3 BLD-SCNC: 24.1 MMOL/L (ref 22–26)
INSPIRATION.DURATION SETTING TIME VENT: 1 SEC
O2/TOTAL GAS SETTING VFR VENT: 60 %
PCO2 BLD: 37.6 MMHG (ref 35–45)
PEEP RESPIRATORY: 12 CMH2O
PH BLD: 7.42 [PH] (ref 7.35–7.45)
PIP ISTAT,IPIP: 19
PO2 BLD: 106 MMHG (ref 75–100)
POTASSIUM SERPL-SCNC: 3.7 MMOL/L (ref 3.5–5.1)
PROCALCITONIN SERPL-MCNC: 0.2 NG/ML
SAO2 % BLD: 98 % (ref 95–98)
SERVICE CMNT-IMP: ABNORMAL
SERVICE CMNT-IMP: ABNORMAL
SODIUM SERPL-SCNC: 141 MMOL/L (ref 136–145)
SPECIMEN TYPE: ABNORMAL
VT SETTING VENT: 736 ML

## 2019-11-12 PROCEDURE — 77030018846 HC SOL IRR STRL H20 ICUM -A

## 2019-11-12 PROCEDURE — 74011000250 HC RX REV CODE- 250: Performed by: INTERNAL MEDICINE

## 2019-11-12 PROCEDURE — 74011250637 HC RX REV CODE- 250/637: Performed by: INTERNAL MEDICINE

## 2019-11-12 PROCEDURE — B2111ZZ FLUOROSCOPY OF MULTIPLE CORONARY ARTERIES USING LOW OSMOLAR CONTRAST: ICD-10-PCS | Performed by: INTERNAL MEDICINE

## 2019-11-12 PROCEDURE — 74011250636 HC RX REV CODE- 250/636: Performed by: INTERNAL MEDICINE

## 2019-11-12 PROCEDURE — 77030009397 HC ELECTRD ECG PACE ZOLL -B

## 2019-11-12 PROCEDURE — 77030019569 HC BND COMPR RAD TERU -B

## 2019-11-12 PROCEDURE — 99153 MOD SED SAME PHYS/QHP EA: CPT

## 2019-11-12 PROCEDURE — 5A2204Z RESTORATION OF CARDIAC RHYTHM, SINGLE: ICD-10-PCS | Performed by: INTERNAL MEDICINE

## 2019-11-12 PROCEDURE — 77030015766

## 2019-11-12 PROCEDURE — 77030004558 HC CATH ANGI DX SUPR TORQ CARD -A

## 2019-11-12 PROCEDURE — 86140 C-REACTIVE PROTEIN: CPT

## 2019-11-12 PROCEDURE — 93005 ELECTROCARDIOGRAM TRACING: CPT | Performed by: INTERNAL MEDICINE

## 2019-11-12 PROCEDURE — 82803 BLOOD GASES ANY COMBINATION: CPT

## 2019-11-12 PROCEDURE — 77010033678 HC OXYGEN DAILY

## 2019-11-12 PROCEDURE — 74011636320 HC RX REV CODE- 636/320: Performed by: INTERNAL MEDICINE

## 2019-11-12 PROCEDURE — 65620000000 HC RM CCU GENERAL

## 2019-11-12 PROCEDURE — 92960 CARDIOVERSION ELECTRIC EXT: CPT

## 2019-11-12 PROCEDURE — 74011000258 HC RX REV CODE- 258: Performed by: INTERNAL MEDICINE

## 2019-11-12 PROCEDURE — 4A023N7 MEASUREMENT OF CARDIAC SAMPLING AND PRESSURE, LEFT HEART, PERCUTANEOUS APPROACH: ICD-10-PCS | Performed by: INTERNAL MEDICINE

## 2019-11-12 PROCEDURE — C1894 INTRO/SHEATH, NON-LASER: HCPCS

## 2019-11-12 PROCEDURE — 83520 IMMUNOASSAY QUANT NOS NONAB: CPT

## 2019-11-12 PROCEDURE — 36600 WITHDRAWAL OF ARTERIAL BLOOD: CPT

## 2019-11-12 PROCEDURE — 99152 MOD SED SAME PHYS/QHP 5/>YRS: CPT

## 2019-11-12 PROCEDURE — 93458 L HRT ARTERY/VENTRICLE ANGIO: CPT

## 2019-11-12 PROCEDURE — 85652 RBC SED RATE AUTOMATED: CPT

## 2019-11-12 PROCEDURE — 84145 PROCALCITONIN (PCT): CPT

## 2019-11-12 PROCEDURE — 94660 CPAP INITIATION&MGMT: CPT

## 2019-11-12 PROCEDURE — 36415 COLL VENOUS BLD VENIPUNCTURE: CPT

## 2019-11-12 PROCEDURE — 99291 CRITICAL CARE FIRST HOUR: CPT | Performed by: INTERNAL MEDICINE

## 2019-11-12 PROCEDURE — C1769 GUIDE WIRE: HCPCS

## 2019-11-12 PROCEDURE — 80048 BASIC METABOLIC PNL TOTAL CA: CPT

## 2019-11-12 PROCEDURE — B2151ZZ FLUOROSCOPY OF LEFT HEART USING LOW OSMOLAR CONTRAST: ICD-10-PCS | Performed by: INTERNAL MEDICINE

## 2019-11-12 PROCEDURE — 71045 X-RAY EXAM CHEST 1 VIEW: CPT

## 2019-11-12 RX ORDER — LISINOPRIL 5 MG/1
10 TABLET ORAL EVERY 12 HOURS
Status: DISCONTINUED | OUTPATIENT
Start: 2019-11-12 | End: 2019-11-25 | Stop reason: HOSPADM

## 2019-11-12 RX ORDER — GUAIFENESIN 100 MG/5ML
81 LIQUID (ML) ORAL ONCE
Status: COMPLETED | OUTPATIENT
Start: 2019-11-12 | End: 2019-11-12

## 2019-11-12 RX ORDER — HEPARIN SODIUM 200 [USP'U]/100ML
3 INJECTION, SOLUTION INTRAVENOUS CONTINUOUS
Status: DISCONTINUED | OUTPATIENT
Start: 2019-11-12 | End: 2019-11-12

## 2019-11-12 RX ORDER — FENTANYL CITRATE 50 UG/ML
25-50 INJECTION, SOLUTION INTRAMUSCULAR; INTRAVENOUS
Status: DISCONTINUED | OUTPATIENT
Start: 2019-11-12 | End: 2019-11-12

## 2019-11-12 RX ORDER — METOPROLOL SUCCINATE 50 MG/1
100 TABLET, EXTENDED RELEASE ORAL EVERY 12 HOURS
Status: DISCONTINUED | OUTPATIENT
Start: 2019-11-12 | End: 2019-11-19

## 2019-11-12 RX ORDER — FAMOTIDINE 20 MG/1
20 TABLET, FILM COATED ORAL EVERY 12 HOURS
Status: DISCONTINUED | OUTPATIENT
Start: 2019-11-12 | End: 2019-11-13

## 2019-11-12 RX ORDER — AMLODIPINE BESYLATE 5 MG/1
5 TABLET ORAL DAILY
Status: DISCONTINUED | OUTPATIENT
Start: 2019-11-13 | End: 2019-11-25 | Stop reason: HOSPADM

## 2019-11-12 RX ORDER — LIDOCAINE HYDROCHLORIDE 10 MG/ML
10-200 INJECTION INFILTRATION; PERINEURAL ONCE
Status: COMPLETED | OUTPATIENT
Start: 2019-11-12 | End: 2019-11-12

## 2019-11-12 RX ORDER — METOPROLOL SUCCINATE 50 MG/1
50 TABLET, EXTENDED RELEASE ORAL EVERY 12 HOURS
Status: DISCONTINUED | OUTPATIENT
Start: 2019-11-12 | End: 2019-11-12

## 2019-11-12 RX ORDER — METOPROLOL TARTRATE 5 MG/5ML
5 INJECTION INTRAVENOUS ONCE
Status: COMPLETED | OUTPATIENT
Start: 2019-11-12 | End: 2019-11-12

## 2019-11-12 RX ORDER — MIDAZOLAM HYDROCHLORIDE 1 MG/ML
.5-2 INJECTION, SOLUTION INTRAMUSCULAR; INTRAVENOUS
Status: DISCONTINUED | OUTPATIENT
Start: 2019-11-12 | End: 2019-11-12

## 2019-11-12 RX ADMIN — APIXABAN 5 MG: 5 TABLET, FILM COATED ORAL at 21:09

## 2019-11-12 RX ADMIN — METOPROLOL TARTRATE 5 MG: 5 INJECTION INTRAVENOUS at 15:34

## 2019-11-12 RX ADMIN — LISINOPRIL 10 MG: 5 TABLET ORAL at 08:19

## 2019-11-12 RX ADMIN — MIDAZOLAM 2 MG: 1 INJECTION INTRAMUSCULAR; INTRAVENOUS at 15:21

## 2019-11-12 RX ADMIN — FENTANYL CITRATE 50 MCG: 50 INJECTION INTRAMUSCULAR; INTRAVENOUS at 15:19

## 2019-11-12 RX ADMIN — AMIODARONE HYDROCHLORIDE 150 MG: 50 INJECTION, SOLUTION INTRAVENOUS at 08:15

## 2019-11-12 RX ADMIN — AMIODARONE HYDROCHLORIDE 1 MG/MIN: 50 INJECTION, SOLUTION INTRAVENOUS at 22:59

## 2019-11-12 RX ADMIN — NITROGLYCERIN 1 INCH: 20 OINTMENT TOPICAL at 11:41

## 2019-11-12 RX ADMIN — Medication 10 ML: at 16:13

## 2019-11-12 RX ADMIN — Medication 10 ML: at 05:28

## 2019-11-12 RX ADMIN — FAMOTIDINE 20 MG: 20 TABLET, FILM COATED ORAL at 21:09

## 2019-11-12 RX ADMIN — AMIODARONE HYDROCHLORIDE 1 MG/MIN: 50 INJECTION, SOLUTION INTRAVENOUS at 08:20

## 2019-11-12 RX ADMIN — LIDOCAINE HYDROCHLORIDE 2 ML: 10 INJECTION, SOLUTION INFILTRATION; PERINEURAL at 15:07

## 2019-11-12 RX ADMIN — AMLODIPINE BESYLATE 10 MG: 10 TABLET ORAL at 08:19

## 2019-11-12 RX ADMIN — HEPARIN SODIUM 2 ML: 10000 INJECTION, SOLUTION INTRAVENOUS; SUBCUTANEOUS at 15:06

## 2019-11-12 RX ADMIN — AMIODARONE HYDROCHLORIDE 1 MG/MIN: 50 INJECTION, SOLUTION INTRAVENOUS at 16:27

## 2019-11-12 RX ADMIN — CEFTRIAXONE 1 G: 1 INJECTION, POWDER, FOR SOLUTION INTRAMUSCULAR; INTRAVENOUS at 21:08

## 2019-11-12 RX ADMIN — LISINOPRIL 10 MG: 5 TABLET ORAL at 21:09

## 2019-11-12 RX ADMIN — ASPIRIN 81 MG 81 MG: 81 TABLET ORAL at 15:07

## 2019-11-12 RX ADMIN — FUROSEMIDE 40 MG: 10 INJECTION, SOLUTION INTRAMUSCULAR; INTRAVENOUS at 21:08

## 2019-11-12 RX ADMIN — FUROSEMIDE 40 MG: 10 INJECTION, SOLUTION INTRAMUSCULAR; INTRAVENOUS at 08:20

## 2019-11-12 RX ADMIN — Medication 10 ML: at 21:10

## 2019-11-12 RX ADMIN — NITROGLYCERIN 1 INCH: 20 OINTMENT TOPICAL at 05:24

## 2019-11-12 RX ADMIN — FAMOTIDINE 20 MG: 10 INJECTION INTRAVENOUS at 08:20

## 2019-11-12 RX ADMIN — IOPAMIDOL 65 ML: 755 INJECTION, SOLUTION INTRAVENOUS at 15:21

## 2019-11-12 RX ADMIN — METOPROLOL SUCCINATE 50 MG: 50 TABLET, EXTENDED RELEASE ORAL at 08:20

## 2019-11-12 RX ADMIN — HEPARIN SODIUM 3 ML/HR: 5000 INJECTION, SOLUTION INTRAVENOUS; SUBCUTANEOUS at 15:06

## 2019-11-12 RX ADMIN — FENTANYL CITRATE 50 MCG: 50 INJECTION INTRAMUSCULAR; INTRAVENOUS at 15:06

## 2019-11-12 RX ADMIN — MIDAZOLAM 1 MG: 1 INJECTION INTRAMUSCULAR; INTRAVENOUS at 15:24

## 2019-11-12 RX ADMIN — MIDAZOLAM 2 MG: 1 INJECTION INTRAMUSCULAR; INTRAVENOUS at 15:06

## 2019-11-12 RX ADMIN — NITROGLYCERIN 1 INCH: 20 OINTMENT TOPICAL at 00:15

## 2019-11-12 RX ADMIN — MIDAZOLAM 2 MG: 1 INJECTION INTRAMUSCULAR; INTRAVENOUS at 15:19

## 2019-11-12 RX ADMIN — METOPROLOL SUCCINATE 100 MG: 50 TABLET, EXTENDED RELEASE ORAL at 21:09

## 2019-11-12 NOTE — PROCEDURES
[facility]  CARDIAC CATH    Name:  Lottie Joyce  MR#:  [id number]  :  1945  ACCOUNT #:  [de-identified]  DATE OF SERVICE:  2019    PRIMARY CARDIOLOGIST:  Shantel Arreola [address] Ricketts MD    PRIMARY CARE PHYSICIAN:  Minor Cantrell. Audrey Leonard MD    BRIEF HISTORY:  The patient is a 76-year-old gentleman with a history of recurrent flash pulmonary edema. Uncertain etiology, may be related to underlying acute coronary ischemia. He is referred for cardiac catheterization followed by cardioversion given recurrent atrial flutter with RVR. PREOPERATIVE DIAGNOSES:  Flash pulmonary edema and atrial flutter with rapid ventricular response. POSTOPERATIVE DIAGNOSES:  Minimal atherosclerotic coronary disease and restoration of sinus rhythm. PROCEDURES PERFORMED:  1.  Bilateral selective coronary angiography and left ventriculography. 2.  Cardioversion. SURGEON:  Gary Low MD    ASSISTANT:  None. COMPLICATIONS:  None. ANESTHESIA:  Conscious sedation. Start time 15:06, end time 15:30. MEDICATIONS:  7 mg of Versed, 100 mcg of fentanyl. MONITORING RN:  Loretta Hicks    SPECIMENS REMOVED:  None. IMPLANTS:  None. ESTIMATED BLOOD LOSS:  Less than 5 mL. PROCEDURE:  After informed consent, he was prepped and draped in the usual sterile fashion. The right wrist was infiltrated with lidocaine. Right radial artery was accessed. A 6-Malawian sheath was advanced. A 6-Malawian Tiger catheter was utilized for left and right coronary injection. A 6-Malawian angled pigtail for left ventriculography. Isovue contrast was utilized. FINDINGS:  1. Left ventricle:  Upper normal left ventricular size with low normal left ventricular systolic ejection fraction of 50%. Left ventricular end-diastolic pressure is measured at 40 mmHg. There is 1+ mitral regurgitation. No aortic valve gradient. 2.  Left main:  Left main is moderate-to-large in size, bifurcates into LAD and circumflex system.   This appears angiographically normal.  3.  Left anterior descending coronary: It is large proximally, gives rise to a large mid vessel diagonal.  The LAD diagonal beyond this appears angiographically normal.  4.  Left circumflex coronary: It is a large vessel, gives rise to a very large first obtuse marginal, small second obtuse marginal, and small third obtuse marginal.  The entire system appears angiographically normal.  5.  Right coronary: It is a large anatomically dominant vessel, has mild 20%-30% mid and distal stenosis, gives rise to moderate-sized posterior ascending posterolateral branch which are normal.    CARDIOVERSION:    PREOPERATIVE DIAGNOSIS:  Atrial flutter. POSTOPERATIVE DIAGNOSIS:  Sinus rhythm. DETAILS:  The patient is chronically anticoagulated with Eliquis. The patient is on 10 liters of high-flow nasal cannula, would not tolerate HIRO preoperatively. This may be a contributing factor to the patient's flash pulmonary edema. Given the fact that his coronaries are normal, LVEDP is luis, we will proceed with cardioversion. The patient is chronically on Eliquis. The patient was adequate sedated. 200 joules of synchronized biphasic energy was administered x1 with restoration of sinus rhythm and frequent PACs. Successful hemostasis with pneumatic radial band. CONCLUSIONS:  1. Low normal left ventricular systolic function with normal end-diastolic pressures. No significant valvular heart disease. 2.  Minimal atherosclerotic coronary disease. 3.  Successful cardioversion and restoration of sinus rhythm. RECOMMENDATIONS:  Continue IV amiodarone for 24 hours. We will change to oral amiodarone tomorrow morning. Uncertain as to etiology of recurrent flash pulmonary edema at this time. Thank you for allowing us to participate in the care of this patient. Any questions or concerns, please feel free to contact me.       Kilo Willard MD      [id number]  D: 11/12/2019 15:36  T:  11/12/2019 16:44  JOB #:  [id number]  CC:  MD Gail Damon MD

## 2019-11-12 NOTE — PROGRESS NOTES
Per pt request. He does not want to take his Zithromax because it make it hard for him to breath. VSS at this time. Pt face is flushed.  Will discuss with AM nurse

## 2019-11-12 NOTE — PROGRESS NOTES
Head to assessment completed on pt this AM.     Neuro: Pt is alert and oriented x 4. PERRLA 3 mm and brisk to react. Able to move ALAMO purposefully. Pt with anxiety r/t procedure later today - tearful. Provided pt with emotional comfort and therapeutic presence. Resp: Transitioned pt off of BiPAP to 10 L HFNC. Diminished throughout. Nonproductive strong cough. Cardiac: Atypical Afib/ Aflutter. Pt hypertensive before AM meds. GI: Pt NPO before procedure. Active bowel sounds. : Cancino - draining and patent. Amanda UOP. Skin: Allevyn intact. SCDs present.

## 2019-11-12 NOTE — PROGRESS NOTES
TRANSFER - OUT REPORT:    Verbal report given to CCU RN on Jackie Pope  being transferred to CCU for routine progression of care       Report consisted of patients Situation, Background, Assessment and Recommendations(SBAR). Information from the following report(s) SBAR, Kardex, Procedure Summary and MAR was reviewed with the receiving nurse. Opportunity for questions and clarification was provided.       [facility] with cardioversion Dr Carlos Saldaña  No intervention  7 versed  100 fentanyl  Right radial Rband 12ml at 1520  200J cardioversion to NSR     5 lopressor

## 2019-11-12 NOTE — PROGRESS NOTES
TRANSFER - IN REPORT:    Verbal report received from Carolina RN (name) on Alli Simple  being received from cath lab (unit) for routine post - op      Report consisted of patients Situation, Background, Assessment and   Recommendations(SBAR). Information from the following report(s) Procedure Summary, MAR and Cardiac Rhythm (NSR with widened QRS)  was reviewed with the receiving nurse. Opportunity for questions and clarification was provided. Assessment completed upon patients arrival to unit and care assumed.

## 2019-11-12 NOTE — PROGRESS NOTES
Pt in respiratory distress and coughing up blood tinged sputum. RT and MD notified and at bedside. Pt placed on Bipap.

## 2019-11-12 NOTE — PROGRESS NOTES
Patient was agitated earlier and given ativan. Anxious and told not to star at the monitor since that usually makes if worse. On BIPAP and coughed up some bloody pink sputum. Lung with rhonchi and coarse. CXR confirms worse pulmonary edema. Did get 40 mg of lasix IV earlier and I gave another 40 mg IV lasix as well. Agitation is less but then tried to climb out of bed. reassured and will given him some morphine and start precedex. I was able to taper his BIPAP from 100% FIO2 down to 80% and saturation at 94% currently. Aware he is slowly improving. Goal is to avoid re-intubation. He is aware. Also stopped his Zithromax since reported more agitated after it ? reaction to it. Already has put out 700 ml of urine over past 30 minutes. Condition is critical  Time spent with care tonight will all issues is 35 minutes.      Olive Perry MD

## 2019-11-12 NOTE — PROGRESS NOTES
[facility] CARDIOLOGY PROGRESS NOTE           11/12/2019 7:23 AM    Admit Date: 11/10/2019      Subjective:   Patient with recurrent episode last night. Appears to be driven by severe anxiety. Now on bipap and stable. ROS:  Cardiovascular:  As noted above    Objective:      Vitals:    11/12/19 0524 11/12/19 0530 11/12/19 0546 11/12/19 0601   BP: 136/69 143/75 133/75 140/81   Pulse: 83 90 87 89   Resp:  25 15 15   Temp:       SpO2:  93% 95% 98%   Weight:       Height:           Physical Exam:  General-No Acute Distress  Neck- supple, no JVD  CV- irregular rate and rhythm no MRG  Lung- clear bilaterally  Abd- soft, nontender, nondistended  Ext- no edema bilaterally. Skin- warm and dry    Data Review:   Recent Labs     11/12/19  0317 11/11/19  0244 11/10/19  2207 11/10/19  1839    140  --  138   K 3.7 3.9  --  4.5   BUN 27* 19  --  17   CREA 1.29 1.17  --  1.50   * 196*  --  227*   WBC  --  11.0  --  12.3*   HGB  --  12.9*  --  13.9   HCT  --  38.2*  --  42.2   PLT  --  213  --  288   TROIQ  --   --  0.04 <0.02*       Assessment/Plan:     Principal Problem:    Acute pulmonary edema (HCC) (11/10/2019)    Etiology unknown. EF normal and no significant valve disease. Anxiety is significant. May be driven by atypical atrial flutter with RVR. - Plan eCV today.   Will start IV amiodarone    - LHC today to r/o acute recurrent ischemia as etiology    - Aggressive control of anxiety for now    Active Problems:    Acute respiratory failure with hypoxia ([facility] [address] (11/10/2019)    As above       Acute heart failure ([facility] (11/10/2019)    As above           Reji Banegas MD  11/12/2019 7:23 AM

## 2019-11-12 NOTE — PROGRESS NOTES
Interdisciplinary team rounds were held 11/12/2019 with the following team members:Care Management, Nursing, Nurse Practitioner, Nutrition, Palliative Care, Pastoral Care, Pharmacy, Physical Therapy, Physician, Physician's Assistant, Respiratory Therapy and Clinical Coordinator and the patient. Plan of care discussed. See clinical pathway and/or care plan for interventions and desired outcomes.

## 2019-11-12 NOTE — PROGRESS NOTES
Pts wife observed pt coughing up pink tinged sputum - pt reports increased WOB and SOB, requesting to be placed on BiPAP. Pt placed on BiPAP - spoke to Dr. Inna Mcginnis. No new orders received.

## 2019-11-12 NOTE — PROCEDURES
Brief Cardiac Procedure Note    Patient: Liliane Shepherd MRN: [id number]  SSN: [id number]    YOB: 1945  Age: 66 y.o. Sex: male      Date of Procedure: 11/12/2019     Pre-procedure Diagnosis: Flash Pulmonary edema    Post-procedure Diagnosis: same    Procedure: Left Heart Catheterization, ECV    Brief Description of Procedure: See note    Performed By: Noman Massey MD     Assistants: None    Anesthesia: Moderate Sedation    Estimated Blood Loss: Less than 10 mL      Specimens: None    Implants: None    Findings:   LV:  EF 45-50%m EDP 14mmHg  LM:  NML  LAD:  NML  LCx:  NML  RCA:  20-30% mid and distal    eCV with 125U x 1    Complications: None    Recommendations: Continue medical therapy.     Signed By: Noman Massey MD     November 12, 2019

## 2019-11-12 NOTE — PROGRESS NOTES
Jazz Manny  Admission Date: 11/10/2019             Daily Progress Note: 11/12/2019     Patient is a 66 y.o.  male presented with respiratory failure. He was just discharged from [facility] today. He presented there with increased SOB of cough. No fever, chills, LE edema, or chest pain. It was originally suspected to represent PNA but the patient improved after just 24 hours of treatment with lasix and abx and was discharged.      He was resting at home  per wife and awoke suddenly very SOB with BP in the 180-190's. Cough with pink frothy sputum. Went to urgent care and was transported here via EMS. Was intubated en route and has had copious pink frothy sputum from ETT since. CXR with B infiltrates c/w edema. Pro BNP better than yesterday but still elevated. No fever or chills still reported. Subjective:     Had another episode of acute hypertensive episode, tachycardia and acute shortness of breath. He required BIPAP overnight and despite extubating to 3L NC yesterday is on 10L HFNC now. He is currently sitting in bed on laptop, but seems anxious about heart and pending cath later today. No additional concerns. Still has had some pink frothy sputum.      Current Facility-Administered Medications   Medication Dose Route Frequency    lisinopril (PRINIVIL, ZESTRIL) tablet 10 mg  10 mg Oral Q12H    amiodarone (CORDARONE) 450 mg in dextrose 5% 250 mL infusion  1 mg/min IntraVENous CONTINUOUS    metoprolol succinate (TOPROL-XL) XL tablet 50 mg  50 mg Oral Q12H    furosemide (LASIX) injection 40 mg  40 mg IntraVENous Q12H    nitroglycerin (NITROBID) 2 % ointment 1 Inch  1 Inch Topical Q6H    diphenhydrAMINE (BENADRYL) injection 12.5 mg  12.5 mg IntraVENous ONCE    dexmedeTOMidine (PRECEDEX) 400 mcg in 0.9% sodium chloride 100 mL infusion  0.2-0.7 mcg/kg/hr IntraVENous TITRATE    amLODIPine (NORVASC) tablet 10 mg  10 mg Oral DAILY    [Held by provider] apixaban (ELIQUIS) tablet 5 mg  5 mg Oral Q12H    cefTRIAXone (ROCEPHIN) 1 g in 0.9% sodium chloride (MBP/ADV) 50 mL  1 g IntraVENous Q24H    sodium chloride (NS) flush 5-40 mL  5-40 mL IntraVENous Q8H    sodium chloride (NS) flush 5-40 mL  5-40 mL IntraVENous PRN    famotidine (PF) (PEPCID) 20 mg in sodium chloride 0.9% 10 mL injection  20 mg IntraVENous Q12H     Objective:     Vitals:    11/12/19 0601 11/12/19 0701 11/12/19 0716 11/12/19 0752   BP: 140/81 152/71 143/68    Pulse: 89 84 85    Resp: 15 14 14    Temp:       SpO2: 98% 94% 94% 93%   Weight:       Height:         Intake and Output:   11/10 1901 - 11/12 0700  In: 217.8 [I.V.:177.8]  Out: 1685 [Urine:3075]  No intake/output data recorded.       Intake/Output Summary (Last 24 hours) at 11/12/2019 0943  Last data filed at 11/12/2019 0527  Gross per 24 hour   Intake 50 ml   Output 2100 ml   Net -2050 ml       Physical Exam:          GEN: well developed and in no acute distress, Oxygen per vent  HEENT: clear, moist MM  NECK:  no JVD, no retractions, no thyromegaly or masses,   LUNGS:  CTA on HFNC 10L  HEART:  RRR with no M,G,R;  ABDOMEN:  soft with no tenderness; positive bowel sounds present  EXTREMITIES:  warm with no cyanosis, no lower leg edema  SKIN:  no jaundice or ecchymosis   NEURO:  alert and oriented, grossly non-focal    LAB  Recent Labs     11/11/19  0244 11/10/19  1839 11/09/19  1613   WBC 11.0 12.3* 8.5   HGB 12.9* 13.9 13.8   HCT 38.2* 42.2 40.1*    288 292     Recent Labs     11/12/19  0317 11/11/19  0244 11/10/19  2207 11/10/19  1839    140  --  138   K 3.7 3.9  --  4.5    106  --  105   CO2 28 26  --  24   * 196*  --  227*   BUN 27* 19  --  17   CREA 1.29 1.17  --  1.50   TROIQ  --   --  0.04 <0.02[facility] Problems  Date Reviewed: 11/10/2019          Codes Class Noted POA    Acute respiratory failure with hypoxia ([facility] ICD-10-CM: J96.01  ICD-9-CM: 518.81  11/10/2019 Yes        * (Principal) Acute pulmonary edema ([facility] ICD-10-CM: J81.0  ICD-9-CM: 518.4  11/10/2019 Yes        Acute heart failure ([facility] ICD-10-CM: I50.9  ICD-9-CM: 428.9  11/10/2019 Yes            67 y/o with repeated episodes of acute hypoxemic respiratory failure presumed secondary to flash pulmonary edema, uncontrolled hypertension, tachycardia. TTE with only mild-moderate MR and preserved EF. Assessment:     --continue diuresis  --BP control  --pending cath this afternoon with cards  --presume this is acute pulmonary edema, but will send CRP, ESR and ANCA to expand differential  --there was no blood on UA, only mild protein  --continue in ICU, remains high risk for decompensation especially going for procedure today    Full Code    The patient is critically ill with respiratory failure, circulatory failure and requires high complexity decision making for assessment and support including frequent ventilator adjustment , frequent evaluation and titration of therapies , application of advanced monitoring technologies and extensive interpretation of multiple databases  Time devoted to patient care services described in this note- 15 min face to face/ 20 min total evaluation time.      Cumulative time devoted to patient care services by me for day of service -28 min     Pastora Hawk MD

## 2019-11-12 NOTE — PROGRESS NOTES
Patient A&Ox4, follows commands, eyes focus and track. Breathing even and unlabored, symmetrical chest expansion on 5L NC. Aflutter with PVCs on monitor, S1/S2 auscultated. Bowel sounds active, abdomen semi-soft and intact. Skin intact, allevyn to sacrum. Patient able to turn himself. Lines: 18G LAC, 20G R AC, 20G R FA, 20G L FA  Drains: [place]    Patient currently denies any pain, family at bedside. Bed locked and lowered with call light within reach. Will continue to monitor.

## 2019-11-12 NOTE — ROUTINE PROCESS
Respiratory Care Services Policy Number: -[id number] Title: Oxygen Protocol Effective Date: 01/1996 Revised Date: 06/2013, 02/29/2016, 4/2018, 7/2019 Reviewed Date: 05/2014, 03/2015, 06/2017 I. Policy: The Oxygen Protocol will be initiated for all patients upon written order from physician for administration of oxygen therapy or if a patient is found to have an oxygen saturation of 88% or less. Special consideration: the goal of oxygen therapy for COPD patients is to maintain oxygen saturation between 88% - 92% to comply with GOLD Guidelines. II. Purpose: To provide protocol driven respiratory therapy for the administration of oxygen at concentrations greater than that in ambient air with the intent of treating or preventing the symptoms and manifestations of hypoxia. III. Responsibility: Director Respiratory Care Services, all Respiratory Care Practitioners IV. Indications: A. Implement this protocol for patients when physician orders oxygen to be administered or when patient is found to have an oxygen saturation of 88% or less. B. To assure routine monitoring of patient's oxygen saturation b.i.d. and to make appropriate adjustments in accordance with ordered oxygen saturation parameters. C. To assure continuity of respiratory care that meets [facility] Clinical Practice Guidelines and GOLD Guidelines. D. Hb < 8 
E. Sickle Cell anemia crisis V. Assessment:  Assess the following parameters to determine the need to adjust oxygen: A. Measurement of patient's oxygen saturation via pulse oximetry. B. Observation of patient's color, respiratory effort, and responsiveness. C. Measurement of heart rate and respiratory rate. D. Complete a three-step ambulatory oxygen saturation when ordered. VI. Initiation:  Upon receipt of an order for oxygen, the RCP will: A. Verify order in the patient's EMR, which should include the desired oxygen saturation to be maintained. B. The patient shall be placed on oxygen with humidity (except for those oxygen delivery devices that do not require humidity, i.e. venturi masks and non-rebreather masks) as ordered by the physician to achieve the prescribed oxygen saturation. C. In the event that no saturation is specified, a saturation of 90% will be maintained. D. Patients, who are found to have a SaO2 of 88% or less, may be started on supplemental oxygen as described above. E. Patients admitted with cardiac problems/disease shall be maintained at 92% per Chest Committee recommendation. F. The patient will be informed of the \"no smoking policy\" and instructed in the proper use of oxygen therapy. G. Once desired oxygen saturation has been achieved, the RCP will document FIO2 and oxygen saturation in the respiratory section of the patient's EMR. VII. Maintenance: A. 30-second oxygen saturation check will be taken to maintain the saturation ordered by the physician each day. B. Patients will be assessed each shift and as needed by pulse oximetry to determine if oxygen needs to be decreased, increased or discontinued. C. If changes in FIO2 are indicated, all changes will be documented in the respiratory section of the patient's EMR. D. If no changes in FIO2 are required, the patient's oxygen flow rate and saturation will be recorded in the respiratory section of the patient's EMR. E. Per [facility], patients who are receiving oxygen therapy but are not on oxygen at home, should be weaned off oxygen as soon as possible or when anticipated discharge becomes evident. Shania Mangle will be discontinued after oxygen saturation has been maintained for 24 hours on room air and documented in the patient's EMR. G. Patients on the Inpatient Rehabilitation area on 9th floor will be exempt from having their oxygen discontinued per protocol.  Oxygen may be weaned but will be changed to prn to meet the needs of the patient when exercising and participating in therapy. H. The goal of oxygen therapy is to maintain patients with a diagnosis of COPD at oxygen saturation between 88% - 92% to comply with GOLD Guidelines. VIII. Safety: RCP will address the following safety issues: A. Identify patient using the two patient identifiers name and birth date via ID bracelet. B. Perform hand hygiene per hospital policy utilizing Standard Precautions for all patients and following transmission-based isolation as indicated per hospital policy. C. Cardiac patients will be maintained at an oxygen saturation of 92%. D. If a patient's FIO2 requirements necessitate changing oxygen delivery devices to a high concentration of oxygen, documentation indicating the change must be included in the progress notes, as well as in the respiratory flowsheet. E. If a patient has a hemoglobin level <8 mg. RCP will consult physician before discontinuing oxygen. IX. Interventions: A. RCP will assess patient for signs of respiratory distress or suspicion of CO2 retention. B. An ABG may be obtained for patients exhibiting respiratory distress or sickle cell      crisis. C. An order should be entered into patient's EMR for ABG under per protocol. X. Documentation A. Document assessment findings in the respiratory section of the patient's EMR. B. Document changes in therapy per protocol in the respiratory orders section and in the care plan section of the patient's EMR. C. Document patient education in the patient education section of the patient's EMR. XI. Reportable Conditions:  Report to the physician immediately: A. Acute changes in patient's respiratory status. B. An oxygen saturation <85%. C. A change in oxygen delivery device to provide a high concentration of oxygen. XII. Patient Instructions: Review with Patient A. Purpose of oxygen therapy B. Proper technique for using oxygen C. No smoking policy Approval: Pulmonary Committee (1-25-96) Revision: Chest Committee (4-28-05) L - Respiratory Care Department Policy, Procedure and Protocol Guideline Manual, , MAYNOR Parekh. L - Therapist Driven Respiratory Care Protocols  A Practitioner's Guide for Criteria-Based Respiratory Care by Viridiana Melton M.D., and MAYNOR Matta RRT. L - The rationale for therapist-driven protocols: an update. Respiratory Care [facility]. [facility] Clinical Practice Guidelines. Respiratory Care Services Policy Number: -[id number] Title: Noninvasive Positive Pressure                         Ventilation, (BIPAP VISION) and 
          Respironics (V60) Effective Date: 2005, 2017 Revised Date: 2012, 2014, 2015, 2016 I. Description: Non Invasive ventilation (NIV) is a ventilatory assist technique used as an alternative to endotracheal intubation. NIV is pressure ventilation delivered as BIPAP (Bi-level Positive Airway Pressure) which is a low pressure electronically driven device intended for use as a ventilatory support system for patients who have an intact respiratory drive. The device provides non-invasive ventilatory assistance through the use of a nasal or full face mask. BiPAP  (two levels of ventilatory support) known as inspired positive airway pressure (IPAP) and  positive airway support (EPAP). One level of support can also be delivered which is delivered as EPAP or CPAP which is delivered throughout the respiratory cycle. The aim is to maintain adequate ventilation and minimize the effort of breathing. The BIPAP Vision System and the Respironics V60 are the two systems available for non-invasive ventilation. These devices are also capable of being used for invasive ventilation in the critical care units only. BIPAP Vision: This device uses an electronic pressure control sensing monitor pressure differential in the patient circuit. This feedback allows for adjustment of the flow and pressure output 
to assist in inhalation or exhalation through the administration at two distinct levels of positive pressure. During inspiration, the level is variably positive and is always higher than the expiratory level. During exhalation, pressure is variably positive or near ambient. In addition, this device has the ability to compensate for leaks through automatic               adjustment of the trigger threshold. This capability allows for the application of BIPAP    for mask-applied ventilation assistance. Respironics V60 The Respironics V60 uses Auto-Trak technology to help ensure patient synchrony and therapy acceptance and is designed to address the specific challenges of NIV. By providing auto-adaptive leak compensation, inspiratory triggering, and expiratory cycling, Auto-Trak delivers optimal synchrony in the face of dynamic leak and changing patient demand. The Respironics V60 is designed to include pediatric use and is equipped with several modes, which allows the practitioner to meet the specific needs of the patients. See Appendix 1 for definitions of settings. II. Policy: The administration of non-invasive positive pressure ventilation (NPPV) will be the responsibility of the Respiratory Care Practitioner (RCP). Upon receipt of a physician order, proper patient instruction, set up and monitoring will be provided to ensure patient understanding, compliance and proper utilization of prescribed therapy. A. A patient may be allowed to use their own NPPV machine after having their equipment checked and approved by [employer]. B. A consent and Release for Home Ventilator form must be signed by the patient and witnessed by a health care provider if the patient chooses to use his home ventilator.  
C. A patient that is being treated for acute respiratory failure and placed on non-invasive ventilation must be placed in a critical care unit, per Medical Director. D.  A patient who is being treated for sleep apnea may be treated on the floors. E.   A patient has the option of using a hospital owned NPPV unit. F.   A patient may use a hospital unit and their own mask if they choose. G. Patients may be placed on full face mask with NPPV units on the hospital floors            under the following conditions: 1. Patient has an end stage disease process. 2. Patient was placed on full face mask and NPPV unit in the Critical Care Units and it has been established as safe and effective therapy. 3. Patient is ordered full face mask with NPPV unit for home use. 4. In the event patient is to be set up or transitioned to home on a NPPV unit, the Respironics V60 (in the AVAPS mode) shall be utilized while the patient is in the hospital. If a Skyla Lento is unavailable, a home NPPV unit may be provided by the DME provider for no more than 48 hours. III. Responsibility: Director, Matt Wheeler, and all Respiratory Care          Practitioners with documented competency. IV. Indications for non-invasive ventilation: A. Acute respiratory failure (Patient must be in Critical Care Unit) B. Chronic respiratory failure C. Alveolar hypoventilation D. Documented sleep apnea V. Contraindications: A. Patients with severe respiratory failure without a spontaneous respiratory drive B. Noninvasive ventilation may be contraindicated for patients with the followin. Inability to maintain a patent airway or adequately clear secretions 2. Acute sinusitis or otitis media 3. Risk for aspiration of gastric contents 4. Hypotension 5. Pre-existing pneumothorax or pneumomediastinum 6. Epistaxis 7. Recent facial, oral or skull surgery or trauma 8. history of allergy or sensitivity to mask materials where the risk from allergic reaction outweighs the benefit of ventilatory assistance C. Potential Complications: 
1. Cardiovascular compromise 2. Skin breakdown and discomfort from mask 3. Gastric distention 4. Increased intracranial pressure 5. Pulmonary barotraumas VI. Mask fit A. It is essential that the patient be correctly fitted with an appropriate size mask. 1. Choose mask according to sizing template on disposable setups. 2.  The mask should fit comfortably on the patients nose,  not occluding the            nares and the base of the mask should fit comfortably between the chin and        bottom lip see picture on setup guide. 3.  Headgear should fit comfortably not tight. The bottom edge of the headgear        should sit at the base of the nape of the neck with side straps underneath the        earlobes. 4. The face masks are better for patients who are dyspneic and tachypneic as            they tend to mouth breathe with a nasal mask and reduce the effectiveness          of the ventilation. 5. Masks that are too tight are uncomfortable and cause pressure areas and              masks that are too loose leak which reduce the efficiency of the system. 6. When using a nasal mask the patient must keep their mouth closed to obtain       the desired effect of the ventilation. 7. Place an Allevyn Gentle Border dressing over bridge of nose to avoid skin          breakdown. VII. Equipment for BIPAP Vision A. BIPAP Vision Ventilatory Support Unit B. BIPAP Vision disposable circuit with proximal pressure and exhalation port. C. Main flow bacterial filter D. Nasal or face mask and disposable head strap F. Smooth inner lumen tubing for connecting the humidifier system to the BIPAP unit. G. Pulse oximetry equipment and supplies H. Oxygen analyzer with circuit adapter I. Device specific humidification system which must be used when using BIPAP. VIII. Procedure for Non-invasive ventilation via BIPAP Vision: A. BIPAP Vision Set Up 1. Assemble the circuit with exhalation port proximal to the patient. 2.  A bacterial filter and oxygen analyzer should be place between the                             machines patient interface port and the patient circuit. If using the oxygen               module, connect to a 50 psi oxygen source. 3. Attach humidifier to the system. 4. Plug electrical cord in AC outlet. Press START on the back of the machine. 5. The Vision will perform a self-test as indicated by the display screen,                        System Self-Test in Progress.  6. Perform the Test Exhalation Port second button from top, left of screen. a) Insure that whistle port and pressure tubing are in line. b) Occlude end of whistle port at end of tubing throughout the test. 
c) Press START TEST, top button right screen; this tests the leak of the            circuit. 7. Assess appropriateness of physicians orders and set ventilatory parameters accordingly. Initial settings as well as changes to ventilatory parameters must be accompanied by a physician order. 8. Select the proper mode by first selecting the mode button at the bottom of screen. a) Choose CPAP or ST mode, top button, right side of screen per  order. b) Activate view mode by pressing the [facility] button, bottom right of screen. 9. Select the parameters button below the screen. a) Choose a parameter from the left and right sides of screen. Press the soft button for the parameter of choice. Once it is highlighted, spin knob clockwise to increase value, and counter clockwise to decrease value in the parameter block. Repress the button for that particular parameter to activate the new value. b) Consult the BIPAP Vision Ventilatory Support System Clinical Manual for specific information on the modes of operation and set parameters. 10.   Select alarms button, below screen. Set values for Hi Pressure, Lo Pressure, Lo      Pressure Delay, Apnea, Lo Min Vent, Hi Rate, Lo Rate as appropriate for patient. B. Post Procedure -Cleaning and Sterilization for the BIPAP Vision 1. Before cleaning the unit, turn the Start/Stop switch to the Stop position and unplug the electrical cord from the wall and from the rear of the unit. 2. Clean the front panel with water or 70% Isopropyl Alcohol only. Do not immerse the Vision unit in water. 3. Clean the exterior of the enclosure with a hospital approved disinfectant solution. Do not allow any liquid to enter the inside of the ventilator. 4. Refer to the BIPAP Vision Ventilatory Support System Clinical Manual, Chapter 15 Cleaning and Routine Maintenance and Replacing the [facility]. IX. Procedure for non-invasive ventilation using the V60: 
A. Set up machine circuit using disposable mask and circuit setups only. 1.  Ensure that there is always an exhalation port at the base of the mask for C02 to be . 2. Set mode and settings as per physician orders. 3.  See Appendix 1 for definitions of all modes, usual and alarm settings. 4. Set Alarms on machine. See usual alarm settings in appendix. 5.  Turn on machine and gently hold mask over nose/face until patient becomes accustomed to the airflow. 6. Attach the head strap. 7. Stay with patient until the 02 saturations and observations are stable. B. Monitor patients response for: 1. Decreased Heart rate, respiratory rate, and blood pressure. 2. Decreased sweating 3. Decreased work of breathing (as per baseline observations) 4. Patient feels more comfortable 5. Patient finds it easier to breathe X. Monitoring: A. While in use, the RCP should check the patient and non-invasive unit no less than every four hours. B. Failure of NIV should be suspected if: 1. The patient is unable to maintain adequate oxygenation, decreasing 02 saturations despite increases in 02. 
2. There is a reduction in neurological or conscious state. 3. The patient has excessive secretions or increasing respiratory rate. 4. Failure of PaCO2 or PH to improve on ABG sample. 5. The patient has poorly compliant lungs. XI. Weaning A. Weaning or cessation of non-invasive ventilation should occur under the following                        conditions: 1. PaC02 returns to normal and patient maintains O2 saturations with minimal                oxygen. 2. Respiratory rate is returned within normal limits. 3. Patient is unable to tolerate non-invasive ventilation. 4. Patients dyspnea is reduced. 5. Patient exhibits normal overnight ventilation. 6. Patient is receiving palliative treatment. XII. Documentation: A. Documentation should include the followin. Ventilator settings comply with physician order. 2. Ventilator is functioning properly as evidenced by a check of measured volumes, rates, pressures and FIO2. 3. Alarms are set appropriately. 4. Measured inspired gas temperature (invasive mode only) 5. Vital signs (pulse, respiratory rate, oxygen saturation, breath sounds) 6. Patient tolerance to therapy. 7. Manual resuscitator and appropriate size mask at patient bedside REFERENCES 
BIPAP Vision Ventilatory Support System Clinical Manual. 
 
[address] Therapy and Respiratory Care Section. HINA Dumont 2001. Introducing non-invasive positive pressure ventilation. Nursing Standard. 15,26, 42-45. Jay Almanza. 2003. Using non-invasive ventilation in acute wards: part 2. Nursing Standard. 18,1, 41-44. Salvatore 47. Use of continuous positive airway pressure (CPAP) in the critically ill-physiological principles. Critical Care [phone] Dimas Blackwell SKhushboo2002.  Bi-level positive airway pressure (BiPAP) and acute cardiogenicpulmonary edema ( ACPO) in the emergency department. Critical Care 15 (2):51-63 RAMILA Hernandez2002. Non-invasive BiPap-implementation of a new service. Intensive and Critical Care Nursing [phone] SONJA Dickson,et al 2002. Non-invasive ventilation in acute respiratory failure. [place] [id number] DEFINITIONS AND USUAL SETTINGS                                                            APPENDIX 1 Settings Settings in Active CPAP Settings in Active BiPAP Description Range Usual Setting Used in NIV 
 
  
 
CPAP Mode   Continuous Positive Airway Pressure 4-24 cmH20 8-14 ST or  BiPAP MODE  Spontaneous Timed or BiLevel Positive Airway Pressure Ventilation. Two level system of alternating during non- invasive ventilation in sync with breathing-set IPAP and EPAP   
 
__  
 
__ AVAPS Mode (only available  on V60)          The volume-targeted AVAPS (average volume-assured pressure support) mode combines the attributes of pressure-controlled and volume-targeted ventilation. __  
 
__  
 
 
EPAP 
 
         
 
            
 
        Positive Airway Pressure. Recruits under ventilated alveoli to remain open during expiration by providing a constant pressure throughout resp. cycle. Must be less than or equal to IPAP  
 4-25 cmH20 5-14 Settings Settings in Active CPAP Settings in Active BiPAP Description Range Usual Setting Used in NIV  
 
IPAP  Inspired Positive Airway Pressure provides pressure throughout the inspiratory phase to support patient ventilation  4-40 cmH20 10-20 I-time  Inspiratory time- time taken to inspire in seconds  0.3  3.0 sec 1:3  
FIO2          Oxygen delivered  21- 100% As ordered Ramp time             
 
 
  The Ramp Time function helps your patient adapt to ventilation by gradually increasing inspiratory and expiratory pressure over a set interval (minutes). This time gradually delivers pressures so to reduce patient anxiety and increases comfort. Off 
or 
5-45 minutes 10 minutes Rate (RR)          Patients respiratory rate 4- 60 BPM 4 Rise time          Speed at which the inspiratory pressure rises to the set pressure. 1-5 
(1 is the fastest) Set at 3 Patient Data Data Description Range Usual setting in NIV Breath phase/trigger Indicator  Bar in left hand corner. Colored according to breath trigger. n/a n/a  
PIP Positive Inspiratory pressure  0-50 n/a Patient total leak Est. or unintentional leak  0-200 n/a Patient trigger Patient triggered breaths as a percentage  0-100% Should be 100% Respiratory rate Respiratory rate 0-90 n/a Ti/Ttot Inspiratory duty cycle or inspiratory time divided by total cycle time  0-91% n/a Minute volume Est. minute ventilation. TV x rate=MV  0-99 LPM n/a Tidal volume Est.  tidal volume  0-3000 ml n/a Alarms Alarm Description Range Set  at Hi Rate High respiratory rate 5-90 10 breaths above patients breath rate Low Rate Low respiratory rate alarm 1-89 BPM 5 breaths below patients breath rate Hi VT High tidal volume alarm 200-2500 ml 200 ml above patients own Low VT Low tidal volume alarm OFF  1500 ml OFF  
HIP High Inspiratory pressure alarm 5-50 cm H20 10 cm above IPAP  
LIP Low inspiratory pressure alarm OFF, 1-40 cmH20 OFF Lo VE Low minute vent alarm OFF, 0.1 to 99L/min OFF  
LIP T Low inspiratory delay time 5-60 seconds 5 seconds CPAP Settings BiPAP Settings Set CPAP level as ordered Set breath rate as ordered Set FIO2 as ordered Set I-time as 1.3 Set Ramp time as ordered Set EPAP as ordered Set C-Flex at 3 Set IPAP as ordered Set Rise time as ordered Set FIO2 as ordered Respiratory Care Services Consent and Release for Home Ventilator Patient Name: _________________________________________ Room: ___________ 
 
SSN: _______________________________           Account Number:  __________________ Use and care of my personal home ventilator, (invasive or non-invasive) mechanical life support device while at [facility] system has been discussed with me by my physician and I have been provided with an opportunity to ask questions which have been answered to my satisfaction. I also understand a respiratory care practitioner is not expected to remain in my room or general vicinity at all times. It is understood that every precaution consistent with the best medical practice will be taken and I give Respiratory Care Services permission to monitor my ventilator during my hospital stay. I hereby release [employer] [facility], its agents, employees and medical staff from liability arising from any and all claims, demands, losses and damages to person and/or property as a result of the above mentioned requests. I certify that I have read and understand this consent agreement and release and that I am legally qualified to kristal this authorization. ___________________ Date 
 
_____________________________________        ________________________ Signature of Patient or Parent (of minor patient,                  Relationship (if other than Patient) [facility] parent) if applicable. Closest Relative, 
Guardian or legal Representative 
 
_____________________________________ Witness Legal representative is defined as person named by the court as a guardian, executor or , or having power of  specifically set forth to authorize this action. [id number]   Consent and Release for Home Ventilator

## 2019-11-13 ENCOUNTER — APPOINTMENT (OUTPATIENT)
Dept: GENERAL RADIOLOGY | Age: 74
DRG: 981 | End: 2019-11-13
Attending: INTERNAL MEDICINE
Payer: MEDICARE

## 2019-11-13 ENCOUNTER — APPOINTMENT (OUTPATIENT)
Dept: ULTRASOUND IMAGING | Age: 74
DRG: 981 | End: 2019-11-13
Attending: INTERNAL MEDICINE
Payer: MEDICARE

## 2019-11-13 ENCOUNTER — APPOINTMENT (OUTPATIENT)
Dept: CT IMAGING | Age: 74
DRG: 981 | End: 2019-11-13
Attending: INTERNAL MEDICINE
Payer: MEDICARE

## 2019-11-13 PROBLEM — J80 ARDS (ADULT RESPIRATORY DISTRESS SYNDROME) (HCC): Status: ACTIVE | Noted: 2019-11-13

## 2019-11-13 LAB
ANION GAP SERPL CALC-SCNC: 7 MMOL/L (ref 7–16)
ARTERIAL PATENCY WRIST A: YES
ARTERIAL PATENCY WRIST A: YES
ATRIAL RATE: 81 BPM
BACTERIA SPEC CULT: NORMAL
BASE DEFICIT BLD-SCNC: 2 MMOL/L
BASE EXCESS BLD CALC-SCNC: 0 MMOL/L
BDY SITE: ABNORMAL
BDY SITE: ABNORMAL
BODY TEMPERATURE: 98.6
BODY TEMPERATURE: 98.6
BUN SERPL-MCNC: 21 MG/DL (ref 8–23)
C-ANCA TITR SER IF: NORMAL TITER
CALCIUM SERPL-MCNC: 8.3 MG/DL (ref 8.3–10.4)
CALCULATED P AXIS, ECG09: 81 DEGREES
CALCULATED R AXIS, ECG10: 68 DEGREES
CALCULATED T AXIS, ECG11: 144 DEGREES
CHLORIDE SERPL-SCNC: 104 MMOL/L (ref 98–107)
CO2 BLD-SCNC: 23 MMOL/L
CO2 BLD-SCNC: 26 MMOL/L
CO2 SERPL-SCNC: 29 MMOL/L (ref 21–32)
COLLECT TIME,HTIME: 1818
COLLECT TIME,HTIME: 515
CREAT SERPL-MCNC: 1.03 MG/DL (ref 0.8–1.5)
DIAGNOSIS, 93000: NORMAL
ERYTHROCYTE [DISTWIDTH] IN BLOOD BY AUTOMATED COUNT: 13.7 % (ref 11.9–14.6)
ERYTHROCYTE [SEDIMENTATION RATE] IN BLOOD: 31 MM/HR (ref 0–20)
EXHALED MINUTE VOLUME, VE: 11.1 L/MIN
EXHALED MINUTE VOLUME, VE: 14 L/MIN
FLUAV RNA SPEC QL NAA+PROBE: NEGATIVE
FLUBV RNA SPEC QL NAA+PROBE: NEGATIVE
GAS FLOW.O2 O2 DELIVERY SYS: ABNORMAL L/MIN
GAS FLOW.O2 O2 DELIVERY SYS: ABNORMAL L/MIN
GAS FLOW.O2 SETTING OXYMISER: 22 BPM
GAS FLOW.O2 SETTING OXYMISER: 24 BPM
GLUCOSE SERPL-MCNC: 146 MG/DL (ref 65–100)
GRAM STN SPEC: NORMAL
HADV DNA SPEC QL NAA+PROBE: NEGATIVE
HCO3 BLD-SCNC: 22 MMOL/L (ref 22–26)
HCO3 BLD-SCNC: 24.5 MMOL/L (ref 22–26)
HCT VFR BLD AUTO: 38.9 % (ref 41.1–50.3)
HGB BLD-MCNC: 12.8 G/DL (ref 13.6–17.2)
HMPV RNA SPEC QL NAA+PROBE: NEGATIVE
HPIV1 RNA SPEC QL NAA+PROBE: NEGATIVE
HPIV2 RNA SPEC QL NAA+PROBE: NEGATIVE
HPIV3 RNA SPEC QL NAA+PROBE: NEGATIVE
INR PPP: 1.3
INSPIRATION.DURATION SETTING TIME VENT: 0.9 SEC
INSPIRATION.DURATION SETTING TIME VENT: 1.1 SEC
LACTATE SERPL-SCNC: 1.4 MMOL/L (ref 0.4–2)
MAGNESIUM SERPL-MCNC: 2.4 MG/DL (ref 1.8–2.4)
MCH RBC QN AUTO: 30.8 PG (ref 26.1–32.9)
MCHC RBC AUTO-ENTMCNC: 32.9 G/DL (ref 31.4–35)
MCV RBC AUTO: 93.5 FL (ref 79.6–97.8)
MYELOPEROXIDASE AB SER IA-ACNC: <9 U/ML (ref 0–9)
NRBC # BLD: 0 K/UL (ref 0–0.2)
O2/TOTAL GAS SETTING VFR VENT: 100 %
O2/TOTAL GAS SETTING VFR VENT: 60 %
P-ANCA ATYPICAL TITR SER IF: NORMAL TITER
P-ANCA TITR SER IF: NORMAL TITER
P-R INTERVAL, ECG05: 184 MS
PCO2 BLD: 33.3 MMHG (ref 35–45)
PCO2 BLD: 39.4 MMHG (ref 35–45)
PEEP RESPIRATORY: 12 CMH2O
PEEP RESPIRATORY: 14 CMH2O
PH BLD: 7.4 [PH] (ref 7.35–7.45)
PH BLD: 7.43 [PH] (ref 7.35–7.45)
PHOSPHATE SERPL-MCNC: 3.8 MG/DL (ref 2.3–3.7)
PLATELET # BLD AUTO: 282 K/UL (ref 150–450)
PMV BLD AUTO: 9.8 FL (ref 9.4–12.3)
PO2 BLD: 122 MMHG (ref 75–100)
PO2 BLD: 60 MMHG (ref 75–100)
POTASSIUM SERPL-SCNC: 3.3 MMOL/L (ref 3.5–5.1)
POTASSIUM SERPL-SCNC: 3.5 MMOL/L (ref 3.5–5.1)
PROTEINASE3 AB SER IA-ACNC: <3.5 U/ML (ref 0–3.5)
PROTHROMBIN TIME: 16.4 SEC (ref 11.7–14.5)
Q-T INTERVAL, ECG07: 490 MS
QRS DURATION, ECG06: 174 MS
QTC CALCULATION (BEZET), ECG08: 525 MS
RBC # BLD AUTO: 4.16 M/UL (ref 4.23–5.6)
RHINOVIRUS RNA SPEC QL NAA+PROBE: POSITIVE
RSV A RNA SPEC QL NAA+PROBE: NEGATIVE
RSV B RNA SPEC QL NAA+PROBE: NEGATIVE
SAO2 % BLD: 91 % (ref 95–98)
SAO2 % BLD: 99 % (ref 95–98)
SERVICE CMNT-IMP: ABNORMAL
SERVICE CMNT-IMP: NORMAL
SODIUM SERPL-SCNC: 140 MMOL/L (ref 136–145)
SPECIMEN SOURCE: ABNORMAL
SPECIMEN TYPE: ABNORMAL
SPECIMEN TYPE: ABNORMAL
VENTILATION MODE VENT: ABNORMAL
VENTILATION MODE VENT: ABNORMAL
VENTRICULAR RATE, ECG03: 69 BPM
VT SETTING VENT: 500 ML
VT SETTING VENT: 550 ML
WBC # BLD AUTO: 13.8 K/UL (ref 4.3–11.1)

## 2019-11-13 PROCEDURE — 36592 COLLECT BLOOD FROM PICC: CPT

## 2019-11-13 PROCEDURE — 36573 INSJ PICC RS&I 5 YR+: CPT | Performed by: INTERNAL MEDICINE

## 2019-11-13 PROCEDURE — 74011250636 HC RX REV CODE- 250/636: Performed by: INTERNAL MEDICINE

## 2019-11-13 PROCEDURE — 82803 BLOOD GASES ANY COMBINATION: CPT

## 2019-11-13 PROCEDURE — 02HV33Z INSERTION OF INFUSION DEVICE INTO SUPERIOR VENA CAVA, PERCUTANEOUS APPROACH: ICD-10-PCS | Performed by: INTERNAL MEDICINE

## 2019-11-13 PROCEDURE — C1751 CATH, INF, PER/CENT/MIDLINE: HCPCS

## 2019-11-13 PROCEDURE — 74011250636 HC RX REV CODE- 250/636: Performed by: NURSE PRACTITIONER

## 2019-11-13 PROCEDURE — 85027 COMPLETE CBC AUTOMATED: CPT

## 2019-11-13 PROCEDURE — 80048 BASIC METABOLIC PNL TOTAL CA: CPT

## 2019-11-13 PROCEDURE — B548ZZA ULTRASONOGRAPHY OF SUPERIOR VENA CAVA, GUIDANCE: ICD-10-PCS | Performed by: INTERNAL MEDICINE

## 2019-11-13 PROCEDURE — 71045 X-RAY EXAM CHEST 1 VIEW: CPT

## 2019-11-13 PROCEDURE — 74011000258 HC RX REV CODE- 258: Performed by: INTERNAL MEDICINE

## 2019-11-13 PROCEDURE — 83605 ASSAY OF LACTIC ACID: CPT

## 2019-11-13 PROCEDURE — 74011000250 HC RX REV CODE- 250: Performed by: INTERNAL MEDICINE

## 2019-11-13 PROCEDURE — 94660 CPAP INITIATION&MGMT: CPT

## 2019-11-13 PROCEDURE — 83735 ASSAY OF MAGNESIUM: CPT

## 2019-11-13 PROCEDURE — 74011250636 HC RX REV CODE- 250/636

## 2019-11-13 PROCEDURE — 77030029488 HC ELECTRD PAD DEFIB ZOLL -B

## 2019-11-13 PROCEDURE — 84132 ASSAY OF SERUM POTASSIUM: CPT

## 2019-11-13 PROCEDURE — 74011000250 HC RX REV CODE- 250

## 2019-11-13 PROCEDURE — 31500 INSERT EMERGENCY AIRWAY: CPT | Performed by: INTERNAL MEDICINE

## 2019-11-13 PROCEDURE — 84100 ASSAY OF PHOSPHORUS: CPT

## 2019-11-13 PROCEDURE — 93975 VASCULAR STUDY: CPT

## 2019-11-13 PROCEDURE — 94002 VENT MGMT INPAT INIT DAY: CPT

## 2019-11-13 PROCEDURE — 5A1955Z RESPIRATORY VENTILATION, GREATER THAN 96 CONSECUTIVE HOURS: ICD-10-PCS | Performed by: INTERNAL MEDICINE

## 2019-11-13 PROCEDURE — 0BH17EZ INSERTION OF ENDOTRACHEAL AIRWAY INTO TRACHEA, VIA NATURAL OR ARTIFICIAL OPENING: ICD-10-PCS | Performed by: INTERNAL MEDICINE

## 2019-11-13 PROCEDURE — 65620000000 HC RM CCU GENERAL

## 2019-11-13 PROCEDURE — 85652 RBC SED RATE AUTOMATED: CPT

## 2019-11-13 PROCEDURE — 36415 COLL VENOUS BLD VENIPUNCTURE: CPT

## 2019-11-13 PROCEDURE — 82384 ASSAY THREE CATECHOLAMINES: CPT

## 2019-11-13 PROCEDURE — 74011000258 HC RX REV CODE- 258: Performed by: NURSE PRACTITIONER

## 2019-11-13 PROCEDURE — 85610 PROTHROMBIN TIME: CPT

## 2019-11-13 PROCEDURE — 99291 CRITICAL CARE FIRST HOUR: CPT | Performed by: INTERNAL MEDICINE

## 2019-11-13 PROCEDURE — 36600 WITHDRAWAL OF ARTERIAL BLOOD: CPT

## 2019-11-13 PROCEDURE — 77030004950 HC CATH ENTRL NG COVD -A

## 2019-11-13 RX ORDER — FENTANYL CITRATE-0.9 % NACL/PF 25 MCG/ML
0-200 PLASTIC BAG, INJECTION (ML) INJECTION
Status: DISCONTINUED | OUTPATIENT
Start: 2019-11-13 | End: 2019-11-13

## 2019-11-13 RX ORDER — LORAZEPAM 2 MG/ML
1 INJECTION INTRAMUSCULAR ONCE
Status: COMPLETED | OUTPATIENT
Start: 2019-11-13 | End: 2019-11-13

## 2019-11-13 RX ORDER — LORAZEPAM 2 MG/ML
2 INJECTION INTRAMUSCULAR
Status: DISCONTINUED | OUTPATIENT
Start: 2019-11-13 | End: 2019-11-14

## 2019-11-13 RX ORDER — LORAZEPAM 2 MG/ML
INJECTION INTRAMUSCULAR
Status: COMPLETED
Start: 2019-11-13 | End: 2019-11-13

## 2019-11-13 RX ORDER — FENTANYL CITRATE 50 UG/ML
INJECTION, SOLUTION INTRAMUSCULAR; INTRAVENOUS
Status: COMPLETED
Start: 2019-11-13 | End: 2019-11-13

## 2019-11-13 RX ORDER — NOREPINEPHRINE BITARTRATE/D5W 4MG/250ML
.5-16 PLASTIC BAG, INJECTION (ML) INTRAVENOUS
Status: DISCONTINUED | OUTPATIENT
Start: 2019-11-13 | End: 2019-11-13

## 2019-11-13 RX ORDER — SODIUM CHLORIDE 0.9 % (FLUSH) 0.9 %
30 SYRINGE (ML) INJECTION AS NEEDED
Status: DISCONTINUED | OUTPATIENT
Start: 2019-11-13 | End: 2019-11-25 | Stop reason: HOSPADM

## 2019-11-13 RX ORDER — POTASSIUM CHLORIDE 14.9 MG/ML
20 INJECTION INTRAVENOUS
Status: COMPLETED | OUTPATIENT
Start: 2019-11-13 | End: 2019-11-13

## 2019-11-13 RX ORDER — SUCCINYLCHOLINE CHLORIDE 20 MG/ML INJECTION SOLUTION
SOLUTION
Status: COMPLETED
Start: 2019-11-13 | End: 2019-11-13

## 2019-11-13 RX ORDER — HEPARIN 100 UNIT/ML
900 SYRINGE INTRAVENOUS AS NEEDED
Status: DISCONTINUED | OUTPATIENT
Start: 2019-11-13 | End: 2019-11-25 | Stop reason: HOSPADM

## 2019-11-13 RX ORDER — SODIUM CHLORIDE 0.9 % (FLUSH) 0.9 %
10 SYRINGE (ML) INJECTION
Status: DISCONTINUED | OUTPATIENT
Start: 2019-11-13 | End: 2019-11-13

## 2019-11-13 RX ORDER — SODIUM CHLORIDE 0.9 % (FLUSH) 0.9 %
30 SYRINGE (ML) INJECTION EVERY 8 HOURS
Status: DISCONTINUED | OUTPATIENT
Start: 2019-11-13 | End: 2019-11-25 | Stop reason: HOSPADM

## 2019-11-13 RX ORDER — FENTANYL CITRATE 50 UG/ML
50 INJECTION, SOLUTION INTRAMUSCULAR; INTRAVENOUS ONCE
Status: COMPLETED | OUTPATIENT
Start: 2019-11-13 | End: 2019-11-13

## 2019-11-13 RX ORDER — NITROGLYCERIN 20 MG/100ML
0-20 INJECTION INTRAVENOUS
Status: DISCONTINUED | OUTPATIENT
Start: 2019-11-13 | End: 2019-11-13

## 2019-11-13 RX ORDER — ETOMIDATE 2 MG/ML
0.3 INJECTION INTRAVENOUS ONCE
Status: COMPLETED | OUTPATIENT
Start: 2019-11-13 | End: 2019-11-13

## 2019-11-13 RX ORDER — SUCCINYLCHOLINE CHLORIDE 20 MG/ML INJECTION SOLUTION
150 SOLUTION
Status: COMPLETED | OUTPATIENT
Start: 2019-11-13 | End: 2019-11-13

## 2019-11-13 RX ORDER — HEPARIN 100 UNIT/ML
900 SYRINGE INTRAVENOUS EVERY 8 HOURS
Status: DISCONTINUED | OUTPATIENT
Start: 2019-11-13 | End: 2019-11-25 | Stop reason: HOSPADM

## 2019-11-13 RX ORDER — FENTANYL CITRATE-0.9 % NACL/PF 25 MCG/ML
0-200 PLASTIC BAG, INJECTION (ML) INJECTION
Status: DISCONTINUED | OUTPATIENT
Start: 2019-11-13 | End: 2019-11-17

## 2019-11-13 RX ORDER — PHENYLEPHRINE HCL IN 0.9% NACL 30MG/250ML
10-100 PLASTIC BAG, INJECTION (ML) INTRAVENOUS
Status: DISCONTINUED | OUTPATIENT
Start: 2019-11-13 | End: 2019-11-13

## 2019-11-13 RX ORDER — POTASSIUM CHLORIDE 14.9 MG/ML
20 INJECTION INTRAVENOUS
Status: COMPLETED | OUTPATIENT
Start: 2019-11-13 | End: 2019-11-14

## 2019-11-13 RX ORDER — ETOMIDATE 2 MG/ML
INJECTION INTRAVENOUS
Status: COMPLETED
Start: 2019-11-13 | End: 2019-11-13

## 2019-11-13 RX ORDER — NOREPINEPHRINE BITARTRATE/D5W 4MG/250ML
.5-16 PLASTIC BAG, INJECTION (ML) INTRAVENOUS
Status: DISCONTINUED | OUTPATIENT
Start: 2019-11-13 | End: 2019-11-17

## 2019-11-13 RX ORDER — PROPOFOL 10 MG/ML
0-50 VIAL (ML) INTRAVENOUS
Status: DISCONTINUED | OUTPATIENT
Start: 2019-11-13 | End: 2019-11-13

## 2019-11-13 RX ADMIN — SUCCINYLCHOLINE CHLORIDE 20 MG/ML INJECTION SOLUTION 150 MG: SOLUTION at 04:25

## 2019-11-13 RX ADMIN — Medication 30 MCG/MIN: at 06:22

## 2019-11-13 RX ADMIN — NITROGLYCERIN 10 MCG/MIN: 20 INJECTION INTRAVENOUS at 04:53

## 2019-11-13 RX ADMIN — Medication 30 ML: at 14:00

## 2019-11-13 RX ADMIN — FAMOTIDINE 20 MG: 10 INJECTION INTRAVENOUS at 21:39

## 2019-11-13 RX ADMIN — FENTANYL CITRATE 50 MCG: 50 INJECTION, SOLUTION INTRAMUSCULAR; INTRAVENOUS at 04:25

## 2019-11-13 RX ADMIN — ETOMIDATE 34.08 MG: 2 INJECTION INTRAVENOUS at 04:25

## 2019-11-13 RX ADMIN — DEXMEDETOMIDINE HYDROCHLORIDE 0.2 MCG/KG/HR: 100 INJECTION, SOLUTION INTRAVENOUS at 08:39

## 2019-11-13 RX ADMIN — AMIODARONE HYDROCHLORIDE 1 MG/MIN: 50 INJECTION, SOLUTION INTRAVENOUS at 05:48

## 2019-11-13 RX ADMIN — METHYLPREDNISOLONE SODIUM SUCCINATE 60 MG: 125 INJECTION, POWDER, FOR SOLUTION INTRAMUSCULAR; INTRAVENOUS at 18:27

## 2019-11-13 RX ADMIN — FUROSEMIDE 40 MG: 10 INJECTION, SOLUTION INTRAMUSCULAR; INTRAVENOUS at 08:37

## 2019-11-13 RX ADMIN — LORAZEPAM 1 MG: 2 INJECTION, SOLUTION INTRAMUSCULAR; INTRAVENOUS at 03:57

## 2019-11-13 RX ADMIN — ETOMIDATE 34.08 MG: 2 INJECTION, SOLUTION INTRAVENOUS at 04:25

## 2019-11-13 RX ADMIN — Medication 0.5 MCG/MIN: at 10:18

## 2019-11-13 RX ADMIN — Medication 150 MG: at 04:25

## 2019-11-13 RX ADMIN — PROPOFOL 25 MCG/KG/MIN: 10 INJECTION, EMULSION INTRAVENOUS at 04:32

## 2019-11-13 RX ADMIN — Medication 10 ML: at 14:00

## 2019-11-13 RX ADMIN — Medication 50 MCG/MIN: at 09:05

## 2019-11-13 RX ADMIN — POTASSIUM CHLORIDE 20 MEQ: 200 INJECTION, SOLUTION INTRAVENOUS at 21:39

## 2019-11-13 RX ADMIN — POTASSIUM CHLORIDE 20 MEQ: 200 INJECTION, SOLUTION INTRAVENOUS at 08:37

## 2019-11-13 RX ADMIN — LORAZEPAM 1 MG: 2 INJECTION INTRAMUSCULAR at 03:57

## 2019-11-13 RX ADMIN — SODIUM CHLORIDE 1 MG/HR: 900 INJECTION, SOLUTION INTRAVENOUS at 11:07

## 2019-11-13 RX ADMIN — Medication 10 ML: at 05:15

## 2019-11-13 RX ADMIN — Medication 5 ML: at 22:00

## 2019-11-13 RX ADMIN — POTASSIUM CHLORIDE 20 MEQ: 200 INJECTION, SOLUTION INTRAVENOUS at 06:27

## 2019-11-13 RX ADMIN — Medication 900 UNITS: at 21:40

## 2019-11-13 RX ADMIN — FUROSEMIDE 40 MG: 10 INJECTION, SOLUTION INTRAMUSCULAR; INTRAVENOUS at 21:40

## 2019-11-13 RX ADMIN — LORAZEPAM 2 MG: 2 INJECTION, SOLUTION INTRAMUSCULAR; INTRAVENOUS at 05:53

## 2019-11-13 RX ADMIN — CEFTRIAXONE 1 G: 1 INJECTION, POWDER, FOR SOLUTION INTRAMUSCULAR; INTRAVENOUS at 21:39

## 2019-11-13 RX ADMIN — Medication 900 UNITS: at 15:06

## 2019-11-13 RX ADMIN — Medication 50 MCG/HR: at 04:54

## 2019-11-13 RX ADMIN — Medication 30 ML: at 22:00

## 2019-11-13 RX ADMIN — FAMOTIDINE 20 MG: 10 INJECTION INTRAVENOUS at 08:37

## 2019-11-13 RX ADMIN — METHYLPREDNISOLONE SODIUM SUCCINATE 60 MG: 125 INJECTION, POWDER, FOR SOLUTION INTRAMUSCULAR; INTRAVENOUS at 21:40

## 2019-11-13 RX ADMIN — Medication 125 MCG/HR: at 19:28

## 2019-11-13 NOTE — PROGRESS NOTES
PICC Placement Note    PRE-PROCEDURE VERIFICATION  Correct Procedure: yes. Time out completed with assistant Misty Mae rn and all persons present in agreement with time out. Correct Site:  yes  Temperature: Temp: 99.4 °F (37.4 °C), Temperature Source: Temp Source: Axillary  Recent Labs     11/13/19  1050 11/13/19  0327   BUN  --  21   CREA  --  1.03   PLT  --  282   INR 1.3  --    WBC  --  13.8*     Allergies: Amoxicillin and Levaquin [levofloxacin]  Education materials for [facility] Care given to patient or family. PROCEDURE DETAIL  A triple lumen PICC line was started for vascular access. The following documentation is in addition to the PICC properties in the lines/airways flowsheet :  Lot #: [id number]  xylocaine used: yes  Mid-Arm Circumference: 32 (cm)  Internal Catheter Length: 46 (cm)  Internal Catheter Total Length: 46 (cm)  Vein Selection for PICC:right basilic  Central Line Bundle followed yes  Complication Related to Insertion: none  Both the insertion guidewire and ECG guidewire were removed intact all ports have positive blood return and were flush well with normal saline. The location of the tip of the PICC is verified using ECG technology. The tip is in the SVC per ECG reading. See image below.                Line is okay to use: yes

## 2019-11-13 NOTE — PROGRESS NOTES
Patient awoke feeling short of breath, anxious, BP slightly elevated above baseline in 150's SBP. Dr. Dina Hawk notified, order received for 1 mg IV Ativan. RT at bedside. 02 sat 90% on 50% Fi02. Patient then began breathing with a rate in the 40's and coughing up pink sputum. Dr. Dina Hawk notified and came to bedside, explained need to intubate. RN left phone message with wife explaining scenario. Time out completed and RSI completed.

## 2019-11-13 NOTE — PROGRESS NOTES
Ventilator check complete; patient has a #8. 0 ET tube secured at the 26 at the lip. Trachea is midline, Negative for subcutaneous air, and chest excursion is symmetric. Patient is also Negative for cyanosis and is Negative for pitting edema. All alarms are set and audible. Resuscitation bag is at the head of the bed. Ventilator Settings  Mode FIO2 Rate Tidal Volume Pressure PEEP I:E Ratio   Assist control, VC+  100 %    550 ml  0 cm H2O  12 cm H20  1:2.3      Peak airway pressure: 29 cm H2O   Minute ventilation: 11.3 l/min     ABG: No results for input(s): PH, PCO2, PO2, HCO3 in the last 72 hours.       Melina Ache, RT

## 2019-11-13 NOTE — PROGRESS NOTES
A follow up visit was made to the patient. Emotional support, spiritual presence and   prayer were provided for the patient. He was not alert.       JAY Irvin

## 2019-11-13 NOTE — PROGRESS NOTES
Problem: Falls - Risk of  Goal: *Absence of Falls  Description  Document Arin Guadalupe Fall Risk and appropriate interventions in the flowsheet. Outcome: Progressing Towards Goal  Note:   Fall Risk Interventions:  Mobility Interventions: Bed/chair exit alarm, Communicate number of staff needed for ambulation/transfer, Patient to call before getting OOB, Strengthening exercises (ROM-active/passive), Utilize walker, cane, or other assistive device         Medication Interventions: Bed/chair exit alarm, Evaluate medications/consider consulting pharmacy, Patient to call before getting OOB, Teach patient to arise slowly    Elimination Interventions: Bed/chair exit alarm, Call light in reach, Patient to call for help with toileting needs, Toileting schedule/hourly rounds              Problem: Patient Education: Go to Patient Education Activity  Goal: Patient/Family Education  Outcome: Progressing Towards Goal     Problem: Pressure Injury - Risk of  Goal: *Prevention of pressure injury  Description  Document Faizan Scale and appropriate interventions in the flowsheet. Outcome: Progressing Towards Goal  Note:   Pressure Injury Interventions: Activity Interventions: Assess need for specialty bed, Increase time out of bed, Pressure redistribution bed/mattress(bed type)    Mobility Interventions: Assess need for specialty bed, Float heels, HOB 30 degrees or less, Pressure redistribution bed/mattress (bed type), Turn and reposition approx.  every two hours(pillow and wedges)    Nutrition Interventions: Document food/fluid/supplement intake, Discuss nutritional consult with provider, Offer support with meals,snacks and hydration    Friction and Shear Interventions: Apply protective barrier, creams and emollients, Feet elevated on foot rest, Foam dressings/transparent film/skin sealants, HOB 30 degrees or less, Lift sheet, Minimize layers                Problem: Patient Education: Go to Patient Education Activity  Goal: Patient/Family Education  Outcome: Progressing Towards Goal     Problem: Gas Exchange - Impaired  Goal: *Absence of hypoxia  Outcome: Progressing Towards Goal     Problem: Patient Education: Go to Patient Education Activity  Goal: Patient/Family Education  Outcome: Progressing Towards Goal

## 2019-11-13 NOTE — PROGRESS NOTES
Spoke to Dr. Dashawn Duran regarding Chest CT - pt remains on 80% FiO2 with high support vent settings.  OK to withhold until tomorrow AM.

## 2019-11-13 NOTE — PROGRESS NOTES
Patient still hypotensive with SBP in 60's despite stopping propofol. Dr. Tony Payne notified, order for keith gtt.

## 2019-11-13 NOTE — PROCEDURES
Procedure: Emergent intubation (CPT 36833)    Indication: acute respiratory failure with hypoxia    Anesthesia:  Fentanyl 50mcg, Etomidate 30mg, succinylcholine 150mg    After assessing the airway, the patient underwent preoxygenation with 100% FiO2 for 5 min. Fentanyl and etomidate were given sequentially. After adequate sedation intubation was performed a 4.0 MAC glidescope blade laryngoscope was used to visualize the epiglottis and vocal chords. After positive identification of the vocal chords, a 8.0 ET tube was placed into the trachea with direct visualization. The tube was seen passing through the vocal chords without difficulty. CO2 colorimetry was employed immediately to verify tube in airway with appropriate color change indicating detection of CO2. Water vapor was seen within the ET tube, and auscultation of the abdomen revealed no bubbling sounds. Auscultation and inspection of the chest after intubation showed symmetric chest excursion and symmetric air entry bilaterally. The tube was secured at 25cm at the teeth. Chest X-ray has been ordered and is pending. The patient has been placed on a mechanical ventilator. There were no complications.     Agustina Sam MD

## 2019-11-13 NOTE — PROGRESS NOTES
Kisha Rich  Admission Date: 11/10/2019             Daily Progress Note: 11/13/2019     Patient is a 66 y.o.  male presented with respiratory failure. He was just discharged from [facility] today. He presented there with increased SOB of cough. No fever, chills, LE edema, or chest pain. It was originally suspected to represent PNA but the patient improved after just 24 hours of treatment with lasix and abx and was discharged.      He was resting at home  per wife and awoke suddenly very SOB with BP in the 180-190's. Cough with pink frothy sputum. Went to urgent care and was transported here via EMS. Was intubated en route and has had copious pink frothy sputum from ETT since. CXR with B infiltrates c/w edema. Pro BNP better than yesterday but still elevated. No fever or chills still reported. Subjective: Went to cath lab, fairly unremarkable other than aflutter cardioversion. Returned on HFNC, but felt more short of breath last evening and went back on BIPAP. Was continued on diuretics. This morning early despite BIPAP he had another acute respiratory event. Per nursing he first reported feeling a little short of breath and sats had dropped a little, but wasn't overly anxious or hypertensive on monitor. He then became more short of breath, anxious and hypertensive and then said his lungs \"were filling up. \" He then became acutely hypoxemic around 79% on 100% BIPAP. He was gurgling pink frothy sputum. He was hypertensive to 200s/120s at that time. 75% on 100% BIPAP and appeared in distress. I discussed with him need to intubate and further evaluated. I intubated him and we are currently working on getting his sedation and BP under control.       Current Facility-Administered Medications   Medication Dose Route Frequency    propofol (DIPRIVAN) infusion  0-50 mcg/kg/min IntraVENous TITRATE    fentaNYL in normal saline (pf) 25 mcg/mL infusion  0-200 mcg/hr IntraVENous TITRATE    nitroglycerin (Tridil) 200 mcg/ml infusion  0-20 mcg/min IntraVENous TITRATE    lisinopril (PRINIVIL, ZESTRIL) tablet 10 mg  10 mg Oral Q12H    amiodarone (CORDARONE) 450 mg in dextrose 5% 250 mL infusion  1 mg/min IntraVENous CONTINUOUS    metoprolol succinate (TOPROL-XL) XL tablet 100 mg  100 mg Oral Q12H    amLODIPine (NORVASC) tablet 5 mg  5 mg Oral DAILY    famotidine (PEPCID) tablet 20 mg  20 mg Oral Q12H    furosemide (LASIX) injection 40 mg  40 mg IntraVENous Q12H    dexmedeTOMidine (PRECEDEX) 400 mcg in 0.9% sodium chloride 100 mL infusion  0.2-0.7 mcg/kg/hr IntraVENous TITRATE    apixaban (ELIQUIS) tablet 5 mg  5 mg Oral Q12H    cefTRIAXone (ROCEPHIN) 1 g in 0.9% sodium chloride (MBP/ADV) 50 mL  1 g IntraVENous Q24H    sodium chloride (NS) flush 5-40 mL  5-40 mL IntraVENous Q8H    sodium chloride (NS) flush 5-40 mL  5-40 mL IntraVENous PRN     Objective:     Vitals:    11/13/19 0301 11/13/19 0330 11/13/19 0331 11/13/19 0342   BP: 125/78  (!) 157/92 (!) 157/92   Pulse:   94 83   Resp:   22 20   Temp:    99.4 °F (37.4 °C)   SpO2:  91%  92%   Weight:       Height:         Intake and Output:   11/11 0701 - 11/12 1900  In: 423 [I.V.:423]  Out: 3875 [Urine:3875]  11/12 1901 - 11/13 0700  In: -   Out: 975 [Urine:975]      Intake/Output Summary (Last 24 hours) at 11/13/2019 0458  Last data filed at 11/13/2019 0358  Gross per 24 hour   Intake 423.01 ml   Output 2750 ml   Net -2326.99 ml       Physical Exam:          GEN: well developed and in acute distress on BIPAP 100%, sats 75%  HEENT: clear, moist MM  NECK:  no JVD, + retractions, no thyromegaly or masses,   LUNGS:  Rales, rhonchi with pink frothy secretions  HEART:  RRR with no M,G,R;  ABDOMEN:  soft with no tenderness; positive bowel sounds present  EXTREMITIES:  warm with no cyanosis, no lower leg edema  SKIN:  no jaundice or ecchymosis   NEURO:  alert and oriented, grossly non-focal, anxious    LAB  Recent Labs     11/13/19  0327 11/11/19  3421 11/10/19  1839   WBC 13.8* 11.0 12.3*   HGB 12.8* 12.9* 13.9   HCT 38.9* 38.2* 42.2    213 288     Recent Labs     11/13/19  0327 11/12/19  0317 11/11/19  0244 11/10/19  2207 11/10/19  1839    141 140  --  138   K 3.3* 3.7 3.9  --  4.5    106 106  --  105   CO2 29 28 26  --  24   * 151* 196*  --  227*   BUN 21 27* 19  --  17   CREA 1.03 1.29 1.17  --  1.50   TROIQ  --   --   --  0.04 <0.02*     11/13: stable to bilateral pulmonary infiltrates suggestive of edema    11/11        Hospital Problems  Date Reviewed: 11/10/2019          Codes Class Noted POA    Acute respiratory failure with hypoxia ([facility] [address] ICD-10-CM: J96.01  ICD-9-CM: 518.81  11/10/2019 Yes        * (Principal) Acute pulmonary edema (HCC) ICD-10-CM: J81.0  ICD-9-CM: 518.4  11/10/2019 Yes        Acute heart failure (HCC) ICD-10-CM: I50.9  ICD-9-CM: 428.9  11/10/2019 Yes            67 y/o with repeated episodes of acute hypoxemic respiratory failure presumed secondary to flash pulmonary edema, uncontrolled hypertension, tachycardia. TTE with only mild-moderate MR and preserved EF. Assessment:     --intubated emergently  --sedate deeply; propofol, fentanyl  --nitro gtt if needed to keep MAP <80  --continue diuresis  --CT Chest with contrast as soon as we can get him stable enough for transport   --would recommend bronch to [facility] r/o [facility] given fairly unremarkable cardiac evaluation  --ANCA sent yesterday and pending  --if truly pulmonary edema would have to believe related to acute hypertensive episodes though his symptoms seem to precede his hypertension  --there was no blood on UA, only mild protein  --SAMSON is improved  --wife was updated by nursing and will update further when more data available.      Full Code    The patient is critically ill with respiratory failure, circulatory failure and requires high complexity decision making for assessment and support including frequent ventilator adjustment , frequent evaluation and titration of therapies , application of advanced monitoring technologies and extensive interpretation of multiple databases  Time devoted to patient care services described in this note- 15 min face to face/ 20 min total evaluation time.      Cumulative time devoted to patient care services by me for day of service -28 min     Aiyana Cr MD

## 2019-11-13 NOTE — PROGRESS NOTES
Verbal bedside report received from [facility]. Shift assessment completed. Patient sleeping on BIPAP, awakens easily, oriented x 4. Denies pain or shortness of breath. Temp 99.3. NSR. 02 sat mid 90's on 40% Fi02, regular unlabored respirations, lung sounds diminished. Abdomen semi-soft, active bowel sounds, poor appetite d/t being on BIPAP. Cancino draining clear chavo urine. SCDs. R radial cath site C/D/I, some bruising present. No family currently present at bedside.      Lines:  #20 R FA  #20 L FA    Drips:  Amio @ 1 mg/min    Drains:  Cancino

## 2019-11-13 NOTE — PROGRESS NOTES
Head to toe assessment completed on pt this AM.    Neuro: Pt eyes open to voice / stimulation. PERRLA 2 mm and sluggish to react. Able to follow commands and ALAMO purposefully. Anxiety and agitation with stimulation. Fentanyl gtt at 100. Resp: Assist control FiO2 100%. Coarse upper / diminished lower. Thick bloody sputum suctioned from ETT. Cardiac: SB with PVCs and LBBB. Misbah gtt at 79 with MAP > 65. GI: NPO d/t intubation. Abd rotund and distended with hypoactive bowel sounds. : Cancino - draining and patent, chavo UOP. Skin: Allevyn in place. SCDs present.

## 2019-11-13 NOTE — PROGRESS NOTES
Patient was intubated with a number 8.0 ET Tube. Tube placement verified by auscultation and ETCO2 monitor. ET Tube is secured at the 25 cm sivakumar at the teeth and on the right side. Patient was intubated by Dr Sophia Moore on the 1 attempt. Breath sounds are coarse. Patient is Negative for subcutaneous air and chest excursion is symmetric. Trachea is midline. Patient is also Negative for cyanosis and is Negative for pitting edema. Patient placed on ventilator on documented settings. All alarms are set and audible. Resuscitation bag is at the head of the bed.       Ventilator Settings  Mode FIO2 Rate Tidal Volume Pressure PEEP I:E Ratio   Assist control(VC+)  100 %   24   550 ml  0 cm H2O  12 cm H20  1.1.5      Peak airway pressure: 30 cm H2O   Minute ventilation: 15 l/min

## 2019-11-13 NOTE — PROGRESS NOTES
[facility] CARDIOLOGY PROGRESS NOTE           11/13/2019 7:52 AM    Admit Date: 11/10/2019    Subjective:   Patient with recurrent respiratory failure overnight. Now with more hemoptysis. Intubated. CXR has never truly cleared and this is very atypical for pulmonary edema. EF low normal.  LDP was 14mmHg. Essentially normal coronaries and no significant valve disease. ROS:  UNABLE TO OBTAIN DUE TO INABILITY OF PATIENT TO COMMUNICATE SECONDARY TO CURRENT INTUBATION AND NEED FOR MECHANICAL VENTILATION. Objective:      Vitals:    11/13/19 0716 11/13/19 0722 11/13/19 0731 11/13/19 0746   BP: 101/57 118/58 108/56 100/57   Pulse: (!) 51 62 (!) 51 (!) 51   Resp: 22 23 24 25   Temp:       SpO2: 98% 92% 99% 99%   Weight:       Height:           Physical Exam:  General-Intubated. Neck- supple, no JVD  CV- regular rate and rhythm no MRG  Lung- diminished bilaterally  Abd- soft, nontender, nondistended  Ext- no edema bilaterally. Skin- warm and dry  Psychiatric:  Unable to accurately assess due to intubated and sedated status. Neurologic:  Unable to accurately assess due to intubated and sedated status. Data Review:   Recent Labs     11/13/19  0327 11/12/19  0317 11/11/19  0244 11/10/19  2207 11/10/19  1839    141 140  --  138   K 3.3* 3.7 3.9  --  4.5   BUN 21 27* 19  --  17   CREA 1.03 1.29 1.17  --  1.50   * 151* 196*  --  227*   WBC 13.8*  --  11.0  --  12.3*   HGB 12.8*  --  12.9*  --  13.9   HCT 38.9*  --  38.2*  --  42.2     --  213  --  288   TROIQ  --   --   --  0.04 <0.02*       TELEMETRY:  SB    Assessment/Plan:     Principal Problem:    Acute pulmonary edema (HCC) (11/10/2019)    This is recurrent and may have a non-cardiogenic etiology. Continue diuresis. Now hypotensive and on keith. BP and HR are elevated in the face of events but not certain these are etiology but rather a response to the respiratory failure. Check renal artery duplex. Check serum catecholamines. Hold Eliquis to allow for bronchoscopy.       Active Problems:    Acute respiratory failure with hypoxia ([facility] (11/10/2019)    See above       Acute heart failure ([facility] (11/10/2019)    See above           Ramon Solano MD  11/13/2019 7:52 AM

## 2019-11-13 NOTE — PROGRESS NOTES
After intubation, 02 sat in low 80's. Patient then became restless, with 02 sat decreasing into the 70s, BP up to 190's. Order from Dr. Vidal Lee to increase propofol and start nitro gtt. Patient now hypotensive with SBP in 50's-60's. Nitro and propofol stopped, Dr. Vidal Lee updated. 02 sat in upper 80's, patient no longer restless.

## 2019-11-13 NOTE — INTERVAL H&P NOTE
H&P Update: 
Odeashirley Clonts was seen and examined. History and physical has been reviewed. The patient has been examined.  There have been no significant clinical changes since the completion of the originally dated History and Physical.

## 2019-11-13 NOTE — PROGRESS NOTES
BP improving but patient waking up and restless/agitated. Propofol restarted and RASS now -1 but again hypotensive with SBP in 70's. Dr. Marta Bach notified, order for PRN Ativan and Precedex gtt.

## 2019-11-14 ENCOUNTER — APPOINTMENT (OUTPATIENT)
Dept: GENERAL RADIOLOGY | Age: 74
DRG: 981 | End: 2019-11-14
Attending: INTERNAL MEDICINE
Payer: MEDICARE

## 2019-11-14 LAB
ANION GAP SERPL CALC-SCNC: 6 MMOL/L (ref 7–16)
APTT PPP: 84.1 SEC (ref 24.7–39.8)
ARTERIAL PATENCY WRIST A: YES
BACTERIA SPEC CULT: NORMAL
BACTERIA SPEC CULT: NORMAL
BASE EXCESS BLD CALC-SCNC: 2 MMOL/L
BDY SITE: ABNORMAL
BNP SERPL-MCNC: 144 PG/ML
BODY TEMPERATURE: 98.6
BUN SERPL-MCNC: 39 MG/DL (ref 8–23)
CALCIUM SERPL-MCNC: 8.2 MG/DL (ref 8.3–10.4)
CHLORIDE SERPL-SCNC: 109 MMOL/L (ref 98–107)
CO2 BLD-SCNC: 26 MMOL/L
CO2 SERPL-SCNC: 28 MMOL/L (ref 21–32)
COLLECT TIME,HTIME: 345
CREAT SERPL-MCNC: 1.41 MG/DL (ref 0.8–1.5)
ERYTHROCYTE [DISTWIDTH] IN BLOOD BY AUTOMATED COUNT: 13.4 % (ref 11.9–14.6)
EXHALED MINUTE VOLUME, VE: 11.2 L/MIN
GAS FLOW.O2 O2 DELIVERY SYS: ABNORMAL L/MIN
GAS FLOW.O2 SETTING OXYMISER: 22 BPM
GLUCOSE SERPL-MCNC: 229 MG/DL (ref 65–100)
HCO3 BLD-SCNC: 25.4 MMOL/L (ref 22–26)
HCT VFR BLD AUTO: 34.6 % (ref 41.1–50.3)
HGB BLD-MCNC: 11.6 G/DL (ref 13.6–17.2)
INSPIRATION.DURATION SETTING TIME VENT: 1.1 SEC
MCH RBC QN AUTO: 31.4 PG (ref 26.1–32.9)
MCHC RBC AUTO-ENTMCNC: 33.5 G/DL (ref 31.4–35)
MCV RBC AUTO: 93.8 FL (ref 79.6–97.8)
NRBC # BLD: 0 K/UL (ref 0–0.2)
O2/TOTAL GAS SETTING VFR VENT: 60 %
PCO2 BLD: 33.8 MMHG (ref 35–45)
PEEP RESPIRATORY: 14 CMH2O
PH BLD: 7.48 [PH] (ref 7.35–7.45)
PLATELET # BLD AUTO: 229 K/UL (ref 150–450)
PMV BLD AUTO: 9.2 FL (ref 9.4–12.3)
PO2 BLD: 180 MMHG (ref 75–100)
POTASSIUM SERPL-SCNC: 4 MMOL/L (ref 3.5–5.1)
RBC # BLD AUTO: 3.69 M/UL (ref 4.23–5.6)
SAO2 % BLD: 100 % (ref 95–98)
SERVICE CMNT-IMP: ABNORMAL
SERVICE CMNT-IMP: NORMAL
SERVICE CMNT-IMP: NORMAL
SODIUM SERPL-SCNC: 143 MMOL/L (ref 136–145)
SPECIMEN TYPE: ABNORMAL
VENTILATION MODE VENT: ABNORMAL
VT SETTING VENT: 500 ML
WBC # BLD AUTO: 8.9 K/UL (ref 4.3–11.1)

## 2019-11-14 PROCEDURE — 99233 SBSQ HOSP IP/OBS HIGH 50: CPT | Performed by: INTERNAL MEDICINE

## 2019-11-14 PROCEDURE — 74011250636 HC RX REV CODE- 250/636: Performed by: INTERNAL MEDICINE

## 2019-11-14 PROCEDURE — 80048 BASIC METABOLIC PNL TOTAL CA: CPT

## 2019-11-14 PROCEDURE — 36592 COLLECT BLOOD FROM PICC: CPT

## 2019-11-14 PROCEDURE — 74011000258 HC RX REV CODE- 258: Performed by: INTERNAL MEDICINE

## 2019-11-14 PROCEDURE — 74011000250 HC RX REV CODE- 250: Performed by: INTERNAL MEDICINE

## 2019-11-14 PROCEDURE — 83880 ASSAY OF NATRIURETIC PEPTIDE: CPT

## 2019-11-14 PROCEDURE — 36600 WITHDRAWAL OF ARTERIAL BLOOD: CPT

## 2019-11-14 PROCEDURE — 85730 THROMBOPLASTIN TIME PARTIAL: CPT

## 2019-11-14 PROCEDURE — 65620000000 HC RM CCU GENERAL

## 2019-11-14 PROCEDURE — 74011250636 HC RX REV CODE- 250/636

## 2019-11-14 PROCEDURE — 74011000250 HC RX REV CODE- 250

## 2019-11-14 PROCEDURE — 71045 X-RAY EXAM CHEST 1 VIEW: CPT

## 2019-11-14 PROCEDURE — 85027 COMPLETE CBC AUTOMATED: CPT

## 2019-11-14 PROCEDURE — 82803 BLOOD GASES ANY COMBINATION: CPT

## 2019-11-14 PROCEDURE — 94003 VENT MGMT INPAT SUBQ DAY: CPT

## 2019-11-14 PROCEDURE — 74011250636 HC RX REV CODE- 250/636: Performed by: NURSE PRACTITIONER

## 2019-11-14 RX ORDER — LORAZEPAM 2 MG/ML
2 INJECTION INTRAMUSCULAR ONCE
Status: COMPLETED | OUTPATIENT
Start: 2019-11-14 | End: 2019-11-14

## 2019-11-14 RX ORDER — METOPROLOL TARTRATE 5 MG/5ML
INJECTION INTRAVENOUS
Status: COMPLETED
Start: 2019-11-14 | End: 2019-11-14

## 2019-11-14 RX ORDER — HEPARIN SODIUM 5000 [USP'U]/ML
4000 INJECTION, SOLUTION INTRAVENOUS; SUBCUTANEOUS ONCE
Status: COMPLETED | OUTPATIENT
Start: 2019-11-14 | End: 2019-11-14

## 2019-11-14 RX ORDER — HEPARIN SODIUM 5000 [USP'U]/100ML
18-36 INJECTION, SOLUTION INTRAVENOUS
Status: DISCONTINUED | OUTPATIENT
Start: 2019-11-14 | End: 2019-11-19

## 2019-11-14 RX ORDER — LORAZEPAM 2 MG/ML
0.25 INJECTION INTRAMUSCULAR 2 TIMES DAILY
Status: DISCONTINUED | OUTPATIENT
Start: 2019-11-14 | End: 2019-11-14

## 2019-11-14 RX ORDER — LORAZEPAM 2 MG/ML
1 INJECTION INTRAMUSCULAR 2 TIMES DAILY
Status: DISCONTINUED | OUTPATIENT
Start: 2019-11-14 | End: 2019-11-17

## 2019-11-14 RX ORDER — METOPROLOL TARTRATE 5 MG/5ML
5 INJECTION INTRAVENOUS
Status: DISCONTINUED | OUTPATIENT
Start: 2019-11-14 | End: 2019-11-25 | Stop reason: HOSPADM

## 2019-11-14 RX ORDER — LORAZEPAM 2 MG/ML
INJECTION INTRAMUSCULAR
Status: COMPLETED
Start: 2019-11-14 | End: 2019-11-14

## 2019-11-14 RX ADMIN — HEPARIN SODIUM 4000 UNITS: 5000 INJECTION INTRAVENOUS; SUBCUTANEOUS at 15:08

## 2019-11-14 RX ADMIN — Medication 900 UNITS: at 06:00

## 2019-11-14 RX ADMIN — DEXMEDETOMIDINE HYDROCHLORIDE 0.2 MCG/KG/HR: 100 INJECTION, SOLUTION INTRAVENOUS at 15:38

## 2019-11-14 RX ADMIN — METOPROLOL TARTRATE 5 MG: 5 INJECTION INTRAVENOUS at 15:00

## 2019-11-14 RX ADMIN — Medication 30 ML: at 06:00

## 2019-11-14 RX ADMIN — FUROSEMIDE 40 MG: 10 INJECTION, SOLUTION INTRAMUSCULAR; INTRAVENOUS at 20:24

## 2019-11-14 RX ADMIN — SODIUM CHLORIDE 5 MG/HR: 900 INJECTION, SOLUTION INTRAVENOUS at 03:58

## 2019-11-14 RX ADMIN — Medication 10 ML: at 22:09

## 2019-11-14 RX ADMIN — Medication 10 ML: at 14:25

## 2019-11-14 RX ADMIN — METHYLPREDNISOLONE SODIUM SUCCINATE 60 MG: 125 INJECTION, POWDER, FOR SOLUTION INTRAMUSCULAR; INTRAVENOUS at 06:03

## 2019-11-14 RX ADMIN — Medication 0.5 MCG/MIN: at 23:35

## 2019-11-14 RX ADMIN — Medication 4 MCG/MIN: at 00:21

## 2019-11-14 RX ADMIN — LORAZEPAM 0.26 MG: 2 INJECTION INTRAMUSCULAR; INTRAVENOUS at 09:19

## 2019-11-14 RX ADMIN — LORAZEPAM 2 MG: 2 INJECTION INTRAMUSCULAR at 15:02

## 2019-11-14 RX ADMIN — Medication 900 UNITS: at 22:09

## 2019-11-14 RX ADMIN — Medication 30 ML: at 22:09

## 2019-11-14 RX ADMIN — Medication 100 MCG/HR: at 18:57

## 2019-11-14 RX ADMIN — FAMOTIDINE 20 MG: 10 INJECTION INTRAVENOUS at 20:20

## 2019-11-14 RX ADMIN — FUROSEMIDE 40 MG: 10 INJECTION, SOLUTION INTRAMUSCULAR; INTRAVENOUS at 09:19

## 2019-11-14 RX ADMIN — Medication 900 UNITS: at 14:25

## 2019-11-14 RX ADMIN — Medication 10 ML: at 06:00

## 2019-11-14 RX ADMIN — Medication 30 ML: at 14:25

## 2019-11-14 RX ADMIN — FAMOTIDINE 20 MG: 10 INJECTION INTRAVENOUS at 09:19

## 2019-11-14 RX ADMIN — HEPARIN SODIUM 18 UNITS/KG/HR: 5000 INJECTION, SOLUTION INTRAVENOUS at 15:41

## 2019-11-14 RX ADMIN — POTASSIUM CHLORIDE 20 MEQ: 200 INJECTION, SOLUTION INTRAVENOUS at 00:22

## 2019-11-14 RX ADMIN — CEFTRIAXONE 1 G: 1 INJECTION, POWDER, FOR SOLUTION INTRAMUSCULAR; INTRAVENOUS at 20:22

## 2019-11-14 RX ADMIN — LORAZEPAM 2 MG: 2 INJECTION INTRAMUSCULAR; INTRAVENOUS at 15:02

## 2019-11-14 NOTE — PROGRESS NOTES
[facility] CARDIOLOGY PROGRESS NOTE           11/14/2019 6:16 PM    Admit Date: 11/10/2019         Subjective: Normal cors, low normal EF, normal LVEDP, likely that his hypoxia and SOB are not related to cardiac dysfunction. ROS:  Cardiovascular:  As noted above    Objective:      Vitals:    11/14/19 1701 11/14/19 1715 11/14/19 1731 11/14/19 1746   BP: 91/51 (!) 88/51 (!) 88/52 91/51   Pulse: (!) 51 68 69 69   Resp: 20 14 16 15   Temp:       SpO2: 91% 93% 93% 94%   Weight:       Height:             Physical Exam:  General: acutely ill  Neck: supple, no JVD  Heart: S1S2 with RRR without murmurs or gallops  Lungs: Clear throughout auscultation bilaterally without adventitious sounds  Abd: soft, nontender, nondistended, with good bowel sounds  Ext: no edema bilaterally  Skin: warm and dry      Data Review:   Recent Labs     11/14/19  0403 11/13/19  1933 11/13/19  1050 11/13/19  0327     --   --  140   K 4.0 3.5  --  3.3*   MG  --  2.4  --   --    BUN 39*  --   --  21   CREA 1.41  --   --  1.03   *  --   --  146*   WBC 8.9  --   --  13.8*   HGB 11.6*  --   --  12.8*   HCT 34.6*  --   --  38.9*     --   --  282   INR  --   --  1.3  --        No results for input(s): TNIPOC, TROIQ in the last 72 hours.         Assessment/Plan:     Principal Problem:    Acute pulmonary edema ([facility] [address] (11/10/2019)    Active Problems:    Acute respiratory failure with hypoxia ([facility] [address] (11/10/2019)      Acute heart failure ([facility] [address] (11/10/2019)      ARDS (adult respiratory distress syndrome) ([facility] [address] (11/13/2019)    A/P  1) ARDS - per pulm  2) Resp failure continue support  3) Pulm edema - NPO after MN for possible RHC      Charlette Chavez MD  11/14/2019 6:16 PM

## 2019-11-14 NOTE — PROGRESS NOTES
Pt in and out of Afib with RVR - spoke with Janet Pike NP regarding cardiac status. Ordered Heparin bolus and gtt. Lopressor prn.

## 2019-11-14 NOTE — PROGRESS NOTES
Bedside shift change report given to [address] [place] [address] (oncoming nurse) by Yocasta Hong (offgoing nurse). Report included the following information Kardex, Intake/Output, MAR, Recent Results, Med Rec Status and Cardiac Rhythm SR BBB.

## 2019-11-14 NOTE — PROGRESS NOTES
Chart reviewed and pt discussed in am IDR. Remains CCU, now re-intubated/vent/fent/versed. CM continues to follow for any assist and d/c POC when medically stable.

## 2019-11-14 NOTE — PROGRESS NOTES
Pt being assessed with Dr. Trisha Spencer - rapid Afib RVR with HTN. Paged [facility] regarding recommendations.

## 2019-11-14 NOTE — PROGRESS NOTES
Interdisciplinary team rounds were held 11/14/2019 with the following team members:Care Management, Nursing, Nurse Practitioner, Nutrition, Palliative Care, Pastoral Care, Pharmacy, Physical Therapy, Physician, Respiratory Therapy and Clinical Coordinator and the patient. Plan of care discussed. See clinical pathway and/or care plan for interventions and desired outcomes.

## 2019-11-14 NOTE — PROGRESS NOTES
Ventilator check complete; patient has a #8. 0 ET tube secured at the 26 at the lip. Patient is not sedated. Patient is able to follow commands. Breath sounds are diminished. Trachea is midline, Negative for subcutaneous air, and chest excursion is symmetric. Patient is also Negative for cyanosis and is Negative for pitting edema. All alarms are set and audible. Resuscitation bag is at the head of the bed. Ventilator Settings  Mode FIO2 Rate Tidal Volume Pressure PEEP I:E Ratio   Assist control(VC+)  50 %    500 ml  0 cm H2O  14 cm H20  1:1.7      Peak airway pressure: 25 cm H2O   Minute ventilation: 10.2 l/min     ABG: No results for input(s): PH, PCO2, PO2, HCO3 in the last 72 hours.       Nolan Mckeon, RT

## 2019-11-14 NOTE — PROGRESS NOTES
Critical Care Daily Progress Note: 11/14/2019  Admission Date: 11/10/2019     The patient's chart is reviewed and the patient is discussed with the staff. Patient is K 89 y.o.  male presented with respiratory failure. He was just discharged from [facility] today. He presented there with increased SOB of cough. No fever, chills, LE edema, or chest pain. It was originally suspected to represent PNA but the patient improved after just 24 hours of treatment with lasix and abx and was discharged.      He was resting at home  per wife and awoke suddenly very SOB with BP in the 180-190's. Cough with pink frothy sputum. Went to urgent care and was transported here via EMS. Was intubated en route and has had copious pink frothy sputum from ETT since. CXR with B infiltrates c/w edema. Pro BNP better than yesterday but still elevated. No fever or chills still reported.  Then was extubated on 11/12  and early morning 11/13 re intubated again with  Reports of increase BP ,agitation just prior to intubation    Subjective:   On vent, awake ,on CPAP pulling 1000 cc, on 35  % Fio2     Current Facility-Administered Medications   Medication Dose Route Frequency    LORazepam (ATIVAN) injection 0.26 mg  0.26 mg IntraVENous BID    NUTRITIONAL SUPPORT ELECTROLYTE PRN ORDERS   Does Not Apply PRN    famotidine (PF) (PEPCID) 20 mg in sodium chloride 0.9% 10 mL injection  20 mg IntraVENous Q12H    NOREPINephrine (LEVOPHED) 4 mg in 5% dextrose 250 mL infusion  0.5-16 mcg/min IntraVENous TITRATE    midazolam (VERSED) 100 mg in 0.9% sodium chloride 100 mL infusion  0-10 mg/hr IntraVENous TITRATE    sodium chloride (NS) flush 30 mL  30 mL InterCATHeter Q8H    heparin (porcine) pf 900 Units  900 Units InterCATHeter Q8H    sodium chloride (NS) flush 30 mL  30 mL InterCATHeter PRN    heparin (porcine) pf 900 Units  900 Units InterCATHeter PRN    fentaNYL in normal saline (pf) 25 mcg/mL infusion  0-200 mcg/hr IntraVENous TITRATE    [Held by provider] lisinopril (PRINIVIL, ZESTRIL) tablet 10 mg  10 mg Oral Q12H    [Held by provider] metoprolol succinate (TOPROL-XL) XL tablet 100 mg  100 mg Oral Q12H    [Held by provider] amLODIPine (NORVASC) tablet 5 mg  5 mg Oral DAILY    furosemide (LASIX) injection 40 mg  40 mg IntraVENous Q12H    [Held by provider] apixaban (ELIQUIS) tablet 5 mg  5 mg Oral Q12H    cefTRIAXone (ROCEPHIN) 1 g in 0.9% sodium chloride (MBP/ADV) 50 mL  1 g IntraVENous Q24H    sodium chloride (NS) flush 5-40 mL  5-40 mL IntraVENous Q8H    sodium chloride (NS) flush 5-40 mL  5-40 mL IntraVENous PRN       Review of Systems    Constitutional:  negative for fever, chills, sweats  Cardiovascular:  negative for chest pain, palpitations, syncope, edema  Gastrointestinal:  negative for dysphagia, reflux, vomiting, diarrhea, abdominal pain, or melena  Neurologic:  negative for focal weakness, numbness, headache        Objective:     Vitals:    11/14/19 1315 11/14/19 1331 11/14/19 1338 11/14/19 1346   BP: 155/84 154/85  (!) 188/93   Pulse: 77 79 78 77   Resp: 12 15 18 8   Temp:       SpO2: 100% 91% 96% 93%   Weight:       Height:             Intake/Output Summary (Last 24 hours) at 11/14/2019 1356  Last data filed at 11/14/2019 0801  Gross per 24 hour   Intake 990.77 ml   Output 1250 ml   Net -259.23 ml         Physical Exam:          Constitutional:  intubated and mechanically ventilated.   EENMT:  Sclera clear, pupils equal, oral mucosa moist  Respiratory: clear  Cardiovascular:  RRR with no M,G,R;  Gastrointestinal:  soft with no tenderness; positive bowel sounds present  Musculoskeletal:  warm with no cyanosis, no lower extremity edema  Skin:  no jaundice or ecchymosis  Neurologic: no gross neuro deficits     Psychiatric:  Awake     LINES:    ETT    DRIPS:    Fentanyl gtt                     CXR:        Ventilator Settings  Mode FIO2 Rate Tidal Volume Pressure PEEP   CPAP  35 %    500 ml  10 cm H2O 8 cm H20      Peak airway pressure: 16 cm H2O   Minute ventilation: 8.9 l/min     ABG:   Recent Labs     11/14/19  0353 11/13/19  1821 11/13/19  0528   PHI 7.484* 7.428 7.401   PCO2I 33.8* 33.3* 39.4   PO2I 180* 122* 60*   HCO3I 25.4 22.0 24.5        LAB  No results for input(s): GLUCPOC in the last 72 hours. No lab exists for component: [place]  Recent Labs     11/14/19  0403 11/13/19  1050 11/13/19  0327   WBC 8.9  --  13.8*   HGB 11.6*  --  12.8*   HCT 34.6*  --  38.9*     --  282   INR  --  1.3  --      Recent Labs     11/14/19  0403 11/13/19  1933 11/13/19  0327 11/12/19  0317     --  140 141   K 4.0 3.5 3.3* 3.7   *  --  104 106   CO2 28  --  29 28   *  --  146* 151*   BUN 39*  --  21 27*   CREA 1.41  --  1.03 1.29   MG  --  2.4  --   --    PHOS  --  3.8*  --   --    CA 8.2*  --  8.3 8.0*     Recent Labs     11/13/19  1933   LAC 1.4         Assessment:  (Medical Decision Making)     Hospital Problems  Date Reviewed: 11/10/2019          Codes Class Noted POA    [facility] ICD-10-CM: L62  ICD-9-CM: 518.52  11/13/2019 Unknown        Acute respiratory failure with hypoxia (HCC) ICD-10-CM: J96.01  ICD-9-CM: 518.81  11/10/2019 Yes        * (Principal) Acute pulmonary edema (HCC) ICD-10-CM: J81.0  ICD-9-CM: 518.4  11/10/2019 Yes        Acute heart failure (HCC) ICD-10-CM: I50.9  ICD-9-CM: 428.9  11/10/2019 Yes                66 y old Male  With repeated episode  of acute hypoxemic respiratory failure presumed secondary to flash pulmonary edema, uncontrolled hypertension, tachycardia.  TTE with only mild-moderate MR and EF 45-50 %  Yesterday on 100 % Fio2 PEEP of 14 and CXR with pulmonary edema, today CXR clearing ,on CPAP with 35 % FI02    Plan:  (Medical Decision Making)     -- he would be ready to be extubated but now again becoming agitated ,BP going to and HR going up -aa fib with 140 HR , has to be controlled before extubation as he would go back to into pulmonary edema  ANCA negative, ESR minimally elevated, ARDS does not clear so quick within 24 hours     More than 50% of the time documented was spent in face-to-face contact with the patient and in the care of the patient on the floor/unit where the patient is located.     Catrachita Quispe MD

## 2019-11-14 NOTE — PROGRESS NOTES
Repeat potassium 3.5. Potassium replacement ordered per nutrition protocol. Sepsis alert posted with documentation.   Lactic Acid level=1.4

## 2019-11-14 NOTE — PROGRESS NOTES
Ventilator check complete; patient has a #8. 0 ET tube secured at the 26 at the lip. Patient is sedated. Patient is not able to follow commands. Breath sounds are coarse. Trachea is midline, Negative for subcutaneous air, and chest excursion is symmetric. Patient is also Negative for cyanosis and is Negative for pitting edema. All alarms are set and audible. Resuscitation bag is at the head of the bed.       Ventilator Settings  Mode FIO2 Rate Tidal Volume Pressure PEEP I:E Ratio   Assist control(VC+)  60 %   22 500 ml  0 cm H2O  14 cm H20  1:1.5      Peak airway pressure: 28 cm H2O   Minute ventilation: 11.4 l/min         Aurea Sanchez RT

## 2019-11-14 NOTE — PROGRESS NOTES
Bedside and verbal shift report received from [facility]. Dual signed Fentanyl and Versed gtt.

## 2019-11-14 NOTE — PROGRESS NOTES
Head to toe assessment completed on pt this AM.     Neuro: Pt eyes open to voice / stimulation. PERRLA 2 mm and sluggish to react. Able to follow commands and ALAMO purposefully. Pt more appropriate with increased command following with controllable anxiety. Fentanyl gtt at 125, Versed gtt at 2. Resp: Assist control FiO2 45%. Coarse upper / diminished lower. Thick bloody sputum suctioned from ETT. Cardiac: NSR to SB with PVCs, LBBB and widened QRS. Levo gtt on standby. GI: NPO d/t intubation. Abd rotund and distended with active bowel sounds. : Cancino - draining and patent, chavo UOP. Skin: R PICC - CDI. Allevyn in place. SCDs present.

## 2019-11-14 NOTE — PROGRESS NOTES
A follow up visit was made to the patient. Emotional support, spiritual presence and   prayer were provided for the patient and his friend, Thi Wong.        JAY Arriaga

## 2019-11-14 NOTE — PROGRESS NOTES
MAP 80. Levophed OFF. Versed IV titrated down to 2 mg/hour. Opens eyes and follows commands. Nods appropriately. Has been clam overnight. Some anxiety when needing to be suctioned. Fentanyl 125 mcg/hour IV infusing. Lifts hips and turns self to the best of his ability considering he has bilateral soft wrist restraints on for safety.

## 2019-11-14 NOTE — PROGRESS NOTES
Pt in Afib RVR - gave 5 mg Lopressor. Pt extremely agitated - gave 2 mg of Ativan per Dr. Jyoti Clarke.

## 2019-11-15 ENCOUNTER — APPOINTMENT (OUTPATIENT)
Dept: GENERAL RADIOLOGY | Age: 74
DRG: 981 | End: 2019-11-15
Attending: INTERNAL MEDICINE
Payer: MEDICARE

## 2019-11-15 ENCOUNTER — APPOINTMENT (OUTPATIENT)
Dept: CT IMAGING | Age: 74
DRG: 981 | End: 2019-11-15
Attending: NURSE PRACTITIONER
Payer: MEDICARE

## 2019-11-15 LAB
ANION GAP SERPL CALC-SCNC: 5 MMOL/L (ref 7–16)
APTT PPP: 130.8 SEC (ref 24.7–39.8)
APTT PPP: 87.6 SEC (ref 24.7–39.8)
APTT PPP: 93 SEC (ref 24.7–39.8)
ARTERIAL PATENCY WRIST A: YES
BACTERIA SPEC CULT: NORMAL
BACTERIA SPEC CULT: NORMAL
BASE EXCESS BLD CALC-SCNC: 4 MMOL/L
BDY SITE: ABNORMAL
BODY TEMPERATURE: 98.6
BUN SERPL-MCNC: 43 MG/DL (ref 8–23)
CALCIUM SERPL-MCNC: 8.9 MG/DL (ref 8.3–10.4)
CHLORIDE SERPL-SCNC: 110 MMOL/L (ref 98–107)
CO2 BLD-SCNC: 30 MMOL/L
CO2 SERPL-SCNC: 31 MMOL/L (ref 21–32)
COLLECT TIME,HTIME: 315
CREAT SERPL-MCNC: 1.25 MG/DL (ref 0.8–1.5)
ERYTHROCYTE [DISTWIDTH] IN BLOOD BY AUTOMATED COUNT: 13.5 % (ref 11.9–14.6)
EXHALED MINUTE VOLUME, VE: 9 L/MIN
GAS FLOW.O2 O2 DELIVERY SYS: ABNORMAL L/MIN
GAS FLOW.O2 SETTING OXYMISER: 15 BPM
GLUCOSE BLD STRIP.AUTO-MCNC: 222 MG/DL (ref 65–100)
GLUCOSE BLD STRIP.AUTO-MCNC: 226 MG/DL (ref 65–100)
GLUCOSE SERPL-MCNC: 242 MG/DL (ref 65–100)
HCO3 BLD-SCNC: 28.9 MMOL/L (ref 22–26)
HCT VFR BLD AUTO: 35.3 % (ref 41.1–50.3)
HGB BLD-MCNC: 11.5 G/DL (ref 13.6–17.2)
MCH RBC QN AUTO: 30.8 PG (ref 26.1–32.9)
MCHC RBC AUTO-ENTMCNC: 32.6 G/DL (ref 31.4–35)
MCV RBC AUTO: 94.6 FL (ref 79.6–97.8)
NRBC # BLD: 0 K/UL (ref 0–0.2)
O2/TOTAL GAS SETTING VFR VENT: 40 %
PCO2 BLD: 44 MMHG (ref 35–45)
PEEP RESPIRATORY: 8 CMH2O
PH BLD: 7.42 [PH] (ref 7.35–7.45)
PLATELET # BLD AUTO: 290 K/UL (ref 150–450)
PMV BLD AUTO: 9.7 FL (ref 9.4–12.3)
PO2 BLD: 94 MMHG (ref 75–100)
POTASSIUM SERPL-SCNC: 4.2 MMOL/L (ref 3.5–5.1)
RBC # BLD AUTO: 3.73 M/UL (ref 4.23–5.6)
SAO2 % BLD: 97 % (ref 95–98)
SERVICE CMNT-IMP: ABNORMAL
SERVICE CMNT-IMP: NORMAL
SERVICE CMNT-IMP: NORMAL
SODIUM SERPL-SCNC: 146 MMOL/L (ref 136–145)
SPECIMEN TYPE: ABNORMAL
VENTILATION MODE VENT: ABNORMAL
VT SETTING VENT: 500 ML
WBC # BLD AUTO: 15.6 K/UL (ref 4.3–11.1)

## 2019-11-15 PROCEDURE — 74011250636 HC RX REV CODE- 250/636: Performed by: NURSE PRACTITIONER

## 2019-11-15 PROCEDURE — 85730 THROMBOPLASTIN TIME PARTIAL: CPT

## 2019-11-15 PROCEDURE — 74011000250 HC RX REV CODE- 250: Performed by: INTERNAL MEDICINE

## 2019-11-15 PROCEDURE — 71260 CT THORAX DX C+: CPT

## 2019-11-15 PROCEDURE — 71045 X-RAY EXAM CHEST 1 VIEW: CPT

## 2019-11-15 PROCEDURE — 36600 WITHDRAWAL OF ARTERIAL BLOOD: CPT

## 2019-11-15 PROCEDURE — 74011000258 HC RX REV CODE- 258: Performed by: INTERNAL MEDICINE

## 2019-11-15 PROCEDURE — 74011250636 HC RX REV CODE- 250/636: Performed by: INTERNAL MEDICINE

## 2019-11-15 PROCEDURE — 82962 GLUCOSE BLOOD TEST: CPT

## 2019-11-15 PROCEDURE — 77030031476 HC EXCH HEAT MOISTW FLTR HALY -A

## 2019-11-15 PROCEDURE — 65620000000 HC RM CCU GENERAL

## 2019-11-15 PROCEDURE — 82803 BLOOD GASES ANY COMBINATION: CPT

## 2019-11-15 PROCEDURE — 74011000250 HC RX REV CODE- 250: Performed by: NURSE PRACTITIONER

## 2019-11-15 PROCEDURE — 94003 VENT MGMT INPAT SUBQ DAY: CPT

## 2019-11-15 PROCEDURE — 74011250637 HC RX REV CODE- 250/637: Performed by: INTERNAL MEDICINE

## 2019-11-15 PROCEDURE — 99233 SBSQ HOSP IP/OBS HIGH 50: CPT | Performed by: INTERNAL MEDICINE

## 2019-11-15 PROCEDURE — 74011636320 HC RX REV CODE- 636/320: Performed by: INTERNAL MEDICINE

## 2019-11-15 PROCEDURE — 80048 BASIC METABOLIC PNL TOTAL CA: CPT

## 2019-11-15 PROCEDURE — 85027 COMPLETE CBC AUTOMATED: CPT

## 2019-11-15 PROCEDURE — 77030020256 HC SOL INJ NACL 0.9%  500ML

## 2019-11-15 PROCEDURE — 74011636637 HC RX REV CODE- 636/637: Performed by: INTERNAL MEDICINE

## 2019-11-15 PROCEDURE — 74018 RADEX ABDOMEN 1 VIEW: CPT

## 2019-11-15 RX ORDER — SODIUM CHLORIDE 0.9 % (FLUSH) 0.9 %
10 SYRINGE (ML) INJECTION
Status: COMPLETED | OUTPATIENT
Start: 2019-11-15 | End: 2019-11-15

## 2019-11-15 RX ORDER — RISPERIDONE 0.5 MG/1
1 TABLET, FILM COATED ORAL
Status: DISCONTINUED | OUTPATIENT
Start: 2019-11-15 | End: 2019-11-19

## 2019-11-15 RX ORDER — INSULIN LISPRO 100 [IU]/ML
INJECTION, SOLUTION INTRAVENOUS; SUBCUTANEOUS EVERY 6 HOURS
Status: DISCONTINUED | OUTPATIENT
Start: 2019-11-15 | End: 2019-11-17

## 2019-11-15 RX ADMIN — FAMOTIDINE 20 MG: 10 INJECTION INTRAVENOUS at 09:32

## 2019-11-15 RX ADMIN — INSULIN LISPRO 4 UNITS: 100 INJECTION, SOLUTION INTRAVENOUS; SUBCUTANEOUS at 20:20

## 2019-11-15 RX ADMIN — FUROSEMIDE 40 MG: 10 INJECTION, SOLUTION INTRAMUSCULAR; INTRAVENOUS at 21:50

## 2019-11-15 RX ADMIN — Medication 900 UNITS: at 05:40

## 2019-11-15 RX ADMIN — HEPARIN SODIUM 16 UNITS/KG/HR: 5000 INJECTION, SOLUTION INTRAVENOUS at 22:31

## 2019-11-15 RX ADMIN — HEPARIN SODIUM 16 UNITS/KG/HR: 5000 INJECTION, SOLUTION INTRAVENOUS at 05:39

## 2019-11-15 RX ADMIN — IOPAMIDOL 80 ML: 755 INJECTION, SOLUTION INTRAVENOUS at 10:46

## 2019-11-15 RX ADMIN — METOPROLOL TARTRATE 5 MG: 5 INJECTION INTRAVENOUS at 11:17

## 2019-11-15 RX ADMIN — SODIUM CHLORIDE 100 ML: 900 INJECTION, SOLUTION INTRAVENOUS at 10:46

## 2019-11-15 RX ADMIN — DEXMEDETOMIDINE HYDROCHLORIDE 0.2 MCG/KG/HR: 100 INJECTION, SOLUTION INTRAVENOUS at 04:04

## 2019-11-15 RX ADMIN — Medication 10 ML: at 21:53

## 2019-11-15 RX ADMIN — CEFTRIAXONE 1 G: 1 INJECTION, POWDER, FOR SOLUTION INTRAMUSCULAR; INTRAVENOUS at 21:42

## 2019-11-15 RX ADMIN — FAMOTIDINE 20 MG: 10 INJECTION INTRAVENOUS at 21:49

## 2019-11-15 RX ADMIN — METHYLPREDNISOLONE SODIUM SUCCINATE 60 MG: 40 INJECTION, POWDER, FOR SOLUTION INTRAMUSCULAR; INTRAVENOUS at 21:45

## 2019-11-15 RX ADMIN — Medication 20 ML: at 21:52

## 2019-11-15 RX ADMIN — RISPERIDONE 1 MG: 0.5 TABLET ORAL at 21:44

## 2019-11-15 RX ADMIN — Medication 30 ML: at 14:03

## 2019-11-15 RX ADMIN — SODIUM CHLORIDE 2 MG/HR: 900 INJECTION, SOLUTION INTRAVENOUS at 15:38

## 2019-11-15 RX ADMIN — LORAZEPAM 1 MG: 2 INJECTION INTRAMUSCULAR; INTRAVENOUS at 09:32

## 2019-11-15 RX ADMIN — Medication 10 ML: at 14:05

## 2019-11-15 RX ADMIN — FUROSEMIDE 40 MG: 10 INJECTION, SOLUTION INTRAMUSCULAR; INTRAVENOUS at 09:31

## 2019-11-15 RX ADMIN — Medication 500 UNITS: at 21:52

## 2019-11-15 RX ADMIN — Medication 900 UNITS: at 15:00

## 2019-11-15 RX ADMIN — METHYLPREDNISOLONE SODIUM SUCCINATE 60 MG: 40 INJECTION, POWDER, FOR SOLUTION INTRAMUSCULAR; INTRAVENOUS at 09:32

## 2019-11-15 NOTE — PROGRESS NOTES
Ventilator check complete; patient has a #8. 0 ET tube secured at the 26 at the lip. Patient is sedated. Patient is not able to follow commands. Breath sounds are clear. Trachea is midline, Negative for subcutaneous air, and chest excursion is symmetric. Patient is also Negative for cyanosis and is Negative for pitting edema. All alarms are set and audible. Resuscitation bag is at the head of the bed. Ventilator Settings  Mode FIO2 Rate Tidal Volume Pressure PEEP I:E Ratio   PRVC  40 %    500 ml  10 cm H2O  8 cm H20  1:3.0      Peak airway pressure: 20 cm H2O   Minute ventilation: 7.8 l/min     ABG: No results for input(s): PH, PCO2, PO2, HCO3 in the last 72 hours.       Con Lux Boone, RT

## 2019-11-15 NOTE — PROGRESS NOTES
Ventilator check complete; patient has a #8. 0 ET tube secured at the 26 at the lip. Patient is not sedated. Patient is able to follow commands. Breath sounds are diminished. Trachea is midline, Negative for subcutaneous air, and chest excursion is symmetrical. Patient is also Negative for cyanosis and is Negative for pitting edema. All alarms are set and audible.   Resuscitation bag and mask are at the head of the bed.       Ventilator Settings  Mode FIO2 Rate Tidal Volume Pressure PEEP I:E Ratio   Assist control(VC+)  50 %   15 500 ml  0 cm H2O  8 cm H20  1:1.7       Peak airway pressure: 21 cm H2O   Minute ventilation: 7.7 l/min

## 2019-11-15 NOTE — PROGRESS NOTES
[facility] CARDIOLOGY PROGRESS NOTE           11/15/2019 8:02 AM    Admit Date: 11/10/2019         Subjective: Back in SR now BP improved, off pressors remains on vent support    ROS:  Intubated, vented    Objective:      Vitals:    11/15/19 0630 11/15/19 0646 11/15/19 0701 11/15/19 0717   BP: 101/61 95/54 102/58 135/74   Pulse: (!) 54 (!) 53 (!) 54 (!) 53   Resp: 14 11 19 15   Temp:       SpO2: 95% 97% 93% 95%   Weight:       Height:           On telemetry: sinus keya      Physical Exam:  General: acutely ill  Neck: supple, no JVD  Heart: S1S2 with RRR without murmurs or gallops  Lungs: Clear throughout auscultation bilaterally without adventitious sounds  Abd: soft, nontender, nondistended, with good bowel sounds  Ext: no edema bilaterally  Skin: warm and dry      Data Review:   Recent Labs     11/15/19  0359 11/14/19  0403 11/13/19  1933 11/13/19  1050   * 143  --   --    K 4.2 4.0 3.5  --    MG  --   --  2.4  --    BUN 43* 39*  --   --    CREA 1.25 1.41  --   --    * 229*  --   --    WBC 15.6* 8.9  --   --    HGB 11.5* 11.6*  --   --    HCT 35.3* 34.6*  --   --     229  --   --    INR  --   --   --  1.3       No results for input(s): TNIPOC, TROIQ in the last 72 hours. Assessment/Plan:     Principal Problem:    Acute pulmonary edema ([facility] (11/10/2019)    Active Problems:    Acute respiratory failure with hypoxia ([facility] (11/10/2019)      Acute heart failure ([facility] (11/10/2019)      ARDS (adult respiratory distress syndrome) (HCC) (11/13/2019)    A/P  1) Hypotension - concern for cardiac involvment, BP and HR controlled likely non-cardiac etiology for resp failure. We can consider a RHC but will defer to pulm, if they feel it is still necessary then we will do one today. 2) Resp failure - feel this is likely pulmonary/ards and not cardiogenic but will be willing to do RHC to evaluate if requested by pulm.       Winifred Fischer MD  11/15/2019 8:02 AM

## 2019-11-15 NOTE — PROGRESS NOTES
Nutrition:  Nutrition Consult for TF Management. (Dr. Mike Lynch)  Assessment:  Anthropometrics:   Ht - 6'3\", wgt - 111.6 kg (CCU bed 11/15/19), BMI 30.8 c/w obese, edema - none reported. Macronutrient Needs (112 kg):  Estimated calorie needs - 0232-1429 carlton/day (15-20 carlton/kg/day)  Estimated protein needs -  gm pro/day (0.8-1 gm pro/kg/day)   Max CHO/day - 280 gm CHO/day (50% carlton/day)   Fluid/day - 1.7-2.3 liters/day (1 ml/carlton/day)  Intake/Comparative Standards: The patient is currently NPO which meets 0% of his calorie and 0% of his protein needs. Pertinent Labs/Hemodynamic Parameters:   Sodium 146, AM glucose 242, BUN 43, creatinine 1.25; MAP - 86. Pertinent Medications/Drips:   Solumedrol, Lasix; Precedex drip. GI Function/Activity:   Semi-soft, rotund abdomen with active bowel sounds. Last bowel movement was PTA. Diet:   NPO. Enteral Access:   No enteral access at this time. Food/Nutrition History:   66year old obese gentleman admitted with acute pulmonary edema, now with ARDS. He was intubated on 11/13/19. No oral intake was recorded prior to that date. Diagnosis (Nutrition): Inadequate energy intake related to NPO status as evidenced by the patient being intubated and ventilated and requires TF for nutrition support. Intervention:  Meals and Snacks: NPO. Enteral Nutrition: Place FT and verify correct placement. Start TF with Glucerna 1.2 @ 25 ml/hr with a 25 ml/hr water flush via FT (to be placed). Increase TF as tolerated to the goal rate of 65 ml/hr - 1872 calories/day (100% calorie goal), 94 grams protein/day (100% protein goal), 179 grams CHO/day (does not exceed max CHO limit) and 1864 ml water/day (100% fluid goal). Mineral Supplement Therapy: Nutrition Support Orders for Electrolyte Management Replacements are active and on the MAR. Nutrition-Related Medication Management: Start POC glucose and SSI coverage. Check magnesium and phosphorus with AM labs.   Coordination of Nutrition Care by a Nutrition Professional: AM CCU rounds, collaboration with Jolanta Samayoa RN. Nutrition Discharge Plan: Too soon to determine. Sisi Whittaker.  [facility]  [phone]

## 2019-11-15 NOTE — PROGRESS NOTES
Levophed restarted last night now on 2 mcg/min. Pt stable all night. Heparin adjusted per protocol. Next PTT 1200 11/15.

## 2019-11-15 NOTE — PROGRESS NOTES
A follow up visit was made to the patient. Emotional support, spiritual presence and the assurance of  prayer were provided for the patient and his friend, Keena Jolly. The nurses were assisting him when the  was there. The patient is intubated.       Matteo Hutchins MDIV

## 2019-11-15 NOTE — PROGRESS NOTES
Pt coughing large amount of sputum from mouth and HR up to 160s with agitation during suctioning. 5 mg Lopressor IV given and HR back down to 60s-70s. Precedex gtt restarted at 0.2 mcg/kg/hr. To continue to monitor.

## 2019-11-15 NOTE — PROGRESS NOTES
Critical Care Daily Progress Note: 11/15/2019  Admission Date: 11/10/2019     The patient's chart is reviewed and the patient is discussed with the staff. Patient is F 27 y.o.  male presented with respiratory failure. He was just discharged from [facility] today. He presented there with increased SOB of cough. No fever, chills, LE edema, or chest pain. It was originally suspected to represent PNA but the patient improved after just 24 hours of treatment with lasix and abx and was discharged.      He was resting at home  per wife and awoke suddenly very SOB with BP in the 180-190's. Cough with pink frothy sputum. Went to urgent care and was transported here via EMS. Was intubated en route and has had copious pink frothy sputum from ETT since. CXR with B infiltrates c/w edema. Pro BNP better than yesterday but still elevated. No fever or chills still reported.  Then was extubated on 11/12  and early morning 11/13 re intubated again with  Reports of increase BP ,agitation just prior to intubation    Subjective:       On vent   Came from chest CT  Became agitated and again HR in  160's      Current Facility-Administered Medications   Medication Dose Route Frequency    methylPREDNISolone (PF) (SOLU-MEDROL) injection 60 mg  60 mg IntraVENous Q8H    saline peripheral flush soln 10 mL  10 mL InterCATHeter RAD ONCE    dexmedeTOMidine (PRECEDEX) 400 mcg in 0.9% sodium chloride 100 mL infusion  0.2-0.7 mcg/kg/hr IntraVENous TITRATE    heparin 25,000 units in dextrose 500 mL infusion  18-36 Units/kg/hr (Adjusted) IntraVENous TITRATE    metoprolol (LOPRESSOR) injection 5 mg  5 mg IntraVENous Q6H PRN    LORazepam (ATIVAN) injection 1 mg  1 mg IntraVENous BID    NUTRITIONAL SUPPORT ELECTROLYTE PRN ORDERS   Does Not Apply PRN    famotidine (PF) (PEPCID) 20 mg in sodium chloride 0.9% 10 mL injection  20 mg IntraVENous Q12H    NOREPINephrine (LEVOPHED) 4 mg in 5% dextrose 250 mL infusion 0.5-16 mcg/min IntraVENous TITRATE    sodium chloride (NS) flush 30 mL  30 mL InterCATHeter Q8H    heparin (porcine) pf 900 Units  900 Units InterCATHeter Q8H    sodium chloride (NS) flush 30 mL  30 mL InterCATHeter PRN    heparin (porcine) pf 900 Units  900 Units InterCATHeter PRN    fentaNYL in normal saline (pf) 25 mcg/mL infusion  0-200 mcg/hr IntraVENous TITRATE    [Held by provider] lisinopril (PRINIVIL, ZESTRIL) tablet 10 mg  10 mg Oral Q12H    [Held by provider] metoprolol succinate (TOPROL-XL) XL tablet 100 mg  100 mg Oral Q12H    [Held by provider] amLODIPine (NORVASC) tablet 5 mg  5 mg Oral DAILY    furosemide (LASIX) injection 40 mg  40 mg IntraVENous Q12H    [Held by provider] apixaban (ELIQUIS) tablet 5 mg  5 mg Oral Q12H    cefTRIAXone (ROCEPHIN) 1 g in 0.9% sodium chloride (MBP/ADV) 50 mL  1 g IntraVENous Q24H    sodium chloride (NS) flush 5-40 mL  5-40 mL IntraVENous Q8H    sodium chloride (NS) flush 5-40 mL  5-40 mL IntraVENous PRN       Review of Systems    Constitutional:  negative for fever, chills, sweats  Cardiovascular:  negative for chest pain, palpitations, syncope, edema  Gastrointestinal:  negative for dysphagia, reflux, vomiting, diarrhea, abdominal pain, or melena  Neurologic:  negative for focal weakness, numbness, headache        Objective:     Vitals:    11/15/19 1106 11/15/19 1108 11/15/19 1119 11/15/19 1131   BP: 150/70  199/85 118/57   Pulse: (!) 56 80 80 (!) 59   Resp: 20 22 16 17   Temp: 97.7 °F (36.5 °C)      SpO2: 99% 97% 98% 93%   Weight:       Height:             Intake/Output Summary (Last 24 hours) at 11/15/2019 1211  Last data filed at 11/15/2019 0619  Gross per 24 hour   Intake 172.56 ml   Output 1375 ml   Net -1202.44 ml         Physical Exam:          Constitutional:  intubated and mechanically ventilated.   EENMT:  Sclera clear, pupils equal, oral mucosa moist  Respiratory: clear  Cardiovascular:  RRR with no M,G,R;  Gastrointestinal:  soft with no tenderness; positive bowel sounds present  Musculoskeletal:  warm with no cyanosis, no lower extremity edema  Skin:  no jaundice or ecchymosis  Neurologic: no gross neuro deficits     Psychiatric:  Awake     LINES:    ETT, Cancino,    DRIPS:    Fentanyl gtt  Precedex gtt                       CXR:            Ventilator Settings  Mode FIO2 Rate Tidal Volume Pressure PEEP   VC+  40 %    500 ml  10 cm H2O  8 cm H20      Peak airway pressure: 30 cm H2O   Minute ventilation: 11.8 l/min     ABG:   Recent Labs     11/15/19  0322 11/14/19  0353 11/13/19  1821   PHI 7.425 7.484* 7.428   PCO2I 44.0 33.8* 33.3*   PO2I 94 180* 122*   HCO3I 28.9* 25.4 22.0        LAB    Recent Labs     11/15/19  0359 11/14/19  0403 11/13/19  1050 11/13/19  0327   WBC 15.6* 8.9  --  13.8*   HGB 11.5* 11.6*  --  12.8*   HCT 35.3* 34.6*  --  38.9*    229  --  282   INR  --   --  1.3  --      Recent Labs     11/15/19  0359 11/14/19  0403 11/13/19  1933 11/13/19  0327   * 143  --  140   K 4.2 4.0 3.5 3.3*   * 109*  --  104   CO2 31 28  --  29   * 229*  --  146*   BUN 43* 39*  --  21   CREA 1.25 1.41  --  1.03   MG  --   --  2.4  --    PHOS  --   --  3.8*  --    CA 8.9 8.2*  --  8.3     Recent Labs     11/13/19  1933   LAC 1.4         Assessment:  (Medical Decision Making)     Hospital Problems  Date Reviewed: 11/10/2019          Codes Class Noted POA    ARDS [facility]adult [facility] distress syndrome) [facility] ICD-10-CM: T19  ICD-9-CM: 518.52  11/13/2019 Unknown        Acute respiratory failure with hypoxia (HCC) ICD-10-CM: J96.01  ICD-9-CM: 518.81  11/10/2019 Yes        * (Principal) Acute pulmonary edema (HCC) ICD-10-CM: J81.0  ICD-9-CM: 518.4  11/10/2019 Yes        Acute heart failure (HCC) ICD-10-CM: I50.9  ICD-9-CM: 428.9  11/10/2019 Yes                66 y old Male  With repeated episode  of acute hypoxemic respiratory failure presumed secondary to flash pulmonary edema, uncontrolled hypertension, tachycardia.  TTE with only mild-moderate MR and EF 45-50 %  Yesterday on 100 % Fio2 PEEP of 14 and CXR with pulmonary edema, today CXR clearing ,on CPAP with 35 % FI02    Plan:  (Medical Decision Making)     Had chest CT and showed small effusion and infiltrates suggestive of heart failure. No PE . He is getting agitated and then HR goes to 160 's . Difficult to extubate. RHC can be helpful with  Possible fluid challenge      More than 50% of the time documented was spent in face-to-face contact with the patient and in the care of the patient on the floor/unit where the patient is located.     Evan Wong MD

## 2019-11-15 NOTE — CDMP QUERY
Pt admitted with acute respiratory failure. Pt noted to have acute heart failure. If possible, please document in progress notes and d/c summary if you are evaluating and /or treating any of the following: ? Acute on Chronic Systolic CHF ? Acute on Chronic Diastolic CHF ? Acute on Chronic Systolic and Diastolic CHF ? Acute Systolic CHF ? Acute Diastolic CHF ? Acute Systolic and Diastolic CHF ? Chronic Systolic CHF ? Chronic Diastolic CHF ? Chronic Systolic and Diastolic CHF 
? Other, please specify ? Clinically unable to determine The medical record reflects the following: 
  Risk Factors: HTN, PAF, CHF Clinical Indicators:  
--H&P from previous admission at  on 11/9 stating, \"Chronic combined systolic and diastolic congestive heart failure\" 
--H&P on 11/10 stating, \"acute heart failure\" 
--ECHO from 11/11 stating, \"Left ventricle: Systolic function was normal. Ejection fraction was estimated in the range of 55 % to 60 %. There were no regional wall motion abnormalities. Wall thickness was mildly increased. The E/e' ratio was 15.65. Diastolic function was indeterminate. \" 
--Pulmonary edema noted on chest xrays from 11/11, 11/12, 11/13, 11/14 and 11/15 
--NT pro- BNP on 11/10 was 584; 11/14 was 144 Treatment: Diuresis; intubation; daily bmp; cardioversion Thank you, Layla Montana, [address] [place] [address] RN 
[phone]

## 2019-11-16 ENCOUNTER — APPOINTMENT (OUTPATIENT)
Dept: GENERAL RADIOLOGY | Age: 74
DRG: 981 | End: 2019-11-16
Attending: INTERNAL MEDICINE
Payer: MEDICARE

## 2019-11-16 LAB
ANION GAP SERPL CALC-SCNC: 6 MMOL/L (ref 7–16)
APTT PPP: 103.8 SEC (ref 24.7–39.8)
ARTERIAL PATENCY WRIST A: YES
BASE EXCESS BLD CALC-SCNC: 2 MMOL/L
BDY SITE: ABNORMAL
BODY TEMPERATURE: 98.6
BRONCH. LAVAGE DIFF.,BR: NORMAL
BUN SERPL-MCNC: 43 MG/DL (ref 8–23)
CALCIUM SERPL-MCNC: 8.7 MG/DL (ref 8.3–10.4)
CHLORIDE SERPL-SCNC: 112 MMOL/L (ref 98–107)
CO2 BLD-SCNC: 28 MMOL/L
CO2 SERPL-SCNC: 31 MMOL/L (ref 21–32)
COLLECT TIME,HTIME: 308
CREAT SERPL-MCNC: 1.23 MG/DL (ref 0.8–1.5)
DOPAMINE SERPL-MCNC: <30 PG/ML (ref 0–48)
EOSINOPHIL NFR BRONCH MANUAL: 0 %
EPINEPH PLAS-MCNC: 57 PG/ML (ref 0–62)
ERYTHROCYTE [DISTWIDTH] IN BLOOD BY AUTOMATED COUNT: 13.5 % (ref 11.9–14.6)
ERYTHROCYTE [SEDIMENTATION RATE] IN BLOOD: 50 MM/HR (ref 0–20)
EXHALED MINUTE VOLUME, VE: 7.7 L/MIN
GAS FLOW.O2 O2 DELIVERY SYS: ABNORMAL L/MIN
GAS FLOW.O2 SETTING OXYMISER: 15 BPM
GLUCOSE BLD STRIP.AUTO-MCNC: 182 MG/DL (ref 65–100)
GLUCOSE BLD STRIP.AUTO-MCNC: 196 MG/DL (ref 65–100)
GLUCOSE BLD STRIP.AUTO-MCNC: 202 MG/DL (ref 65–100)
GLUCOSE BLD STRIP.AUTO-MCNC: 224 MG/DL (ref 65–100)
GLUCOSE SERPL-MCNC: 196 MG/DL (ref 65–100)
HCO3 BLD-SCNC: 27.1 MMOL/L (ref 22–26)
HCT VFR BLD AUTO: 34.8 % (ref 41.1–50.3)
HGB BLD-MCNC: 11.2 G/DL (ref 13.6–17.2)
LYMPHOCYTES NFR BRONCH MANUAL: 79 %
MACROPHAGES NFR BRONCH MANUAL: 21 %
MAGNESIUM SERPL-MCNC: 2.7 MG/DL (ref 1.8–2.4)
MCH RBC QN AUTO: 30.9 PG (ref 26.1–32.9)
MCHC RBC AUTO-ENTMCNC: 32.2 G/DL (ref 31.4–35)
MCV RBC AUTO: 95.9 FL (ref 79.6–97.8)
NEUTROPHILS NFR BRONCH MANUAL: 0 %
NOREPINEPH PLAS-MCNC: 593 PG/ML (ref 0–874)
NRBC # BLD: 0 K/UL (ref 0–0.2)
O2/TOTAL GAS SETTING VFR VENT: 40 %
PCO2 BLD: 43.3 MMHG (ref 35–45)
PEEP RESPIRATORY: 8 CMH2O
PH BLD: 7.41 [PH] (ref 7.35–7.45)
PHOSPHATE SERPL-MCNC: 3.6 MG/DL (ref 2.3–3.7)
PLATELET # BLD AUTO: 274 K/UL (ref 150–450)
PMV BLD AUTO: 9.9 FL (ref 9.4–12.3)
PO2 BLD: 89 MMHG (ref 75–100)
POTASSIUM SERPL-SCNC: 4.1 MMOL/L (ref 3.5–5.1)
RBC # BLD AUTO: 3.63 M/UL (ref 4.23–5.6)
SAO2 % BLD: 97 % (ref 95–98)
SERVICE CMNT-IMP: ABNORMAL
SODIUM SERPL-SCNC: 149 MMOL/L (ref 136–145)
SPECIMEN TYPE: ABNORMAL
VENTILATION MODE VENT: ABNORMAL
VT SETTING VENT: 500 ML
WBC # BLD AUTO: 10.9 K/UL (ref 4.3–11.1)

## 2019-11-16 PROCEDURE — 74011250636 HC RX REV CODE- 250/636: Performed by: INTERNAL MEDICINE

## 2019-11-16 PROCEDURE — 74011000250 HC RX REV CODE- 250: Performed by: NURSE PRACTITIONER

## 2019-11-16 PROCEDURE — 74011000258 HC RX REV CODE- 258: Performed by: INTERNAL MEDICINE

## 2019-11-16 PROCEDURE — 77030012699 HC VLV SUC CNTRL OCOA -A: Performed by: INTERNAL MEDICINE

## 2019-11-16 PROCEDURE — 94003 VENT MGMT INPAT SUBQ DAY: CPT

## 2019-11-16 PROCEDURE — 87541 LEGION PNEUMO DNA AMP PROB: CPT

## 2019-11-16 PROCEDURE — 36600 WITHDRAWAL OF ARTERIAL BLOOD: CPT

## 2019-11-16 PROCEDURE — 87799 DETECT AGENT NOS DNA QUANT: CPT

## 2019-11-16 PROCEDURE — 82803 BLOOD GASES ANY COMBINATION: CPT

## 2019-11-16 PROCEDURE — 74011250637 HC RX REV CODE- 250/637: Performed by: INTERNAL MEDICINE

## 2019-11-16 PROCEDURE — 86038 ANTINUCLEAR ANTIBODIES: CPT

## 2019-11-16 PROCEDURE — 85730 THROMBOPLASTIN TIME PARTIAL: CPT

## 2019-11-16 PROCEDURE — 87252 VIRUS INOCULATION TISSUE: CPT

## 2019-11-16 PROCEDURE — 74018 RADEX ABDOMEN 1 VIEW: CPT

## 2019-11-16 PROCEDURE — 86431 RHEUMATOID FACTOR QUANT: CPT

## 2019-11-16 PROCEDURE — 87798 DETECT AGENT NOS DNA AMP: CPT

## 2019-11-16 PROCEDURE — 74011636637 HC RX REV CODE- 636/637: Performed by: INTERNAL MEDICINE

## 2019-11-16 PROCEDURE — 0B9D7ZX DRAINAGE OF RIGHT MIDDLE LUNG LOBE, VIA NATURAL OR ARTIFICIAL OPENING, DIAGNOSTIC: ICD-10-PCS | Performed by: INTERNAL MEDICINE

## 2019-11-16 PROCEDURE — 84100 ASSAY OF PHOSPHORUS: CPT

## 2019-11-16 PROCEDURE — 99233 SBSQ HOSP IP/OBS HIGH 50: CPT | Performed by: INTERNAL MEDICINE

## 2019-11-16 PROCEDURE — 87633 RESP VIRUS 12-25 TARGETS: CPT

## 2019-11-16 PROCEDURE — 86430 RHEUMATOID FACTOR TEST QUAL: CPT

## 2019-11-16 PROCEDURE — 83735 ASSAY OF MAGNESIUM: CPT

## 2019-11-16 PROCEDURE — 87075 CULTR BACTERIA EXCEPT BLOOD: CPT

## 2019-11-16 PROCEDURE — 85027 COMPLETE CBC AUTOMATED: CPT

## 2019-11-16 PROCEDURE — 87116 MYCOBACTERIA CULTURE: CPT

## 2019-11-16 PROCEDURE — 31624 DX BRONCHOSCOPE/LAVAGE: CPT | Performed by: INTERNAL MEDICINE

## 2019-11-16 PROCEDURE — 87102 FUNGUS ISOLATION CULTURE: CPT

## 2019-11-16 PROCEDURE — 65620000000 HC RM CCU GENERAL

## 2019-11-16 PROCEDURE — 87486 CHLMYD PNEUM DNA AMP PROBE: CPT

## 2019-11-16 PROCEDURE — 85652 RBC SED RATE AUTOMATED: CPT

## 2019-11-16 PROCEDURE — 36592 COLLECT BLOOD FROM PICC: CPT

## 2019-11-16 PROCEDURE — 82962 GLUCOSE BLOOD TEST: CPT

## 2019-11-16 PROCEDURE — 89051 BODY FLUID CELL COUNT: CPT

## 2019-11-16 PROCEDURE — 80048 BASIC METABOLIC PNL TOTAL CA: CPT

## 2019-11-16 PROCEDURE — 74011000250 HC RX REV CODE- 250: Performed by: INTERNAL MEDICINE

## 2019-11-16 PROCEDURE — 76040000025: Performed by: INTERNAL MEDICINE

## 2019-11-16 PROCEDURE — 88112 CYTOPATH CELL ENHANCE TECH: CPT

## 2019-11-16 PROCEDURE — 87070 CULTURE OTHR SPECIMN AEROBIC: CPT

## 2019-11-16 PROCEDURE — 74011250636 HC RX REV CODE- 250/636

## 2019-11-16 PROCEDURE — 74011250636 HC RX REV CODE- 250/636: Performed by: NURSE PRACTITIONER

## 2019-11-16 PROCEDURE — 71045 X-RAY EXAM CHEST 1 VIEW: CPT

## 2019-11-16 RX ORDER — FENTANYL CITRATE 50 UG/ML
50 INJECTION, SOLUTION INTRAMUSCULAR; INTRAVENOUS ONCE
Status: COMPLETED | OUTPATIENT
Start: 2019-11-16 | End: 2019-11-16

## 2019-11-16 RX ORDER — HYDRALAZINE HYDROCHLORIDE 20 MG/ML
20 INJECTION INTRAMUSCULAR; INTRAVENOUS
Status: DISCONTINUED | OUTPATIENT
Start: 2019-11-16 | End: 2019-11-25 | Stop reason: HOSPADM

## 2019-11-16 RX ORDER — MIDAZOLAM HYDROCHLORIDE 1 MG/ML
INJECTION, SOLUTION INTRAMUSCULAR; INTRAVENOUS
Status: COMPLETED
Start: 2019-11-16 | End: 2019-11-16

## 2019-11-16 RX ORDER — MIDAZOLAM HYDROCHLORIDE 1 MG/ML
2 INJECTION, SOLUTION INTRAMUSCULAR; INTRAVENOUS ONCE
Status: COMPLETED | OUTPATIENT
Start: 2019-11-16 | End: 2019-11-16

## 2019-11-16 RX ORDER — FENTANYL CITRATE 50 UG/ML
INJECTION, SOLUTION INTRAMUSCULAR; INTRAVENOUS
Status: COMPLETED
Start: 2019-11-16 | End: 2019-11-16

## 2019-11-16 RX ADMIN — LISINOPRIL 10 MG: 5 TABLET ORAL at 12:16

## 2019-11-16 RX ADMIN — INSULIN LISPRO 2 UNITS: 100 INJECTION, SOLUTION INTRAVENOUS; SUBCUTANEOUS at 01:01

## 2019-11-16 RX ADMIN — HEPARIN SODIUM 16 UNITS/KG/HR: 5000 INJECTION, SOLUTION INTRAVENOUS at 16:25

## 2019-11-16 RX ADMIN — Medication 75 MCG/HR: at 02:22

## 2019-11-16 RX ADMIN — FUROSEMIDE 40 MG: 10 INJECTION, SOLUTION INTRAMUSCULAR; INTRAVENOUS at 08:33

## 2019-11-16 RX ADMIN — Medication 30 ML: at 21:04

## 2019-11-16 RX ADMIN — FENTANYL CITRATE 50 MCG: 50 INJECTION, SOLUTION INTRAMUSCULAR; INTRAVENOUS at 16:14

## 2019-11-16 RX ADMIN — Medication 10 ML: at 05:13

## 2019-11-16 RX ADMIN — SODIUM CHLORIDE 3 MG/HR: 900 INJECTION, SOLUTION INTRAVENOUS at 14:48

## 2019-11-16 RX ADMIN — FUROSEMIDE 40 MG: 10 INJECTION, SOLUTION INTRAMUSCULAR; INTRAVENOUS at 21:02

## 2019-11-16 RX ADMIN — AMLODIPINE BESYLATE 5 MG: 5 TABLET ORAL at 12:16

## 2019-11-16 RX ADMIN — INSULIN LISPRO 4 UNITS: 100 INJECTION, SOLUTION INTRAVENOUS; SUBCUTANEOUS at 11:50

## 2019-11-16 RX ADMIN — METHYLPREDNISOLONE SODIUM SUCCINATE 60 MG: 40 INJECTION, POWDER, FOR SOLUTION INTRAMUSCULAR; INTRAVENOUS at 21:03

## 2019-11-16 RX ADMIN — MIDAZOLAM 2 MG: 1 INJECTION INTRAMUSCULAR; INTRAVENOUS at 16:14

## 2019-11-16 RX ADMIN — METHYLPREDNISOLONE SODIUM SUCCINATE 60 MG: 40 INJECTION, POWDER, FOR SOLUTION INTRAMUSCULAR; INTRAVENOUS at 08:33

## 2019-11-16 RX ADMIN — INSULIN LISPRO 4 UNITS: 100 INJECTION, SOLUTION INTRAVENOUS; SUBCUTANEOUS at 18:12

## 2019-11-16 RX ADMIN — MIDAZOLAM HYDROCHLORIDE 2 MG: 1 INJECTION, SOLUTION INTRAMUSCULAR; INTRAVENOUS at 16:14

## 2019-11-16 RX ADMIN — Medication 500 UNITS: at 05:13

## 2019-11-16 RX ADMIN — INSULIN LISPRO 2 UNITS: 100 INJECTION, SOLUTION INTRAVENOUS; SUBCUTANEOUS at 05:56

## 2019-11-16 RX ADMIN — LORAZEPAM 1 MG: 2 INJECTION INTRAMUSCULAR; INTRAVENOUS at 08:27

## 2019-11-16 RX ADMIN — Medication 900 UNITS: at 14:00

## 2019-11-16 RX ADMIN — FAMOTIDINE 20 MG: 10 INJECTION INTRAVENOUS at 21:02

## 2019-11-16 RX ADMIN — LISINOPRIL 10 MG: 5 TABLET ORAL at 21:03

## 2019-11-16 RX ADMIN — METOPROLOL TARTRATE 5 MG: 5 INJECTION INTRAVENOUS at 00:10

## 2019-11-16 RX ADMIN — FAMOTIDINE 20 MG: 10 INJECTION INTRAVENOUS at 08:33

## 2019-11-16 RX ADMIN — RISPERIDONE 1 MG: 0.5 TABLET ORAL at 21:04

## 2019-11-16 RX ADMIN — Medication 30 ML: at 16:12

## 2019-11-16 RX ADMIN — Medication 900 UNITS: at 21:03

## 2019-11-16 NOTE — PROGRESS NOTES
Critical Care Daily Progress Note: 11/16/2019  Admission Date: 11/10/2019     The patient's chart is reviewed and the patient is discussed with the staff. The patient's chart is reviewed and the patient is discussed with the staff.     Patient is R 82 y.o.  male presented with respiratory failure. He was just discharged from [facility] today. He presented there with increased SOB of cough. No fever, chills, LE edema, or chest pain. It was originally suspected to represent PNA but the patient improved after just 24 hours of treatment with lasix and abx and was discharged.      He was resting at home  per wife and awoke suddenly very SOB with BP in the 180-190's. Cough with pink frothy sputum. Went to urgent care and was transported here via EMS. Was intubated en route and has had copious pink frothy sputum from ETT since. CXR with B infiltrates c/w edema. Pro BNP better than yesterday but still elevated. No fever or chills still reported.  Then was extubated on 11/12  and early morning 11/13 re intubated again with  Reports of increase BP ,agitation just prior to intubation    Subjective:     Awake on vent  When placed on  CPAP -pulling low 400cc ,however his HR and BP very unstable - up and down from HR from 40 -130 ,also BP goes to 200's early today    Current Facility-Administered Medications   Medication Dose Route Frequency    hydrALAZINE (APRESOLINE) 20 mg/mL injection 20 mg  20 mg IntraVENous Q6H PRN    methylPREDNISolone (PF) (SOLU-MEDROL) injection 60 mg  60 mg IntraVENous Q12H    risperiDONE (RisperDAL) tablet 1 mg  1 mg Oral QHS    midazolam (VERSED) 100 mg in 0.9% sodium chloride 100 mL infusion  0-10 mg/hr IntraVENous TITRATE    insulin lispro (HUMALOG) injection   SubCUTAneous Q6H    heparin 25,000 units in dextrose 500 mL infusion  18-36 Units/kg/hr (Adjusted) IntraVENous TITRATE    metoprolol (LOPRESSOR) injection 5 mg  5 mg IntraVENous Q6H PRN    LORazepam (ATIVAN) injection 1 mg  1 mg IntraVENous BID    NUTRITIONAL SUPPORT ELECTROLYTE PRN ORDERS   Does Not Apply PRN    famotidine (PF) (PEPCID) 20 mg in sodium chloride 0.9% 10 mL injection  20 mg IntraVENous Q12H    NOREPINephrine (LEVOPHED) 4 mg in 5% dextrose 250 mL infusion  0.5-16 mcg/min IntraVENous TITRATE    sodium chloride (NS) flush 30 mL  30 mL InterCATHeter Q8H    heparin (porcine) pf 900 Units  900 Units InterCATHeter Q8H    sodium chloride (NS) flush 30 mL  30 mL InterCATHeter PRN    heparin (porcine) pf 900 Units  900 Units InterCATHeter PRN    fentaNYL in normal saline (pf) 25 mcg/mL infusion  0-200 mcg/hr IntraVENous TITRATE    lisinopril (PRINIVIL, ZESTRIL) tablet 10 mg  10 mg Oral Q12H    [Held by provider] metoprolol succinate (TOPROL-XL) XL tablet 100 mg  100 mg Oral Q12H    amLODIPine (NORVASC) tablet 5 mg  5 mg Oral DAILY    furosemide (LASIX) injection 40 mg  40 mg IntraVENous Q12H    [Held by provider] apixaban (ELIQUIS) tablet 5 mg  5 mg Oral Q12H    sodium chloride (NS) flush 5-40 mL  5-40 mL IntraVENous PRN       Review of Systems    Constitutional:  negative for fever, chills, sweats  Cardiovascular:  negative for chest pain, palpitations, syncope, edema  Gastrointestinal:  negative for dysphagia, reflux, vomiting, diarrhea, abdominal pain, or melena  Neurologic:  negative for focal weakness, numbness, headache        Objective:     Vitals:    11/16/19 1401 11/16/19 1406 11/16/19 1501 11/16/19 1525   BP: (!) 203/96 152/70 150/66    Pulse: 71 (!) 44 (!) 48 73   Resp: 15 14 14 20   Temp:       SpO2: 95% 95% 96% 99%   Weight:       Height:             Intake/Output Summary (Last 24 hours) at 11/16/2019 1615  Last data filed at 11/16/2019 1522  Gross per 24 hour   Intake 1333.84 ml   Output 3325 ml   Net -1991.16 ml         Physical Exam:          Constitutional:  intubated and mechanically ventilated.   EENMT:  Sclera clear, pupils equal, oral mucosa moist  Respiratory: crackles  Cardiovascular:  RRR with no M,G,R;  Gastrointestinal:  soft with no tenderness; positive bowel sounds present  Musculoskeletal:  warm with no cyanosis,  nolower extremity edema  Skin:  no jaundice or ecchymosis  Neurologic: no gross neuro deficits     Psychiatric:  Awake      LINES:    ETT, Cancino     DRIPS:    Fentanyl and versed gtt     CXR:        Ventilator Settings  Mode FIO2 Rate Tidal Volume Pressure PEEP   VC+  30 %    500 ml  0 cm H2O  8 cm H20      Peak airway pressure: 22 cm H2O   Minute ventilation: 10.8 l/min     ABG:   Recent Labs     11/16/19  0314 11/15/19  0322 11/14/19  0353   PHI 7.405 7.425 7.484*   PCO2I 43.3 44.0 33.8*   PO2I 89 94 180*   HCO3I 27.1* 28.9* 25.4        LAB  Recent Labs     11/16/19  1144 11/16/19  0554 11/16/19  0059 11/15/19  2018 11/15/19  1811   GLUCPOC 224* 196* 182* 222* 226*     Recent Labs     11/16/19  0212 11/15/19  0359 11/14/19  0403   WBC 10.9 15.6* 8.9   HGB 11.2* 11.5* 11.6*   HCT 34.8* 35.3* 34.6*    290 229     Recent Labs     11/16/19  0212 11/15/19  0359 11/14/19  0403 11/13/19  1933   * 146* 143  --    K 4.1 4.2 4.0 3.5   * 110* 109*  --    CO2 31 31 28  --    * 242* 229*  --    BUN 43* 43* 39*  --    CREA 1.23 1.25 1.41  --    MG 2.7*  --   --  2.4   PHOS 3.6  --   --  3.8*   CA 8.7 8.9 8.2*  --      Recent Labs     11/13/19  1933   LAC 1.4         Assessment:  (Medical Decision Making)     Hospital Problems  Date Reviewed: 11/10/2019          Codes Class Noted POA    ARDS [facility]adult respiratory distress syndrome) [facility] ICD-10-CM: N70  ICD-9-CM: 518.52  11/13/2019 Unknown        Acute respiratory failure with hypoxia ([facility] ICD-10-CM: J96.01  ICD-9-CM: 518.81  11/10/2019 Yes        * (Principal) Acute pulmonary edema (HCC) ICD-10-CM: J81.0  ICD-9-CM: 518.4  11/10/2019 Yes        Acute heart failure (HCC) ICD-10-CM: I50.9  ICD-9-CM: 428.9  11/10/2019 Yes        MARISOL (obstructive sleep apnea) (Chronic) ICD-10-CM: G47.33  ICD-9-CM: 327.23  11/15/2018 Yes        Chronic combined systolic and diastolic congestive heart failure (HCC) (Chronic) ICD-10-CM: I50.42  ICD-9-CM: 428.42, 428.0  5/14/2018 Yes    Overview Signed 11/9/2019  5:54 PM by Soniya Liao MD     EF 45-50%              Paroxysmal A-fib [facility]) (Chronic) ICD-10-CM: I48.0  ICD-9-CM: 427.31  5/14/2018 Yes              66 y old Male  With repeated episode  of acute hypoxemic respiratory failure presumed secondary to flash pulmonary edema, uncontrolled hypertension,  Uncontrolled a fib . TTE with only mild-moderate MR and EF 45-50 %  He has diuresis  Over 8L and CXR continue with pulmonary edema and pleural effusion, had chest CT with small effusions and interstitial pulmonary edema, continue unstable BP and HR -up and down  Steroids  Empiric added earlier for ? non cardiogenic  pulmonary edema /ARDS   ESR only 31  ANCA negative  LUCINDA , RF pending    Plan:  (Medical Decision Making)   - he has positive cultures for rhinovirus - not sure of the clinical significance   - he has been on empiric steroids for last few days for / ARDS/norcardiogenic edema- not much better so far   - continue to stabilize his BP and HR - did not tolerate CPAP today   --bronch today   -discussed in details with his son      More than 50% of the time documented was spent in face-to-face contact with the patient and in the care of the patient on the floor/unit where the patient is located.     Tom Recinos MD

## 2019-11-16 NOTE — PROCEDURES
PROCEDURE    Bronchoscopy with airway inspection/cleanout/BAL    INDICATION     Pulmonary infiltrates    EQUIPMENT:    Olympus Q 180 Bronchhoscope. ANESTHESIA    Concious sedation with: Fentanyl  50  mcg IV; Versed 2 mg IV; Lidocaine  mg to tracheo-bronchial tree and vocal cords;     AIRWAY INSPECTION    After obtaining informed consent, using a bite block/ET tube adapter, an Olympus Q 180 video/fiberoptic bronchoscope was  introduced into the trachea through the  ET tube, without complication. RIGHT    LOCATION NORM/ABNORM DESCRIPTION   VOCAL CORDS NL    TRACHEA NL    JULIA NL    RMSB NL Friable, inflamed mucosa    RUL NL    BI NL    RML NL  scope was wedged and BAL done, 120 cc with 60 cc return    SUP SEGM RLL NL    MED BASAL NL    ANTERIOR BASAL NL    LATERAL BASAL NL    POSTERIOR BASAL NL                            LEFT    LOCATION NORMAL/ABNORMAL TYPE   LMSB NL    AMARA NL    LINGULA NL    SUPERIOR DIVISION NL    SUPERIOR SEG LLL NL    BOBBY-MEDIAL LLL NL    LATERAL LLL NL    POSTERIOR LLL NL        Minimal secretions    The following samples were obtained:    BAL:  Cell cound and dfi  CD4/CD8 ration  GS and cultures  Viral cultures  - fungal cultures   -AFB  -cytology  - PCP     Samples were sent for: The procedure was completed  without complication and the patient tolerated the procedure well.     EBL: none     Recommendations:  Continue current therapy and aait results    Cas Merlos MD

## 2019-11-16 NOTE — H&P
Date of Surgery Update:  Jackie Pope was seen and examined. History and physical has been reviewed. The patient has been examined.  There have been no significant clinical changes since the completion of the originally dated History and Physical.    Signed By: Faustino Lovett MD     November 16, 2019 5:46 PM

## 2019-11-16 NOTE — PROGRESS NOTES
Interdisciplinary team rounds were held 11/16/2019 with the following team members:Care Management, Nursing, Nurse Practitioner, Nutrition, Palliative Care, Pastoral Care, Pharmacy, Physical Therapy, Physician, Respiratory Therapy, Speech Therapy and Clinical Coordinator. Plan of care discussed. See clinical pathway and/or care plan for interventions and desired outcomes.

## 2019-11-16 NOTE — PROGRESS NOTES
Pt intubated and lightly sedated, but otherwise stable. Pt wakes easily and is very calm and cooperative. Pt nods appropriately and is able to write on a paper his needs and concerns. Pt's son Getachew Lambert) called this evening stating he is concerned that Mr. Abbie Espana did not have the [address] today that Dr. Prieto Burroughs had stated he might do. Deana Saldana is just generally concerned about his father's condition and wants to make sure everything is being done to care for his father. I reassured him and informed him of the plan of care. Deana Saldana states he will be attempting to come in to the hospital tomorrow.

## 2019-11-16 NOTE — PROGRESS NOTES
Ventilator check complete; patient has a #8. 0 ET tube secured at the 26 at the lip. Patient is not sedated. Patient is able to follow commands. Breath sounds are diminished. Trachea is midline, Negative for subcutaneous air, and chest excursion is symmetric. Patient is also Negative for cyanosis and is Negative for pitting edema. All alarms are set and audible. Resuscitation bag is at the head of the bed. Ventilator Settings  Mode FIO2 Rate Tidal Volume Pressure PEEP I:E Ratio   VC+  40 %    500 ml  10 cm H2O  8 cm H20  1:3      Peak airway pressure: 22 cm H2O   Minute ventilation: 7.74 l/min     ABG: No results for input(s): PH, PCO2, PO2, HCO3 in the last 72 hours.       Jamel Dowling, RT

## 2019-11-16 NOTE — PROGRESS NOTES
Dr. Miller Living notified re: SBP 190s. Orders received to restart home BP meds and give 20 mg Hydralazine IV q6 PRN.

## 2019-11-16 NOTE — PROGRESS NOTES
11/16/19 0717   Patient Observations   Pulse (Heart Rate) 67   Resp Rate 13   O2 Sat (%) 100 %   Airway - Endotracheal Tube 11/13/19 Oral   Placement Date/Time: 11/13/19 0430   Number of Attempts: 1  Inserted By: Dr John Fay  Location: Oral  Placement Verified: EtCO2; Auscultation  Airway Types: Endotracheal, cuffed  Airway Tube Size: 8 mm   Insertion Depth (cm) 26 cm   Line Gary Lips   Side Secured Device   Cuff Pressure 25 cmH20   Respiratory   Respiratory (WDL) X   Patient on Vent Yes - If patient is on vent, add Doc Flowsheet Ventilator ().    Respiratory Pattern Regular   Chest/Tracheal Assessment Chest expansion, symmetrical   Breath Sounds Bilateral Clear   Cough Non-productive   Vent Settings   FIO2 (%) 30 %   CMV Rate Set 10   Vt Set (ml) 450 ml   Pressure Support (cm H2O) 0 cm H2O   PEEP/VENT (cm H2O) 8 cm H20   Insp Time (sec) 0.9 sec   Pressure Trigger 0   Flow Trigger 3   Ventilator Measurements   Resp Rate Observed 13   Vt Exhaled (Machine Breath) (ml) 542 ml   Vt Spont (ml) 0 ml   Ve Observed (l/min) 6.3 l/min   PIP Observed (cm H2O) 25 cm H2O   MAP (cm H2O) 13   Auto PEEP Observed (cm H2O) 0 cm H2O   Safety & Alarms   Pressure Max 50 cm H2O   Pressure Min 2 cm H2O   Ve Min 2   Ve Max 22   RR Min 50   RR Max 450   Ambu Bag Yes   Ambu Mask Yes   Vent Method/Mode   Ventilation Method Conventional   Ventilator Mode VC+

## 2019-11-16 NOTE — PROGRESS NOTES
Report received from Mikaela Prajapati, [facility] and ciara dually verified. Pt currently sedated on vent rate with FiO2 30% and SpO2 96%. SBP. HR 47 with 1st degree AVB and BBB. Afebrile.  To continue to monitor.     Lines/gtts:  -ETT intact and in place  -OGT infusing Glucerna at 50 mL/hr  -Right triple lumen PICC:              -Fentanyl @ 75 mcg/hr              -Versed @ 3 mg/hr              -Heparin @ 16 units/kg/hr  -Cancino with chavo, hazy/sedimentary UOP

## 2019-11-16 NOTE — PROGRESS NOTES
Report received from Jack [facility] and ciara dually verified. Pt currently alert and calm on vent on rate with FiO2 40% and SpO2 94%. SBP 100s on Levophed gtt, and gtt stopped at this time. HR within range. Afebrile. To continue to monitor.     Lines/gtts:  -ETT intact and in place  -Right triple lumen PICC:   -Fentanyl @ 75 mcg/hr   -Precedex @ 0.2 mcg/kr/hr   -Heparin @ 16 units/kg/hr   -Levophed @ 2 mcg/min  -Barak with chavo, hazy/sedimentary UOP

## 2019-11-16 NOTE — PROGRESS NOTES
[facility] CARDIOLOGY PROGRESS NOTE           11/16/2019 9:25 AM    Admit Date: 11/10/2019         Subjective: Decided not to do RHC with improvement over night on Friday. Remains normotensive and in SR. Still vent dependent. ROS:  Cardiovascular:  As noted above    Objective:      Vitals:    11/16/19 0502 11/16/19 0524 11/16/19 0701 11/16/19 0717   BP: 128/60  112/57    Pulse: (!) 58  (!) 47 67   Resp: 12  15 13   Temp:       SpO2: 95%  97% 100%   Weight:  105.5 kg (232 lb 9.4 oz)     Height:           On telemetry: SR      Physical Exam:  General: intubated and opens eyes on command  Neck: supple, no JVD  Heart: S1S2 with RRR without murmurs or gallops  Lungs: crackles  Abd: soft, nontender, nondistended, with good bowel sounds  Ext: no edema bilaterally  Skin: warm and dry      Data Review:   Recent Labs     11/16/19  0212 11/15/19  0359  11/13/19  1933 11/13/19  1050   * 146*   < >  --   --    K 4.1 4.2   < > 3.5  --    MG 2.7*  --   --  2.4  --    BUN 43* 43*   < >  --   --    CREA 1.23 1.25   < >  --   --    * 242*   < >  --   --    WBC 10.9 15.6*   < >  --   --    HGB 11.2* 11.5*   < >  --   --    HCT 34.8* 35.3*   < >  --   --     290   < >  --   --    INR  --   --   --   --  1.3    < > = values in this interval not displayed. No results for input(s): Kraig Reese in the last 72 hours. Assessment/Plan:     Principal Problem:    Ac[facility] pulmonary [facility] ([facility] (11/10/2019)    Active Problems:    Acute respiratory failure with hypoxia ([facility] (11/10/2019)      Acute heart failure ([facility] (11/10/2019)      ARDS (adult respiratory distress syndrome) ([facility] (11/13/2019)      A/P  1) Pulm edema/ARDS - per pulm recs  2) Atrial tach - in SR now , bp improved  3) LVEF is mildly decreased LVEDP was normal, there is likely some component of diastolic dysfunction but does not explain the severity of his hypoxic resp failure.   Discussed RHC with Dr Masoud Zavala but pt appears to be improving so will hold off for now.     Magan Perez MD  11/16/2019 9:25 AM

## 2019-11-16 NOTE — ROUTINE PROCESS
Called to bedside to assist Dr. Jozef Yeager with bronchoscopy at bedside. Consent obtained (ICU CONSENT). Timeout completed (patient identification). Vital signs monitored during procedure (see flow sheet) and remain stable at this time with patient on mechanical ventilation. No sedation medications given by ENDO RN, ICU nurse medicated and remained at bedside. Patient tolerated procedure well, no adverse reactions. Report given to primary nurse, ONOFRE TAYLOR. Specimens labeled and sent to lab as ordered. See MD note.

## 2019-11-16 NOTE — PROGRESS NOTES
RT at bedside, attempted weaning trial on pressure support. HR increased to 160s, then spontaneously returned below 100 with irregular rhythm. Pt switched back to rate by RT.

## 2019-11-17 ENCOUNTER — APPOINTMENT (OUTPATIENT)
Dept: GENERAL RADIOLOGY | Age: 74
DRG: 981 | End: 2019-11-17
Attending: INTERNAL MEDICINE
Payer: MEDICARE

## 2019-11-17 LAB
ANION GAP SERPL CALC-SCNC: 3 MMOL/L (ref 7–16)
APTT PPP: 104.3 SEC (ref 24.7–39.8)
ARTERIAL PATENCY WRIST A: YES
BASE EXCESS BLD CALC-SCNC: 4 MMOL/L
BDY SITE: ABNORMAL
BODY TEMPERATURE: 98.6
BUN SERPL-MCNC: 45 MG/DL (ref 8–23)
CALCIUM SERPL-MCNC: 8.2 MG/DL (ref 8.3–10.4)
CHLORIDE SERPL-SCNC: 115 MMOL/L (ref 98–107)
CO2 BLD-SCNC: 32 MMOL/L
CO2 SERPL-SCNC: 33 MMOL/L (ref 21–32)
COLLECT TIME,HTIME: 310
CREAT SERPL-MCNC: 1.19 MG/DL (ref 0.8–1.5)
ERYTHROCYTE [DISTWIDTH] IN BLOOD BY AUTOMATED COUNT: 13.4 % (ref 11.9–14.6)
EXHALED MINUTE VOLUME, VE: 7.5 L/MIN
GAS FLOW.O2 O2 DELIVERY SYS: ABNORMAL L/MIN
GAS FLOW.O2 SETTING OXYMISER: 14 BPM
GLUCOSE BLD STRIP.AUTO-MCNC: 221 MG/DL (ref 65–100)
GLUCOSE BLD STRIP.AUTO-MCNC: 226 MG/DL (ref 65–100)
GLUCOSE BLD STRIP.AUTO-MCNC: 235 MG/DL (ref 65–100)
GLUCOSE BLD STRIP.AUTO-MCNC: 261 MG/DL (ref 65–100)
GLUCOSE SERPL-MCNC: 244 MG/DL (ref 65–100)
HCO3 BLD-SCNC: 30.5 MMOL/L (ref 22–26)
HCT VFR BLD AUTO: 36.2 % (ref 41.1–50.3)
HGB BLD-MCNC: 11.6 G/DL (ref 13.6–17.2)
MCH RBC QN AUTO: 31.1 PG (ref 26.1–32.9)
MCHC RBC AUTO-ENTMCNC: 32 G/DL (ref 31.4–35)
MCV RBC AUTO: 97.1 FL (ref 79.6–97.8)
NRBC # BLD: 0 K/UL (ref 0–0.2)
O2/TOTAL GAS SETTING VFR VENT: 40 %
PCO2 BLD: 51.9 MMHG (ref 35–45)
PEEP RESPIRATORY: 8 CMH2O
PH BLD: 7.38 [PH] (ref 7.35–7.45)
PLATELET # BLD AUTO: 282 K/UL (ref 150–450)
PMV BLD AUTO: 9.8 FL (ref 9.4–12.3)
PO2 BLD: 362 MMHG (ref 75–100)
POTASSIUM SERPL-SCNC: 4.1 MMOL/L (ref 3.5–5.1)
RBC # BLD AUTO: 3.73 M/UL (ref 4.23–5.6)
SAO2 % BLD: 100 % (ref 95–98)
SERVICE CMNT-IMP: ABNORMAL
SODIUM SERPL-SCNC: 151 MMOL/L (ref 136–145)
SPECIMEN TYPE: ABNORMAL
VENTILATION MODE VENT: ABNORMAL
VT SETTING VENT: 500 ML
WBC # BLD AUTO: 8.9 K/UL (ref 4.3–11.1)

## 2019-11-17 PROCEDURE — 99232 SBSQ HOSP IP/OBS MODERATE 35: CPT | Performed by: INTERNAL MEDICINE

## 2019-11-17 PROCEDURE — 74011250636 HC RX REV CODE- 250/636: Performed by: INTERNAL MEDICINE

## 2019-11-17 PROCEDURE — 74011636637 HC RX REV CODE- 636/637: Performed by: INTERNAL MEDICINE

## 2019-11-17 PROCEDURE — 36600 WITHDRAWAL OF ARTERIAL BLOOD: CPT

## 2019-11-17 PROCEDURE — 94003 VENT MGMT INPAT SUBQ DAY: CPT

## 2019-11-17 PROCEDURE — 74011250636 HC RX REV CODE- 250/636

## 2019-11-17 PROCEDURE — 82803 BLOOD GASES ANY COMBINATION: CPT

## 2019-11-17 PROCEDURE — 65620000000 HC RM CCU GENERAL

## 2019-11-17 PROCEDURE — 77010033678 HC OXYGEN DAILY

## 2019-11-17 PROCEDURE — 77030027138 HC INCENT SPIROMETER -A

## 2019-11-17 PROCEDURE — 74011000250 HC RX REV CODE- 250: Performed by: INTERNAL MEDICINE

## 2019-11-17 PROCEDURE — 82962 GLUCOSE BLOOD TEST: CPT

## 2019-11-17 PROCEDURE — 85027 COMPLETE CBC AUTOMATED: CPT

## 2019-11-17 PROCEDURE — 74011250637 HC RX REV CODE- 250/637: Performed by: INTERNAL MEDICINE

## 2019-11-17 PROCEDURE — 80048 BASIC METABOLIC PNL TOTAL CA: CPT

## 2019-11-17 PROCEDURE — 85730 THROMBOPLASTIN TIME PARTIAL: CPT

## 2019-11-17 PROCEDURE — 74011000250 HC RX REV CODE- 250: Performed by: NURSE PRACTITIONER

## 2019-11-17 PROCEDURE — 71045 X-RAY EXAM CHEST 1 VIEW: CPT

## 2019-11-17 PROCEDURE — 36592 COLLECT BLOOD FROM PICC: CPT

## 2019-11-17 PROCEDURE — 74011250636 HC RX REV CODE- 250/636: Performed by: NURSE PRACTITIONER

## 2019-11-17 PROCEDURE — 77030018846 HC SOL IRR STRL H20 ICUM -A

## 2019-11-17 RX ORDER — ONDANSETRON 2 MG/ML
INJECTION INTRAMUSCULAR; INTRAVENOUS
Status: COMPLETED
Start: 2019-11-17 | End: 2019-11-17

## 2019-11-17 RX ORDER — LORAZEPAM 1 MG/1
1 TABLET ORAL 2 TIMES DAILY
Status: DISCONTINUED | OUTPATIENT
Start: 2019-11-17 | End: 2019-11-19

## 2019-11-17 RX ORDER — DIPHENHYDRAMINE HCL 25 MG
50 CAPSULE ORAL
Status: DISCONTINUED | OUTPATIENT
Start: 2019-11-17 | End: 2019-11-25 | Stop reason: HOSPADM

## 2019-11-17 RX ORDER — ONDANSETRON 2 MG/ML
4 INJECTION INTRAMUSCULAR; INTRAVENOUS
Status: DISCONTINUED | OUTPATIENT
Start: 2019-11-17 | End: 2019-11-25 | Stop reason: HOSPADM

## 2019-11-17 RX ORDER — INSULIN LISPRO 100 [IU]/ML
INJECTION, SOLUTION INTRAVENOUS; SUBCUTANEOUS
Status: DISCONTINUED | OUTPATIENT
Start: 2019-11-17 | End: 2019-11-22

## 2019-11-17 RX ADMIN — ONDANSETRON 4 MG: 2 INJECTION INTRAMUSCULAR; INTRAVENOUS at 18:16

## 2019-11-17 RX ADMIN — FAMOTIDINE 20 MG: 10 INJECTION INTRAVENOUS at 08:54

## 2019-11-17 RX ADMIN — HEPARIN SODIUM 16 UNITS/KG/HR: 5000 INJECTION, SOLUTION INTRAVENOUS at 10:07

## 2019-11-17 RX ADMIN — FUROSEMIDE 40 MG: 10 INJECTION, SOLUTION INTRAMUSCULAR; INTRAVENOUS at 21:10

## 2019-11-17 RX ADMIN — LORAZEPAM 1 MG: 1 TABLET ORAL at 21:10

## 2019-11-17 RX ADMIN — HYDRALAZINE HYDROCHLORIDE 20 MG: 20 INJECTION, SOLUTION INTRAMUSCULAR; INTRAVENOUS at 07:07

## 2019-11-17 RX ADMIN — Medication 30 ML: at 22:00

## 2019-11-17 RX ADMIN — LISINOPRIL 10 MG: 5 TABLET ORAL at 21:11

## 2019-11-17 RX ADMIN — Medication 30 ML: at 07:11

## 2019-11-17 RX ADMIN — ONDANSETRON 4 MG: 2 INJECTION INTRAMUSCULAR; INTRAVENOUS at 13:14

## 2019-11-17 RX ADMIN — INSULIN LISPRO 4 UNITS: 100 INJECTION, SOLUTION INTRAVENOUS; SUBCUTANEOUS at 12:13

## 2019-11-17 RX ADMIN — METOPROLOL TARTRATE 5 MG: 5 INJECTION INTRAVENOUS at 08:18

## 2019-11-17 RX ADMIN — Medication 900 UNITS: at 13:21

## 2019-11-17 RX ADMIN — FUROSEMIDE 40 MG: 10 INJECTION, SOLUTION INTRAMUSCULAR; INTRAVENOUS at 08:53

## 2019-11-17 RX ADMIN — Medication 30 ML: at 14:00

## 2019-11-17 RX ADMIN — METHYLPREDNISOLONE SODIUM SUCCINATE 60 MG: 40 INJECTION, POWDER, FOR SOLUTION INTRAMUSCULAR; INTRAVENOUS at 08:54

## 2019-11-17 RX ADMIN — INSULIN LISPRO 6 UNITS: 100 INJECTION, SOLUTION INTRAVENOUS; SUBCUTANEOUS at 19:12

## 2019-11-17 RX ADMIN — LISINOPRIL 10 MG: 5 TABLET ORAL at 08:54

## 2019-11-17 RX ADMIN — Medication 900 UNITS: at 21:11

## 2019-11-17 RX ADMIN — FAMOTIDINE 20 MG: 10 INJECTION INTRAVENOUS at 21:10

## 2019-11-17 RX ADMIN — AMLODIPINE BESYLATE 5 MG: 5 TABLET ORAL at 08:54

## 2019-11-17 RX ADMIN — METHYLPREDNISOLONE SODIUM SUCCINATE 40 MG: 40 INJECTION, POWDER, FOR SOLUTION INTRAMUSCULAR; INTRAVENOUS at 21:10

## 2019-11-17 RX ADMIN — HYDRALAZINE HYDROCHLORIDE 20 MG: 20 INJECTION, SOLUTION INTRAMUSCULAR; INTRAVENOUS at 13:12

## 2019-11-17 RX ADMIN — INSULIN LISPRO 4 UNITS: 100 INJECTION, SOLUTION INTRAVENOUS; SUBCUTANEOUS at 01:35

## 2019-11-17 RX ADMIN — LORAZEPAM 1 MG: 1 TABLET ORAL at 11:00

## 2019-11-17 RX ADMIN — INSULIN LISPRO 4 UNITS: 100 INJECTION, SOLUTION INTRAVENOUS; SUBCUTANEOUS at 07:26

## 2019-11-17 NOTE — PROGRESS NOTES
Bedside shift change report given to [address] (oncoming nurse) by Rafa Jerez (offgoing nurse). Report included the following information Kardex,labs, Sinus Tricia Gravely with frequent PACs. Drowsy RASS -1(appears exhausted), nods appropriately with generalized weakness. ETT/Vent 40% s/p bronchoscopy today per report. Right arm PICC:Fentanyl,Versed and Heparin WBP. Cancino patent chavo, cloudy urine with sediment present. No edema present. No DTIs/No pressure ulcers observed.

## 2019-11-17 NOTE — PROGRESS NOTES
Patient was calm  Family was present  Receptive to   Thanked him    Adia Palumbo, staff , [address], [facility]  /   [email or web address]

## 2019-11-17 NOTE — PROGRESS NOTES
Bedside STAND completed with no coughing, gagging or signs of distress noted; SpO2 remains 97-99% on 3LNC. Pt tolerating sips of water with PO medications well.

## 2019-11-17 NOTE — PROGRESS NOTES
Ventilator check complete; patient has a #8. 0 ET tube secured at the 26 at the teeth. Patient is sedated. Patient is able to follow commands. Breath sounds are diminished. Trachea is midline, Negative for subcutaneous air, and chest excursion is symmetric. Patient is also Negative for cyanosis and is Negative for pitting edema. All alarms are set and audible. Resuscitation bag is at the head of the bed. Ventilator Settings  Mode FIO2 Rate Tidal Volume Pressure PEEP I:E Ratio   VC+  40 %    500 ml  0 cm H2O  8 cm H20  1:3      Peak airway pressure: 22 cm H2O   Minute ventilation: 11.5 l/min     ABG: No results for input(s): PH, PCO2, PO2, HCO3 in the last 72 hours.       Jamel Dowling, RT

## 2019-11-17 NOTE — PROGRESS NOTES
Dr. Cobb [facility] paged re: more frequent runs of sustained atrial tach to 150s followed by bradycardic episodes to 40s. Orders received to give 5 mg Lopressor IV. To continue to monitor.

## 2019-11-17 NOTE — PROGRESS NOTES
Critical Care Daily Progress Note: 11/17/2019  Admission Date: 11/10/2019     The patient's chart is reviewed and the patient is discussed with the staff. The patient's chart is reviewed and the patient is discussed with the staff.     Patient is Z 31 y.o.  male presented with respiratory failure. He was just discharged from [facility] today. He presented there with increased SOB of cough. No fever, chills, LE edema, or chest pain. It was originally suspected to represent PNA but the patient improved after just 24 hours of treatment with lasix and abx and was discharged.      He was resting at home  per wife and awoke suddenly very SOB with BP in the 180-190's. Cough with pink frothy sputum. Went to urgent care and was transported here via EMS. Was intubated en route and has had copious pink frothy sputum from ETT since. CXR with B infiltrates c/w edema. Pro BNP better than yesterday but still elevated. No fever or chills still reported.  Then was extubated on 11/12  and early morning 11/13 re intubated again with  Reports of increase BP ,agitation just prior to intubation    Subjective:     Extubated himself lat night ,doing OK  On 3L NC    Current Facility-Administered Medications   Medication Dose Route Frequency    LORazepam (ATIVAN) tablet 1 mg  1 mg Oral BID    methylPREDNISolone (PF) (SOLU-MEDROL) injection 40 mg  40 mg IntraVENous Q12H    hydrALAZINE (APRESOLINE) 20 mg/mL injection 20 mg  20 mg IntraVENous Q6H PRN    risperiDONE (RisperDAL) tablet 1 mg  1 mg Oral QHS    insulin lispro (HUMALOG) injection   SubCUTAneous Q6H    heparin 25,000 units in dextrose 500 mL infusion  18-36 Units/kg/hr (Adjusted) IntraVENous TITRATE    metoprolol (LOPRESSOR) injection 5 mg  5 mg IntraVENous Q6H PRN    NUTRITIONAL SUPPORT ELECTROLYTE PRN ORDERS   Does Not Apply PRN    famotidine (PF) (PEPCID) 20 mg in sodium chloride 0.9% 10 mL injection  20 mg IntraVENous Q12H    sodium chloride (NS) flush 30 mL  30 mL InterCATHeter Q8H    heparin (porcine) pf 900 Units  900 Units InterCATHeter Q8H    sodium chloride (NS) flush 30 mL  30 mL InterCATHeter PRN    heparin (porcine) pf 900 Units  900 Units InterCATHeter PRN    lisinopril (PRINIVIL, ZESTRIL) tablet 10 mg  10 mg Oral Q12H    [Held by provider] metoprolol succinate (TOPROL-XL) XL tablet 100 mg  100 mg Oral Q12H    amLODIPine (NORVASC) tablet 5 mg  5 mg Oral DAILY    furosemide (LASIX) injection 40 mg  40 mg IntraVENous Q12H    [Held by provider] apixaban (ELIQUIS) tablet 5 mg  5 mg Oral Q12H    sodium chloride (NS) flush 5-40 mL  5-40 mL IntraVENous PRN       Review of Systems    Constitutional:  negative for fever, chills, sweats  Cardiovascular:  negative for chest pain, palpitations, syncope, edema  Gastrointestinal:  negative for dysphagia, reflux, vomiting, diarrhea, abdominal pain, or melena  Neurologic:  negative for focal weakness, numbness, headache        Objective:     Vitals:    11/17/19 0707 11/17/19 0806 11/17/19 0818 11/17/19 0853   BP: 198/88 (P) 136/84 136/84 163/72   Pulse: 72  (!) 118 76   Resp:       Temp:       SpO2:       Weight:       Height:             Intake/Output Summary (Last 24 hours) at 11/17/2019 1109  Last data filed at 11/17/2019 0707  Gross per 24 hour   Intake 3297.66 ml   Output 1875 ml   Net 1422.66 ml         Physical Exam:          Constitutional:  intubated and mechanically ventilated.   EENMT:  Sclera clear, pupils equal, oral mucosa moist  Respiratory: crackles  Cardiovascular:  RRR with no M,G,R;  Gastrointestinal:  soft with no tenderness; positive bowel sounds present  Musculoskeletal:  warm with no cyanosis,  nolower extremity edema  Skin:  no jaundice or ecchymosis  Neurologic: no gross neuro deficits     Psychiatric:  Awake      LINES:    ETT, Cancino     DRIPS:    None     CXR:          ABG:   Recent Labs     11/17/19  0320 11/16/19  0314 11/15/19  0322   PHI 7.378 7.405 7.425 PCO2I 51.9* 43.3 44.0   PO2I 362* 89 94   HCO3I 30.5* 27.1* 28.9*        LAB  Recent Labs     11/17/19  0724 11/17/19  0134 11/16/19  1806 11/16/19  1144 11/16/19  0554   GLUCPOC 221* 235* 202* 224* 196*     Recent Labs     11/17/19  0302 11/16/19  0212 11/15/19  0359   WBC 8.9 10.9 15.6*   HGB 11.6* 11.2* 11.5*   HCT 36.2* 34.8* 35.3*    274 290     Recent Labs     11/17/19  0302 11/16/19  0212 11/15/19  0359   * 149* 146*   K 4.1 4.1 4.2   * 112* 110*   CO2 33* 31 31   * 196* 242*   BUN 45* 43* 43*   CREA 1.19 1.23 1.25   MG  --  2.7*  --    PHOS  --  3.6  --    CA 8.2* 8.7 8.9       Assessment:  (Medical Decision Making)     Hospital Problems  Date Reviewed: 11/10/2019          Codes Class Noted POA    ARDS (adult respiratory distress syndrome) ([facility] ICD-10-CM: U75  ICD-9-CM: 518.52  11/13/2019 Unknown        Acute respiratory failure with hypoxia ([facility] ICD-10-CM: J96.01  ICD-9-CM: 518.81  11/10/2019 Yes        * (Principal) Acute pulmonary edema (HCC) ICD-10-CM: J81.0  ICD-9-CM: 518.4  11/10/2019 Yes        Acute heart failure (HCC) ICD-10-CM: I50.9  ICD-9-CM: 428.9  11/10/2019 Yes        MARISOL (obstructive sleep apnea) (Chronic) ICD-10-CM: G47.33  ICD-9-CM: 327.23  11/15/2018 Yes        Chronic combined systolic and diastolic congestive heart failure (HCC) (Chronic) ICD-10-CM: I50.42  ICD-9-CM: 428.42, 428.0  5/14/2018 Yes    Overview Signed 11/9/2019  5:54 PM by Scarlett Townsend MD     EF 45-50%              Paroxysmal A-fib [facility]) (Chronic) ICD-10-CM: I48.0  ICD-9-CM: 427.31  5/14/2018 Yes              66 y old Male  With repeated episode  of acute hypoxemic respiratory failure presumed secondary to flash pulmonary edema, uncontrolled hypertension,  Uncontrolled a fib .  TTE with only mild-moderate MR and EF 45-50 %  He has diuresis  Over 8L and CXR continue with pulmonary edema and pleural effusion, had chest CT with small effusions and interstitial pulmonary edema, continue unstable BP and HR -up and down  Steroids  Empiric added earlier for ? non cardiogenic  pulmonary edema /ARDS   ESR only 31  ANCA negative  LUCINDA , RF pending    Plan:  (Medical Decision Making)   -extubated himself, doing OK   -      he has positive cultures for rhinovirus - not sure of the clinical significance   - he has been on empiric steroids for last few days for / ARDS/norcardiogenic edema- not much better so far   - continue to stabilize his BP and HR - did not tolerate CPAP today   --bronch today   -discussed in details with his son      More than 50% of the time documented was spent in face-to-face contact with the patient and in the care of the patient on the floor/unit where the patient is located.     Jacklyn Zuniga MD

## 2019-11-17 NOTE — PROGRESS NOTES
Sharonda CORBETT notified Mr. Bourgeois Handler with unsustained runs of atrial tachycardia. HR  at times. No s/sx of distress observed. No new orders verbalized at this time.

## 2019-11-17 NOTE — PROGRESS NOTES
Family member Mike Altamirano notified Mr. Ricky Joy self extubated this am and is currently stable on 3 LNC.

## 2019-11-17 NOTE — PROGRESS NOTES
After hanging up phone with  I entered the room to see Mr. Gabby Zhang pulling on inline suction catheter. Instructed to stop but he continued to pull tubing. Bilateral soft wrist restraints on. Marybeth Shone has his head and upper body leaning forward off mattress. ETT @ 13cm. (Previously 26 cm @ lip)  Tube feeding turned OFF. Suctioned mouth, clear thin mucous present. O2 saturation remained at 100%. Respiratory therapist called to bedside. ETT removed and placed on 3 LNC by Inder CORREA.  PO2 was 364 this am on blood gas while intubated.  notified, no new orders at this time.

## 2019-11-17 NOTE — PROGRESS NOTES
Bedside shift change report given to Karin Partida 84 (oncoming nurse) by Hari Batista (offgoing nurse). Report included the following information Kardex, Intake/Output, MAR, Recent Results, Med Rec Status and Cardiac Rhythm SB PACs atrial tach non sustained. Self extubated @ 0530 this am. O2 saturation sustained at 100%. Placed on 3 LNC. PO2 360's while intubated this am.  Cancino patent, sediment chavo cloudy. Bilateral soft wrist restraints discontinued. Passed STAND evaluation with thin liquid in cup and with straw. Talking with son at bedside. Voice clear. No DTIs/No pressure ulcers observed.  at bedside.

## 2019-11-17 NOTE — PROGRESS NOTES
At bedside with pt when pt stated he felt lightheaded and didn't \"feel so good,\" then proceeded to projectile-vomit large amount of dark brown emesis onto himself and bed. Pt had been given diet Pepsi by night shift RN. HR fluctuating between sinus keya with PACs and atrial tach during episode. IV Lopressor given and HR better controlled between 60s-80s. Pt now stating he feels better, denies chest pain or additional nausea. To continue to monitor.

## 2019-11-17 NOTE — PROGRESS NOTES
Report received from Kavon Albarado, [facility]. Heparin gtt dually verified, infusing at 16 units/kg/hr. Pt alert and oriented x4, sitting up in bed and pleasant, conversational. Per Kavon Albarado, pt self-extubated around 0530 this morning. Now on 3L NC with no distress noted. Denies SOB. Breath sounds coarse, productive cough. HR 40s-110s, irregular with BBB and some runs of atrial tach. Hypertensive. Afebrile. To continue to monitor.

## 2019-11-17 NOTE — PROGRESS NOTES
notified Mr. Krishna Mix with occasional unsustained runs of atrial tach. -35  No new orders at this time.

## 2019-11-17 NOTE — ROUTINE PROCESS
CHF teaching started post introduction to pt/family; aware of diagnosis. Planner/scale @ BS and will follow. Smoking/ ETOH/Illicit drug use cessation covered. Pt/family aware that I cannot prescribe nor adjust medications: 15mins Palliative Care score:  ACP on file RATT 19, none Start 2 Liter Fluid Restriction/ Cardiac diet CHF teaching continues to pt/family. Emphasis on taking prescription meds as ordered, to keep F/U appointments and  maintain healthy weight , to call MD STAT if any of the following occur: ? If you cannot breathe, are short of breath or rapid breathing at rest. Develop a cough or wheezing. Pt/family verbalizes understanding, will follow to reinforce teaching skills: 20 min 
 [employer]

## 2019-11-17 NOTE — PROGRESS NOTES
[facility] CARDIOLOGY PROGRESS NOTE           11/17/2019 10:01 AM    Admit Date: 11/10/2019         Subjective: Self extubated doing well off vent. ROS:  Cardiovascular:  As noted above    Objective:      Vitals:    11/17/19 0707 11/17/19 0806 11/17/19 0818 11/17/19 0853   BP: 198/88 (P) 136/84 136/84 163/72   Pulse: 72  (!) 118 76   Resp:       Temp:       SpO2:       Weight:       Height:           On telemetry: intermittent afib and bb      Physical Exam:  General: Well Developed, Well Nourished, No Acute Distress, Alert & Oriented x 3, Appropriate mood  Neck: supple, no JVD  Heart: S1S2 with RRR without murmurs or gallops  Lungs: Clear throughout auscultation bilaterally without adventitious sounds  Abd: soft, nontender, nondistended, with good bowel sounds  Ext: no edema bilaterally  Skin: warm and dry      Data Review:   Recent Labs     11/17/19  0302 11/16/19  0212   * 149*   K 4.1 4.1   MG  --  2.7*   BUN 45* 43*   CREA 1.19 1.23   * 196*   WBC 8.9 10.9   HGB 11.6* 11.2*   HCT 36.2* 34.8*    274       No results for input(s): TNIPOC, TROIQ in the last 72 hours. Assessment/Plan:     Principal Problem:    Acute pulmonary edema ([address] [facility] (11/10/2019)    Active Problems:    Chronic combined systolic and diastolic congestive heart failure ([address] (5/14/2018)      Overview: EF 45-50%       Paroxysmal A-fib (HCC) (5/14/2018)      MARISOL (obstructive sleep apnea) (11/15/2018)      Acute respiratory failure with hypoxia ([address] (11/10/2019)      Acute heart failure ([address] (11/10/2019)      ARDS (adult respiratory distress syndrome) ([address] (11/13/2019)      A/P  1) Atrial tach - gets very bradycardic at times, and then get tachycardic with HR to 160s. 2) sCHF - continue lasix  3) HTN - continue ACE/amlodipine    Will discuss with EP re consideration for ppm tomorrow. Intol to long acting rate controlling agents 2/2 bradycardia.       Linda Anderson MD  11/17/2019 10:01 AM

## 2019-11-18 ENCOUNTER — APPOINTMENT (OUTPATIENT)
Dept: GENERAL RADIOLOGY | Age: 74
DRG: 981 | End: 2019-11-18
Attending: INTERNAL MEDICINE
Payer: MEDICARE

## 2019-11-18 LAB
ANION GAP SERPL CALC-SCNC: 5 MMOL/L (ref 7–16)
APTT PPP: 87.5 SEC (ref 24.7–39.8)
BNP SERPL-MCNC: 2490 PG/ML
BUN SERPL-MCNC: 38 MG/DL (ref 8–23)
CALCIUM SERPL-MCNC: 8.6 MG/DL (ref 8.3–10.4)
CHLORIDE SERPL-SCNC: 108 MMOL/L (ref 98–107)
CO2 SERPL-SCNC: 36 MMOL/L (ref 21–32)
CREAT SERPL-MCNC: 1.21 MG/DL (ref 0.8–1.5)
ERYTHROCYTE [DISTWIDTH] IN BLOOD BY AUTOMATED COUNT: 13.2 % (ref 11.9–14.6)
GLUCOSE BLD STRIP.AUTO-MCNC: 161 MG/DL (ref 65–100)
GLUCOSE BLD STRIP.AUTO-MCNC: 197 MG/DL (ref 65–100)
GLUCOSE BLD STRIP.AUTO-MCNC: 202 MG/DL (ref 65–100)
GLUCOSE BLD STRIP.AUTO-MCNC: 203 MG/DL (ref 65–100)
GLUCOSE BLD STRIP.AUTO-MCNC: 204 MG/DL (ref 65–100)
GLUCOSE SERPL-MCNC: 204 MG/DL (ref 65–100)
HCT VFR BLD AUTO: 38.1 % (ref 41.1–50.3)
HGB BLD-MCNC: 12.5 G/DL (ref 13.6–17.2)
MAGNESIUM SERPL-MCNC: 2.7 MG/DL (ref 1.8–2.4)
MCH RBC QN AUTO: 31.2 PG (ref 26.1–32.9)
MCHC RBC AUTO-ENTMCNC: 32.8 G/DL (ref 31.4–35)
MCV RBC AUTO: 95 FL (ref 79.6–97.8)
NRBC # BLD: 0 K/UL (ref 0–0.2)
PHOSPHATE SERPL-MCNC: 4 MG/DL (ref 2.3–3.7)
PLATELET # BLD AUTO: 324 K/UL (ref 150–450)
PMV BLD AUTO: 9.7 FL (ref 9.4–12.3)
POTASSIUM SERPL-SCNC: 3.7 MMOL/L (ref 3.5–5.1)
RBC # BLD AUTO: 4.01 M/UL (ref 4.23–5.6)
SODIUM SERPL-SCNC: 149 MMOL/L (ref 136–145)
WBC # BLD AUTO: 11.2 K/UL (ref 4.3–11.1)

## 2019-11-18 PROCEDURE — 65620000000 HC RM CCU GENERAL

## 2019-11-18 PROCEDURE — 74011636637 HC RX REV CODE- 636/637: Performed by: PEDIATRICS

## 2019-11-18 PROCEDURE — 80048 BASIC METABOLIC PNL TOTAL CA: CPT

## 2019-11-18 PROCEDURE — 74011250637 HC RX REV CODE- 250/637: Performed by: INTERNAL MEDICINE

## 2019-11-18 PROCEDURE — 85027 COMPLETE CBC AUTOMATED: CPT

## 2019-11-18 PROCEDURE — 83735 ASSAY OF MAGNESIUM: CPT

## 2019-11-18 PROCEDURE — 74011250636 HC RX REV CODE- 250/636: Performed by: INTERNAL MEDICINE

## 2019-11-18 PROCEDURE — 84100 ASSAY OF PHOSPHORUS: CPT

## 2019-11-18 PROCEDURE — 99232 SBSQ HOSP IP/OBS MODERATE 35: CPT | Performed by: INTERNAL MEDICINE

## 2019-11-18 PROCEDURE — 74011000250 HC RX REV CODE- 250: Performed by: INTERNAL MEDICINE

## 2019-11-18 PROCEDURE — 74011250636 HC RX REV CODE- 250/636: Performed by: NURSE PRACTITIONER

## 2019-11-18 PROCEDURE — 85730 THROMBOPLASTIN TIME PARTIAL: CPT

## 2019-11-18 PROCEDURE — 71045 X-RAY EXAM CHEST 1 VIEW: CPT

## 2019-11-18 PROCEDURE — 83880 ASSAY OF NATRIURETIC PEPTIDE: CPT

## 2019-11-18 PROCEDURE — 74011636637 HC RX REV CODE- 636/637: Performed by: NURSE PRACTITIONER

## 2019-11-18 PROCEDURE — 82962 GLUCOSE BLOOD TEST: CPT

## 2019-11-18 RX ADMIN — FUROSEMIDE 40 MG: 10 INJECTION, SOLUTION INTRAMUSCULAR; INTRAVENOUS at 08:33

## 2019-11-18 RX ADMIN — LISINOPRIL 10 MG: 5 TABLET ORAL at 09:00

## 2019-11-18 RX ADMIN — HEPARIN SODIUM 16 UNITS/KG/HR: 5000 INJECTION, SOLUTION INTRAVENOUS at 19:00

## 2019-11-18 RX ADMIN — AMLODIPINE BESYLATE 5 MG: 5 TABLET ORAL at 09:00

## 2019-11-18 RX ADMIN — RISPERIDONE 1 MG: 0.5 TABLET ORAL at 00:05

## 2019-11-18 RX ADMIN — HYDRALAZINE HYDROCHLORIDE 20 MG: 20 INJECTION, SOLUTION INTRAMUSCULAR; INTRAVENOUS at 19:06

## 2019-11-18 RX ADMIN — PROMETHAZINE HYDROCHLORIDE 6.25 MG: 25 INJECTION INTRAMUSCULAR; INTRAVENOUS at 00:32

## 2019-11-18 RX ADMIN — INSULIN LISPRO 4 UNITS: 100 INJECTION, SOLUTION INTRAVENOUS; SUBCUTANEOUS at 00:05

## 2019-11-18 RX ADMIN — LORAZEPAM 1 MG: 1 TABLET ORAL at 09:50

## 2019-11-18 RX ADMIN — INSULIN LISPRO 4 UNITS: 100 INJECTION, SOLUTION INTRAVENOUS; SUBCUTANEOUS at 18:06

## 2019-11-18 RX ADMIN — INSULIN LISPRO 2 UNITS: 100 INJECTION, SOLUTION INTRAVENOUS; SUBCUTANEOUS at 12:00

## 2019-11-18 RX ADMIN — LORAZEPAM 1 MG: 1 TABLET ORAL at 18:06

## 2019-11-18 RX ADMIN — RISPERIDONE 1 MG: 0.5 TABLET ORAL at 21:01

## 2019-11-18 RX ADMIN — METHYLPREDNISOLONE SODIUM SUCCINATE 40 MG: 40 INJECTION, POWDER, FOR SOLUTION INTRAMUSCULAR; INTRAVENOUS at 20:57

## 2019-11-18 RX ADMIN — INSULIN LISPRO 4 UNITS: 100 INJECTION, SOLUTION INTRAVENOUS; SUBCUTANEOUS at 08:33

## 2019-11-18 RX ADMIN — INSULIN LISPRO 2 UNITS: 100 INJECTION, SOLUTION INTRAVENOUS; SUBCUTANEOUS at 21:01

## 2019-11-18 RX ADMIN — PROMETHAZINE HYDROCHLORIDE 6.25 MG: 25 INJECTION INTRAMUSCULAR; INTRAVENOUS at 09:56

## 2019-11-18 RX ADMIN — FAMOTIDINE 20 MG: 10 INJECTION INTRAVENOUS at 08:33

## 2019-11-18 RX ADMIN — LISINOPRIL 10 MG: 5 TABLET ORAL at 21:02

## 2019-11-18 RX ADMIN — Medication 900 UNITS: at 14:00

## 2019-11-18 RX ADMIN — METHYLPREDNISOLONE SODIUM SUCCINATE 40 MG: 40 INJECTION, POWDER, FOR SOLUTION INTRAMUSCULAR; INTRAVENOUS at 08:33

## 2019-11-18 RX ADMIN — Medication 30 ML: at 15:54

## 2019-11-18 RX ADMIN — HEPARIN SODIUM 16 UNITS/KG/HR: 5000 INJECTION, SOLUTION INTRAVENOUS at 02:04

## 2019-11-18 RX ADMIN — FUROSEMIDE 40 MG: 10 INJECTION, SOLUTION INTRAMUSCULAR; INTRAVENOUS at 20:57

## 2019-11-18 RX ADMIN — Medication 900 UNITS: at 21:02

## 2019-11-18 RX ADMIN — Medication 30 ML: at 21:02

## 2019-11-18 NOTE — PROGRESS NOTES
Critical Care Daily Progress Note: 11/18/2019    Alli Simple   Admission Date: 11/10/2019         The patient's chart is reviewed and the patient is discussed with the staff. 66 y.o. CM presented with respiratory failure. He was just discharged from [place] on the day of admission. He presented there with increased SOB of cough. It was originally suspected to represent PNA but the patient improved after just 24 hours of treatment with lasix and abx and was discharged.      He was resting at home per wife and awoke suddenly very SOB with BP in the 180-190's. Cough with pink frothy sputum. Went to urgent care and was transported here via EMS. Was intubated en route and has had copious pink frothy sputum from ETT since. CXR with B infiltrates c/w edema. Pro BNP better than yesterday but still elevated. No fever or chills still reported. Then was extubated on 11/12  and early morning 11/13 re intubated again with reports of increase BP,agitation just prior to intubation. Self extubated 11/17 and placed on NC.        Subjective:   Feels better, on 3 L nc  Good output    Current Facility-Administered Medications   Medication Dose Route Frequency    LORazepam (ATIVAN) tablet 1 mg  1 mg Oral BID    methylPREDNISolone (PF) (SOLU-MEDROL) injection 40 mg  40 mg IntraVENous Q12H    ondansetron (ZOFRAN) injection 4 mg  4 mg IntraVENous Q6H PRN    promethazine (PHENERGAN) with saline injection 6.25 mg  6.25 mg IntraVENous Q4H PRN    diphenhydrAMINE (BENADRYL) capsule 50 mg  50 mg Oral QHS PRN    insulin lispro (HUMALOG) injection   SubCUTAneous AC&HS    hydrALAZINE (APRESOLINE) 20 mg/mL injection 20 mg  20 mg IntraVENous Q6H PRN    risperiDONE (RisperDAL) tablet 1 mg  1 mg Oral QHS    heparin 25,000 units in dextrose 500 mL infusion  18-36 Units/kg/hr (Adjusted) IntraVENous TITRATE    metoprolol (LOPRESSOR) injection 5 mg  5 mg IntraVENous Q6H PRN    NUTRITIONAL SUPPORT ELECTROLYTE PRN ORDERS Does Not Apply PRN    famotidine (PF) (PEPCID) 20 mg in sodium chloride 0.9% 10 mL injection  20 mg IntraVENous Q12H    sodium chloride (NS) flush 30 mL  30 mL InterCATHeter Q8H    heparin (porcine) pf 900 Units  900 Units InterCATHeter Q8H    sodium chloride (NS) flush 30 mL  30 mL InterCATHeter PRN    heparin (porcine) pf 900 Units  900 Units InterCATHeter PRN    lisinopril (PRINIVIL, ZESTRIL) tablet 10 mg  10 mg Oral Q12H    [Held by provider] metoprolol succinate (TOPROL-XL) XL tablet 100 mg  100 mg Oral Q12H    amLODIPine (NORVASC) tablet 5 mg  5 mg Oral DAILY    furosemide (LASIX) injection 40 mg  40 mg IntraVENous Q12H    [Held by provider] apixaban (ELIQUIS) tablet 5 mg  5 mg Oral Q12H    sodium chloride (NS) flush 5-40 mL  5-40 mL IntraVENous PRN       Review of Systems    Constitutional:  negative for fever, chills, sweats  Cardiovascular:  negative for chest pain, palpitations, syncope, edema  Gastrointestinal:  negative for dysphagia, reflux, vomiting, diarrhea, abdominal pain, or melena  Neurologic:  negative for focal weakness, numbness, headache      Objective:     Vitals:    11/18/19 1102 11/18/19 1131 11/18/19 1201 11/18/19 1231   BP: 137/63 153/78 180/82 174/82   Pulse: 86 88 86 77   Resp: 21 15 18 23   Temp:  98.5 °F (36.9 °C)     SpO2: 94% 95% 97%    Weight:       Height:             Intake/Output Summary (Last 24 hours) at 11/18/2019 1304  Last data filed at 11/18/2019 1001  Gross per 24 hour   Intake 1886.32 ml   Output 3075 ml   Net -1188.68 ml       Physical Exam:          Constitutional:  the patient is well developed and in no acute distress  EENMT:  Sclera clear, pupils equal, oral mucosa moist  Respiratory: clear- no wheezing  Cardiovascular:  RRR without M,G,R  Gastrointestinal: soft and non-tender; with positive bowel sounds. Musculoskeletal: warm without cyanosis. There is no lower extremity edema.   Skin:  no jaundice or rashes, no wounds   Neurologic: no gross neuro deficits     Psychiatric:  alert and oriented x 3    LINES:  Right PICC 11/13/19  Cancino 11/10/19    DRIPS:  Heparin drip    CXR:   11/18/19:  Pulmonary edema with bilateral pleural effusions improved. LAB  Recent Labs     11/18/19  1150 11/18/19  0734 11/18/19  0001 11/17/19  1847 11/17/19  1210   GLUCPOC 197* 202* 203* 261* 226*      Recent Labs     11/18/19  0344 11/17/19  0302 11/16/19  0212   WBC 11.2* 8.9 10.9   HGB 12.5* 11.6* 11.2*   HCT 38.1* 36.2* 34.8*    282 274     Recent Labs     11/18/19  0341 11/17/19  0302 11/16/19  0212   * 151* 149*   K 3.7 4.1 4.1   * 115* 112*   CO2 36* 33* 31   * 244* 196*   BUN 38* 45* 43*   CREA 1.21 1.19 1.23   MG 2.7*  --  2.7*   PHOS 4.0*  --  3.6   CA 8.6 8.2* 8.7     Recent Labs     11/17/19  0320 11/16/19  0314   PHI 7.378 7.405   PCO2I 51.9* 43.3   PO2I 362* 89   HCO3I 30.5* 27.1*     No results for input(s): LCAD, LAC in the last 72 hours. No results for input(s): PH, PCO2, PO2, HCO3 in the last 72 hours.      Assessment:  (Medical Decision Making)     Hospital Problems  Date Reviewed: 11/10/2019          Codes Class Noted POA    ARDS (adult respiratory distress syndrome) ([facility] ICD-10-CM: G66  ICD-9-CM: 518.52  11/13/2019 Unknown        Acute respiratory failure with hypoxia ([facility] ICD-10-CM: J96.01  ICD-9-CM: 518.81  11/10/2019 Yes        * (Principal) Acute pulmonary edema ([facility] ICD-10-CM: J81.0  ICD-9-CM: 518.4  11/10/2019 Yes        Acute heart failure ([facility] ICD-10-CM: I50.9  ICD-9-CM: 428.9  11/10/2019 Yes        MARISOL (obstructive sleep apnea) (Chronic) ICD-10-CM: B48.85  ICD-9-CM: 327.23  11/15/2018 Yes        Chronic combined systolic and diastolic congestive heart failure (HCC) (Chronic) ICD-10-CM: I50.42  ICD-9-CM: 428.42, 428.0  5/14/2018 Yes    Overview Signed 11/9/2019  5:54 PM by Janiya Ohara MD     EF 45-50%              Paroxysmal A-fib ([facility] (Chronic) ICD-10-CM: I48.0  ICD-9-CM: 427.31  5/14/2018 Yes Plan:  (Medical Decision Making)     --Lasix 40mg q12h-urine output 3.3 L (net 2L negative), creat 1.21  --Solu Medrol 40mg q12h  --BAL 11/16/19:  Pending  --pro-BNP 2490 today    More than 50% of the time documented was spent in face-to-face contact with the patient and in the care of the patient on the floor/unit where the patient is located. Zeus Serrano NP   Lungs:  lear  Heart:  RRR with no Murmur/Rubs/Gallops    Additional Comments:  cxr improving , good response to lasix, pcm tomorrow    I have spoken with and examined the patient. I agree with the above assessment and plan as documented.     Betty Ness MD

## 2019-11-18 NOTE — PROGRESS NOTES
Chart reviewed as pt remains CCU. Now extubated and plan for PPM possibly today. CM continues to follow for any assist and d/c POC when stable.

## 2019-11-18 NOTE — PROGRESS NOTES
Report received from Sherman, [facility]. Heparin gtt dually verified, infusing at 16 units/kg/hr. Pt sleeping with eyes open to voice, wearing home CPAP machine on 3L and with no distress noted. Denies SOB. Breath sounds clear, diminished. HR 60s-80s, afib/aflutter with BBB. BP within range. Afebrile. To continue to monitor.

## 2019-11-18 NOTE — PROGRESS NOTES
Nutrition F/U:  Assessment:  The patient self-extubated yesterday. The RN reports that he's not been alert enough to eat an entire meal so clear liquids were offered and his diet will be advanced as tolerated. Macronutrient Needs (112 kg):  Estimated calorie needs - 5311-3460 carlton/day (15-20 carlton/kg/day)  Estimated protein needs -  gm pro/day (0.8-1 gm pro/kg/day)   Max CHO/day - 280 gm CHO/day (50% carlton/day)   Fluid/day - 1.7-2.3 liters/day (1 ml/carlton/day)  Intake/Comparative Standards: The patient has been NPO since he removed his ETT and FT. Intervention:   Meals and Snacks: Cardiac with 2 liter fluid restriction  Mineral Supplement Therapy: Nutrition Support Orders/Electrolyte Management Replacement Protocols are active on the MAR. Coordination of Nutrition Care by a Nutrition Professional: AM CCU rounds, collaboration with Blair Carmen RN. Nutrition Discharge Plan: Too soon to determine. Zachery Mendez.  [facility]  [phone]

## 2019-11-18 NOTE — PROGRESS NOTES
Bedside shift change report given to [address] (oncoming nurse) by Deangelo Chaney (offgoing nurse). Report included the following information Kardex, Intake/Output, MAR, Recent Results, Med Rec Status and Cardiac Rhythm atrial fibrillation. Per report EP consult to be called in am.  Alert and oriented x3 on 3 LNC. Denies nausea at this time. Crackers removed from bedside. On clear liquid diet. Cancino patent with chavo cloudy urine and a lot of sediment present. No DTIs/No pressure ulcers observed.

## 2019-11-18 NOTE — PROGRESS NOTES
11/17/19 9932   Oxygen Therapy   O2 Sat (%) 95 %   Pulse via Oximetry 74 beats per minute   O2 Device CPAP mask   O2 Flow Rate (L/min) 3 l/min   CPAP/BIPAP   CPAP/BIPAP Start/Stop On   Device Mode CPAP   Mask Type and Size Full face   Skin Condition intact   EPAP (cm H2O) 10 cm H2O   Total RR (Spontaneous) 22 breaths per minute   Pt's Home Machine Yes (type/vendor)  ([employer])

## 2019-11-18 NOTE — PROGRESS NOTES
Bedside shift change report given to Karin Gill (oncoming nurse) by Tyler Heard (offgoing nurse). Report included the following information Kardex, Intake/Output, MAR, Recent Results, Med Rec Status and Cardiac Rhythm atrial fibrillation.

## 2019-11-18 NOTE — PROGRESS NOTES
Cesar Shepherd NP notified Mr. Patterson Matilde c/o anticipated insomnia. Order obtained for Benadryl 50 mg po QHS for sleep.

## 2019-11-18 NOTE — PROGRESS NOTES
Interdisciplinary team rounds were held 11/18/2019 with the following team members:Care Management, Hospice, Nursing, Nurse Practitioner, Nutrition, Palliative Care, Pastoral Care, Pharmacy, Physical Therapy, Physician, Respiratory Therapy, Speech Therapy, and Clinical Coordinator. Plan of care discussed. See clinical pathway and/or care plan for interventions and desired outcomes.

## 2019-11-18 NOTE — PROGRESS NOTES
[facility] CARDIOLOGY PROGRESS NOTE           11/18/2019 10:01 AM    Admit Date: 11/10/2019         Subjective: Self extubated doing well off vent. Continues AF does have tachy keya with symptoms. His pul edema may be from AF RVR. PPM to allow more aggressive rate and rhythm control meds is a logical management step. With normal ef he does not need ICD and he will pace intermittently so I dont think he will need Bi V device. Pt and family have greed yesterday. Son wants to have another discussion today with pt. Plan for implant tomorrow    ROS:  Cardiovascular:  As noted above    Objective:      Vitals:    11/18/19 0345 11/18/19 0401 11/18/19 0430 11/18/19 0435   BP:  152/74 146/70    Pulse: (!) 110 (!) 101 78 78   Resp: 19 12 20 12   Temp:       SpO2: 90% 94% 95% 95%   Weight:       Height:           On telemetry: intermittent afib and bb      Physical Exam:  General: Well Developed, Well Nourished, No Acute Distress, Alert & Oriented x 3, Appropriate mood  Neck: supple, no JVD  Heart: S1S2 with IRRR without murmurs or gallops  Lungs: Clear throughout auscultation bilaterally without adventitious sounds  Abd: soft, nontender, nondistended, with good bowel sounds  Ext: no edema bilaterally  Skin: warm and dry      Data Review:   Recent Labs     11/18/19  0344 11/18/19  0341 11/17/19  0302 11/16/19  0212   NA  --  149* 151* 149*   K  --  3.7 4.1 4.1   MG  --  2.7*  --  2.7*   BUN  --  38* 45* 43*   CREA  --  1.21 1.19 1.23   GLU  --  204* 244* 196*   WBC 11.2*  --  8.9 10.9   HGB 12.5*  --  11.6* 11.2*   HCT 38.1*  --  36.2* 34.8*     --  282 274       No results for input(s): TNIPOC, TROIQ in the last 72 hours.       Assessment/Plan:     Principal Problem:    Acute pulmonary edema ([facility] (11/10/2019)    Active Problems:    Chronic combined systolic and diastolic congestive heart failure ([address] (5/14/2018)      Overview: EF 45-50%       Paroxysmal A-fib (HCC) (5/14/2018)      MARISOL (obstructive sleep apnea) (11/15/2018)      Acute respiratory failure with hypoxia ([facility] (11/10/2019)      Acute heart failure ([facility] [facility] (11/10/2019)      ARDS (adult respiratory distress syndrome) ([facility] [facility] (11/13/2019)      A/P  1) Atrial tach - gets very bradycardic at times, and then get tachycardic with HR to 160s. 2) sCHF - continue lasix  3) HTN - continue ACE/amlodipine    Will discuss with EP re consideration for ppm.  Intol to long acting rate controlling agents 2/2 bradycardia.       Vickey Minaya MD  11/18/2019 10:01 AM

## 2019-11-18 NOTE — PROGRESS NOTES
A follow up visit was made to the patient. Emotional support, spiritual presence and   prayer were provided for the patient. He is extubated, and was taking breathing treatment. He said that he was doing much better. His son, Benita Mcgraw was present.       JAY Maldonado

## 2019-11-19 ENCOUNTER — APPOINTMENT (OUTPATIENT)
Dept: GENERAL RADIOLOGY | Age: 74
DRG: 981 | End: 2019-11-19
Attending: INTERNAL MEDICINE
Payer: MEDICARE

## 2019-11-19 LAB
ANA SER QL: NEGATIVE
ANION GAP SERPL CALC-SCNC: 5 MMOL/L (ref 7–16)
APTT PPP: 94.4 SEC (ref 24.7–39.8)
ATRIAL RATE: 416 BPM
BACTERIA SPEC CULT: NORMAL
BUN SERPL-MCNC: 35 MG/DL (ref 8–23)
CALCIUM SERPL-MCNC: 8.2 MG/DL (ref 8.3–10.4)
CALCULATED R AXIS, ECG10: 15 DEGREES
CALCULATED T AXIS, ECG11: 79 DEGREES
CHLORIDE SERPL-SCNC: 104 MMOL/L (ref 98–107)
CO2 SERPL-SCNC: 36 MMOL/L (ref 21–32)
CREAT SERPL-MCNC: 0.96 MG/DL (ref 0.8–1.5)
DIAGNOSIS, 93000: NORMAL
ERYTHROCYTE [DISTWIDTH] IN BLOOD BY AUTOMATED COUNT: 12.9 % (ref 11.9–14.6)
GLUCOSE BLD STRIP.AUTO-MCNC: 157 MG/DL (ref 65–100)
GLUCOSE BLD STRIP.AUTO-MCNC: 196 MG/DL (ref 65–100)
GLUCOSE BLD STRIP.AUTO-MCNC: 196 MG/DL (ref 65–100)
GLUCOSE BLD STRIP.AUTO-MCNC: 302 MG/DL (ref 65–100)
GLUCOSE SERPL-MCNC: 230 MG/DL (ref 65–100)
GRAM STN SPEC: NORMAL
HCT VFR BLD AUTO: 39.5 % (ref 41.1–50.3)
HGB BLD-MCNC: 12.9 G/DL (ref 13.6–17.2)
MCH RBC QN AUTO: 30.4 PG (ref 26.1–32.9)
MCHC RBC AUTO-ENTMCNC: 32.7 G/DL (ref 31.4–35)
MCV RBC AUTO: 92.9 FL (ref 79.6–97.8)
NRBC # BLD: 0 K/UL (ref 0–0.2)
PLATELET # BLD AUTO: 319 K/UL (ref 150–450)
PMV BLD AUTO: 9.9 FL (ref 9.4–12.3)
POTASSIUM SERPL-SCNC: 3.6 MMOL/L (ref 3.5–5.1)
Q-T INTERVAL, ECG07: 470 MS
QRS DURATION, ECG06: 182 MS
QTC CALCULATION (BEZET), ECG08: 517 MS
RBC # BLD AUTO: 4.25 M/UL (ref 4.23–5.6)
SERVICE CMNT-IMP: NORMAL
SODIUM SERPL-SCNC: 145 MMOL/L (ref 136–145)
VENTRICULAR RATE, ECG03: 73 BPM
WBC # BLD AUTO: 10.2 K/UL (ref 4.3–11.1)

## 2019-11-19 PROCEDURE — 74011250636 HC RX REV CODE- 250/636: Performed by: INTERNAL MEDICINE

## 2019-11-19 PROCEDURE — C1898 LEAD, PMKR, OTHER THAN TRANS: HCPCS

## 2019-11-19 PROCEDURE — 74011250637 HC RX REV CODE- 250/637: Performed by: INTERNAL MEDICINE

## 2019-11-19 PROCEDURE — 85730 THROMBOPLASTIN TIME PARTIAL: CPT

## 2019-11-19 PROCEDURE — 02HK3JZ INSERTION OF PACEMAKER LEAD INTO RIGHT VENTRICLE, PERCUTANEOUS APPROACH: ICD-10-PCS | Performed by: INTERNAL MEDICINE

## 2019-11-19 PROCEDURE — 71045 X-RAY EXAM CHEST 1 VIEW: CPT

## 2019-11-19 PROCEDURE — 77030018846 HC SOL IRR STRL H20 ICUM -A

## 2019-11-19 PROCEDURE — 0JH606Z INSERTION OF PACEMAKER, DUAL CHAMBER INTO CHEST SUBCUTANEOUS TISSUE AND FASCIA, OPEN APPROACH: ICD-10-PCS | Performed by: INTERNAL MEDICINE

## 2019-11-19 PROCEDURE — 02H63JZ INSERTION OF PACEMAKER LEAD INTO RIGHT ATRIUM, PERCUTANEOUS APPROACH: ICD-10-PCS | Performed by: INTERNAL MEDICINE

## 2019-11-19 PROCEDURE — C1785 PMKR, DUAL, RATE-RESP: HCPCS

## 2019-11-19 PROCEDURE — 77030022704 HC SUT VLOC COVD -B

## 2019-11-19 PROCEDURE — 33208 INSRT HEART PM ATRIAL & VENT: CPT

## 2019-11-19 PROCEDURE — 36592 COLLECT BLOOD FROM PICC: CPT

## 2019-11-19 PROCEDURE — 82962 GLUCOSE BLOOD TEST: CPT

## 2019-11-19 PROCEDURE — 77030008459 HC STPLR SKN COOP -B

## 2019-11-19 PROCEDURE — 77030018579 HC SUT TICRN1 COVD -B

## 2019-11-19 PROCEDURE — 77030039266 HC ADH SKN EXOFIN S2SG -A

## 2019-11-19 PROCEDURE — 85027 COMPLETE CBC AUTOMATED: CPT

## 2019-11-19 PROCEDURE — 99153 MOD SED SAME PHYS/QHP EA: CPT

## 2019-11-19 PROCEDURE — C1893 INTRO/SHEATH, FIXED,NON-PEEL: HCPCS

## 2019-11-19 PROCEDURE — 65660000000 HC RM CCU STEPDOWN

## 2019-11-19 PROCEDURE — 99152 MOD SED SAME PHYS/QHP 5/>YRS: CPT

## 2019-11-19 PROCEDURE — 77030012935 HC DRSG AQUACEL BMS -B

## 2019-11-19 PROCEDURE — 93005 ELECTROCARDIOGRAM TRACING: CPT | Performed by: INTERNAL MEDICINE

## 2019-11-19 PROCEDURE — 74011636637 HC RX REV CODE- 636/637: Performed by: NURSE PRACTITIONER

## 2019-11-19 PROCEDURE — 80048 BASIC METABOLIC PNL TOTAL CA: CPT

## 2019-11-19 PROCEDURE — 74011000250 HC RX REV CODE- 250: Performed by: INTERNAL MEDICINE

## 2019-11-19 PROCEDURE — 99232 SBSQ HOSP IP/OBS MODERATE 35: CPT | Performed by: INTERNAL MEDICINE

## 2019-11-19 RX ORDER — SOTALOL HYDROCHLORIDE 80 MG/1
160 TABLET ORAL EVERY 12 HOURS
Status: DISCONTINUED | OUTPATIENT
Start: 2019-11-19 | End: 2019-11-21

## 2019-11-19 RX ORDER — SODIUM CHLORIDE 0.9 % (FLUSH) 0.9 %
5-40 SYRINGE (ML) INJECTION EVERY 8 HOURS
Status: DISCONTINUED | OUTPATIENT
Start: 2019-11-19 | End: 2019-11-19

## 2019-11-19 RX ORDER — CLINDAMYCIN PHOSPHATE 900 MG/50ML
900 INJECTION INTRAVENOUS EVERY 8 HOURS
Status: DISCONTINUED | OUTPATIENT
Start: 2019-11-19 | End: 2019-11-25

## 2019-11-19 RX ORDER — SODIUM CHLORIDE 0.9 % (FLUSH) 0.9 %
5-40 SYRINGE (ML) INJECTION AS NEEDED
Status: DISCONTINUED | OUTPATIENT
Start: 2019-11-19 | End: 2019-11-19

## 2019-11-19 RX ORDER — CLINDAMYCIN PHOSPHATE 900 MG/50ML
900 INJECTION INTRAVENOUS ONCE
Status: COMPLETED | OUTPATIENT
Start: 2019-11-19 | End: 2019-11-19

## 2019-11-19 RX ORDER — FENTANYL CITRATE 50 UG/ML
25-75 INJECTION, SOLUTION INTRAMUSCULAR; INTRAVENOUS
Status: DISCONTINUED | OUTPATIENT
Start: 2019-11-19 | End: 2019-11-19

## 2019-11-19 RX ORDER — LIDOCAINE HYDROCHLORIDE 10 MG/ML
10-30 INJECTION INFILTRATION; PERINEURAL ONCE
Status: COMPLETED | OUTPATIENT
Start: 2019-11-19 | End: 2019-11-19

## 2019-11-19 RX ORDER — CEFAZOLIN SODIUM/WATER 2 G/20 ML
2 SYRINGE (ML) INTRAVENOUS
Status: DISCONTINUED | OUTPATIENT
Start: 2019-11-19 | End: 2019-11-25

## 2019-11-19 RX ORDER — MIDAZOLAM HYDROCHLORIDE 1 MG/ML
.5-2 INJECTION, SOLUTION INTRAMUSCULAR; INTRAVENOUS
Status: DISCONTINUED | OUTPATIENT
Start: 2019-11-19 | End: 2019-11-19

## 2019-11-19 RX ORDER — LORAZEPAM 1 MG/1
1 TABLET ORAL
Status: DISCONTINUED | OUTPATIENT
Start: 2019-11-19 | End: 2019-11-25 | Stop reason: HOSPADM

## 2019-11-19 RX ADMIN — CLINDAMYCIN PHOSPHATE 900 MG: 900 INJECTION, SOLUTION INTRAVENOUS at 17:40

## 2019-11-19 RX ADMIN — AMLODIPINE BESYLATE 5 MG: 5 TABLET ORAL at 10:40

## 2019-11-19 RX ADMIN — Medication 30 ML: at 19:56

## 2019-11-19 RX ADMIN — INSULIN LISPRO 2 UNITS: 100 INJECTION, SOLUTION INTRAVENOUS; SUBCUTANEOUS at 17:39

## 2019-11-19 RX ADMIN — INSULIN LISPRO 2 UNITS: 100 INJECTION, SOLUTION INTRAVENOUS; SUBCUTANEOUS at 07:29

## 2019-11-19 RX ADMIN — LISINOPRIL 10 MG: 5 TABLET ORAL at 19:52

## 2019-11-19 RX ADMIN — Medication 30 ML: at 05:51

## 2019-11-19 RX ADMIN — Medication 900 UNITS: at 05:51

## 2019-11-19 RX ADMIN — METHYLPREDNISOLONE SODIUM SUCCINATE 40 MG: 40 INJECTION, POWDER, FOR SOLUTION INTRAMUSCULAR; INTRAVENOUS at 10:40

## 2019-11-19 RX ADMIN — LISINOPRIL 10 MG: 5 TABLET ORAL at 10:40

## 2019-11-19 RX ADMIN — Medication 900 UNITS: at 15:00

## 2019-11-19 RX ADMIN — SODIUM CHLORIDE: 900 INJECTION, SOLUTION INTRAVENOUS at 09:18

## 2019-11-19 RX ADMIN — LORAZEPAM 1 MG: 1 TABLET ORAL at 10:40

## 2019-11-19 RX ADMIN — SOTALOL HYDROCHLORIDE 160 MG: 80 TABLET ORAL at 12:07

## 2019-11-19 RX ADMIN — CLINDAMYCIN PHOSPHATE 900 MG: 900 INJECTION, SOLUTION INTRAVENOUS at 08:42

## 2019-11-19 RX ADMIN — INSULIN LISPRO 2 UNITS: 100 INJECTION, SOLUTION INTRAVENOUS; SUBCUTANEOUS at 12:10

## 2019-11-19 RX ADMIN — Medication 30 ML: at 15:41

## 2019-11-19 RX ADMIN — METHYLPREDNISOLONE SODIUM SUCCINATE 40 MG: 40 INJECTION, POWDER, FOR SOLUTION INTRAMUSCULAR; INTRAVENOUS at 19:54

## 2019-11-19 RX ADMIN — FUROSEMIDE 40 MG: 10 INJECTION, SOLUTION INTRAMUSCULAR; INTRAVENOUS at 19:51

## 2019-11-19 RX ADMIN — Medication 900 UNITS: at 19:55

## 2019-11-19 RX ADMIN — Medication 10 ML: at 15:41

## 2019-11-19 RX ADMIN — Medication 10 ML: at 10:00

## 2019-11-19 RX ADMIN — SOTALOL HYDROCHLORIDE 160 MG: 80 TABLET ORAL at 19:53

## 2019-11-19 RX ADMIN — INSULIN LISPRO 8 UNITS: 100 INJECTION, SOLUTION INTRAVENOUS; SUBCUTANEOUS at 22:00

## 2019-11-19 RX ADMIN — APIXABAN 5 MG: 5 TABLET, FILM COATED ORAL at 19:52

## 2019-11-19 RX ADMIN — FUROSEMIDE 40 MG: 10 INJECTION, SOLUTION INTRAMUSCULAR; INTRAVENOUS at 10:40

## 2019-11-19 RX ADMIN — MIDAZOLAM 2 MG: 1 INJECTION INTRAMUSCULAR; INTRAVENOUS at 09:03

## 2019-11-19 RX ADMIN — LIDOCAINE HYDROCHLORIDE 20 ML: 10 INJECTION, SOLUTION INFILTRATION; PERINEURAL at 09:06

## 2019-11-19 RX ADMIN — FENTANYL CITRATE 50 MCG: 50 INJECTION, SOLUTION INTRAMUSCULAR; INTRAVENOUS at 09:03

## 2019-11-19 NOTE — PROCEDURES
Cardiac Catheterization Procedure Note pacer/loop    Patient ID:     Name: Shahid Ramos   Medical Record Number: [id number]   YOB: 1945    Date of Procedure: 11/19/2019    Physician: Wayne Lawrence MD    Referring dennis    Indications: This is a 66 yrs male who presents with symptomatic keya. Pre procedure dx keya  Post procedure dx Placement of dual chamber biotronik MRI  compatible system    Blood loss less than 5 ml    Sedation. Pt received 2 mg versed and 0 mcg fentanyl for monitored conscious sedation from 9:00 to 9:33. Specimen: None    No complications    No assistants    Time out, Mallampati, and ASA performed    Procedure: left pectoral region was cleaned and prepped in a sterile fashion. Lidocaine was used for local anesthesia. Modified Seldinger technique was used to gain access to the subclavian vein. Pacer pocket was made with sharp and blunt dissection. Sheaths were placed which allowed for placement of a dual chamber device. After appropriate placement of leads the sheaths were removed and leads were attached to the pre pectoral fascia. The pocket was flushed with antibiotic solution. The device was attached to the appropriate leads and set screws were tightened. Closure was then performored with 2.0 V Geneva and 3.0 V Geneva to close the skin. Retention suture was not used to secure pulse generator to the pectoral fascia    All counts were correct    Dressing was applied to the skin    Pulse generator was a EDORA 8 DRT serial number [id number] .      Right Atrial lead was a SOLIA S 45 serial number [id number]   Threshold na V @ na ms flutter waves   A wave 3.7mV   Impedence 585 ohms    Right Ventricular lead was a SOLIA S53 serial number [id number]    Threshold 0.5 V @ 0.4 ms   V wave 3.2 mV   [id number] ohms    Settings DDD-CLS

## 2019-11-19 NOTE — PROGRESS NOTES
Problem: Falls - Risk of  Goal: *Absence of Falls  Description  Document Donata Carrel Fall Risk and appropriate interventions in the flowsheet. Outcome: Progressing Towards Goal  Note:   Fall Risk Interventions:  Mobility Interventions: Bed/chair exit alarm, Communicate number of staff needed for ambulation/transfer, Patient to call before getting OOB, Strengthening exercises (ROM-active/passive), Utilize walker, cane, or other assistive device    Mentation Interventions: Adequate sleep, hydration, pain control, Bed/chair exit alarm, Door open when patient unattended, Evaluate medications/consider consulting pharmacy, Eyeglasses and hearing aids, Increase mobility, More frequent rounding, Toileting rounds, Update white board, Reorient patient    Medication Interventions: Bed/chair exit alarm, Evaluate medications/consider consulting pharmacy    Elimination Interventions: Bed/chair exit alarm, Call light in reach, Patient to call for help with toileting needs, Toileting schedule/hourly rounds              Problem: Patient Education: Go to Patient Education Activity  Goal: Patient/Family Education  Outcome: Progressing Towards Goal     Problem: Pressure Injury - Risk of  Goal: *Prevention of pressure injury  Description  Document Faizan Scale and appropriate interventions in the flowsheet. Outcome: Progressing Towards Goal  Note:   Pressure Injury Interventions:  Sensory Interventions: Assess changes in LOC, Assess need for specialty bed, Avoid rigorous massage over bony prominences, Check visual cues for pain, Float heels, Keep linens dry and wrinkle-free, Maintain/enhance activity level, Minimize linen layers, Monitor skin under medical devices, Pad between skin to skin, Pressure redistribution bed/mattress (bed type), Turn and reposition approx.  every two hours (pillows and wedges if needed), Use 30-degree side-lying position         Activity Interventions: Assess need for specialty bed, Increase time out of bed, Pressure redistribution bed/mattress(bed type)    Mobility Interventions: Assess need for specialty bed, Float heels, HOB 30 degrees or less, Pressure redistribution bed/mattress (bed type), Turn and reposition approx.  every two hours(pillow and wedges)    Nutrition Interventions: Document food/fluid/supplement intake, Offer support with meals,snacks and hydration    Friction and Shear Interventions: Apply protective barrier, creams and emollients, Feet elevated on foot rest, Foam dressings/transparent film/skin sealants, HOB 30 degrees or less, Lift sheet, Minimize layers                Problem: Patient Education: Go to Patient Education Activity  Goal: Patient/Family Education  Outcome: Progressing Towards Goal     Problem: Gas Exchange - Impaired  Goal: *Absence of hypoxia  Outcome: Progressing Towards Goal     Problem: Patient Education: Go to Patient Education Activity  Goal: Patient/Family Education  Outcome: Progressing Towards Goal     Problem: Nutrition Deficit  Goal: *Optimize nutritional status  Outcome: Progressing Towards Goal

## 2019-11-19 NOTE — PROGRESS NOTES
[facility] CARDIOLOGY PROGRESS NOTE           11/19/2019 10:01 AM    Admit Date: 11/10/2019         Subjective: Self extubated doing well off vent. Continues AF does have tachy keya with symptoms. His pul edema may be from AF RVR. PPM to allow more aggressive rate and rhythm control meds is a logical management step. With normal ef he does not need ICD and he will pace intermittently so I dont think he will need Bi V device. Pt and family have greed yesterday. Son wants to have another discussion today with pt. Plan for implant today    ROS:  Cardiovascular:  As noted above    Objective:      Vitals:    11/19/19 0400 11/19/19 0500 11/19/19 0550 11/19/19 0600   BP: 164/72 150/79  151/83   Pulse: 71 63  72   Resp: 18 14     Temp:       SpO2: 96% 95%  96%   Weight:   105.1 kg (231 lb 11.3 oz)    Height:           On telemetry: intermittent afib and bb      Physical Exam:  General: Well Developed, Well Nourished, No Acute Distress, Alert & Oriented x 3, Appropriate mood  Neck: supple, no JVD  Heart: S1S2 with IRRR without murmurs or gallops  Lungs: Clear throughout auscultation bilaterally without adventitious sounds  Abd: soft, nontender, nondistended, with good bowel sounds  Ext: no edema bilaterally  Skin: warm and dry      Data Review:   Recent Labs     11/19/19  0340 11/18/19  0344 11/18/19  0341     --  149*   K 3.6  --  3.7   MG  --   --  2.7*   BUN 35*  --  38*   CREA 0.96  --  1.21   *  --  204*   WBC 10.2 11.2*  --    HGB 12.9* 12.5*  --    HCT 39.5* 38.1*  --     324  --        No results for input(s): TNIPOC, TROIQ in the last 72 hours.       Assessment/Plan:     Principal Problem:    Acute pulmonary edema ([facility] [address] (11/10/2019)    Active Problems:    Chronic combined systolic and diastolic congestive heart failure ([facility] [address] (5/14/2018)      Overview: EF 45-50%       Paroxysmal A-fib (HCC) (5/14/2018)      MARISOL (obstructive sleep apnea) (11/15/2018)      Acute respiratory failure with hypoxia ([facility] (11/10/2019)      Acute heart failure ([facility] (11/10/2019)      ARDS (adult respiratory distress syndrome) ([facility] (11/13/2019)      A/P  1) Atrial tach - gets very bradycardic at times, and then get tachycardic with HR to 160s. 2) sCHF - continue lasix  3) HTN - continue ACE/amlodipine    Will discuss with EP re consideration for ppm.  Intol to long acting rate controlling agents 2/2 bradycardia.       Jay Donnelly MD  11/19/2019 10:01 AM

## 2019-11-19 NOTE — PROGRESS NOTES
TRANSFER - OUT REPORT:    Verbal report given to Michael Shi RN(name) on Quique Marking  being transferred to cath lab(unit) for ordered procedure       Report consisted of patients Situation, Background, Assessment and   Recommendations(SBAR). Information from the following report(s) SBAR, Procedure Summary, Intake/Output, MAR, Recent Results, Med Rec Status and Cardiac Rhythm afib was reviewed with the receiving nurse. Lines:   PICC Triple Lumen 49/12/65 Right;Basilic (Active)   Central Line Being Utilized Yes 11/18/2019  7:01 PM   Criteria for Appropriate Use Irritant/vesicant medication 11/18/2019  7:01 PM   Site Assessment Clean, dry, & intact 11/18/2019  7:01 PM   Phlebitis Assessment 0 11/18/2019  7:01 PM   Infiltration Assessment 0 11/18/2019  7:01 PM   Arm Circumference (cm) 32 cm 11/13/2019 12:57 PM   Date of Last Dressing Change 11/13/19 11/18/2019  4:01 PM   Dressing Status Clean, dry, & intact 11/18/2019  7:01 PM   External Catheter Length (cm) 1 centimeters 11/16/2019  5:02 AM   Dressing Type Disk with Chlorhexadine gluconate (CHG); Tape;Transparent 11/18/2019  7:01 PM   Action Taken Blood drawn 11/16/2019  3:37 PM   Hub Color/Line Status Red;Flushed;Patent 11/18/2019  7:01 PM   Positive Blood Return (Site #1) Yes 11/18/2019  7:01 PM   Hub Color/Line Status White;Flushed;Patent; Infusing 11/18/2019  7:01 PM   Positive Blood Return (Site #2) Yes 11/18/2019  7:01 PM   Hub Color/Line Status Gray;Flushed;Patent 11/18/2019  7:01 PM   Positive Blood Return (Site #3) Yes 11/18/2019  7:01 PM   Alcohol Cap Used No 11/18/2019  7:01 PM        Opportunity for questions and clarification was provided.       Patient transported with:   Monitor  O2 @ 3 liters  Registered Nurse  [facility]

## 2019-11-19 NOTE — PROGRESS NOTES
Critical Care Daily Progress Note: 11/19/2019    Chika Kelley   Admission Date: 11/10/2019         The patient's chart is reviewed and the patient is discussed with the staff. 66 y.o. CM presented with respiratory failure. He was just discharged from [place] on the day of admission. He presented there with increased SOB of cough. It was originally suspected to represent PNA but the patient improved after just 24 hours of treatment with lasix and abx and was discharged.      He was resting at home per wife and awoke suddenly very SOB with BP in the 180-190's. Cough with pink frothy sputum. Went to urgent care and was transported here via EMS. Was intubated en route and has had copious pink frothy sputum from ETT since. CXR with B infiltrates c/w edema. Pro BNP better than yesterday but still elevated. No fever or chills still reported. Then was extubated on 11/12  and early morning 11/13 re intubated again with reports of increase BP,agitation just prior to intubation. Self extubated 11/17 and placed on NC. Subjective:    Had pcm today, doing ok off O2    Current Facility-Administered Medications   Medication Dose Route Frequency    ceFAZolin (ANCEF) 2 g/20 mL in sterile water IV syringe  2 g IntraVENous ON CALL    midazolam (VERSED) injection 0.5-2 mg  0.5-2 mg IntraVENous Multiple    fentaNYL citrate (PF) injection 25-75 mcg  25-75 mcg IntraVENous Multiple    sotalol (BETAPACE) tablet 160 mg  160 mg Oral Q12H    sodium chloride (NS) flush 5-40 mL  5-40 mL IntraVENous Q8H    sodium chloride (NS) flush 5-40 mL  5-40 mL IntraVENous PRN    clindamycin (CLEOCIN) 900mg D5W 50mL IVPB (premix)  900 mg IntraVENous Q8H    LORazepam (ATIVAN) tablet 1 mg  1 mg Oral BID    methylPREDNISolone (PF) (SOLU-MEDROL) injection 40 mg  40 mg IntraVENous Q12H    ondansetron (ZOFRAN) injection 4 mg  4 mg IntraVENous Q6H PRN    promethazine (PHENERGAN) with saline injection 6.25 mg  6.25 mg IntraVENous Q4H PRN    diphenhydrAMINE (BENADRYL) capsule 50 mg  50 mg Oral QHS PRN    insulin lispro (HUMALOG) injection   SubCUTAneous AC&HS    hydrALAZINE (APRESOLINE) 20 mg/mL injection 20 mg  20 mg IntraVENous Q6H PRN    metoprolol (LOPRESSOR) injection 5 mg  5 mg IntraVENous Q6H PRN    NUTRITIONAL SUPPORT ELECTROLYTE PRN ORDERS   Does Not Apply PRN    sodium chloride (NS) flush 30 mL  30 mL InterCATHeter Q8H    heparin (porcine) pf 900 Units  900 Units InterCATHeter Q8H    sodium chloride (NS) flush 30 mL  30 mL InterCATHeter PRN    heparin (porcine) pf 900 Units  900 Units InterCATHeter PRN    lisinopril (PRINIVIL, ZESTRIL) tablet 10 mg  10 mg Oral Q12H    amLODIPine (NORVASC) tablet 5 mg  5 mg Oral DAILY    furosemide (LASIX) injection 40 mg  40 mg IntraVENous Q12H    apixaban (ELIQUIS) tablet 5 mg  5 mg Oral Q12H    sodium chloride (NS) flush 5-40 mL  5-40 mL IntraVENous PRN       Review of Systems    Constitutional:  negative for fever, chills, sweats  Cardiovascular:  negative for chest pain, palpitations, syncope, edema  Gastrointestinal:  negative for dysphagia, reflux, vomiting, diarrhea, abdominal pain, or melena  Neurologic:  negative for focal weakness, numbness, headache      Objective:     Vitals:    11/19/19 1207 11/19/19 1231 11/19/19 1306 11/19/19 1331   BP: 170/79 184/86 132/66 127/61   Pulse: 75 71 66 77   Resp: 17 22 (!) 42 17   Temp:       SpO2: 92% 91% (!) 84% 95%   Weight:       Height:             Intake/Output Summary (Last 24 hours) at 11/19/2019 1401  Last data filed at 11/19/2019 1306  Gross per 24 hour   Intake 1539.2 ml   Output 4475 ml   Net -2935.8 ml       Physical Exam:          Constitutional:  the patient is well developed and in no acute distress  EENMT:  Sclera clear, pupils equal, oral mucosa moist  Respiratory: clear  Cardiovascular:  RRR without M,G,R  Gastrointestinal: soft and non-tender; with positive bowel sounds.   Musculoskeletal: warm without cyanosis. There is no lower extremity edema. Skin:  no jaundice or rashes, no wounds   Neurologic: no gross neuro deficits     Psychiatric:  alert and oriented x 3    LINES:  peripheral    DRIPS:  none    CXR:       LAB  Recent Labs     11/19/19  1206 11/19/19  0722 11/18/19  2054 11/18/19  1732 11/18/19  1150   GLUCPOC 157* 196* 161* 204* 197*      Recent Labs     11/19/19  0340 11/18/19  0344 11/17/19  0302   WBC 10.2 11.2* 8.9   HGB 12.9* 12.5* 11.6*   HCT 39.5* 38.1* 36.2*    324 282     Recent Labs     11/19/19  0340 11/18/19  0341 11/17/19  0302    149* 151*   K 3.6 3.7 4.1    108* 115*   CO2 36* 36* 33*   * 204* 244*   BUN 35* 38* 45*   CREA 0.96 1.21 1.19   MG  --  2.7*  --    PHOS  --  4.0*  --    CA 8.2* 8.6 8.2*     Recent Labs     11/17/19  0320   PHI 7.378   PCO2I 51.9*   PO2I 362*   HCO3I 30.5*     No results for input(s): LCAD, LAC in the last 72 hours. No results for input(s): PH, PCO2, PO2, HCO3 in the last 72 hours.      Assessment:  (Medical Decision Making)     Hospital Problems  Date Reviewed: 11/10/2019          Codes Class Noted POA    ARDS (adult respiratory distress syndrome) ([facility] ICD-10-CM: J80  ICD-9-CM: 518.52  11/13/2019 Unknown    Bal + for rhinovirus    Acute respiratory failure with hypoxia ([facility] ICD-10-CM: J96.01  ICD-9-CM: 518.81  11/10/2019 Yes    Better off o2    * (Principal) Acute pulmonary edema ([facility] ICD-10-CM: J81.0  ICD-9-CM: 518.4  11/10/2019 Yes    + for rhinovirus on bal    Acute heart failure ([facility] ICD-10-CM: I50.9  ICD-9-CM: 428.9  11/10/2019 Yes        MARISOL (obstructive sleep apnea) (Chronic) ICD-10-CM: J81.76  ICD-9-CM: 327.23  11/15/2018 Yes        Chronic combined systolic and diastolic congestive heart failure (HCC) (Chronic) ICD-10-CM: I50.42  ICD-9-CM: 428.42, 428.0  5/14/2018 Yes    Overview Signed 11/9/2019  5:54 PM by Johann Macdonald MD     EF 45-50%              Paroxysmal A-fib (HCC) (Chronic) ICD-10-CM: I48.0  ICD-9-CM: 427.31  5/14/2018 Yes              Plan:  (Medical Decision Making)   1    Move to floor when ok with cards    2    Decrease solumedrol  3    Lasix- cxr now is clear  --    More than 50% of the time documented was spent in face-to-face contact with the patient and in the care of the patient on the floor/unit where the patient is located.     Sommer Galarza MD

## 2019-11-19 NOTE — PROGRESS NOTES
TRANSFER - IN REPORT:    Verbal report received from CLAUDIA Zarate(name) on Genice Rule  being received from cath lab(unit) for routine progression of care      Report consisted of patients Situation, Background, Assessment and   Recommendations(SBAR). Information from the following report(s) SBAR, Procedure Summary, Intake/Output, MAR, Recent Results, Med Rec Status and Cardiac Rhythm afib was reviewed with the receiving nurse. Opportunity for questions and clarification was provided. Assessment completed upon patients arrival to unit and care assumed. L chest site s/p PPM placement dressed and covered with aquacel. Site is soft, benign, L arm in sling.

## 2019-11-19 NOTE — PROGRESS NOTES
Verbal bedside report received from [facility]. Shift assessment completed. Patient is alert and oriented x 4. Denies pain or shortness of breath. Afebrile. A. Fib on monitor, rate 80's-90's. Hypertensive, PRN hydralazine given. 02 sat upper 90's on 3 L NC, using home CPAP with sleep. Lung sounds diminished. Abdomen semi-soft, active bowel sounds. Cancino draining hazy, yellow urine with sediment. SCDs. Family at bedside.     Lines:  RUE PICC    Drips:  Heparin @ 16 units/kg/hr    Drains:  Cancino

## 2019-11-19 NOTE — ROUTINE PROCESS
TRANSFER - OUT REPORT: 
 
DILEEP Mojica Left subclavian access Versed 2mg, fentanyl 50mcg, clindamycin 900mg Left sided dual chamber pacer Pacer settings DDDR  Pocket closed with suture Skin closed with staples and exofin Site dressed with aquacel and arm placed in sling Verbal report given to [facility] RN(name) on Chika Kelley  being transferred to ccu(unit) for routine progression of care Report consisted of patients Situation, Background, Assessment and  
Recommendations(SBAR). Information from the following report(s) Procedure Summary was reviewed with the receiving nurse. Lines: PICC Triple Lumen 58/26/75 Right;Basilic (Active) Central Line Being Utilized Yes 11/18/2019  7:01 PM  
Criteria for Appropriate Use Irritant/vesicant medication 11/18/2019  7:01 PM  
Site Assessment Clean, dry, & intact 11/18/2019  7:01 PM  
Phlebitis Assessment 0 11/18/2019  7:01 PM  
Infiltration Assessment 0 11/18/2019  7:01 PM  
Arm Circumference (cm) 32 cm 11/13/2019 12:57 PM  
Date of Last Dressing Change 11/13/19 11/18/2019  4:01 PM  
Dressing Status Clean, dry, & intact 11/18/2019  7:01 PM  
External Catheter Length (cm) 1 centimeters 11/16/2019  5:02 AM  
Dressing Type Disk with Chlorhexadine gluconate (CHG); Tape;Transparent 11/18/2019  7:01 PM  
Action Taken Blood drawn 11/16/2019  3:37 PM  
Hub Color/Line Status Red;Flushed;Patent 11/18/2019  7:01 PM  
Positive Blood Return (Site #1) Yes 11/18/2019  7:01 PM  
Hub Color/Line Status White;Flushed;Patent; Infusing 11/18/2019  7:01 PM  
Positive Blood Return (Site #2) Yes 11/18/2019  7:01 PM  
Hub Color/Line Status Gray;Flushed;Patent 11/18/2019  7:01 PM  
Positive Blood Return (Site #3) Yes 11/18/2019  7:01 PM  
Alcohol Cap Used No 11/18/2019  7:01 PM  
  
 
Opportunity for questions and clarification was provided. Patient transported with: 
 Monitor O2 @ 3 liters Registered Nurse

## 2019-11-20 ENCOUNTER — APPOINTMENT (OUTPATIENT)
Dept: GENERAL RADIOLOGY | Age: 74
DRG: 981 | End: 2019-11-20
Attending: INTERNAL MEDICINE
Payer: MEDICARE

## 2019-11-20 LAB
ANION GAP SERPL CALC-SCNC: 10 MMOL/L (ref 7–16)
ATRIAL RATE: 300 BPM
ATRIAL RATE: 312 BPM
BUN SERPL-MCNC: 36 MG/DL (ref 8–23)
C. PNEUMONAIE, CPPT: NOT DETECTED
CALCIUM SERPL-MCNC: 8.1 MG/DL (ref 8.3–10.4)
CALCULATED P AXIS, ECG09: 100 DEGREES
CALCULATED R AXIS, ECG10: -35 DEGREES
CALCULATED R AXIS, ECG10: -69 DEGREES
CALCULATED T AXIS, ECG11: 103 DEGREES
CALCULATED T AXIS, ECG11: 96 DEGREES
CHLORIDE SERPL-SCNC: 102 MMOL/L (ref 98–107)
CO2 SERPL-SCNC: 28 MMOL/L (ref 21–32)
CREAT SERPL-MCNC: 1.19 MG/DL (ref 0.8–1.5)
DIAGNOSIS, 93000: NORMAL
DIAGNOSIS, 93000: NORMAL
GLUCOSE BLD STRIP.AUTO-MCNC: 157 MG/DL (ref 65–100)
GLUCOSE BLD STRIP.AUTO-MCNC: 181 MG/DL (ref 65–100)
GLUCOSE BLD STRIP.AUTO-MCNC: 190 MG/DL (ref 65–100)
GLUCOSE BLD STRIP.AUTO-MCNC: 244 MG/DL (ref 65–100)
GLUCOSE SERPL-MCNC: 190 MG/DL (ref 65–100)
L PNEUMO DNA SPEC QL NAA+PROBE: NOT DETECTED
M. PNEUMONIAE, MPPT: NOT DETECTED
MAGNESIUM SERPL-MCNC: 2.6 MG/DL (ref 1.8–2.4)
PAN LEGIONELLA: NOT DETECTED
POTASSIUM SERPL-SCNC: 3.7 MMOL/L (ref 3.5–5.1)
Q-T INTERVAL, ECG07: 522 MS
Q-T INTERVAL, ECG07: 568 MS
QRS DURATION, ECG06: 166 MS
QRS DURATION, ECG06: 186 MS
QTC CALCULATION (BEZET), ECG08: 529 MS
QTC CALCULATION (BEZET), ECG08: 568 MS
SODIUM SERPL-SCNC: 140 MMOL/L (ref 136–145)
VENTRICULAR RATE, ECG03: 60 BPM
VENTRICULAR RATE, ECG03: 62 BPM

## 2019-11-20 PROCEDURE — 74011250637 HC RX REV CODE- 250/637: Performed by: INTERNAL MEDICINE

## 2019-11-20 PROCEDURE — 65660000000 HC RM CCU STEPDOWN

## 2019-11-20 PROCEDURE — 86580 TB INTRADERMAL TEST: CPT | Performed by: INTERNAL MEDICINE

## 2019-11-20 PROCEDURE — 99222 1ST HOSP IP/OBS MODERATE 55: CPT | Performed by: PHYSICAL MEDICINE & REHABILITATION

## 2019-11-20 PROCEDURE — 97530 THERAPEUTIC ACTIVITIES: CPT

## 2019-11-20 PROCEDURE — 83735 ASSAY OF MAGNESIUM: CPT

## 2019-11-20 PROCEDURE — 93005 ELECTROCARDIOGRAM TRACING: CPT | Performed by: NURSE PRACTITIONER

## 2019-11-20 PROCEDURE — 93005 ELECTROCARDIOGRAM TRACING: CPT | Performed by: INTERNAL MEDICINE

## 2019-11-20 PROCEDURE — 74011250636 HC RX REV CODE- 250/636: Performed by: INTERNAL MEDICINE

## 2019-11-20 PROCEDURE — 97162 PT EVAL MOD COMPLEX 30 MIN: CPT

## 2019-11-20 PROCEDURE — 80048 BASIC METABOLIC PNL TOTAL CA: CPT

## 2019-11-20 PROCEDURE — 74011636637 HC RX REV CODE- 636/637: Performed by: NURSE PRACTITIONER

## 2019-11-20 PROCEDURE — 74011000302 HC RX REV CODE- 302: Performed by: INTERNAL MEDICINE

## 2019-11-20 PROCEDURE — 36415 COLL VENOUS BLD VENIPUNCTURE: CPT

## 2019-11-20 PROCEDURE — 36600 WITHDRAWAL OF ARTERIAL BLOOD: CPT

## 2019-11-20 PROCEDURE — 71045 X-RAY EXAM CHEST 1 VIEW: CPT

## 2019-11-20 PROCEDURE — 99232 SBSQ HOSP IP/OBS MODERATE 35: CPT | Performed by: INTERNAL MEDICINE

## 2019-11-20 PROCEDURE — 82962 GLUCOSE BLOOD TEST: CPT

## 2019-11-20 RX ADMIN — FUROSEMIDE 40 MG: 10 INJECTION, SOLUTION INTRAMUSCULAR; INTRAVENOUS at 08:15

## 2019-11-20 RX ADMIN — Medication 30 ML: at 06:13

## 2019-11-20 RX ADMIN — APIXABAN 5 MG: 5 TABLET, FILM COATED ORAL at 21:44

## 2019-11-20 RX ADMIN — Medication 900 UNITS: at 22:00

## 2019-11-20 RX ADMIN — TUBERCULIN PURIFIED PROTEIN DERIVATIVE 5 UNITS: 5 INJECTION INTRADERMAL at 10:47

## 2019-11-20 RX ADMIN — CLINDAMYCIN PHOSPHATE 900 MG: 900 INJECTION, SOLUTION INTRAVENOUS at 02:00

## 2019-11-20 RX ADMIN — INSULIN LISPRO 4 UNITS: 100 INJECTION, SOLUTION INTRAVENOUS; SUBCUTANEOUS at 16:02

## 2019-11-20 RX ADMIN — INSULIN LISPRO 2 UNITS: 100 INJECTION, SOLUTION INTRAVENOUS; SUBCUTANEOUS at 10:55

## 2019-11-20 RX ADMIN — CLINDAMYCIN PHOSPHATE 900 MG: 900 INJECTION, SOLUTION INTRAVENOUS at 18:18

## 2019-11-20 RX ADMIN — METHYLPREDNISOLONE SODIUM SUCCINATE 40 MG: 40 INJECTION, POWDER, FOR SOLUTION INTRAMUSCULAR; INTRAVENOUS at 08:15

## 2019-11-20 RX ADMIN — SOTALOL HYDROCHLORIDE 160 MG: 80 TABLET ORAL at 21:43

## 2019-11-20 RX ADMIN — Medication 900 UNITS: at 13:39

## 2019-11-20 RX ADMIN — INSULIN LISPRO 2 UNITS: 100 INJECTION, SOLUTION INTRAVENOUS; SUBCUTANEOUS at 08:16

## 2019-11-20 RX ADMIN — Medication 30 ML: at 13:38

## 2019-11-20 RX ADMIN — LISINOPRIL 10 MG: 5 TABLET ORAL at 21:44

## 2019-11-20 RX ADMIN — Medication 900 UNITS: at 06:13

## 2019-11-20 RX ADMIN — LISINOPRIL 10 MG: 5 TABLET ORAL at 08:15

## 2019-11-20 RX ADMIN — APIXABAN 5 MG: 5 TABLET, FILM COATED ORAL at 08:15

## 2019-11-20 RX ADMIN — SOTALOL HYDROCHLORIDE 160 MG: 80 TABLET ORAL at 08:53

## 2019-11-20 RX ADMIN — AMLODIPINE BESYLATE 5 MG: 5 TABLET ORAL at 08:15

## 2019-11-20 RX ADMIN — CLINDAMYCIN PHOSPHATE 900 MG: 900 INJECTION, SOLUTION INTRAVENOUS at 10:20

## 2019-11-20 NOTE — PROGRESS NOTES
Bedside shift change report given to [address] (oncoming nurse) by Donald Newby RN (offgoing nurse). Report included the following information Kardex, Intake/Output, MAR, Recent Results, Med Rec Status and Cardiac Rhythm Paced PACs. Alert and oriented on room air. Tolerating diet. Cancino patent. Right arm PICC, capped. Mr. Evgeny Whitfield states,\"I've had a good day. I am just really weak and can hardly walk. \"  No DTIs/No pressure ulcers observed. Left upper chest PPM incision site with dressing that was placed at time of procedure. Dressing is clean dry and intact with sling present to Left arm. Pain 2 on 1-10 pain scale.

## 2019-11-20 NOTE — CONSULTS
PM&R Rehab Consult    Subjective:     Date of Consultation:  November 20, 2019    Referring Physician: Dr Angelo Moore    Patient is a 66 y.o. male who is being seen for rehab recommendations due to physical debility due to prolonged bedridden status s/p acute respiratory failure secondary to CHF exac and acute pulm edema    Principal Problem:    Acute pulmonary edema ([facility] (11/10/2019)    Active Problems:    Chronic combined systolic and diastolic congestive heart failure ([address] (5/14/2018)      Overview: EF 45-50%       Paroxysmal A-fib ([address] (5/14/2018)      MARISOL (obstructive sleep apnea) (11/15/2018)      Acute respiratory failure with hypoxia ([facility] [address] (11/10/2019)      Acute heart failure ([address] (11/10/2019)      ARDS (adult respiratory distress syndrome) ([facility] [address] (11/13/2019)    HPI: Mr Abbie Espana is a previously functionally independent 72yo male with a PMH of PAFm MARISOL, HTN, prostate CA, and MVP who presented to [facility] shortly after dc from [address] [place] [address] earlier that day where he had presented with inc sob and cough. He originally was suspected to have community acquired PNA that improved with 24hrs of IV lasix and abx. However, after discharging home, his wife reports that he was sleeping and awoke suddenly very SOB with BP in the 180-190's. Cough with pink frothy sputum. Went to urgent care and was transported here via EMS. Was intubated en route and has had copious pink frothy sputum from ETT since. CXR with B infiltrates c/w edema. Admission date 11/10 to ICU. He extubated the following day but reintub the next morning, 11/13, with reports of increase BP,agitation just prior to intubation.    Self extubated 11/17 and placed on NC.  he has diuresed 35lbs since admission. 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 It was felt that his pulm edema could have been caused by Afib with RVR. ECHO showed a nl EF, thus ICD not needed. PPM placed 11/19. CXR now clear. weaning steroids.  Pro BNP 2490 We are consulted due to resulted physical debility. Pt is wanting to go home but has multiple steps to get into home and has a 3 story home. His common law wife is very concerned about bringing him home. He was seen by PT today, for the first time after a 10d hospitalization. Currently, requiring min assist with bed mobility, STS mod assist ambulating 3 ft with RW with mod assist. Wearing LUE sling post PPM placement. Has not been seen by OT  Past Medical History:   Diagnosis Date    Arrhythmia     Cancer ([facility] [facility]     basal cell    Elevated PSA     Erectile dysfunction     Hypertension     daily meds    MVP (mitral valve prolapse)     diagnosed many years ago, pt states does not see cardiologist, in youth    MARISOL (obstructive sleep apnea) 11/15/2018    Osteoarthritis     thumbs    Prostate cancer ([facility] [address] [facility]     S/P colonoscopy     due in       Family History   Problem Relation Age of Onset    Cancer Father         prostate    Stroke Father     Cancer Paternal Uncle     Heart Disease Mother     Heart Attack Maternal Uncle     Prostate Cancer Sister       Social History     Tobacco Use    Smoking status: Former Smoker     Last attempt to quit: 1997     Years since quittin.2    Smokeless tobacco: Never Used   Substance Use Topics    Alcohol use: Yes     Comment: 15 drinks per wk   Home Environment: Private residence  # Steps to Enter: 8  Rails to Enter: Yes  [employer] : Right  One/Two Story Residence: (three story)  # of Interior Steps: 25  Interior Rails: Right  Lift Chair Available: No  Living Alone: No  Support Systems: Spouse/Significant Other/Partner  Patient Expects to be Discharged to[de-identified] Unknown  Current DME Used/Available at Home: CPAP  Tub or Shower Type: Shower  Prior Level of Function/Work/Activity:  He lives with his \"common law wife,\" in a 3 story home and typically ambulates and performs ADLs independently.  He reports no falls in the past 6 months and has experienced a prolonged hospitalization this admission with x2 intubations  Past Surgical History:   Procedure Laterality Date    BIOPSY PROSTATE      HX APPENDECTOMY        Prior to Admission medications    Medication Sig Start Date End Date Taking? Authorizing Provider   cloNIDine HCl (CATAPRES) 0.1 mg tablet Take 0.1 mg by mouth four (4) times daily. Indications: high blood pressure    Provider, Historical   amLODIPine (NORVASC) 10 mg tablet Take 10 mg by mouth daily. Provider, Historical   apixaban (ELIQUIS) 5 mg tablet Take 1 Tab by mouth two (2) times a day. 10/7/19   Jose Mathis MD   furosemide (LASIX) 40 mg tablet Take 1 Tab by mouth two (2) times a day. 9/16/19   Kaylen Gray MD   cloNIDine HCl (CATAPRES) 0.2 mg tablet Take 1 Tab by mouth two (2) times a day. 6/26/19   Jose Mathis MD   olmesartan (BENICAR) 40 mg tablet Take 1 Tab by mouth daily. am 2/11/19   Jose Mathis MD   carvedilol (COREG) 6.25 mg tablet Take 1 Tab by mouth two (2) times daily (with meals). 12/12/18   Jose Mathis MD   miscellaneous medical supply misc by Does Not Apply route. Provider, Historical   Blood Pressure Kit-Extra Large kit Use to check blood pressure. 11/28/18   Jose Mathis MD   DISABLED PLACARD (DISABLED PLACARD) DMV Apply to car 6/29/18   Jose Mathis MD   B infantis/B ani/B taco/B bifid (PROBIOTIC 4X PO) Take 1 Tab by mouth as needed. Provider, Historical     Allergies   Allergen Reactions    Amoxicillin Swelling     Diff breathing, lip swelling    Levaquin [Levofloxacin] Shortness of Breath    Zithromax [Azithromycin] Shortness of Breath      Review of Systems   Constitution: Positive for malaise/fatigue. Negative for chills, diaphoresis, fever, weight gain and weight loss. HENT: Negative. Eyes: Negative. Cardiovascular: Positive for dyspnea on exertion.  Negative for chest pain ,claudication, cyanosis, irregular heartbeat, leg swelling, near-syncope, orthopnea, palpitations, paroxysmal nocturnal dyspnea and syncope. Respiratory: Negative for cough, shortness of breath and wheezing. Endocrine: Negative. Skin: Negative. Musculoskeletal: Negative. Gastrointestinal: Negative for nausea and vomiting. Genitourinary: Negative. Neurological: + for dizziness with mobilization oob  Psychiatric/Behavioral: Negative. Allergic/Immunologic: Negative. Objective:     Vitals:  Blood pressure 102/64, pulse 64, temperature 97.6 °F (36.4 °C), resp. rate 18, height 6' 3\" (1.905 m), weight 210 lb 14.4 oz (95.7 kg), SpO2 95 %. Temp (24hrs), Av °F (36.7 °C), Min:97.6 °F (36.4 °C), Max:98.6 °F (37 °C)      Intake and Output:   1901 -  0700  In: 1434.7 [P.O.:1080; I.V.:354.7]  Out: 8544 [Urine:5175]    Physical Exam:  General:  Alert, oriented and mood affect appropriate   Lungs:   Clear to auscultation bilaterally. Dec bases   Heart:  Regular rate and rhythm, S1, S2 stable, no murmur, click, rub or gallop. No JVD, no bruits   Abdomen:   Soft, non-tender. Bowel sounds present. No masses,  No organomegaly. Genitourinary: deferred   Neuro Muscular: Generalized prox > distal weakness  No lateralizing symptoms  LUE prox limited by PPM prec/sling   Skin:  No rashes, lesions, or signs/symptoms or infection.        Labs/Studies:  Recent Results (from the past 72 hour(s))   GLUCOSE, POC    Collection Time: 19  6:47 PM   Result Value Ref Range    Glucose (POC) 261 (H) 65 - 100 mg/dL   GLUCOSE, POC    Collection Time: 19 12:01 AM   Result Value Ref Range    Glucose (POC) 203 (H) 65 - 987 mg/dL   METABOLIC PANEL, BASIC    Collection Time: 19  3:41 AM   Result Value Ref Range    Sodium 149 (H) 136 - 145 mmol/L    Potassium 3.7 3.5 - 5.1 mmol/L    Chloride 108 (H) 98 - 107 mmol/L    CO2 36 (H) 21 - 32 mmol/L    Anion gap 5 (L) 7 - 16 mmol/L    Glucose 204 (H) 65 - 100 mg/dL    BUN 38 (H) 8 - 23 MG/DL    Creatinine 1.21 0.8 - 1.5 MG/DL    GFR est AA >60 >60 ml/min/1.73m2 GFR est non-AA >60 >60 ml/min/1.73m2    Calcium 8.6 8.3 - 10.4 MG/DL   PTT    Collection Time: 11/18/19  3:41 AM   Result Value Ref Range    aPTT 87.5 (H) 24.7 - 39.8 SEC   NT-PRO BNP    Collection Time: 11/18/19  3:41 AM   Result Value Ref Range    NT pro-BNP 2,490 (H) <450 PG/ML   MAGNESIUM    Collection Time: 11/18/19  3:41 AM   Result Value Ref Range    Magnesium 2.7 (H) 1.8 - 2.4 mg/dL   PHOSPHORUS    Collection Time: 11/18/19  3:41 AM   Result Value Ref Range    Phosphorus 4.0 (H) 2.3 - 3.7 MG/DL   CBC W/O DIFF    Collection Time: 11/18/19  3:44 AM   Result Value Ref Range    WBC 11.2 (H) 4.3 - 11.1 K/uL    RBC 4.01 (L) 4.23 - 5.6 M/uL    HGB 12.5 (L) 13.6 - 17.2 g/dL    HCT 38.1 (L) 41.1 - 50.3 %    MCV 95.0 79.6 - 97.8 FL    MCH 31.2 26.1 - 32.9 PG    MCHC 32.8 31.4 - 35.0 g/dL    RDW 13.2 11.9 - 14.6 %    PLATELET 739 958 - 801 K/uL    MPV 9.7 9.4 - 12.3 FL    ABSOLUTE NRBC 0.00 0.0 - 0.2 K/uL   GLUCOSE, POC    Collection Time: 11/18/19  7:34 AM   Result Value Ref Range    Glucose (POC) 202 (H) 65 - 100 mg/dL   GLUCOSE, POC    Collection Time: 11/18/19 11:50 AM   Result Value Ref Range    Glucose (POC) 197 (H) 65 - 100 mg/dL   GLUCOSE, POC    Collection Time: 11/18/19  5:32 PM   Result Value Ref Range    Glucose (POC) 204 (H) 65 - 100 mg/dL   GLUCOSE, POC    Collection Time: 11/18/19  8:54 PM   Result Value Ref Range    Glucose (POC) 161 (H) 65 - 367 mg/dL   METABOLIC PANEL, BASIC    Collection Time: 11/19/19  3:40 AM   Result Value Ref Range    Sodium 145 136 - 145 mmol/L    Potassium 3.6 3.5 - 5.1 mmol/L    Chloride 104 98 - 107 mmol/L    CO2 36 (H) 21 - 32 mmol/L    Anion gap 5 (L) 7 - 16 mmol/L    Glucose 230 (H) 65 - 100 mg/dL    BUN 35 (H) 8 - 23 MG/DL    Creatinine 0.96 0.8 - 1.5 MG/DL    GFR est AA >60 >60 ml/min/1.73m2    GFR est non-AA >60 >60 ml/min/1.73m2    Calcium 8.2 (L) 8.3 - 10.4 MG/DL   CBC W/O DIFF    Collection Time: 11/19/19  3:40 AM   Result Value Ref Range    WBC 10.2 4.3 - 11.1 K/uL RBC 4.25 4.23 - 5.6 M/uL    HGB 12.9 (L) 13.6 - 17.2 g/dL    HCT 39.5 (L) 41.1 - 50.3 %    MCV 92.9 79.6 - 97.8 FL    MCH 30.4 26.1 - 32.9 PG    MCHC 32.7 31.4 - 35.0 g/dL    RDW 12.9 11.9 - 14.6 %    PLATELET 125 424 - 129 K/uL    MPV 9.9 9.4 - 12.3 FL    ABSOLUTE NRBC 0.00 0.0 - 0.2 K/uL   PTT    Collection Time: 11/19/19  3:40 AM   Result Value Ref Range    aPTT 94.4 (H) 24.7 - 39.8 SEC   GLUCOSE, POC    Collection Time: 11/19/19  7:22 AM   Result Value Ref Range    Glucose (POC) 196 (H) 65 - 100 mg/dL   EKG, 12 LEAD, INITIAL    Collection Time: 11/19/19 10:14 AM   Result Value Ref Range    Ventricular Rate 73 BPM    Atrial Rate 416 BPM    QRS Duration 182 ms    Q-T Interval 470 ms    QTC Calculation (Bezet) 517 ms    Calculated R Axis 15 degrees    Calculated T Axis 79 degrees    Diagnosis       Atrial fibrillation with occasional ventricular-paced complexes and with   premature ventricular or aberrantly conducted  complexes  Left bundle branch block  Abnormal ECG  When compared with ECG of 12-NOV-2019 15:33,  Electronic ventricular pacemaker has replaced Sinus rhythm  Confirmed by CLEMENTINA YOUSSEF (), Raquel Day ([id number]) on 11/19/2019 12:39:28 PM     GLUCOSE, POC    Collection Time: 11/19/19 12:06 PM   Result Value Ref Range    Glucose (POC) 157 (H) 65 - 100 mg/dL   GLUCOSE, POC    Collection Time: 11/19/19  5:32 PM   Result Value Ref Range    Glucose (POC) 196 (H) 65 - 100 mg/dL   GLUCOSE, POC    Collection Time: 11/19/19 10:56 PM   Result Value Ref Range    Glucose (POC) 302 (H) 65 - 820 mg/dL   METABOLIC PANEL, BASIC    Collection Time: 11/20/19  7:08 AM   Result Value Ref Range    Sodium 140 136 - 145 mmol/L    Potassium 3.7 3.5 - 5.1 mmol/L    Chloride 102 98 - 107 mmol/L    CO2 28 21 - 32 mmol/L    Anion gap 10 7 - 16 mmol/L    Glucose 190 (H) 65 - 100 mg/dL    BUN 36 (H) 8 - 23 MG/DL    Creatinine 1.19 0.8 - 1.5 MG/DL    GFR est AA >60 >60 ml/min/1.73m2    GFR est non-AA >60 >60 ml/min/1.73m2    Calcium 8.1 (L) 8.3 - 10.4 MG/DL   EKG, 12 LEAD, SUBSEQUENT    Collection Time: 11/20/19  7:19 AM   Result Value Ref Range    Ventricular Rate 60 BPM    Atrial Rate 300 BPM    QRS Duration 186 ms    Q-T Interval 568 ms    QTC Calculation (Bezet) 568 ms    Calculated R Axis -69 degrees    Calculated T Axis 96 degrees    Diagnosis       Ventricular-paced rhythm with occasional Premature ventricular complexes  Abnormal ECG  When compared with ECG of 19-NOV-2019 10:14,  Premature ventricular complexes are now Present  Vent.  rate has decreased BY  13 BPM  Confirmed by [facility]  MD (), ELENA LEE ([id number]) on 11/20/2019 9:19:57 AM     GLUCOSE, POC    Collection Time: 11/20/19  7:47 AM   Result Value Ref Range    Glucose (POC) 181 (H) 65 - 100 mg/dL   MAGNESIUM    Collection Time: 11/20/19  8:24 AM   Result Value Ref Range    Magnesium 2.6 (H) 1.8 - 2.4 mg/dL   GLUCOSE, POC    Collection Time: 11/20/19 10:54 AM   Result Value Ref Range    Glucose (POC) 190 (H) 65 - 100 mg/dL   EKG, 12 LEAD, INITIAL    Collection Time: 11/20/19 10:56 AM   Result Value Ref Range    Ventricular Rate 62 BPM    Atrial Rate 312 BPM    QRS Duration 166 ms    Q-T Interval 522 ms    QTC Calculation (Bezet) 529 ms    Calculated P Axis 100 degrees    Calculated R Axis -35 degrees    Calculated T Axis 103 degrees    Diagnosis       Atrial flutter with variable A-V block with frequent ventricular-paced   complexes  Left axis deviation  Left bundle branch block  Abnormal ECG    Confirmed by Juancho Carrion MD (), Junior Vasquez ([id number]) on 11/20/2019 11:10:30 AM           Functional Assessment:  Functional Assessment  Fall in Past 12 Months: No  Decline in Gait/Transfer/Balance: No  Decline in Capacity to Feed/Dress/Bathe: No  Developmental Delay: No  Chewing/Swallowing Problems: No  Difficulty with Secretions: No  Speech Slurred/Thick/Garbled: No     Henriquez Score:        Speech Assessment:    Dysphagia Screening  Vocal Quality/Secretions: Normal  History of Dysphagia: No  O2 Saturation: Normal  Alertness: Normal  Pre-Swallow Assessment Score: 0  Purees: No difficulty noted  Water by Cup: No difficulty noted  Water by Straw: No difficulty noted                Ambulation:  Activity and Safety  Activity Level: Up with Assistance  Ambulate: No (Comment)  Elsie's Egress Test: Fail  Activity: In bed  Activity Assistance: Partial (one person)  Weight Bearing Status: WBAT (Weight Bearing as Tolerated)  NWB (Non Weight Bearing extremities: LUE (Left Upper Extremities)  Mode of Transportation: Stretcher, IV equipment  Repositioned: Head of bed elevated (degrees)  Patient Turned: Turns self  Head of Bed Elevated: Self regulated  Distance Ambulated (ft): 0  Activity Response: Dyspnea on exertion  Assistive Device: Sling  Safety Measures: Bed/Chair alarm on, Bed/Chair-Wheels locked, Bed in low position, Call light within reach, Fall prevention (comment), Gripper socks, Side rails X2  Venipuncture/BP Precautions: Venipuncture/BP precautions LUE     Impression/Plan:     Principal Problem:    Acute pulmonary edema (HCC) (11/10/2019)    Active Problems:    Chronic combined systolic and diastolic congestive heart failure (HCC) (5/14/2018)      Overview: EF 45-50%       Paroxysmal A-fib (HCC) (5/14/2018)      MARISOL (obstructive sleep apnea) (11/15/2018)      Acute respiratory failure with hypoxia ([facility] [address] (11/10/2019)      Acute heart failure ([facility] [address] (11/10/2019)      ARDS (adult respiratory distress syndrome) ([facility] [address] (11/13/2019)    Physical Debility s/p ARDS/respiratory failure/pulm edema/CHF    Recommendations: Continue Acute Rehab Program  Coordination of rehab/medical care  Counseling of PM & R care issues management  Monitoring and management of medical conditions per plan of care/orders   -per PT notes, pt would benefit from ongoing therapy . He is resistant to inpt at [facility]. I suspect, based on premorbid level of functional independence, he will due well. Just needs to build his endurance.  Cannot make IRC determination under Medicare without OT evaluation, thus  , will order today.   -will f/u with pt and family tomorrow with possible IRC adm vs home with [facility] 11/22, Friday. Pt would like to see how he does with therapies tomorrow.    Discussion with Family/Caregiver/Staff  Reviewed Therapies/Labs/Meds/Records  Angi Schneider MD

## 2019-11-20 NOTE — PROGRESS NOTES
Critical Care Outreach Nurse Progress Report:    Subjective: In to assess pt secondary to recent transfer from unit. MEWS Score: 1 (11/20/19 9164)    Vitals:    11/20/19 0535 11/20/19 6850 11/20/19 0815 11/20/19 0819   BP: 120/80  139/71 139/71   Pulse: 62  60 63   Resp: 19   18   Temp: 97.8 °F (36.6 °C)   97.9 °F (36.6 °C)   SpO2: 93%   92%   Weight:  95.7 kg (210 lb 14.4 oz)     Height:  6' 3\" (1.905 m)          Objective: Patient sitting up in bed eating lunch. Pain Intensity 1: 0 (11/20/19 0942)  Pain Location 1: Chest(ppm incision site)  Pain Intervention(s) 1: Declines  Patient Stated Pain Goal: 0    Assessment: Patient Alert and oriented. Eating lunch. States his breathing has improved overall. Working with physical therapy and may need rehab for discharge. Complaining of lack of sleep. Plan: Will continue to follow up per outreach protocol.

## 2019-11-20 NOTE — ROUTINE PROCESS
CHF teaching started post introduction to pt.; aware of diagnosis. Planner/scale (HOME)@ BS and will follow. Smoking/ ETOH/Illicit drug use cessation and maintain a healthy weight covered. Pt. aware that I can not prescribe nor adjust  medications: 15mins Palliative Care score: ACP on file Start 2L/D Fluid restriction/ cardiac diet CHF teaching continues to pt/family. Emphasis on taking prescription meds as ordered, to keep F/U appts and to call MD STAT if any of the following occur: 
?  short of breath. Pt. verbalizes understanding, will follow to reinforce teaching skills: 20 mins

## 2019-11-20 NOTE — PROGRESS NOTES
Critical Care Outreach Nurse Progress Report:    Subjective: In to assess pt secondary to transfer from CCU    MEWS Score: 1 (11/19/19 1901)    Vitals:    11/19/19 2131 11/19/19 2201 11/19/19 2231 11/19/19 2252   BP: 134/62 113/58 123/61    Pulse: (!) 59 61 (!) 59 60   Resp: 12 18 15 (!) 33   Temp:    97.8 °F (36.6 °C)   SpO2: 94% 95% 94% 95%   Weight:       Height:            LAB DATA:    Recent Labs     11/19/19  0340 11/18/19 0341 11/17/19  0302    149* 151*   K 3.6 3.7 4.1    108* 115*   CO2 36* 36* 33*   AGAP 5* 5* 3*   * 204* 244*   BUN 35* 38* 45*   CREA 0.96 1.21 1.19   GFRAA >60 >60 >60   GFRNA >60 >60 >60   CA 8.2* 8.6 8.2*   MG  --  2.7*  --    PHOS  --  4.0*  --         Recent Labs     11/19/19 0340 11/18/19 0344 11/17/19  0302   WBC 10.2 11.2* 8.9   HGB 12.9* 12.5* 11.6*   HCT 39.5* 38.1* 36.2*    324 282        Objective: Patient in room alert    Pain Intensity 1: 2 (11/19/19 1901)  Pain Location 1: Chest(ppm incision site)  Pain Intervention(s) 1: Declines  Patient Stated Pain Goal: 2    Assessment: Patient admit w/ acute pulm edema requiring intubation. Patient currently weaned to RA w/ Cpap at night. Per pacer placed today. Site c.d.i. w/ sling in place. No other needs at this time.     Plan: Follow per outreach protocol

## 2019-11-20 NOTE — PROGRESS NOTES
Bedside and Verbal shift change report given to Douglas Carson RN (oncoming nurse) by self Inez gilbert). Report included the following information SBAR, Kardex and MAR. RN assumed care of patient.

## 2019-11-20 NOTE — PROGRESS NOTES
Bedside and Verbal shift change report given to self (oncoming nurse) by Diane Hutchins (offgoing nurse). Report included the following information SBAR, Kardex, Intake/Output and MAR.

## 2019-11-20 NOTE — PROGRESS NOTES
Bedside shift change report given to Henrietta Marsh RN (oncoming nurse) by Tyler Heard (offgoing nurse). Report included the following information Kardex, Intake/Output, MAR, Recent Results, Med Rec Status and Cardiac Rhythm Paced PACs. Alert and oriented x4 on room air. Home CPAP. Left upper chest insertion site dressing is clean dry and intact with sling in place. Cancino present. Lasix IV and 2 liter fluid restriction. No DTIs/No pressure ulcers observed. Personal belongings with patient to room 319. Caterina(girlfriend) states,\"I don't need to be called with his new room number tonight. I will come up in the morning and find out where he is at. \"

## 2019-11-20 NOTE — PROGRESS NOTES
am  11/20/2019 6:37 AM    Admit Date: 11/10/2019    Admit Diagnosis: Acute pulmonary edema ([facility] [address] [J81.0]      Subjective:    Patient with DDD pacer placement. Has underlying PAF a flutter. Has been intubated from resp failure. Pul edema thought to be acute diastolic heart failure brought on by PAF and RVR. Plan is PPM sotalol load and cardioversion prior to discharge.  Would need armando as he has missed some [address] for PPM    Objective:      Visit Vitals  /80 (BP 1 Location: Left arm, BP Patient Position: At rest)   Pulse 62   Temp 97.8 °F (36.6 °C)   Resp 19   Ht 6' 3\" (1.905 m)   Wt 95.7 kg (210 lb 14.4 oz)   SpO2 93%   BMI 26.36 kg/m²       ROS:  General ROS: negative for - chills  Hematological and Lymphatic ROS: negative for - blood clots or jaundice  Respiratory ROS: no cough, shortness of breath, or wheezing  Cardiovascular ROS: no chest pain or dyspnea on exertion  Gastrointestinal ROS: no abdominal pain, change in bowel habits, or black or bloody stools  Neurological ROS: no TIA or stroke symptoms    Physical Exam:    Physical Examination: General appearance - alert, well appearing, and in no distress  Mental status - alert, oriented to person, place, and time  Eyes - pupils equal and reactive, extraocular eye movements intact  Neck/lymph - supple, no significant adenopathy  Chest/CV - clear to auscultation, no wheezes, rales or rhonchi, symmetric air entry  Heart - irregularly irregular rhythm with rate 60  Abdomen/GI - soft, nontender, nondistended, no masses or organomegaly  Musculoskeletal - no joint tenderness, deformity or swelling  Extremities - peripheral pulses normal, no pedal edema, no clubbing or cyanosis  Skin - normal coloration and turgor, no rashes, no suspicious skin lesions noted    Current Facility-Administered Medications   Medication Dose Route Frequency    ceFAZolin (ANCEF) 2 g/20 mL in sterile water IV syringe  2 g IntraVENous ON CALL    sotalol (BETAPACE) tablet 160 mg  160 mg Oral Q12H    clindamycin (CLEOCIN) 900mg D5W 50mL IVPB (premix)  900 mg IntraVENous Q8H    LORazepam (ATIVAN) tablet 1 mg  1 mg Oral Q6H PRN    methylPREDNISolone (PF) (SOLU-MEDROL) injection 40 mg  40 mg IntraVENous Q12H    ondansetron (ZOFRAN) injection 4 mg  4 mg IntraVENous Q6H PRN    promethazine (PHENERGAN) with saline injection 6.25 mg  6.25 mg IntraVENous Q4H PRN    diphenhydrAMINE (BENADRYL) capsule 50 mg  50 mg Oral QHS PRN    insulin lispro (HUMALOG) injection   SubCUTAneous AC&HS    hydrALAZINE (APRESOLINE) 20 mg/mL injection 20 mg  20 mg IntraVENous Q6H PRN    metoprolol (LOPRESSOR) injection 5 mg  5 mg IntraVENous Q6H PRN    NUTRITIONAL SUPPORT ELECTROLYTE PRN ORDERS   Does Not Apply PRN    sodium chloride (NS) flush 30 mL  30 mL InterCATHeter Q8H    heparin (porcine) pf 900 Units  900 Units InterCATHeter Q8H    sodium chloride (NS) flush 30 mL  30 mL InterCATHeter PRN    heparin (porcine) pf 900 Units  900 Units InterCATHeter PRN    lisinopril (PRINIVIL, ZESTRIL) tablet 10 mg  10 mg Oral Q12H    amLODIPine (NORVASC) tablet 5 mg  5 mg Oral DAILY    furosemide (LASIX) injection 40 mg  40 mg IntraVENous Q12H    apixaban (ELIQUIS) tablet 5 mg  5 mg Oral Q12H    sodium chloride (NS) flush 5-40 mL  5-40 mL IntraVENous PRN       Data Review:   @LABRCNT(Na,K,BUN,CREA,WBC,HGB,HCT,PLT,INR,TRP,TCHOL*,Triglyceride*,LDL*,LDLCPOC HDL*,HDL])@    TELEMETRY: AF paced    Assessment/Plan:     Principal Problem:    Acute pulmonary edema (HCC) (11/10/2019)    Active Problems:    Chronic combined systolic and diastolic congestive heart failure (HCC) (5/14/2018)      Overview: EF 45-50% beta pace and lisinopril. He currently is not on coreg or toprol xl so we can utilize sotalol      Paroxysmal A-fib ([address] (5/14/2018) seems to do better when hs is in nsr.  He has had prior cardioversions and has been able to be maintained in nsr in outpatient setting      MARISOL (obstructive sleep apnea) (11/15/2018)      Acute respiratory failure with hypoxia ([facility] [address] (11/10/2019)      Acute heart failure ([facility] [address] (11/10/2019)      ARDS (adult respiratory distress syndrome) (HCC) (11/13/2019)    Cont sotalol load and if he does not convert to nsr on his own then armando eCV prior to discharge      Farheen Cornelius MD

## 2019-11-20 NOTE — PROGRESS NOTES
Critical Care Daily Progress Note: 11/20/2019    Natasha Bergman   Admission Date: 11/10/2019         The patient's chart is reviewed and the patient is discussed with the staff. 66 y.o. CM presented with respiratory failure. He was just discharged from [place] on the day of admission. He presented there with increased SOB of cough. It was originally suspected to represent PNA but the patient improved after just 24 hours of treatment with lasix and abx and was discharged.      He was resting at home per wife and awoke suddenly very SOB with BP in the 180-190's. Cough with pink frothy sputum. Went to urgent care and was transported here via EMS. Was intubated en route and has had copious pink frothy sputum from ETT since. CXR with B infiltrates c/w edema. Pro BNP better than yesterday but still elevated. No fever or chills still reported. Then was extubated on 11/12  and early morning 11/13 re intubated again with reports of increase BP,agitation just prior to intubation. Self extubated 11/17 and placed on NC. Subjective: On RA. Down 35lbs since admission. PCM Yesterday. Still sore. Glucose has been high. Bronch with only rhinivirus findings. Otehrwise unremarkable.      Current Facility-Administered Medications   Medication Dose Route Frequency    ELECTROLYTE REPLACEMENT PROTOCOL - Potassium  1 Each Other PRN    [START ON 11/21/2019] MAGNESIUM ELECTROLYTE REPLACEMENT PROTOCOL  1 Each Other Daily    tuberculin injection 5 Units  5 Units IntraDERMal ONCE    ceFAZolin (ANCEF) 2 g/20 mL in sterile water IV syringe  2 g IntraVENous ON CALL    sotalol (BETAPACE) tablet 160 mg  160 mg Oral Q12H    clindamycin (CLEOCIN) 900mg D5W 50mL IVPB (premix)  900 mg IntraVENous Q8H    LORazepam (ATIVAN) tablet 1 mg  1 mg Oral Q6H PRN    methylPREDNISolone (PF) (SOLU-MEDROL) injection 40 mg  40 mg IntraVENous Q12H    ondansetron (ZOFRAN) injection 4 mg  4 mg IntraVENous Q6H PRN    promethazine (PHENERGAN) with saline injection 6.25 mg  6.25 mg IntraVENous Q4H PRN    diphenhydrAMINE (BENADRYL) capsule 50 mg  50 mg Oral QHS PRN    insulin lispro (HUMALOG) injection   SubCUTAneous AC&HS    hydrALAZINE (APRESOLINE) 20 mg/mL injection 20 mg  20 mg IntraVENous Q6H PRN    metoprolol (LOPRESSOR) injection 5 mg  5 mg IntraVENous Q6H PRN    NUTRITIONAL SUPPORT ELECTROLYTE PRN ORDERS   Does Not Apply PRN    sodium chloride (NS) flush 30 mL  30 mL InterCATHeter Q8H    heparin (porcine) pf 900 Units  900 Units InterCATHeter Q8H    sodium chloride (NS) flush 30 mL  30 mL InterCATHeter PRN    heparin (porcine) pf 900 Units  900 Units InterCATHeter PRN    lisinopril (PRINIVIL, ZESTRIL) tablet 10 mg  10 mg Oral Q12H    amLODIPine (NORVASC) tablet 5 mg  5 mg Oral DAILY    furosemide (LASIX) injection 40 mg  40 mg IntraVENous Q12H    apixaban (ELIQUIS) tablet 5 mg  5 mg Oral Q12H    sodium chloride (NS) flush 5-40 mL  5-40 mL IntraVENous PRN     Review of Systems  Constitutional:  negative for fever, chills, sweats  Cardiovascular:  negative for chest pain, palpitations, syncope, edema  Gastrointestinal:  negative for dysphagia, reflux, vomiting, diarrhea, abdominal pain, or melena  Neurologic:  negative for focal weakness, numbness, headache    Objective:     Vitals:    11/20/19 0535 11/20/19 0632 11/20/19 0815 11/20/19 0819   BP: 120/80  139/71 139/71   Pulse: 62  60 63   Resp: 19   18   Temp: 97.8 °F (36.6 °C)   97.9 °F (36.6 °C)   SpO2: 93%   92%   Weight:  210 lb 14.4 oz (95.7 kg)     Height:  6' 3\" (1.905 m)         Intake/Output Summary (Last 24 hours) at 11/20/2019 1128  Last data filed at 11/20/2019 6632  Gross per 24 hour   Intake 840 ml   Output 3450 ml   Net -2610 ml     Physical Exam:          Constitutional:  the patient is well developed and in no acute distress  EENMT:  Sclera clear, pupils equal, oral mucosa moist  Respiratory: clear, RA  Cardiovascular:  RRR without M,G,R  Gastrointestinal: soft and non-tender; with positive bowel sounds. Musculoskeletal: warm without cyanosis. There is no lower extremity edema. Skin:  no jaundice or rashes, no wounds   Neurologic: no gross neuro deficits     Psychiatric:  alert and oriented x 3    LINES:  peripheral    DRIPS:  none    CXR: 11/20      LAB  Recent Labs     11/20/19  1054 11/20/19  0747 11/19/19  2256 11/19/19  1732 11/19/19  1206   GLUCPOC 190* 181* 302* 196* 157*      Recent Labs     11/19/19  0340 11/18/19  0344   WBC 10.2 11.2*   HGB 12.9* 12.5*   HCT 39.5* 38.1*    324     Recent Labs     11/20/19  0824 11/20/19  0708 11/19/19  0340 11/18/19  0341   NA  --  140 145 149*   K  --  3.7 3.6 3.7   CL  --  102 104 108*   CO2  --  28 36* 36*   GLU  --  190* 230* 204*   BUN  --  36* 35* 38*   CREA  --  1.19 0.96 1.21   MG 2.6*  --   --  2.7*   PHOS  --   --   --  4.0*   CA  --  8.1* 8.2* 8.6     No results for input(s): PH, PCO2, PO2, HCO3, PHI, PCO2I, PO2I, HCO3I in the last 72 hours. No results for input(s): LCAD, LAC in the last 72 hours. No results for input(s): PH, PCO2, PO2, HCO3 in the last 72 hours.      Assessment:  (Medical Decision Making)     Hospital Problems  Date Reviewed: 11/10/2019          Codes Class Noted POA    ARDS (adult respiratory distress syndrome) ([facility] ICD-10-CM: J80  ICD-9-CM: 518.52  11/13/2019 Unknown    Bal + for rhinovirus    Acute respiratory failure with hypoxia ([facility] ICD-10-CM: J96.01  ICD-9-CM: 518.81  11/10/2019 Yes    Better off o2    * (Principal) Acute pulmonary edema ([facility] ICD-10-CM: J81.0  ICD-9-CM: 518.4  11/10/2019 Yes    + for rhinovirus on bal    Acute heart failure ([facility] [address] ICD-10-CM: I50.9  ICD-9-CM: 428.9  11/10/2019 Yes        MARISOL (obstructive sleep apnea) (Chronic) ICD-10-CM: J59.04  ICD-9-CM: 327.23  11/15/2018 Yes        Chronic combined systolic and diastolic congestive heart failure (HCC) (Chronic) ICD-10-CM: I50.42  ICD-9-CM: 428.42, 428.0  5/14/2018 Yes    Overview Signed 11/9/2019  5:54 PM by Cortney Montiel MD     EF 45-50%              Paroxysmal A-fib [facility]) (Chronic) ICD-10-CM: I48.0  ICD-9-CM: 427.31  5/14/2018 Yes              Plan:  (Medical Decision Making)   --diuresis, change to oral prior to discharge, per cards  --stop steroids, don't think treating anything and causing hyperglycemia  --stop checking daily CXR and ABGs    Will follow up tomorrow off steroids. If still looks good will likely sign off. More than 50% of the time documented was spent in face-to-face contact with the patient and in the care of the patient on the floor/unit where the patient is located.     Sunny Blackburn MD

## 2019-11-20 NOTE — PROGRESS NOTES
Date of Outreach Update:  Cesar Martines was seen and assessed. Previous Outreach assessment has been reviewed. There have been no significant clinical changes since the completion of the last dated Outreach assessment. Will continue to follow up per outreach protocol.     Signed By:   Carrington Nazario RN    November 20, 2019 5:22 PM

## 2019-11-20 NOTE — PROGRESS NOTES
CASE MANAGEMENT ASSESSMENT      AGE:   66            DIAGNOSIS:    Acute Pulmonary Edema                     DPOA/ LIVING WILL:    Yes, Fredi Radford is DPOA           PCP? LAST VISIT?     FAMILY ASSESSMENT:    Patient lives with life partner Fredi Radford [phone], son Loreto Hunter lives in [facility]. [address]:    Local is Fredi Radford life partner                      CURRENT LIVING ARRANGEMENTS:    Lives with Fredi Radford            HOUSE/ APARTMENT/ TRAILER:    Three story house all bedrooms on third floor   TUB-SHOWER COMBO OR WALK IN SHOWER:    Walk in shower with  bars in place         STEPS TO ENTER HOME:    8 steps plus a hill to the front door, no steps from the garage   INTERIOR STEPS:   Multiple    PRIOR FUNCTION:    Independent          ADL'S:    Independent  DRIVING:    Yes    DME:  CPAP  PROVIDER FOR OXYGEN/ NEBULIZER:    None  AIDES/ SITTERS:    None    HOME HEALTH AGENCY PRIOR:    None  HOME HEALTH AGENCY DESIRED AT DISCHARGE IF NEEDED:    Patient agrees with [facility] after discharge if needed    :    Yes, Served in the [employer] [facility]:   Retired from [employer].:   penitentiary, social security, and as needed work income  ARE [address]:    No, covered by insurance    PRIMARY CAREGIVER ABILITY:    Asia Alvarez able to provide 24/7 care  [address]:    None  HOBBIES:  Golf, anything sports    Spoke with patient and he would prefer to go home with home health, out patient cardiac rehab, not agreeable to inpatient rehab. Attempted to call Fredi Radford at [phone] without success. Care Management Interventions  PCP Verified by CM:  Yes  Mode of Transport at Discharge: Self  Transition of Care Consult (CM Consult): Discharge Planning, Home Health, Other(Patient does not want a inpatient rehab, wants to go home with home health)  [address]: Yes  Discharge Durable Medical Equipment: (CPAP)  Physical Therapy Consult: Yes  Occupational Therapy Consult: Yes  Current Support Network: Lives with Spouse, Own Home(Caterina Sal [facility]) [phone], son Rosina Langley that lives in [facility])  Confirm Follow Up Transport: Family  Plan discussed with Pt/Family/Caregiver: Yes  Freedom of Choice Offered: Yes  [employer] Information Provided?: No  Discharge Location  Discharge Placement: Unable to determine at this time

## 2019-11-20 NOTE — PROGRESS NOTES
TRANSFER - IN REPORT:    Verbal report received from Cristina Ayala RN(name) on Natasha Bergman  being received from CCU(unit) for routine progression of care      Report consisted of patients Situation, Background, Assessment and   Recommendations(SBAR). Information from the following report(s)  was reviewed with the receiving nurse. Opportunity for questions and clarification was provided. Assessment completed upon patients arrival to unit and care assumed. Received to CV-Telemetry Unit, via stretcher from CCU. Telemetry Skyler in place, TTX= 1306, verified with Monitor Tech. Admitted to room 319, oriented to room, surroundings and staff, voiced understanding. Instructed and demonstrated use of hand held call-light and Bed rail controls. Dressed in Hospital gown, nonskid socks on feet. Head to toe skin assessment completed, second nurse Nik Issa RN. Skin warm, dry and intact EXCEPT status post PPM placement today, lt subclavian incision, Aquacel drsg, clean, dry and intact, surrounding site benign. No skin abrasions, no skin tears or break-down noted.

## 2019-11-20 NOTE — PROGRESS NOTES
Date of Outreach Update:  Valerie Arshad was seen and assessed. MEWS Score: 1 (11/19/19 2328)  Vitals:    11/19/19 2201 11/19/19 2231 11/19/19 2252 11/19/19 2328   BP: 113/58 123/61  125/78   Pulse: 61 (!) 59 60 60   Resp: 18 15 (!) 33 19   Temp:   97.8 °F (36.6 °C) 98 °F (36.7 °C)   SpO2: 95% 94% 95% 96%   Weight:       Height:             Pain Assessment  Pain Intensity 1: 0 (11/19/19 2328)  Pain Location 1: Chest(ppm incision site)  Pain Intervention(s) 1: Declines  Patient Stated Pain Goal: 0      Previous Outreach assessment has been reviewed. There have been no significant clinical changes since the completion of the last dated Outreach assessment. Will continue to follow up per outreach protocol.     Signed By:   Jolene Reese RN    November 20, 2019 4:51 AM

## 2019-11-20 NOTE — PROGRESS NOTES
Problem: Mobility Impaired (Adult and Pediatric)  Goal: *Acute Goals and Plan of Care (Insert Text)  Description  STG:  (1.)Mr. Kavon Fitzpatrick will move from supine to sit and sit to supine , scoot up and down and roll side to side with CONTACT GUARD ASSIST within 3 treatment day(s). (2.)Mr. Kavon Fitzpatrick will transfer from bed to chair and chair to bed with MINIMAL ASSIST using the least restrictive device within 3 treatment day(s). (3.)Mr. Kavon Fitzpatrick will ambulate with MINIMAL ASSIST for 25 feet with the least restrictive device within 3 treatment day(s). (4.)Mr. Kavon Fitzpatrick will perform standing static and dynamic balance activities x 15 minutes with MINIMAL ASSIST to improve safety within 3 day(s). (5.)Mr. Kavon Fitzpatrick will tolerate 45+ minutes of therapeutic activity/exercise within 3 days in order to  Improve activity tolerance for mobility. LTG:  (1.)Mr. Kavon Fitzpatrick will move from supine to sit and sit to supine , scoot up and down and roll side to side in bed with SUPERVISION within 7 treatment day(s). (2.)Mr. Kavon Fitzpatrick will transfer from bed to chair and chair to bed with CONTACT GUARD ASSIST using the least restrictive device within 7 treatment day(s). (3.)Mr. Kavon Fitzpatrick will ambulate with CONTACT GUARD ASSIST for 75 feet with the least restrictive device within 7 treatment day(s). (4.)Mr. Kavon Fitzpatrick will perform standing static and dynamic balance activities x 15 minutes with CONTACT GUARD ASSIST to improve safety within 7 day(s).   ________________________________________________________________________________________________     Outcome: Progressing Towards Goal     PHYSICAL THERAPY: Initial Assessment, Daily Note, and AM 11/20/2019  INPATIENT: PT Visit Days : 1  [place] / [place] / Product Type: Medicare /       NAME/AGE/GENDER: Quique Alford is a 66 y.o. male   PRIMARY DIAGNOSIS: Acute pulmonary edema (HCC) [J81.0] Acute pulmonary edema ([address] Acute pulmonary edema (HCC)  Procedure(s) (LRB):  BRONCHOSCOPY (N/A)  BRONCHIAL ALVEOLAR LAVAGE (N/A)  4 Days Post-Op  ICD-10: Treatment Diagnosis:    Difficulty in walking, Not elsewhere classified (R26.2)  Repeated Falls (R29.6)   Precaution/Allergies:  Amoxicillin; Levaquin [levofloxacin]; and Zithromax [azithromycin]      ASSESSMENT:     Mr. Cherie Vinson is a 66 y.o. male admitted for acute pulmonary edema. He lives with his \"common law wife,\" in a 3 story home and typically ambulates and performs ADLs independently. He reports no falls in the past 6 months and has experienced a prolonged hospitalization this admission with x2 intubations. He is now on room air. He is supine and agreeable to physical therapy evaluation and treatment. Educated on pacer precautions and with sling applied on contact, he required minimal assistance to transfer to sitting and moderate assist to scoot to the edge of the bed. Initially with some lightheadedness and presents with 4/5 strength in BLE. He stood with moderate assist from raised bed to walker with cues to decrease weight bearing on UEs. Treatment initiated to include sit to stand transfer and standing x30' with side steps at edge of bed as well as seated activities at edge of bed. Educated on log roll technique to return to supine without using LUE and able to perform with CGA. He is well below his baseline. Michael Walton is currently functioning below his baseline and would benefit from skilled PT during acute care stay to maximize safety and independence with functional mobility.     This section established at most recent assessment   PROBLEM LIST (Impairments causing functional limitations):  Decreased Strength  Decreased ADL/Functional Activities  Decreased Transfer Abilities  Decreased Ambulation Ability/Technique  Decreased Balance  Increased Pain  Decreased Activity Tolerance  Decreased Pacing Skills  Decreased Knowledge of Precautions  Decreased [place] with Home Exercise Program   INTERVENTIONS PLANNED: (Benefits and precautions of physical therapy have been discussed with the patient.)  Balance Exercise  Bed Mobility  Family Education  Gait Training  Home Exercise Program (HEP)  Therapeutic Activites  Therapeutic Exercise/Strengthening  Transfer Training     TREATMENT PLAN: Frequency/Duration: 3 times a week for duration of hospital stay  Rehabilitation Potential For Stated Goals: Good     REHAB RECOMMENDATIONS (at time of discharge pending progress):    Placement: It is my opinion, based on this patient's performance to date, that Mr. Lisbeth Barrios may benefit from intensive therapy at an [address] [place] [address] after discharge due to a probable need for close medical supervision by a rehab physician, a probable need for multiple therapy disciplines, and potential to make ongoing and sustainable functional improvement that is of practical value. .  Equipment:   None at this time              HISTORY:   History of Present Injury/Illness (Reason for Referral):  Patient is a 66 y.o.  male presents with respiratory failure. He was just discharged from [facility] today. He presented there yesterday with increased SOB of cough. No fever, chills, LE edema, or chest pain. It was originally suspected to represent PNA but the patient improved after just 24 hours of treatment with lasix and abx and was discharged. He was resting at home today per wife and awoke suddenly very SOB with BP in the 180-190's. Cough with pink frothy sputum. Went to urgent care and was transported here via EMS. Was intubated en route and has had copious pink frothy sputum from ETT since. CXR with B infiltrates c/w edema. Pro BNP better than yesterday but still elevated. No fever or chills still reported. No vaping reported by wife.    Past Medical History/Comorbidities:   Mr. Lisbeth Barrios  has a past medical history of Arrhythmia, Cancer ([facility] [address] Elevated PSA, Erectile dysfunction, Hypertension, MVP (mitral valve prolapse), MARISOL (obstructive sleep apnea) (11/15/2018), Osteoarthritis, Prostate cancer ([facility] [address] and S/P colonoscopy (2015). He also has no past medical history of Chronic kidney disease, Chronic obstructive pulmonary disease (HCC), Difficult intubation, Liver disease, Malignant hyperthermia due to anesthesia, Nausea & vomiting, Pseudocholinesterase deficiency, Psychiatric disorder, or PUD (peptic ulcer disease). Mr. Abbie Espana  has a past surgical history that includes hx appendectomy and biopsy prostate. Social History/Living Environment:   Home Environment: Private residence  # Steps to Enter: 8  Rails to Enter: Yes  Hand Rails : Right  One/Two Story Residence: (three story)  # of Interior Steps: 18  Interior Rails: Right  Lift Chair Available: No  Living Alone: No  Support Systems: Spouse/Significant Other/Partner  Patient Expects to be Discharged to[de-identified] Unknown  Current DME Used/Available at Home: CPAP  Tub or Shower Type: Shower  Prior Level of Function/Work/Activity:  He lives with his \"common law wife,\" in a 3 story home and typically ambulates and performs ADLs independently. He reports no falls in the past 6 months and has experienced a prolonged hospitalization this admission with x2 intubations. Number of Personal Factors/Comorbidities that affect the Plan of Care: 3+: HIGH COMPLEXITY   EXAMINATION:   Most Recent Physical Functioning:   Gross Assessment:  AROM: Generally decreased, functional  PROM: Generally decreased, functional  Strength: Generally decreased, functional               Posture:  Posture (WDL): Exceptions to WDL  Posture Assessment: Forward head, Rounded shoulders  Balance:  Sitting: Impaired  Sitting - Static: Fair (occasional)  Sitting - Dynamic: Fair (occasional)  Standing: Impaired  Standing - Static: Fair  Standing - Dynamic : Constant support Bed Mobility:  Rolling: Contact guard assistance  Supine to Sit: Minimum assistance  Scooting:  Moderate assistance  Wheelchair Mobility: Transfers:  Sit to Stand: Moderate assistance  Stand to Sit: Moderate assistance  Gait:     Base of Support: Center of gravity altered;Narrowed  Speed/Ginger: Slow;Pace decreased (<100 feet/min)  Step Length: Right shortened;Left shortened  Gait Abnormalities: Decreased step clearance; Path deviations; Shuffling gait;Trunk sway increased  Distance (ft): 3 Feet (ft)(side steps next to bed)  Assistive Device: Walker, rolling; Other (comment)(sling to LUE)  Ambulation - Level of Assistance: Moderate assistance  Interventions: Manual cues; Safety awareness training;Visual/Demos; Verbal cues      Body Structures Involved:  Nerves  Heart  Lungs  Muscles Body Functions Affected:  Sensory/Pain  Cardio  Respiratory  Neuromusculoskeletal  Movement Related Activities and Participation Affected: Mobility  Self Care  Domestic Life  Interpersonal Interactions and Relationships  Community, Social and [address] [place] [address]   Number of elements that affect the Plan of Care: 4+: HIGH COMPLEXITY   CLINICAL PRESENTATION:   Presentation: Evolving clinical presentation with changing clinical characteristics: MODERATE COMPLEXITY   CLINICAL DECISION MAKIN [facility] Mobility Inpatient Short Form  How much difficulty does the patient currently have. .. Unable A Lot A Little None   1. Turning over in bed (including adjusting bedclothes, sheets and blankets)? [] 1   [] 2   [x] 3   [] 4   2. Sitting down on and standing up from a chair with arms ( e.g., wheelchair, bedside commode, etc.)   [] 1   [x] 2   [] 3   [] 4   3. Moving from lying on back to sitting on the side of the bed? [] 1   [] 2   [x] 3   [] 4   How much help from another person does the patient currently need. .. Total A Lot A Little None   4. Moving to and from a bed to a chair (including a wheelchair)? [] 1   [x] 2   [] 3   [] 4   5. Need to walk in hospital room? [] 1   [x] 2   [] 3   [] 4   6. Climbing 3-5 steps with a railing?    [x] 1 [] 2   [] 3   [] 4   © 2007, Trustees of [address] [place] [address] [address] [id number], under license to [employer]. All rights reserved      Score:  Initial: 13 Most Recent: X (Date: -- )    Interpretation of Tool:  Represents activities that are increasingly more difficult (i.e. Bed mobility, Transfers, Gait). Medical Necessity:     Patient demonstrates   good   rehab potential due to higher previous functional level. Reason for Services/Other Comments:  Patient   continues to require modification of therapeutic interventions to increase complexity of exercises  . Use of outcome tool(s) and clinical judgement create a POC that gives a: Questionable prediction of patient's progress: MODERATE COMPLEXITY            TREATMENT:   (In addition to Assessment/Re-Assessment sessions the following treatments were rendered)   Pre-treatment Symptoms/Complaints:  some L pacemaker site discomfort  Pain: Initial:   Pain Intensity 1: 0  Post Session:  0/10     Therapeutic Activity: (    23 minutes): Therapeutic activities including Ambulation on level ground and sit to stand transfers, bed mobility, sitting balance activities to improve mobility, strength, balance, and activity tolerance . Required minimal Manual cues; Safety awareness training;Visual/Demos; Verbal cues to promote static and dynamic balance in standing. Braces/Orthotics/Lines/Etc:   O2 Device: Room air  Treatment/Session Assessment:    Response to Treatment:  Patient stood x2 and took side steps at edge of bed. Interdisciplinary Collaboration:   Physical Therapist  Registered Nurse  After treatment position/precautions:   Supine in bed  Bed/Chair-wheels locked  Bed in low position  Call light within reach  RN notified  Family at bedside  Nurse at bedside   Compliance with Program/Exercises: Will assess as treatment progresses  Recommendations/Intent for next treatment session:   \"Next visit will focus on advancements to more challenging activities and reduction in assistance provided\".   Total Treatment Duration:  PT Patient Time In/Time Out  Time In: 0942  Time Out: 1024    Marialuisa Martinez, PT, DPT

## 2019-11-20 NOTE — CONSULTS
[facility] Cardiology Consult                Date of  Admission: 11/10/2019  6:35 PM     Primary Care Physician: Alberto Richter MD  Primary Cardiologist: Dr Yolanda Luna  Referring Physician: Dr Toure Morning Physician: Dr Valentina Redman    CC/Reason for consult: Difficulty to control Atrial arrithmias      Gi Hardy is a 66 y.o. male with h/o PAF on Eliquis, HTN, MVP and MARISOL who presented on 11/10 with respiratory failure. He was just discharged from [facility] 11/10/23143. He reports feeling SOB for weeks and noted he was back in AF last week. On Saturday, he was blowing leaves and became very SOB. He was seen at [facility] and it was originally thought to have PNA but the patient improved after just 24 hours of treatment with lasix and abx and he was discharged home. He states he went home and took a nap on his CPAP and woke up unable to breath well. His SBP was in the 180-190's and he noted cough with pink frothy sputum. He presented to [facility] and EMS was called. He was intubated in route to [facility] ED. He denied fever, chills, LE edema or chest pain. In the [facility] ED, CXR with B infiltrates c/w edema, Pro . He was admitted by pulmonary and given IV lasix with good UOP. He has been extubated. He is in AF with LBBB. Echo shows normal EF which was previously 45% in 2018. The patient was actively being treated with ABX by pulmonary. The patient was scheduled for a C on 11/12 that showed low normal systolic function, normal end diastolic pressures, minimal atherosclerotic CAD, and a successful CVN to NSR. The patient was placed amiodarone load by IV. He continued to have problems with respitraory failure and now atrial tach his Eliquis held for bronchoscopy that showed minimal secretions with samples sent. He tolerated the procedure well with recommendations to continue current therapy. The patient self extubated on 11/16/2019 and did well off the vent.   With periods of tachy keya and difficulty to treat HR with peaks in the 160 and keya less than 40 the patient received a [employer] MRI safe dual chamber PPM on 11/19/2019. He was placed on a Sotolal loading by Dr Sam Lehman with plans for  HIRO/eCVN if patient remains in underlying A Flutter. Review of the charting shows the patient has already received two doses of Sotolal at 8 hrs apart. Recent Cardiac Synopsis w/ Labs  LHC/NST: 11/10/2019 1. Low normal left ventricular systolic function with normal end-diastolic pressures. No significant valvular heart disease. 2.  Minimal atherosclerotic coronary disease. 3.  Successful cardioversion and restoration of sinus rhythm. Echo: 11/11/2019    -  Left ventricle: Systolic function was normal. Ejection fraction was  estimated in the range of 55 % to 60 %. There were no regional wall motion  abnormalities. Wall thickness was mildly increased. The E/e' ratio was 15.65. Diastolic function was indeterminate.   -  Left atrium: The atrium was mildly dilated.   -  Mitral valve: There was mild to moderate regurgitation.  The regurgitant   Jet was eccentric and posteriorly directed.   EKG: Paced       Lab Results   Component Value Date     11/20/2019    K 3.7 11/20/2019    MG 2.7 (H) 11/18/2019    BUN 36 (H) 11/20/2019    CREA 1.19 11/20/2019    WBC 10.2 11/19/2019    HGB 12.9 (L) 11/19/2019     11/19/2019    INR 1.3 11/13/2019    TROIQ 0.04 11/10/2019    TROIQ <0.02 (L) 11/10/2019    TROIQ <0.02 (L) 05/15/2018    TSH 1.230 11/11/2019          Patient Active Problem List   Diagnosis Code    Hypertension, essential I10    Medicare annual wellness visit, subsequent Z00.00    Acute respiratory failure ([address] J96.00    Chronic combined systolic and diastolic congestive heart failure (HCC) I50.42    Paroxysmal A-fib (HCC) I48.0    LBBB (left bundle branch block) I44.7    Leukocytosis D72.829    HAP (hospital-acquired pneumonia) J18.9, Y95    C. difficile colitis A04.72    MARISOL (obstructive sleep apnea) G47.33    Inadequate sleep hygiene Z72.821    Pneumonia of lower lobe of lung (HCC) J18.1    Acute respiratory failure with hypoxia (HCC) J96.01    Acute pulmonary edema (HCC) J81.0    Acute heart failure (HCC) I50.9    ARDS (adult respiratory distress syndrome) ([facility]) J80       Past Medical History:   Diagnosis Date    Arrhythmia     Cancer ([facility]     basal cell    Elevated PSA     Erectile dysfunction     Hypertension     daily meds    MVP (mitral valve prolapse)     diagnosed many years ago, pt states does not see cardiologist, in youth    MARISOL (obstructive sleep apnea) 11/15/2018    Osteoarthritis     thumbs    Prostate cancer ([facility]     S/P colonoscopy     due in       Past Surgical History:   Procedure Laterality Date    BIOPSY PROSTATE      HX APPENDECTOMY       Allergies   Allergen Reactions    Amoxicillin Swelling     Diff breathing, lip swelling    Levaquin [Levofloxacin] Shortness of Breath    Zithromax [Azithromycin] Shortness of Breath      Family History   Problem Relation Age of Onset    Cancer Father         prostate    Stroke Father     Cancer Paternal Uncle     Heart Disease Mother     Heart Attack Maternal Uncle     Prostate Cancer Sister       Social History     Socioeconomic History    Marital status: LIFE PARTNER     Spouse name: Not on file    Number of children: Not on file    Years of education: Not on file    Highest education level: Not on file   Occupational History    Not on file   Social Needs    Financial resource strain: Not on file    Food insecurity:     Worry: Not on file     Inability: Not on file    Transportation needs:     Medical: Not on file     Non-medical: Not on file   Tobacco Use    Smoking status: Former Smoker     Last attempt to quit: 1997     Years since quittin.2    Smokeless tobacco: Never Used   Substance and Sexual Activity    Alcohol use: Yes     Comment: 15 drinks per wk    Drug use: No    Sexual activity: Not on file   Lifestyle    Physical activity:     Days per week: Not on file     Minutes per session: Not on file    Stress: Not on file   Relationships    Social connections:     Talks on phone: Not on file     Gets together: Not on file     Attends [employer] service: Not on file     Active member of club or organization: Not on file     Attends meetings of clubs or organizations: Not on file     Relationship status: Not on file    Intimate partner violence:     Fear of current or ex partner: Not on file     Emotionally abused: Not on file     Physically abused: Not on file     Forced sexual activity: Not on file   Other Topics Concern    Not on file   Social History Narrative    Not on file       Current Facility-Administered Medications   Medication Dose Route Frequency    ELECTROLYTE REPLACEMENT PROTOCOL - Potassium  1 Each Other PRN    [START ON 11/21/2019] MAGNESIUM ELECTROLYTE REPLACEMENT PROTOCOL  1 Each Other Daily    ceFAZolin (ANCEF) 2 g/20 mL in sterile water IV syringe  2 g IntraVENous ON CALL    sotalol (BETAPACE) tablet 160 mg  160 mg Oral Q12H    clindamycin (CLEOCIN) 900mg D5W 50mL IVPB (premix)  900 mg IntraVENous Q8H    LORazepam (ATIVAN) tablet 1 mg  1 mg Oral Q6H PRN    methylPREDNISolone (PF) (SOLU-MEDROL) injection 40 mg  40 mg IntraVENous Q12H    ondansetron (ZOFRAN) injection 4 mg  4 mg IntraVENous Q6H PRN    promethazine (PHENERGAN) with saline injection 6.25 mg  6.25 mg IntraVENous Q4H PRN    diphenhydrAMINE (BENADRYL) capsule 50 mg  50 mg Oral QHS PRN    insulin lispro (HUMALOG) injection   SubCUTAneous AC&HS    hydrALAZINE (APRESOLINE) 20 mg/mL injection 20 mg  20 mg IntraVENous Q6H PRN    metoprolol (LOPRESSOR) injection 5 mg  5 mg IntraVENous Q6H PRN    NUTRITIONAL SUPPORT ELECTROLYTE PRN ORDERS   Does Not Apply PRN    sodium chloride (NS) flush 30 mL  30 mL InterCATHeter Q8H    heparin (porcine) pf 900 Units  900 Units InterCATHeter Q8H    sodium chloride (NS) flush 30 mL  30 mL InterCATHeter PRN    heparin (porcine) pf 900 Units  900 Units InterCATHeter PRN    lisinopril (PRINIVIL, ZESTRIL) tablet 10 mg  10 mg Oral Q12H    amLODIPine (NORVASC) tablet 5 mg  5 mg Oral DAILY    furosemide (LASIX) injection 40 mg  40 mg IntraVENous Q12H    apixaban (ELIQUIS) tablet 5 mg  5 mg Oral Q12H    sodium chloride (NS) flush 5-40 mL  5-40 mL IntraVENous PRN       Review of Systems   Constitution: Positive for malaise/fatigue. Negative for chills, diaphoresis, fever, weight gain and weight loss. HENT: Negative. Eyes: Negative. Cardiovascular: Positive for dyspnea on exertion. Negative for chest pain (currently pain free), claudication, cyanosis, irregular heartbeat, leg swelling, near-syncope, orthopnea, palpitations, paroxysmal nocturnal dyspnea and syncope. Respiratory: Negative for cough, shortness of breath and wheezing. Endocrine: Negative. Skin: Negative. Musculoskeletal: Negative. Gastrointestinal: Negative for nausea and vomiting. Genitourinary: Negative. Neurological: Negative for dizziness. Psychiatric/Behavioral: Negative. Allergic/Immunologic: Negative.          Physical Exam  Vitals:    11/20/19 0535 11/20/19 0632 11/20/19 0815 11/20/19 0819   BP: 120/80  139/71 139/71   Pulse: 62  60 63   Resp: 19   18   Temp: 97.8 °F (36.6 °C)   97.9 °F (36.6 °C)   SpO2: 93%   92%   Weight:  95.7 kg (210 lb 14.4 oz)     Height:  6' 3\" (1.905 m)         Physical Exam:  General: Well Developed, Well Nourished, No Acute Distress  HEENT: pupils equal and round, no abnormalities noted  Neck: supple, no JVD, no carotid bruits  Heart: S1S2 with RRR without murmurs or gallops  Lungs: Clear throughout auscultation bilaterally without adventitious sounds  Abd: soft, nontender, nondistended, with good bowel sounds  Ext: warm, no edema, calves supple/nontender, pulses 2+ bilaterally  Skin: warm and dry  Psychiatric: Normal mood and affect  Neurologic: Alert and oriented X 3      Labs:   Recent Labs     11/20/19  0708 11/19/19  0340 11/18/19  0344 11/18/19  0341    145  --  149*   K 3.7 3.6  --  3.7   MG  --   --   --  2.7*   BUN 36* 35*  --  38*   CREA 1.19 0.96  --  1.21   * 230*  --  204*   WBC  --  10.2 11.2*  --    HGB  --  12.9* 12.5*  --    HCT  --  39.5* 38.1*  --    PLT  --  319 324  --        Echo Results  (Last 48 hours)    None        Xr Chest Sngl V    Result Date: 11/20/2019  IMPRESSION: Normal chest.    Xr Chest Sngl V    Result Date: 11/19/2019  IMPRESSION: Pulmonary edema resolved. Xr Chest Sngl V    Result Date: 11/18/2019  IMPRESSION: Pulmonary edema with bilateral pleural effusions improved. Xr Chest Sngl V    Result Date: 11/17/2019  IMPRESSION: Pulmonary edema with bilateral pleural effusions unchanged. Xr Chest Sngl V    Result Date: 11/16/2019  IMPRESSION: Pulmonary edema with bilateral pleural effusions worse in the interval.    Xr Chest Sngl V    Result Date: 11/15/2019  IMPRESSION: 1. Pulmonary edema, similar to prior exam.    Xr Chest Sngl V    Result Date: 11/14/2019  IMPRESSION: Resolving pulmonary edema. Xr Chest Sngl V    Result Date: 11/13/2019  IMPRESSION: 1. ET tube tip is in satisfactory position. 2. Bilateral interstitial and alveolar opacities, similar to prior exam. Differential diagnosis includes pulmonary edema, pneumonia and ARDS. Xr Chest Sngl V    Result Date: 11/13/2019  IMPRESSION: Bilateral interstitial and alveolar opacities. Leading diagnostic consideration is pulmonary edema. Findings progressed since the prior exam.    Xr Chest Sngl V    Result Date: 11/12/2019  IMPRESSION: Bilateral groundglass opacities, likely pulmonary edema. No significant change. Xr Chest Sngl V    Result Date: 11/11/2019  IMPRESSION: Persistent bilateral alveolar opacities.     Xr Chest Sngl V    Result Date: 11/11/2019  IMPRESSION: Improved aeration of the lungs when compared to prior exam.    Xr Abd (kub)    Result Date: 11/16/2019  IMPRESSION: Tip of the feeding tube is in the stomach    Xr Abd (kub)    Result Date: 11/15/2019  IMPRESSION: Tip of the feeding tube is in the stomach    Xr Abd (kub)    Result Date: 11/10/2019  IMPRESSION: 1. Tip of the enteric tube is in the region of the mid stomach. Ct Chest W Cont    Result Date: 11/15/2019  IMPRESSION: 1. No evidence of pulmonary embolism. 2. Interstitial lung edema and small bilateral pleural effusions, suggesting CHF or fluid overload. 3. Coronary artery disease. Xr Chest Port    Result Date: 11/19/2019  IMPRESSION:  Negative pneumothorax. Nealan Mook Chest Port    Result Date: 11/10/2019  Impression: 1. Relative high positioning of endotracheal tube. This could be advanced approximately 3 cm. 2. Extensive bilateral airspace opacities. Duplex Renal Art/marina Bilateral    Result Date: 11/13/2019  Impression: 1. Per criteria less than 60% stenosis noted in the right renal artery as the velocities are elevated however the renal artery to aortic ratio is less than 3.5. However direct evaluation has shown that velocities greater than 200 cm/s indicate a greater than 60% renal artery stenosis. Would recommend a CTA of the abdomen to further evaluate degree of stenosis. Assessment/Plan:     Assessment:      Principal Problem:    Acute pulmonary edema ([address] (11/10/2019) Per Primary Team    Active Problems:    Chronic combined systolic and diastolic congestive heart failure ([address] (5/14/2018)      Overview: EF 45-50%. New Echo shows EF 55 % to 60 % with intermediate diastolic function. Currently on ACE, BB      Paroxysmal A-fib ([address] (5/14/2018) Placed on Sotolal and re for appropriate 12 hour dosing, EKG 2 hrs after each dose placed, continue daily BMP and Mag, Monitor QTc,  CrCl is 71 by manual calculation. Currently in underling A Flutter, consideration for HIRO/eCVN vrs A Flutter ablation if pt does not convert prior to DC.   Further recommendations pending clinical course and attending recommendations. MARISOL (obstructive sleep apnea) (11/15/2018) Per Pulmonary      Acute respiratory failure with hypoxia ([facility] (11/10/2019) Per Pulmonary      Acute heart failure ([facility] (11/10/2019) see above      ARDS (adult respiratory distress syndrome) ([facility] (11/13/2019) Per Pulmonary    Thank you very much for this referral. We appreciate the opportunity to participate in this patient's care. We will follow along with above stated plan. Gina Mensah NP  Consulting MD: Dr Martina Mendoza    Attending Addendum    Patient independently seen and examined by me. Agree with above note by physician extender with the following additions and exceptions: 66 y.o. male with h/o PAF on Eliquis, HTN, MVP and MARISOL who presented on 11/10 with respiratory failure and pneumonia complicated by atrial fibrillation    Key findings are:  No CP or AMADOR  CV- irreg irreg, without murmur  Lungs- Clear bilaterally  Ext- no edema    Plan: Pt has been initiated on sotalol, given underlying paced rhythm with QRS duration 166 msec, corrected QTc acceptable at this time, cont current dose, telemetry, post dose ECGs per protocol, plan for DCCV in the next 1-2 days if sotalol dose not convert rhythm, could consider ablation as outpatient       --further plan pending clinical course and as noted above    Hodan Vogt [facility] Cardiology

## 2019-11-20 NOTE — PROGRESS NOTES
Nutrition F/U:  Assessment:  The patient self-extubated 11/17. Pt has been advanced to cardiac diet. He reports that he has eaten the entire meal for the last 3 meals. He states that he had one bout of diarrhea but believes it is due to being unable to eat for so many days. Wife was present in room with pt. Pt has no other concerns or questions at this time. Macronutrient Needs (112 kg):  Estimated calorie needs - 1821-6027 carlton/day (15-20 carlton/kg/day)  Estimated protein needs -  gm pro/day (0.8-1 gm pro/kg/day)   Max CHO/day - 280 gm CHO/day (50% carlton/day)   Fluid/day - 1.7-2.3 liters/day (1 ml/carlton/day)    Intake/Comparative Standards: Average intake for past 2 recorded meal(s): 100%. This potentially meets ~100% of kcal and ~100% of protein needs. Patient Vitals for the past 100 hrs:   % Diet Eaten   11/19/19 1746 100 %   11/19/19 1306 100 %       Intervention:   Meals and Snacks: Cardiac with 2 liter fluid restriction  Mineral Supplement Therapy: Nutrition Support Orders/Electrolyte Management Replacement Protocols are active on the MAR. Nutrition Discharge Plan: Too soon to determine.     Ramya Chavez MS, RD, LD

## 2019-11-20 NOTE — PROGRESS NOTES
Dr. Graciela Woo at bedside. Notified of Tikosyn first and second dose given earlier, QTC > 500. New orders, EKG now, then notify if QTC > 500.

## 2019-11-21 LAB
ANION GAP SERPL CALC-SCNC: 7 MMOL/L (ref 7–16)
ASPERGILLUS BY PCR, XVASP: NORMAL
ATRIAL RATE: 277 BPM
ATRIAL RATE: 288 BPM
BUN SERPL-MCNC: 47 MG/DL (ref 8–23)
CALCIUM SERPL-MCNC: 8.4 MG/DL (ref 8.3–10.4)
CALCULATED P AXIS, ECG09: 91 DEGREES
CALCULATED R AXIS, ECG10: -70 DEGREES
CALCULATED R AXIS, ECG10: -70 DEGREES
CALCULATED T AXIS, ECG11: 95 DEGREES
CALCULATED T AXIS, ECG11: 99 DEGREES
CHLORIDE SERPL-SCNC: 101 MMOL/L (ref 98–107)
CO2 SERPL-SCNC: 31 MMOL/L (ref 21–32)
CREAT SERPL-MCNC: 1.25 MG/DL (ref 0.8–1.5)
DIAGNOSIS, 93000: NORMAL
DIAGNOSIS, 93000: NORMAL
GLUCOSE BLD STRIP.AUTO-MCNC: 136 MG/DL (ref 65–100)
GLUCOSE BLD STRIP.AUTO-MCNC: 158 MG/DL (ref 65–100)
GLUCOSE BLD STRIP.AUTO-MCNC: 160 MG/DL (ref 65–100)
GLUCOSE BLD STRIP.AUTO-MCNC: 165 MG/DL (ref 65–100)
GLUCOSE BLD STRIP.AUTO-MCNC: 184 MG/DL (ref 65–100)
GLUCOSE SERPL-MCNC: 138 MG/DL (ref 65–100)
MAGNESIUM SERPL-MCNC: 2.6 MG/DL (ref 1.8–2.4)
MM INDURATION POC: 0 MM (ref 0–5)
P. JIROVECI - PCR, XVPJ: NORMAL
POTASSIUM SERPL-SCNC: 3.7 MMOL/L (ref 3.5–5.1)
PPD POC: NEGATIVE NEGATIVE
Q-T INTERVAL, ECG07: 602 MS
Q-T INTERVAL, ECG07: 612 MS
QRS DURATION, ECG06: 182 MS
QRS DURATION, ECG06: 190 MS
QTC CALCULATION (BEZET), ECG08: 602 MS
QTC CALCULATION (BEZET), ECG08: 612 MS
SODIUM SERPL-SCNC: 139 MMOL/L (ref 136–145)
SPECIMEN SOURCE: NORMAL
SPECIMEN SOURCE: NORMAL
VENTRICULAR RATE, ECG03: 60 BPM
VENTRICULAR RATE, ECG03: 60 BPM

## 2019-11-21 PROCEDURE — 93005 ELECTROCARDIOGRAM TRACING: CPT | Performed by: INTERNAL MEDICINE

## 2019-11-21 PROCEDURE — 74011250637 HC RX REV CODE- 250/637: Performed by: INTERNAL MEDICINE

## 2019-11-21 PROCEDURE — 83735 ASSAY OF MAGNESIUM: CPT

## 2019-11-21 PROCEDURE — 97530 THERAPEUTIC ACTIVITIES: CPT

## 2019-11-21 PROCEDURE — 74011250636 HC RX REV CODE- 250/636: Performed by: INTERNAL MEDICINE

## 2019-11-21 PROCEDURE — 65660000000 HC RM CCU STEPDOWN

## 2019-11-21 PROCEDURE — 99232 SBSQ HOSP IP/OBS MODERATE 35: CPT | Performed by: INTERNAL MEDICINE

## 2019-11-21 PROCEDURE — 74011636637 HC RX REV CODE- 636/637: Performed by: NURSE PRACTITIONER

## 2019-11-21 PROCEDURE — 93005 ELECTROCARDIOGRAM TRACING: CPT | Performed by: NURSE PRACTITIONER

## 2019-11-21 PROCEDURE — 80048 BASIC METABOLIC PNL TOTAL CA: CPT

## 2019-11-21 PROCEDURE — 97166 OT EVAL MOD COMPLEX 45 MIN: CPT

## 2019-11-21 PROCEDURE — 82962 GLUCOSE BLOOD TEST: CPT

## 2019-11-21 PROCEDURE — 99231 SBSQ HOSP IP/OBS SF/LOW 25: CPT | Performed by: PHYSICAL MEDICINE & REHABILITATION

## 2019-11-21 PROCEDURE — 36592 COLLECT BLOOD FROM PICC: CPT

## 2019-11-21 PROCEDURE — 77030018846 HC SOL IRR STRL H20 ICUM -A

## 2019-11-21 RX ORDER — SOTALOL HYDROCHLORIDE 80 MG/1
120 TABLET ORAL EVERY 12 HOURS
Status: DISCONTINUED | OUTPATIENT
Start: 2019-11-21 | End: 2019-11-25 | Stop reason: HOSPADM

## 2019-11-21 RX ORDER — SOTALOL HYDROCHLORIDE 80 MG/1
120 TABLET ORAL EVERY 12 HOURS
Status: DISCONTINUED | OUTPATIENT
Start: 2019-11-21 | End: 2019-11-21

## 2019-11-21 RX ORDER — POTASSIUM CHLORIDE 20 MEQ/1
40 TABLET, EXTENDED RELEASE ORAL
Status: COMPLETED | OUTPATIENT
Start: 2019-11-21 | End: 2019-11-21

## 2019-11-21 RX ADMIN — FUROSEMIDE 40 MG: 10 INJECTION, SOLUTION INTRAMUSCULAR; INTRAVENOUS at 08:22

## 2019-11-21 RX ADMIN — CLINDAMYCIN PHOSPHATE 900 MG: 900 INJECTION, SOLUTION INTRAVENOUS at 10:03

## 2019-11-21 RX ADMIN — Medication 30 ML: at 05:51

## 2019-11-21 RX ADMIN — CLINDAMYCIN PHOSPHATE 900 MG: 900 INJECTION, SOLUTION INTRAVENOUS at 02:00

## 2019-11-21 RX ADMIN — AMLODIPINE BESYLATE 5 MG: 5 TABLET ORAL at 08:22

## 2019-11-21 RX ADMIN — APIXABAN 5 MG: 5 TABLET, FILM COATED ORAL at 08:22

## 2019-11-21 RX ADMIN — POTASSIUM CHLORIDE 40 MEQ: 20 TABLET, EXTENDED RELEASE ORAL at 10:03

## 2019-11-21 RX ADMIN — LISINOPRIL 10 MG: 5 TABLET ORAL at 08:22

## 2019-11-21 RX ADMIN — Medication 900 UNITS: at 05:54

## 2019-11-21 RX ADMIN — Medication 900 UNITS: at 21:32

## 2019-11-21 RX ADMIN — Medication 30 ML: at 21:33

## 2019-11-21 RX ADMIN — APIXABAN 5 MG: 5 TABLET, FILM COATED ORAL at 21:31

## 2019-11-21 RX ADMIN — FUROSEMIDE 40 MG: 10 INJECTION, SOLUTION INTRAMUSCULAR; INTRAVENOUS at 21:32

## 2019-11-21 RX ADMIN — Medication 30 ML: at 05:54

## 2019-11-21 RX ADMIN — INSULIN LISPRO 2 UNITS: 100 INJECTION, SOLUTION INTRAVENOUS; SUBCUTANEOUS at 08:22

## 2019-11-21 RX ADMIN — Medication 900 UNITS: at 14:07

## 2019-11-21 RX ADMIN — SOTALOL HYDROCHLORIDE 120 MG: 80 TABLET ORAL at 21:31

## 2019-11-21 RX ADMIN — CLINDAMYCIN PHOSPHATE 900 MG: 900 INJECTION, SOLUTION INTRAVENOUS at 17:47

## 2019-11-21 RX ADMIN — LISINOPRIL 10 MG: 5 TABLET ORAL at 21:32

## 2019-11-21 RX ADMIN — SOTALOL HYDROCHLORIDE 120 MG: 80 TABLET ORAL at 10:03

## 2019-11-21 RX ADMIN — Medication 30 ML: at 14:07

## 2019-11-21 NOTE — PROGRESS NOTES
Lilly Frank, NP updated on recent Sotalol EKG and dosing. NP to update Dr. Maycol Mane. Orders received to hold med at this time until Dr. Maycol Mane evaluates. Will continue to monitor.

## 2019-11-21 NOTE — PROGRESS NOTES
Critical Care Daily Progress Note: 11/21/2019    Kisha Rich   Admission Date: 11/10/2019         The patient's chart is reviewed and the patient is discussed with the staff. 66 y.o. CM presented with respiratory failure. He was just discharged from [place] on the day of admission. He presented there with increased SOB of cough. It was originally suspected to represent PNA but the patient improved after just 24 hours of treatment with lasix and abx and was discharged.      He was resting at home per wife and awoke suddenly very SOB with BP in the 180-190's. Cough with pink frothy sputum. Went to urgent care and was transported here via EMS. Was intubated en route and has had copious pink frothy sputum from ETT since. CXR with B infiltrates c/w edema. Pro BNP better than yesterday but still elevated. No fever or chills still reported. Then was extubated on 11/12  and early morning 11/13 re intubated again with reports of increase BP,agitation just prior to intubation. Self extubated 11/17 and placed on NC. Subjective: On RA. Down 35lbs since admission.  PCM 2 days ago  Denies SOB,        Current Facility-Administered Medications   Medication Dose Route Frequency    sotalol (BETAPACE) tablet 120 mg  120 mg Oral Q12H    ELECTROLYTE REPLACEMENT PROTOCOL - Potassium  1 Each Other PRN    MAGNESIUM ELECTROLYTE REPLACEMENT PROTOCOL  1 Each Other Daily    ceFAZolin (ANCEF) 2 g/20 mL in sterile water IV syringe  2 g IntraVENous ON CALL    clindamycin (CLEOCIN) 900mg D5W 50mL IVPB (premix)  900 mg IntraVENous Q8H    LORazepam (ATIVAN) tablet 1 mg  1 mg Oral Q6H PRN    ondansetron (ZOFRAN) injection 4 mg  4 mg IntraVENous Q6H PRN    promethazine (PHENERGAN) with saline injection 6.25 mg  6.25 mg IntraVENous Q4H PRN    diphenhydrAMINE (BENADRYL) capsule 50 mg  50 mg Oral QHS PRN    insulin lispro (HUMALOG) injection   SubCUTAneous AC&HS    hydrALAZINE (APRESOLINE) 20 mg/mL injection 20 mg  20 mg IntraVENous Q6H PRN    metoprolol (LOPRESSOR) injection 5 mg  5 mg IntraVENous Q6H PRN    NUTRITIONAL SUPPORT ELECTROLYTE PRN ORDERS   Does Not Apply PRN    sodium chloride (NS) flush 30 mL  30 mL InterCATHeter Q8H    heparin (porcine) pf 900 Units  900 Units InterCATHeter Q8H    sodium chloride (NS) flush 30 mL  30 mL InterCATHeter PRN    heparin (porcine) pf 900 Units  900 Units InterCATHeter PRN    lisinopril (PRINIVIL, ZESTRIL) tablet 10 mg  10 mg Oral Q12H    amLODIPine (NORVASC) tablet 5 mg  5 mg Oral DAILY    furosemide (LASIX) injection 40 mg  40 mg IntraVENous Q12H    apixaban (ELIQUIS) tablet 5 mg  5 mg Oral Q12H    sodium chloride (NS) flush 5-40 mL  5-40 mL IntraVENous PRN     Review of Systems  Constitutional:  negative for fever, chills, sweats  Cardiovascular:  negative for chest pain, palpitations, syncope, edema  Gastrointestinal:  negative for dysphagia, reflux, vomiting, diarrhea, abdominal pain, or melena  Neurologic:  negative for focal weakness, numbness, headache    Objective:     Vitals:    11/21/19 0046 11/21/19 0405 11/21/19 0845 11/21/19 1158   BP: 117/74 137/84 152/72 112/65   Pulse: 86 63 60 84   Resp: 18 18 20 17   Temp: 98.7 °F (37.1 °C) 98.3 °F (36.8 °C) 97.7 °F (36.5 °C) 98.7 °F (37.1 °C)   SpO2: 95% 97% 96% 95%   Weight:  224 lb 4.8 oz (101.7 kg)     Height:           Intake/Output Summary (Last 24 hours) at 11/21/2019 1304  Last data filed at 11/21/2019 1010  Gross per 24 hour   Intake 480 ml   Output 1051 ml   Net -571 ml     Physical Exam:          Constitutional:  the patient is well developed and in no acute distress  EENMT:  Sclera clear, pupils equal, oral mucosa moist  Respiratory: clear, RA  Cardiovascular:  RRR without M,G,R  Gastrointestinal: soft and non-tender; with positive bowel sounds. Musculoskeletal: warm without cyanosis. There is no lower extremity edema.   Skin:  no jaundice or rashes, no wounds   Neurologic: no gross neuro deficits Psychiatric:  alert and oriented x 3    CXR: none today       LAB  Recent Labs     11/21/19  1118 11/21/19  0757 11/21/19  0404 11/20/19  2142 11/20/19  1544   GLUCPOC 158* 160* 165* 157* 244*      Recent Labs     11/19/19  0340   WBC 10.2   HGB 12.9*   HCT 39.5*        Recent Labs     11/21/19  0543 11/20/19  0824 11/20/19  0708 11/19/19  0340     --  140 145   K 3.7  --  3.7 3.6     --  102 104   CO2 31  --  28 36*   *  --  190* 230*   BUN 47*  --  36* 35*   CREA 1.25  --  1.19 0.96   MG 2.6* 2.6*  --   --    CA 8.4  --  8.1* 8.2*         Assessment:  (Medical Decision Making)     Hospital Problems  Date Reviewed: 11/10/2019          Codes Class Noted POA    ARDS (adult respiratory distress syndrome) ([facility] ICD-10-CM: J80  ICD-9-CM: 518.52  11/13/2019 Unknown    Bal + for rhinovirus      Acute respiratory failure with hypoxia ([facility] ICD-10-CM: J96.01  ICD-9-CM: 518.81  11/10/2019 Yes    Better off o2    * (Principal) Acute pulmonary edema (HCC) ICD-10-CM: J81.0  ICD-9-CM: 518.4  11/10/2019 Yes    + for rhinovirus on bal    Acute heart failure ([facility] ICD-10-CM: I50.9  ICD-9-CM: 428.9  11/10/2019 Yes        MARISOL (obstructive sleep apnea) (Chronic) ICD-10-CM: G47.33  ICD-9-CM: 327.23  11/15/2018 Yes        Chronic combined systolic and diastolic congestive heart failure (HCC) (Chronic) ICD-10-CM: I50.42  ICD-9-CM: 428.42, 428.0  5/14/2018 Yes    Overview Signed 11/9/2019  5:54 PM by Viktoria Negron MD     EF 45-50%              Paroxysmal A-fib [facility]) (Chronic) ICD-10-CM: I48.0  ICD-9-CM: 427.31  5/14/2018 Yes              Plan:  (Medical Decision Making)   --diuresis, change to oral prior to discharge, per cards  - HIRO/DCCV per card maybe tomorrow  - may need STR  - on RA, will ask cardiology if can take over the care    More than 50% of the time documented was spent in face-to-face contact with the patient and in the care of the patient on the floor/unit where the patient is located.     Padilla Medina MD

## 2019-11-21 NOTE — ROUTINE PROCESS
CHF teaching started post introduction to pt/family; aware of diagnosis. Planner/scale(home) @ BS and will follow. Smoking/ ETOH/Illicit drug use cessation covered. Pt/family aware that I cannot prescribe nor adjust medications: 15mins Palliative Care score:  ACP on file Start 2 Liter Fluid Restriction/ Cardiac diet CHF teaching continues to pt/family. Emphasis on taking prescription meds as ordered, to keep F/U appointments and  maintain healthy weight , to call MD STAT if any of the following occur: 
?  short of breath. ? If you cannot breathe, are short of breath or rapid breathing at rest. Develop a cough or wheezing. Pt/family verbalizes understanding, will follow to reinforce teaching skills: 20 mins ARDs CHF 1.5 yrs ago

## 2019-11-21 NOTE — PROGRESS NOTES
Patient QTC > 500.   Beth Aldridge NP on unit, verbal order: aware of elevated QTC,   Continue current orders, and give Sotalol 160 mg as ordered, next dose due at 9 pm.

## 2019-11-21 NOTE — PROGRESS NOTES
Patient found sitting on floor in bathroom by YONIS Acosta. Patient Alert and oriented x4; No apparent injury, denies hitting head. Patient dressed in [facility] only, and reported thought I could make it on my own. When reminded and questioned reqarding Fall precautions \"what happened to using call light, keeping nonskid socks on feet and BLE's in SCD's. Again, Patient stated, thought I was strong enough to go on my own. Reinforced instructions given earlier regarding Fall precautions. Bed alarm Mattress Pad ON bed, functioning as intended. Patient admitted to removing SCD hose and nonskid socks from feet, stated \"I really did not fall, I got weak and sat down on floor. \"  Clinical Coordinator, Faizan Steele RN at bedside.

## 2019-11-21 NOTE — PROGRESS NOTES
Care Management Interventions  PCP Verified by CM: Yes  Mode of Transport at Discharge: Self  Transition of Care Consult (CM Consult): Discharge [address] [address]: Yes  Discharge Durable Medical Equipment: (CPAP)  Physical Therapy Consult: Yes  Occupational Therapy Consult: Yes  Current Support Network: Lives with Spouse, Own Home(Caterina Chau ([address]) [phone], son Veryl Comas that lives in [facility] [place])  Confirm Follow Up Transport: Family  Plan discussed with Pt/Family/Caregiver: Yes  Freedom of Choice Offered: Yes  [address] Provided?: No  Discharge Location  Discharge Placement: Skilled nursing facility      CM met with pt to discuss IRC vs SNF. Pt would like IRC placement. CM to continue to follow for dc needs.

## 2019-11-21 NOTE — PROGRESS NOTES
Problem: Self Care Deficits Care Plan (Adult)  Goal: *Acute Goals and Plan of Care (Insert Text)  Description  1. Pt will toilet with CGA  2. Pt will complete functional mobility for ADLs with CGA  3. Pt will complete lower body dressing with CGA using AE as needed  4. Pt will complete grooming and hygiene at sink with CGA  5. Pt will demonstrate independence with HEP to promote increased BUE strength and functional use for ADLs  6. Pt will tolerate 23 minutes functional activity with min or fewer rest breaks to promote increased endurance for ADLs  7. Pt will complete bed mobility with SBA in prep for ADLs  9. Pt will independently demonstrate/ verbalize 2+ Energy conservation techniques  to promote increased endurance for ADLs  10. Pt will complete UB bathing and dressing with set up    Timeframe: 7 days     Outcome: Progressing Towards Goal     OCCUPATIONAL THERAPY: Initial Assessment and Daily Note 11/21/2019  INPATIENT: OT Visit Days: 1  [place] / [place] / Product Type: Medicare /      NAME/AGE/GENDER: Garnetta Homans is a 66 y.o. male   PRIMARY DIAGNOSIS:  Acute pulmonary edema (HCC) [J81.0] Acute pulmonary edema (HCC) Acute pulmonary edema (HCC)  Procedure(s) (LRB):  BRONCHOSCOPY (N/A)  BRONCHIAL ALVEOLAR LAVAGE (N/A)  5 Days Post-Op  ICD-10: Treatment Diagnosis:    Generalized Muscle Weakness (M62.81)   Precautions/Allergies:    pacemaker Amoxicillin; Levaquin [levofloxacin]; and Zithromax [azithromycin]      ASSESSMENT:     Mr. Jaja Guy was admitted with pulmonary edema and debility, s/p PM placement. Pt fell last night while attempting to reach [facility] unassisted. Pt lives in a 3 level house with his wife and is independent at baseline, does not use an AD for mobility. This session, pt presented generally weak with deficits in endurance, mobility, balance, and strength impacting ADLs.  Pt educated on PM precautions and on adaptive techniques to maintain during ADLs and mobility for ADLs. Pt required min A for bed mobility, mod A to stand from elevated bed, and min-mod A to transfer from bed to chair using RW. Max A for socks, mod A for UB bathing and dressing. Pt is well below his functional baseline and would benefit from skilled OT services to address deficits. This section established at most recent assessment   PROBLEM LIST (Impairments causing functional limitations):  Decreased Strength  Decreased ADL/Functional Activities  Decreased Transfer Abilities  Decreased Balance  Decreased Activity Tolerance  Increased Fatigue   INTERVENTIONS PLANNED: (Benefits and precautions of occupational therapy have been discussed with the patient.)  Activities of daily living training  Adaptive equipment training  Balance training  Therapeutic activity  Therapeutic exercise     TREATMENT PLAN: Frequency/Duration: Follow patient 3 times/ week   to address above goals. Rehabilitation Potential For Stated Goals: Good     REHAB RECOMMENDATIONS (at time of discharge pending progress):    Placement: It is my opinion, based on this patient's performance to date, that Mr. Julio العراقي may benefit from intensive therapy at an [address] [place] [address] after discharge due to potential to make ongoing and sustainable functional improvement that is of practical value. .  Equipment:   None at this time              OCCUPATIONAL PROFILE AND HISTORY:   History of Present Injury/Illness (Reason for Referral):  See H&P  Past Medical History/Comorbidities:   Mr. Julio العراقي  has a past medical history of Arrhythmia, Cancer ([address] Elevated PSA, Erectile dysfunction, Hypertension, MVP (mitral valve prolapse), MARISOL (obstructive sleep apnea) (11/15/2018), Osteoarthritis, Prostate cancer ([address] and S/P colonoscopy (2015).  He also has no past medical history of Chronic kidney disease, Chronic obstructive pulmonary disease ([address] Difficult intubation, Liver disease, Malignant hyperthermia due to anesthesia, Nausea & vomiting, Pseudocholinesterase deficiency, Psychiatric disorder, or PUD (peptic ulcer disease). Mr. Vargas Tripp  has a past surgical history that includes hx appendectomy and biopsy prostate. Social History/Living Environment:   Home Environment: Private residence  # Steps to Enter: 8  Rails to Enter: Yes  Hand Rails : Right  One/Two Story Residence: (3 L home)  # of Interior Steps: 18  Interior Rails: Right  Lift Chair Available: No  Living Alone: No  Support Systems: Spouse/Significant Other/Partner  Patient Expects to be Discharged to[de-identified] Rehabilitation facility  Current DME Used/Available at Home: None  Tub or Shower Type: Shower  Prior Level of Function/Work/Activity:  Independent, lives w/ wife     Number of Personal Factors/Comorbidities that affect the Plan of Care: Expanded review of therapy/medical records (1-2):  MODERATE COMPLEXITY   ASSESSMENT OF OCCUPATIONAL PERFORMANCE[de-identified]   Activities of Daily Living:   Basic ADLs (From Assessment) Complex ADLs (From Assessment)   Feeding: Independent  Oral Facial Hygiene/Grooming: Contact guard assistance  Bathing: Moderate assistance  Upper Body Dressing: Moderate assistance  Lower Body Dressing: Maximum assistance  Toileting: Minimum assistance Instrumental ADL  Meal Preparation: Maximum assistance  Homemaking: Maximum assistance   Grooming/Bathing/Dressing Activities of Daily Living                             Bed/Mat Mobility  Supine to Sit: Minimum assistance  Sit to Stand: Moderate assistance  Stand to Sit: Minimum assistance  Bed to Chair: Minimum assistance; Moderate assistance  Scooting: Contact guard assistance     Most Recent Physical Functioning:   Gross Assessment:  AROM: Generally decreased, functional  Strength: Generally decreased, functional               Posture:  Posture (WDL): Exceptions to WDL  Posture Assessment:  Forward head, Rounded shoulders  Balance:  Sitting: Impaired  Sitting - Static: Good (unsupported)  Sitting - Dynamic: Fair (occasional)  Standing: Impaired  Standing - Static: Fair  Standing - Dynamic : Fair Bed Mobility:  Supine to Sit: Minimum assistance  Scooting: Contact guard assistance  Wheelchair Mobility:     Transfers:  Sit to Stand: Moderate assistance  Stand to Sit: Minimum assistance  Bed to Chair: Minimum assistance; Moderate assistance            Patient Vitals for the past 6 hrs:   BP SpO2 Pulse   19 0845 152/72 96 % 60   19 1158 112/65 95 % 84       Mental Status  Neurologic State: Alert  Orientation Level: Oriented X4  Cognition: Follows commands  Perception: Appears intact  Perseveration: No perseveration noted  Safety/Judgement: Fall prevention                          Physical Skills Involved:  Range of Motion  Balance  Strength  Activity Tolerance Cognitive Skills Affected (resulting in the inability to perform in a timely and safe manner):  none  Psychosocial Skills Affected:  Habits/Routines  Environmental Adaptation   Number of elements that affect the Plan of Care: 3-5:  MODERATE COMPLEXITY   CLINICAL DECISION MAKIN[address] [place] [id number] AM-PAC 6 Clicks   Daily Activity Inpatient Short Form  How much help from another person does the patient currently need. .. Total A Lot A Little None   1. Putting on and taking off regular lower body clothing? [] 1   [x] 2   [] 3   [] 4   2. Bathing (including washing, rinsing, drying)? [] 1   [x] 2   [] 3   [] 4   3. Toileting, which includes using toilet, bedpan or urinal?   [] 1   [x] 2   [] 3   [] 4   4. Putting on and taking off regular upper body clothing? [] 1   [] 2   [x] 3   [] 4   5. Taking care of personal grooming such as brushing teeth? [] 1   [] 2   [x] 3   [] 4   6. Eating meals? [] 1   [] 2   [] 3   [x] 4   © , Trustees of [address] [place] [id number], under license to [employer].  All rights reserved      Score:  Initial: 16 Most Recent: X (Date: -- )    Interpretation of Tool:  Represents activities that are increasingly more difficult (i.e. Bed mobility, Transfers, Gait). Medical Necessity:     Patient demonstrates   good   rehab potential due to higher previous functional level. Reason for Services/Other Comments:  Patient   continues to require present interventions due to patient's inability to independently complete ADLs   . Use of outcome tool(s) and clinical judgement create a POC that gives a: MODERATE COMPLEXITY         TREATMENT:   (In addition to Assessment/Re-Assessment sessions the following treatments were rendered)     Pre-treatment Symptoms/Complaints:    Pain: Initial:   Pain Intensity 1: 2  Pain Location 1: Incisional  Pain Intervention(s) 1: Declines  Post Session:  same     Therapeutic Activity: (    8 min): Therapeutic activities including Bed transfers and Chair transfers to improve mobility, balance, and overall endurance for ADLs . Braces/Orthotics/Lines/Etc:   O2 Device: Room air  Treatment/Session Assessment:    Response to Treatment:  no adverse reaction  Interdisciplinary Collaboration:   Occupational Therapist  Registered Nurse  After treatment position/precautions:   Up in chair  Bed alarm/tab alert on  Bed/Chair-wheels locked  Bed in low position  Call light within reach  RN notified  Family at bedside   Compliance with Program/Exercises: Compliant all of the time, Will assess as treatment progresses. Recommendations/Intent for next treatment session: \"Next visit will focus on advancements to more challenging activities and reduction in assistance provided\".   Total Treatment Duration:  OT Patient Time In/Time Out  Time In: 0930  Time Out: Italo Garcia OT

## 2019-11-21 NOTE — PROGRESS NOTES
Bedside and Verbal shift change report given to self (oncoming nurse) by Aamir Marcus (offgoing nurse). Report included the following information SBAR, Kardex, Intake/Output and MAR.

## 2019-11-21 NOTE — PROGRESS NOTES
[facility] CARDIOLOGY PROGRESS NOTE    11/21/2019 9:52 AM    Admit Date: 11/10/2019    Admit Diagnosis: Acute pulmonary edema (HCC) [J81.0]      Subjective:   Stable overnight without angina, CHF, or palpitations. Vitals stable and controlled. No other complaints overnight. Tolerating meds well. Still in flutter but rate controlled and asymptomatic. QTc longer on doses 3&4 of sotalol. Decreasing dose today. Objective:      Vitals:    11/20/19 2054 11/21/19 0046 11/21/19 0405 11/21/19 0845   BP: 149/75 117/74 137/84 152/72   Pulse: 62 86 63 60   Resp: 18 18 18 20   Temp: 98.9 °F (37.2 °C) 98.7 °F (37.1 °C) 98.3 °F (36.8 °C) 97.7 °F (36.5 °C)   SpO2: 97% 95% 97% 96%   Weight:   101.7 kg (224 lb 4.8 oz)    Height:           Physical Exam:  Neck- supple, no JVD  CV- regular rate and rhythm no MRG  Lung- clear bilaterally  Abd- soft, nontender, nondistended  Ext- no edema, pacer site CDI  Skin- warm and dry    Data Review:   Recent Labs     11/21/19  0543 11/20/19  0824 11/20/19  0708 11/19/19  0340     --  140 145   K 3.7  --  3.7 3.6   MG 2.6* 2.6*  --   --    BUN 47*  --  36* 35*   CREA 1.25  --  1.19 0.96   *  --  190* 230*   WBC  --   --   --  10.2   HGB  --   --   --  12.9*   HCT  --   --   --  39.5*   PLT  --   --   --  319       Assessment and Plan:     Principal Problem:    Acute pulmonary edema (HCC) (11/10/2019)- improved, continue meds, recheck cbc, bmp, mg in am    Active Problems:    Chronic combined systolic and diastolic congestive heart failure (HCC) - stable, fairly euvolemic now, rate controlled in flutter, see below      Overview: EF 45-50%       Paroxysmal A-fib ([address] (5/14/2018)- in flutter but rate controlled with intermittent V-pacing. On eliquis but has misses several doses recently. Plan HIRO/DCCV tomorrow if still in flutter. If unsuccessful, consider EP evaluation for flutter ablation as outpatient.        MARISOL (obstructive sleep apnea) - CPAP as tolerated      Acute respiratory failure with hypoxia ([facility] (11/10/2019)- improved, resolved, follow with pulmonary    Npo X Meds for possible HIRO/DCCV tomorrow  Continue Eliquis  QTc 600ms this AM, decrease sotalol to 120mg BID and follow tele closely. Recheck labs in AM        A.  Milton Sequeira MD  [facility] Cardiology  Pager [phone]

## 2019-11-21 NOTE — PROGRESS NOTES
Bedside and verbal report given to [id number] [facility] by Jorge Levy. Report included the following information SBAR, Kardex, Intake/Output and MAR. Oncoming RN assumed care of the patient.

## 2019-11-21 NOTE — PROGRESS NOTES
Critical Care Outreach Nurse Progress Report:    Subjective: In to assess pt secondary to recent transfer from unit. MEWS Score: 1 (11/21/19 1158)    Vitals:    11/21/19 0046 11/21/19 0405 11/21/19 0845 11/21/19 1158   BP: 117/74 137/84 152/72 112/65   Pulse: 86 63 60 84   Resp: 18 18 20 17   Temp: 98.7 °F (37.1 °C) 98.3 °F (36.8 °C) 97.7 °F (36.5 °C) 98.7 °F (37.1 °C)   SpO2: 95% 97% 96% 95%   Weight:  101.7 kg (224 lb 4.8 oz)     Height:            Objective: Patient sitting in recliner. Pain Intensity 1: 0 (11/21/19 1200)  Pain Location 1: Incisional  Pain Intervention(s) 1: Declines  Patient Stated Pain Goal: 0    Assessment: Patient states he slept very well last night. Got up on his own this morning and had to ease himself to the floor and call for help. Educated on patient safety and fall risk. Understands and agrees to call for help when getting up. Overall feeling good. CPAP worn overnight without issues. Plan: Will follow per outreach protocol.

## 2019-11-21 NOTE — PROGRESS NOTES
Jim Meadows NP notified of recent QTC and upcoming Sotalol dose at 2000. NP will notify MD. Will continue to monitor.

## 2019-11-22 LAB
ANION GAP SERPL CALC-SCNC: 5 MMOL/L (ref 7–16)
ATRIAL RATE: 241 BPM
ATRIAL RATE: 267 BPM
ATRIAL RATE: 59 BPM
BUN SERPL-MCNC: 41 MG/DL (ref 8–23)
CALCIUM SERPL-MCNC: 7.4 MG/DL (ref 8.3–10.4)
CALCULATED P AXIS, ECG09: 74 DEGREES
CALCULATED R AXIS, ECG10: -68 DEGREES
CALCULATED R AXIS, ECG10: -72 DEGREES
CALCULATED R AXIS, ECG10: -77 DEGREES
CALCULATED T AXIS, ECG11: 94 DEGREES
CALCULATED T AXIS, ECG11: 96 DEGREES
CALCULATED T AXIS, ECG11: 97 DEGREES
CHLORIDE SERPL-SCNC: 104 MMOL/L (ref 98–107)
CO2 SERPL-SCNC: 31 MMOL/L (ref 21–32)
CREAT SERPL-MCNC: 1.06 MG/DL (ref 0.8–1.5)
DIAGNOSIS, 93000: NORMAL
ERYTHROCYTE [DISTWIDTH] IN BLOOD BY AUTOMATED COUNT: 12.9 % (ref 11.9–14.6)
GLUCOSE SERPL-MCNC: 126 MG/DL (ref 65–100)
HCT VFR BLD AUTO: 37.9 % (ref 41.1–50.3)
HGB BLD-MCNC: 12.7 G/DL (ref 13.6–17.2)
MAGNESIUM SERPL-MCNC: 2.5 MG/DL (ref 1.8–2.4)
MCH RBC QN AUTO: 30.3 PG (ref 26.1–32.9)
MCHC RBC AUTO-ENTMCNC: 33.5 G/DL (ref 31.4–35)
MCV RBC AUTO: 90.5 FL (ref 79.6–97.8)
MM INDURATION POC: 0 MM (ref 0–5)
NRBC # BLD: 0 K/UL (ref 0–0.2)
PLATELET # BLD AUTO: 286 K/UL (ref 150–450)
PMV BLD AUTO: 10 FL (ref 9.4–12.3)
POTASSIUM SERPL-SCNC: 3.7 MMOL/L (ref 3.5–5.1)
PPD POC: NEGATIVE NEGATIVE
Q-T INTERVAL, ECG07: 576 MS
Q-T INTERVAL, ECG07: 602 MS
Q-T INTERVAL, ECG07: 604 MS
QRS DURATION, ECG06: 176 MS
QRS DURATION, ECG06: 192 MS
QRS DURATION, ECG06: 194 MS
QTC CALCULATION (BEZET), ECG08: 576 MS
QTC CALCULATION (BEZET), ECG08: 602 MS
QTC CALCULATION (BEZET), ECG08: 604 MS
RBC # BLD AUTO: 4.19 M/UL (ref 4.23–5.6)
SODIUM SERPL-SCNC: 140 MMOL/L (ref 136–145)
VENTRICULAR RATE, ECG03: 60 BPM
WBC # BLD AUTO: 13.4 K/UL (ref 4.3–11.1)

## 2019-11-22 PROCEDURE — 92960 CARDIOVERSION ELECTRIC EXT: CPT

## 2019-11-22 PROCEDURE — 74011250636 HC RX REV CODE- 250/636: Performed by: INTERNAL MEDICINE

## 2019-11-22 PROCEDURE — 74011250637 HC RX REV CODE- 250/637: Performed by: INTERNAL MEDICINE

## 2019-11-22 PROCEDURE — 85027 COMPLETE CBC AUTOMATED: CPT

## 2019-11-22 PROCEDURE — 93005 ELECTROCARDIOGRAM TRACING: CPT | Performed by: INTERNAL MEDICINE

## 2019-11-22 PROCEDURE — 77030009397 HC ELECTRD ECG PACE ZOLL -B

## 2019-11-22 PROCEDURE — 80048 BASIC METABOLIC PNL TOTAL CA: CPT

## 2019-11-22 PROCEDURE — 93005 ELECTROCARDIOGRAM TRACING: CPT | Performed by: NURSE PRACTITIONER

## 2019-11-22 PROCEDURE — 93312 ECHO TRANSESOPHAGEAL: CPT

## 2019-11-22 PROCEDURE — B24BZZ4 ULTRASONOGRAPHY OF HEART WITH AORTA, TRANSESOPHAGEAL: ICD-10-PCS | Performed by: INTERNAL MEDICINE

## 2019-11-22 PROCEDURE — 83735 ASSAY OF MAGNESIUM: CPT

## 2019-11-22 PROCEDURE — 74011000250 HC RX REV CODE- 250: Performed by: INTERNAL MEDICINE

## 2019-11-22 PROCEDURE — 99152 MOD SED SAME PHYS/QHP 5/>YRS: CPT

## 2019-11-22 PROCEDURE — 99232 SBSQ HOSP IP/OBS MODERATE 35: CPT | Performed by: INTERNAL MEDICINE

## 2019-11-22 PROCEDURE — 97530 THERAPEUTIC ACTIVITIES: CPT

## 2019-11-22 PROCEDURE — 65660000000 HC RM CCU STEPDOWN

## 2019-11-22 PROCEDURE — 5A2204Z RESTORATION OF CARDIAC RHYTHM, SINGLE: ICD-10-PCS | Performed by: INTERNAL MEDICINE

## 2019-11-22 PROCEDURE — 36592 COLLECT BLOOD FROM PICC: CPT

## 2019-11-22 RX ORDER — LIDOCAINE HYDROCHLORIDE 20 MG/ML
15 SOLUTION OROPHARYNGEAL AS NEEDED
Status: DISCONTINUED | OUTPATIENT
Start: 2019-11-22 | End: 2019-11-25 | Stop reason: HOSPADM

## 2019-11-22 RX ORDER — MIDAZOLAM HYDROCHLORIDE 1 MG/ML
.5-2 INJECTION, SOLUTION INTRAMUSCULAR; INTRAVENOUS
Status: DISCONTINUED | OUTPATIENT
Start: 2019-11-22 | End: 2019-11-22

## 2019-11-22 RX ORDER — POTASSIUM CHLORIDE 20 MEQ/1
40 TABLET, EXTENDED RELEASE ORAL
Status: COMPLETED | OUTPATIENT
Start: 2019-11-22 | End: 2019-11-22

## 2019-11-22 RX ORDER — FENTANYL CITRATE 50 UG/ML
25-50 INJECTION, SOLUTION INTRAMUSCULAR; INTRAVENOUS
Status: DISCONTINUED | OUTPATIENT
Start: 2019-11-22 | End: 2019-11-22

## 2019-11-22 RX ADMIN — MIDAZOLAM 2 MG: 1 INJECTION INTRAMUSCULAR; INTRAVENOUS at 15:23

## 2019-11-22 RX ADMIN — SOTALOL HYDROCHLORIDE 120 MG: 80 TABLET ORAL at 08:37

## 2019-11-22 RX ADMIN — MIDAZOLAM 1 MG: 1 INJECTION INTRAMUSCULAR; INTRAVENOUS at 15:29

## 2019-11-22 RX ADMIN — LISINOPRIL 10 MG: 5 TABLET ORAL at 20:53

## 2019-11-22 RX ADMIN — FENTANYL CITRATE 25 MCG: 50 INJECTION, SOLUTION INTRAMUSCULAR; INTRAVENOUS at 15:26

## 2019-11-22 RX ADMIN — APIXABAN 5 MG: 5 TABLET, FILM COATED ORAL at 20:53

## 2019-11-22 RX ADMIN — POTASSIUM CHLORIDE 40 MEQ: 20 TABLET, EXTENDED RELEASE ORAL at 08:36

## 2019-11-22 RX ADMIN — AMLODIPINE BESYLATE 5 MG: 5 TABLET ORAL at 08:37

## 2019-11-22 RX ADMIN — CLINDAMYCIN PHOSPHATE 900 MG: 900 INJECTION, SOLUTION INTRAVENOUS at 17:21

## 2019-11-22 RX ADMIN — SOTALOL HYDROCHLORIDE 120 MG: 80 TABLET ORAL at 20:54

## 2019-11-22 RX ADMIN — Medication 900 UNITS: at 21:00

## 2019-11-22 RX ADMIN — APIXABAN 5 MG: 5 TABLET, FILM COATED ORAL at 08:37

## 2019-11-22 RX ADMIN — Medication 900 UNITS: at 05:08

## 2019-11-22 RX ADMIN — LIDOCAINE HYDROCHLORIDE 15 ML: 20 SOLUTION ORAL; TOPICAL at 15:24

## 2019-11-22 RX ADMIN — MIDAZOLAM 1 MG: 1 INJECTION INTRAMUSCULAR; INTRAVENOUS at 15:26

## 2019-11-22 RX ADMIN — LISINOPRIL 10 MG: 5 TABLET ORAL at 08:36

## 2019-11-22 RX ADMIN — LIDOCAINE HYDROCHLORIDE 15 ML: 20 SOLUTION ORAL; TOPICAL at 15:25

## 2019-11-22 RX ADMIN — LIDOCAINE HYDROCHLORIDE 15 ML: 20 SOLUTION ORAL; TOPICAL at 15:23

## 2019-11-22 RX ADMIN — FUROSEMIDE 40 MG: 10 INJECTION, SOLUTION INTRAMUSCULAR; INTRAVENOUS at 20:52

## 2019-11-22 RX ADMIN — CLINDAMYCIN PHOSPHATE 900 MG: 900 INJECTION, SOLUTION INTRAVENOUS at 10:35

## 2019-11-22 RX ADMIN — Medication 30 ML: at 20:59

## 2019-11-22 RX ADMIN — CLINDAMYCIN PHOSPHATE 900 MG: 900 INJECTION, SOLUTION INTRAVENOUS at 01:02

## 2019-11-22 RX ADMIN — Medication 900 UNITS: at 15:00

## 2019-11-22 RX ADMIN — FENTANYL CITRATE 25 MCG: 50 INJECTION, SOLUTION INTRAMUSCULAR; INTRAVENOUS at 15:23

## 2019-11-22 RX ADMIN — Medication 30 ML: at 05:09

## 2019-11-22 RX ADMIN — FUROSEMIDE 40 MG: 10 INJECTION, SOLUTION INTRAMUSCULAR; INTRAVENOUS at 08:36

## 2019-11-22 RX ADMIN — Medication 30 ML: at 15:00

## 2019-11-22 RX ADMIN — FENTANYL CITRATE 25 MCG: 50 INJECTION, SOLUTION INTRAMUSCULAR; INTRAVENOUS at 15:29

## 2019-11-22 NOTE — PROGRESS NOTES
PM&R Consult Progress Note      Patient: Jackie Pope  Admit Date: 11/10/2019  Admit Diagnosis: Acute pulmonary edema ([facility] [address] [J81.0]  Recommendations: Continue Acute Rehab Program, Coordination of rehab/medical care, Counseling of PM & R care issues management, Hospital Inpatient Rehab  -will plan on [facility] Monday. Pt off floor for Cardioversion. OOB to chair at least 2hrs bid. -will f/u Monday  History/Subjective/Complaint:     Records reviewed.     Pain 1  Pain Scale 1: Numeric (0 - 10) (11/22/19 1314)  Pain Intensity 1: 0 (11/22/19 1314)  Patient Stated Pain Goal: 0 (11/22/19 1248)  Pain Reassessment 1: Yes (11/21/19 1200)  Pain Onset 1: post op (11/21/19 1158)  Pain Location 1: Incisional (11/21/19 1158)  Pain Orientation 1: Left (11/19/19 1901)  Pain Description 1: Aching (11/19/19 1901)  Pain Intervention(s) 1: Declines (11/21/19 1158)     Objective:     Vitals:  Patient Vitals for the past 8 hrs:   BP Temp Pulse Resp SpO2   11/22/19 1155 114/74 97.6 °F (36.4 °C) (!) 59  (!) 18 %   11/22/19 0833 128/80 97.6 °F (36.4 °C) 91 18 97 %      Intake and Output:  11/20 1901 - 11/22 0700  In: 960 [P.O.:960]  Out: 2801 [Urine:2800]    Allergies   Allergen Reactions    Amoxicillin Swelling     Diff breathing, lip swelling    Levaquin [Levofloxacin] Shortness of Breath    Zithromax [Azithromycin] Shortness of Breath     Current Facility-Administered Medications   Medication Dose Route Frequency    sotalol (BETAPACE) tablet 120 mg  120 mg Oral Q12H    ELECTROLYTE REPLACEMENT PROTOCOL - Potassium  1 Each Other PRN    MAGNESIUM ELECTROLYTE REPLACEMENT PROTOCOL  1 Each Other Daily    ceFAZolin (ANCEF) 2 g/20 mL in sterile water IV syringe  2 g IntraVENous ON CALL    clindamycin (CLEOCIN) 900mg D5W 50mL IVPB (premix)  900 mg IntraVENous Q8H    LORazepam (ATIVAN) tablet 1 mg  1 mg Oral Q6H PRN    ondansetron (ZOFRAN) injection 4 mg  4 mg IntraVENous Q6H PRN    promethazine (PHENERGAN) with saline injection 6.25 mg  6.25 mg IntraVENous Q4H PRN    diphenhydrAMINE (BENADRYL) capsule 50 mg  50 mg Oral QHS PRN    hydrALAZINE (APRESOLINE) 20 mg/mL injection 20 mg  20 mg IntraVENous Q6H PRN    metoprolol (LOPRESSOR) injection 5 mg  5 mg IntraVENous Q6H PRN    NUTRITIONAL SUPPORT ELECTROLYTE PRN ORDERS   Does Not Apply PRN    sodium chloride (NS) flush 30 mL  30 mL InterCATHeter Q8H    heparin (porcine) pf 900 Units  900 Units InterCATHeter Q8H    sodium chloride (NS) flush 30 mL  30 mL InterCATHeter PRN    heparin (porcine) pf 900 Units  900 Units InterCATHeter PRN    lisinopril (PRINIVIL, ZESTRIL) tablet 10 mg  10 mg Oral Q12H    amLODIPine (NORVASC) tablet 5 mg  5 mg Oral DAILY    furosemide (LASIX) injection 40 mg  40 mg IntraVENous Q12H    apixaban (ELIQUIS) tablet 5 mg  5 mg Oral Q12H    sodium chloride (NS) flush 5-40 mL  5-40 mL IntraVENous PRN       Functional Assessment:  Gross Assessment  AROM: Generally decreased, functional (11/21/19 1200)  PROM: Generally decreased, functional (11/20/19 1000)  Strength: Generally decreased, functional (11/21/19 1200)     Gait  Base of Support: Center of gravity altered;Narrowed (11/22/19 1300)  Speed/Ginger: Shuffled; Slow (11/22/19 1300)  Step Length: Right shortened;Left shortened (11/22/19 1300)  Gait Abnormalities: Decreased step clearance;Trunk sway increased; Path deviations (11/22/19 1300)  Ambulation - Level of Assistance: Minimal assistance (11/22/19 1300)  Distance (ft): 20 Feet (ft) (11/22/19 1300)  Assistive Device: Desiree Brick, rolling;Gait belt(chair follow) (11/22/19 1300)  Interventions: Manual cues; Safety awareness training (11/22/19 1300)     Bed Mobility  Rolling: Contact guard assistance (11/20/19 1000)  Supine to Sit: Minimum assistance (11/22/19 1300)  Scooting: Contact guard assistance (11/22/19 1300)     Balance  Sitting: Impaired (11/22/19 1300)  Sitting - Static: Good (unsupported) (11/22/19 1300)  Sitting - Dynamic: Fair (occasional) (11/22/19 1300)  Standing: Impaired (11/22/19 1300)  Standing - Static: Fair (11/22/19 1300)  Standing - Dynamic : Fair (11/22/19 1300)                       Bed/Mat Mobility  Rolling: Contact guard assistance (11/20/19 1000)  Supine to Sit: Minimum assistance (11/22/19 1300)  Sit to Stand: Minimum assistance (11/22/19 1300)  Stand to Sit: Minimum assistance (11/22/19 1300)  Bed to Chair: Minimum assistance; Moderate assistance (11/21/19 1200)  Scooting: Contact guard assistance (11/22/19 1300)     Labs/Studies:  Recent Results (from the past 72 hour(s))   GLUCOSE, POC    Collection Time: 11/19/19  5:32 PM   Result Value Ref Range    Glucose (POC) 196 (H) 65 - 100 mg/dL   GLUCOSE, POC    Collection Time: 11/19/19 10:56 PM   Result Value Ref Range    Glucose (POC) 302 (H) 65 - 419 mg/dL   METABOLIC PANEL, BASIC    Collection Time: 11/20/19  7:08 AM   Result Value Ref Range    Sodium 140 136 - 145 mmol/L    Potassium 3.7 3.5 - 5.1 mmol/L    Chloride 102 98 - 107 mmol/L    CO2 28 21 - 32 mmol/L    Anion gap 10 7 - 16 mmol/L    Glucose 190 (H) 65 - 100 mg/dL    BUN 36 (H) 8 - 23 MG/DL    Creatinine 1.19 0.8 - 1.5 MG/DL    GFR est AA >60 >60 ml/min/1.73m2    GFR est non-AA >60 >60 ml/min/1.73m2    Calcium 8.1 (L) 8.3 - 10.4 MG/DL   EKG, 12 LEAD, SUBSEQUENT    Collection Time: 11/20/19  7:19 AM   Result Value Ref Range    Ventricular Rate 60 BPM    Atrial Rate 300 BPM    QRS Duration 186 ms    Q-T Interval 568 ms    QTC Calculation (Bezet) 568 ms    Calculated R Axis -69 degrees    Calculated T Axis 96 degrees    Diagnosis       Ventricular-paced rhythm with occasional Premature ventricular complexes  Abnormal ECG  When compared with ECG of 19-NOV-2019 10:14,  Premature ventricular complexes are now Present  Vent.  rate has decreased BY  13 BPM  Confirmed by [facility]  MD ()ELENA ([id number]) on 11/20/2019 9:19:57 AM     GLUCOSE, POC    Collection Time: 11/20/19  7:47 AM   Result Value Ref Range    Glucose (POC) 181 (H) 65 - 100 mg/dL   MAGNESIUM    Collection Time: 11/20/19  8:24 AM   Result Value Ref Range    Magnesium 2.6 (H) 1.8 - 2.4 mg/dL   GLUCOSE, POC    Collection Time: 11/20/19 10:54 AM   Result Value Ref Range    Glucose (POC) 190 (H) 65 - 100 mg/dL   EKG, 12 LEAD, INITIAL    Collection Time: 11/20/19 10:56 AM   Result Value Ref Range    Ventricular Rate 62 BPM    Atrial Rate 312 BPM    QRS Duration 166 ms    Q-T Interval 522 ms    QTC Calculation (Bezet) 529 ms    Calculated P Axis 100 degrees    Calculated R Axis -35 degrees    Calculated T Axis 103 degrees    Diagnosis       Atrial flutter with variable A-V block with frequent ventricular-paced   complexes  Left axis deviation  Left bundle branch block  Abnormal ECG    Confirmed by JOSE C YOUSSEF (), Laura Singh ([id number]) on 11/20/2019 11:10:30 AM     GLUCOSE, POC    Collection Time: 11/20/19  3:44 PM   Result Value Ref Range    Glucose (POC) 244 (H) 65 - 100 mg/dL   GLUCOSE, POC    Collection Time: 11/20/19  9:42 PM   Result Value Ref Range    Glucose (POC) 157 (H) 65 - 100 mg/dL   EKG, 12 LEAD, INITIAL    Collection Time: 11/21/19  2:22 AM   Result Value Ref Range    Ventricular Rate 60 BPM    Atrial Rate 288 BPM    QRS Duration 182 ms    Q-T Interval 612 ms    QTC Calculation (Bezet) 612 ms    Calculated P Axis 91 degrees    Calculated R Axis -70 degrees    Calculated T Axis 99 degrees    Diagnosis       Electronic ventricular pacemaker  When compared with ECG of 20-NOV-2019 10:56,  Vent.  rate has decreased BY   2 BPM  Confirmed by CLEMENTINA YOUSSEF ()Vickie ([id number]) on 11/21/2019 8:03:23 AM     GLUCOSE, POC    Collection Time: 11/21/19  4:04 AM   Result Value Ref Range    Glucose (POC) 165 (H) 65 - 100 mg/dL   MAGNESIUM    Collection Time: 11/21/19  5:43 AM   Result Value Ref Range    Magnesium 2.6 (H) 1.8 - 2.4 mg/dL   METABOLIC PANEL, BASIC    Collection Time: 11/21/19  5:43 AM   Result Value Ref Range    Sodium 139 136 - 145 mmol/L    Potassium 3.7 3.5 - 5.1 mmol/L    Chloride 101 98 - 107 mmol/L    CO2 31 21 - 32 mmol/L    Anion gap 7 7 - 16 mmol/L    Glucose 138 (H) 65 - 100 mg/dL    BUN 47 (H) 8 - 23 MG/DL    Creatinine 1.25 0.8 - 1.5 MG/DL    GFR est AA >60 >60 ml/min/1.73m2    GFR est non-AA 60 (L) >60 ml/min/1.73m2    Calcium 8.4 8.3 - 10.4 MG/DL   GLUCOSE, POC    Collection Time: 11/21/19  7:57 AM   Result Value Ref Range    Glucose (POC) 160 (H) 65 - 100 mg/dL   PLEASE READ & DOCUMENT PPD TEST IN 24 HRS    Collection Time: 11/21/19 10:58 AM   Result Value Ref Range    PPD Negative Negative    mm Induration 0 0 - 5 mm   GLUCOSE, POC    Collection Time: 11/21/19 11:18 AM   Result Value Ref Range    Glucose (POC) 158 (H) 65 - 100 mg/dL   EKG, 12 LEAD, INITIAL    Collection Time: 11/21/19 12:08 PM   Result Value Ref Range    Ventricular Rate 60 BPM    Atrial Rate 277 BPM    QRS Duration 190 ms    Q-T Interval 602 ms    QTC Calculation (Bezet) 602 ms    Calculated R Axis -70 degrees    Calculated T Axis 95 degrees    Diagnosis       Ventricular-paced rhythm  Abnormal ECG  When compared with ECG of 21-NOV-2019 02:22,  No significant change was found  Confirmed by CLEMENTINA YOUSSEF (), Raquel Day ([id number]) on 11/21/2019 3:04:20 PM     GLUCOSE, POC    Collection Time: 11/21/19  4:05 PM   Result Value Ref Range    Glucose (POC) 184 (H) 65 - 100 mg/dL   EKG, 12 LEAD, INITIAL    Collection Time: 11/21/19  8:56 PM   Result Value Ref Range    Ventricular Rate 60 BPM    Atrial Rate 267 BPM    QRS Duration 192 ms    Q-T Interval 576 ms    QTC Calculation (Bezet) 576 ms    Calculated P Axis 74 degrees    Calculated R Axis -72 degrees    Calculated T Axis 96 degrees    Diagnosis       Atrial flutter Electronic ventricular pacemaker  When compared with ECG of 21-NOV-2019 12:08,  No significant change was found  Confirmed by [address] Bingham MD (), Faizan Cueva ([id number]) on 11/22/2019 7:19:03 AM     GLUCOSE, POC    Collection Time: 11/21/19  9:27 PM   Result Value Ref Range    Glucose (POC) 136 (H) 65 - 100 mg/dL   EKG, 12 LEAD, INITIAL    Collection Time: 11/21/19 11:28 PM   Result Value Ref Range    Ventricular Rate 60 BPM    Atrial Rate 241 BPM    QRS Duration 176 ms    Q-T Interval 604 ms    QTC Calculation (Bezet) 604 ms    Calculated R Axis -77 degrees    Calculated T Axis 97 degrees    Diagnosis       Electronic ventricular pacemaker Atrial flutter  When compared with ECG of 21-NOV-2019 20:56,  No significant change was found  Confirmed by JOSE C YOUSSEF (), JARRETT RUSS ([id number]) on 11/22/2019 7:19:22 AM     MAGNESIUM    Collection Time: 11/22/19  5:08 AM   Result Value Ref Range    Magnesium 2.5 (H) 1.8 - 2.4 mg/dL   METABOLIC PANEL, BASIC    Collection Time: 11/22/19  5:08 AM   Result Value Ref Range    Sodium 140 136 - 145 mmol/L    Potassium 3.7 3.5 - 5.1 mmol/L    Chloride 104 98 - 107 mmol/L    CO2 31 21 - 32 mmol/L    Anion gap 5 (L) 7 - 16 mmol/L    Glucose 126 (H) 65 - 100 mg/dL    BUN 41 (H) 8 - 23 MG/DL    Creatinine 1.06 0.8 - 1.5 MG/DL    GFR est AA >60 >60 ml/min/1.73m2    GFR est non-AA >60 >60 ml/min/1.73m2    Calcium 7.4 (L) 8.3 - 10.4 MG/DL   CBC W/O DIFF    Collection Time: 11/22/19  5:08 AM   Result Value Ref Range    WBC 13.4 (H) 4.3 - 11.1 K/uL    RBC 4.19 (L) 4.23 - 5.6 M/uL    HGB 12.7 (L) 13.6 - 17.2 g/dL    HCT 37.9 (L) 41.1 - 50.3 %    MCV 90.5 79.6 - 97.8 FL    MCH 30.3 26.1 - 32.9 PG    MCHC 33.5 31.4 - 35.0 g/dL    RDW 12.9 11.9 - 14.6 %    PLATELET 869 613 - 857 K/uL    MPV 10.0 9.4 - 12.3 FL    ABSOLUTE NRBC 0.00 0.0 - 0.2 K/uL   EKG, 12 LEAD, INITIAL    Collection Time: 11/22/19 10:30 AM   Result Value Ref Range    Ventricular Rate 60 BPM    Atrial Rate 59 BPM    QRS Duration 194 ms    Q-T Interval 602 ms    QTC Calculation (Bezet) 602 ms    Calculated R Axis -68 degrees    Calculated T Axis 94 degrees    Diagnosis       Ventricular-paced rhythm  Abnormal ECG  When compared with ECG of 21-NOV-2019 23:28,  No significant change was found  Confirmed by CLEMENTINA YOUSSEF (), Heaven Franco ([id number]) on 11/22/2019 10:44:27 AM PLEASE READ & DOCUMENT PPD TEST IN 48 HRS    Collection Time: 11/22/19 11:00 AM   Result Value Ref Range    PPD Negative Negative    mm Induration 0 0 - 5 mm        Assessment:     Principal Problem:    Acute pulmonary edema (HCC) (11/10/2019)    Active Problems:    Chronic combined systolic and diastolic congestive heart failure ([address] (5/14/2018)      Overview: EF 45-50%       Paroxysmal A-fib (HCC) (5/14/2018)      MARISOL (obstructive sleep apnea) (11/15/2018)      Acute respiratory failure with hypoxia ([facility] [address] (11/10/2019)      Acute heart failure ([facility] [address] (11/10/2019)        Plan:     Recommendations: Continue Acute Rehab Program  Coordination of rehab/medical care  Counseling of PM & R care issues management  Monitoring and management of medical conditions per plan of care/orders  Discussion with Family/Caregiver/Staff  Reviewed Therapies/Labs/Medications/Records

## 2019-11-22 NOTE — PROCEDURES
Brief Cardiac Procedure Note    Patient: Quique Alford MRN: [id number]  SSN: [id number]    YOB: 1945  Age: 66 y.o. Sex: male      Date of Procedure: 11/22/2019     Pre-procedure Diagnosis: Atrial Fibrillation/Atrial Flutter    Post-procedure Diagnosis: No Cardiac Source of Embolism    Reason for Procedure: Cardiac Arrhythmia    Procedure: Transesophageal Echocardiogram with Cardioversion    Brief Description of Procedure: HIRO/DCCV    Performed By: Emilia Carter MD     Assistants: NONE    Anesthesia: Moderate Sedation    Estimated Blood Loss: Less than 10 mL      Specimens: None    Implants: None    Findings:   LV NORMAL  LA/RA NORMAL  ANTONINO WITHOUT CLOT  SUCCESSFUL CARDIOVERSION 150J X 1    NSR ON TELE    Complications: None    Recommendations: Continue medical therapy.     Signed By: Emilia Carter MD     November 22, 2019

## 2019-11-22 NOTE — PROGRESS NOTES
Problem: Falls - Risk of  Goal: *Absence of Falls  Description  Document Rigoberto Yoo Fall Risk and appropriate interventions in the flowsheet.   Outcome: Progressing Towards Goal  Note: Fall Risk Interventions:  Mobility Interventions: Communicate number of staff needed for ambulation/transfer, Patient to call before getting OOB    Mentation Interventions: Bed/chair exit alarm, Adequate sleep, hydration, pain control    Medication Interventions: Patient to call before getting OOB, Teach patient to arise slowly, Evaluate medications/consider consulting pharmacy    Elimination Interventions: Elevated toilet seat, Urinal in reach, Toilet paper/wipes in reach    History of Falls Interventions: Bed/chair exit alarm, Investigate reason for fall

## 2019-11-22 NOTE — ROUTINE PROCESS
CHF teaching continues to pt/ FM @ BS, verbalize emphasis on monitoring self and report to MD: 
?  short of breath. ? If you cannot lay flat without developing short of breath or rapid breathing at night; or if it wakes you up. Develop a cough or wheezing. . 
 
Exercise needs to be started with your Doctors approval. 
Reduce stress, call your Doctor or myself if concerned Pass posttest via teach back; will make self-available post DC, if an questions arise. Diabetic teaching completed. Planner/scale @ BS:  100 mins HIRO

## 2019-11-22 NOTE — PROGRESS NOTES
Bedside and Verbal shift change report given to [facility] (oncoming nurse) by  Satnam gilbert). Report included the following information SBAR, Kardex, MAR and Recent Results.

## 2019-11-22 NOTE — PROGRESS NOTES
[facility] CARDIOLOGY PROGRESS NOTE    11/22/2019 5:51 PM    Admit Date: 11/10/2019    Admit Diagnosis: Acute pulmonary edema (HCC) [J81.0]      Subjective:   Stable overnight without angina, CHF, or palpitations. Vitals stable and controlled. No other complaints overnight. Tolerating meds well. S/P HIRO/DCCV earlier today, doing well and NSR with V-pacing. Objective:      Vitals:    11/22/19 1643 11/22/19 1645 11/22/19 1649 11/22/19 1711   BP: 104/65 106/66 107/69 125/71   Pulse: 63 62 64 65   Resp:    18   Temp:    97 °F (36.1 °C)   SpO2: 98% 98%  98%   Weight:       Height:           Physical Exam:  Neck- supple, no JVD  CV- regular rate and rhythm no MRG  Lung- clear bilaterally  Abd- soft, nontender, nondistended  Ext- no edema  Skin- warm and dry    Data Review:   Recent Labs     11/22/19  0508 11/21/19  0543    139   K 3.7 3.7   MG 2.5* 2.6*   BUN 41* 47*   CREA 1.06 1.25   * 138*   WBC 13.4*  --    HGB 12.7*  --    HCT 37.9*  --      --        Assessment and Plan:     Principal Problem:    Acute pulmonary edema (HCC) (11/10/2019)- improved, continue meds, recheck cbc, bmp, mg in am     Active Problems:    Chronic combined systolic and diastolic congestive heart failure (HCC) - stable, fairly euvolemic now in sinus s/p HIRO/DCCV. Overview: EF 45-50%        Paroxysmal A-fib ([facility] [address] (5/14/2018)- s/p successful HIRO/DCCV today, QTc 544ms on 120mg BID sotalol. Continue current meds, to 9th floor rehab soon, any time fine with me.        MARISOL (obstructive sleep apnea) - CPAP as tolerated       Acute respiratory failure with hypoxia ([facility] [address] (11/10/2019)- improved, resolved, follow with pulmonary     BMP, MG, CBC IN AM  CONTINUE SOTALOL MONITORING, IN NSR WITH V-PACING NOW S/P CARDIOVERSION EARLIER TODAY.           FREDRICK Pettit MD  [address] Cardiology  Pager [phone]

## 2019-11-22 NOTE — PROGRESS NOTES
Lisa Akhtar  Admission Date: 11/10/2019             Daily Progress Note: 11/22/2019    The patient's chart is reviewed and the patient is discussed with the staff. 66 y.o. CM presented with respiratory failure. He was just discharged from [place] on the day of admission. He presented there with increased SOB of cough. It was originally suspected to represent PNA but the patient improved after just 24 hours of treatment with lasix and abx and was discharged.      He was resting at home per wife and awoke suddenly very SOB with BP in the 180-190's. Cough with pink frothy sputum. Went to urgent care and was transported here via EMS. Was intubated en route and has had copious pink frothy sputum from ETT since. CXR with B infiltrates c/w edema. Pro BNP elevated. Then was extubated on 11/12  and early morning 11/13 re intubated again with reports of increase BP,agitation just prior to intubation.    Self extubated 11/17 and placed on NC. Subjective:      Waiting to go for cardioversion. Has a headache from not eating.      Current Facility-Administered Medications   Medication Dose Route Frequency    sotalol (BETAPACE) tablet 120 mg  120 mg Oral Q12H    ELECTROLYTE REPLACEMENT PROTOCOL - Potassium  1 Each Other PRN    MAGNESIUM ELECTROLYTE REPLACEMENT PROTOCOL  1 Each Other Daily    ceFAZolin (ANCEF) 2 g/20 mL in sterile water IV syringe  2 g IntraVENous ON CALL    clindamycin (CLEOCIN) 900mg D5W 50mL IVPB (premix)  900 mg IntraVENous Q8H    LORazepam (ATIVAN) tablet 1 mg  1 mg Oral Q6H PRN    ondansetron (ZOFRAN) injection 4 mg  4 mg IntraVENous Q6H PRN    promethazine (PHENERGAN) with saline injection 6.25 mg  6.25 mg IntraVENous Q4H PRN    diphenhydrAMINE (BENADRYL) capsule 50 mg  50 mg Oral QHS PRN    hydrALAZINE (APRESOLINE) 20 mg/mL injection 20 mg  20 mg IntraVENous Q6H PRN    metoprolol (LOPRESSOR) injection 5 mg  5 mg IntraVENous Q6H PRN    NUTRITIONAL SUPPORT ELECTROLYTE PRN ORDERS   Does Not Apply PRN    sodium chloride (NS) flush 30 mL  30 mL InterCATHeter Q8H    heparin (porcine) pf 900 Units  900 Units InterCATHeter Q8H    sodium chloride (NS) flush 30 mL  30 mL InterCATHeter PRN    heparin (porcine) pf 900 Units  900 Units InterCATHeter PRN    lisinopril (PRINIVIL, ZESTRIL) tablet 10 mg  10 mg Oral Q12H    amLODIPine (NORVASC) tablet 5 mg  5 mg Oral DAILY    furosemide (LASIX) injection 40 mg  40 mg IntraVENous Q12H    apixaban (ELIQUIS) tablet 5 mg  5 mg Oral Q12H    sodium chloride (NS) flush 5-40 mL  5-40 mL IntraVENous PRN         Objective:     Vitals:    11/22/19 0054 11/22/19 0446 11/22/19 0833 11/22/19 1155   BP: 95/60 126/74 128/80 114/74   Pulse: 61 (!) 58 91 (!) 59   Resp: 18 18 18    Temp: 98.1 °F (36.7 °C) 97.8 °F (36.6 °C) 97.6 °F (36.4 °C) 97.6 °F (36.4 °C)   SpO2: 95% 96% 97% (!) 18%   Weight:  224 lb 4.8 oz (101.7 kg)     Height:         Intake and Output:   11/20 1901 - 11/22 0700  In: 960 [P.O.:960]  Out: 2801 [Urine:2800]  11/22 0701 - 11/22 1900  In: 0   Out: 200 [Urine:200]    Physical Exam:   Constitutional:  the patient is well developed and in no acute distress  HEENT:  Sclera clear, pupils equal, oral mucosa moist  Lungs: clear bilaterally. On room air  Cardiovascular:  RRR without M,G,R  Abd/GI: soft and non-tender; with positive bowel sounds. Ext: warm without cyanosis. There is no lower leg edema. Musculoskeletal: moves all four extremities with equal strength  Skin:  no jaundice or rashes, no wounds   Neuro: no gross neuro deficits   Musculoskeletal: can ambulate but weak. No deformity  Psychiatric: Calm. Review of Systems - Review of Systems   Constitutional: Positive for weight loss. HENT: Negative. Cardiovascular: Positive for leg swelling. Gastrointestinal: Negative. Neurological: Positive for headaches.         From NPO status     Lines: PICC line right arm  CHEST XRAY: none today    LAB  Recent Labs 11/22/19  0508   WBC 13.4*   HGB 12.7*   HCT 37.9*        Recent Labs     11/22/19  0508 11/21/19  0543 11/20/19  0824 11/20/19  0708    139  --  140   K 3.7 3.7  --  3.7    101  --  102   CO2 31 31  --  28   * 138*  --  190*   BUN 41* 47*  --  36*   CREA 1.06 1.25  --  1.19   MG 2.5* 2.6* 2.6*  --            Assessment:  (Medical Decision Making)     Hospital Problems  Date Reviewed: 11/10/2019          Codes Class Noted POA    ARDS (adult respiratory distress syndrome) ([facility] ICD-10-CM: Y01  ICD-9-CM: 518.52  11/13/2019 Unknown    Last CXR clear    Acute respiratory failure with hypoxia [facility]) ICD-10-CM: J96.01  ICD-9-CM: 518.81  11/10/2019 Yes    resolved    * (Principal) Acute pulmonary edema ([facility] ICD-10-CM: J81.0  ICD-9-CM: 518.4  11/10/2019 Yes    Improved with diuresis    Acute heart failure ([facility] ICD-10-CM: I50.9  ICD-9-CM: 428.9  11/10/2019 Yes    As above    MARISOL (obstructive sleep apnea) (Chronic) ICD-10-CM: Z54.48  ICD-9-CM: 327.23  11/15/2018 Yes    Compliant with home CPAP    Chronic combined systolic and diastolic congestive heart failure (HCC) (Chronic) ICD-10-CM: I50.42  ICD-9-CM: 428.42, 428.0  5/14/2018 Yes    Overview Signed 11/9/2019  5:54 PM by Jenni Laguna MD     EF 45-50%              Paroxysmal A-fib ([facility] (Chronic) ICD-10-CM: I48.0  ICD-9-CM: 427.31  5/14/2018 Yes    Rate controlled. On Eliquis          Plan:  (Medical Decision Making)   1. Cardioversion today. Also on Sotalol  2. Now on room air. Fluid balance neg 1.2L overpast 24 hours and overall neg 14 liters. IV lasix per cardiology  3. Using home CPAP with sleep  4. Day 4 Cleocin - ordered by cardiology    Jazmine Nichols NP    More than 50% of time documented was spent face-to-face contact with the patient and in the care of the patient on the floor/unit where the patient is located.      Lungs:  clear  Heart:  RRR with no Murmur/Rubs/Gallops    Additional Comments:  Doing well on RA,CPAP at night , appreciate  to take over the care,   Will sign off, call if needed    I have spoken with and examined the patient. I agree with the above assessment and plan as documented.     David Branch MD

## 2019-11-22 NOTE — PROGRESS NOTES
PM&R Consult Progress Note      Patient: Quique Marking  Admit Date: 11/10/2019  Admit Diagnosis: Acute pulmonary edema ([facility] [address] [J81.0]  Recommendations: Continue Acute Rehab Program, Coordination of rehab/medical care, Counseling of PM & R care issues management  -patient decided against IRC and wanting to go to the [place] to \"get out of the hospital.\" Case mgt spoke with patient once again and he changed his mind.   -going for possible HIRO/DCCV tomorrow due to ongoing a-flutter. On eliquis. Likely monitor thru weekend and revisit possible IRC transfer Monday. History/Subjective/Complaint:     Patient seen and examined. Records reviewed. Spoke with patient regarding \" fall\" overnoc. Pt with poor insight into deficits. Used poor judgement but still says he thinks he can get up alone. Does realize, however, that he is not ready to go home.  Denies cp or sob    Pain 1  Pain Scale 1: Numeric (0 - 10) (11/21/19 1618)  Pain Intensity 1: 0 (11/21/19 1618)  Patient Stated Pain Goal: 0 (11/21/19 1618)  Pain Reassessment 1: Yes (11/21/19 1200)  Pain Onset 1: post op (11/21/19 1158)  Pain Location 1: Incisional (11/21/19 1158)  Pain Orientation 1: Left (11/19/19 1901)  Pain Description 1: Aching (11/19/19 1901)  Pain Intervention(s) 1: Declines (11/21/19 1158)     Objective:     Vitals:  Patient Vitals for the past 8 hrs:   BP Temp Pulse Resp SpO2   11/21/19 1653 115/66 97.7 °F (36.5 °C) 80 18 95 %      Intake and Output:  11/20 0701 - 11/21 1900  In: 480 [P.O.:480]  Out: 1551 [Urine:1550]    Allergies   Allergen Reactions    Amoxicillin Swelling     Diff breathing, lip swelling    Levaquin [Levofloxacin] Shortness of Breath    Zithromax [Azithromycin] Shortness of Breath     Current Facility-Administered Medications   Medication Dose Route Frequency    sotalol (BETAPACE) tablet 120 mg  120 mg Oral Q12H    ELECTROLYTE REPLACEMENT PROTOCOL - Potassium  1 Each Other PRN    MAGNESIUM ELECTROLYTE REPLACEMENT PROTOCOL 1 Each Other Daily    ceFAZolin (ANCEF) 2 g/20 mL in sterile water IV syringe  2 g IntraVENous ON CALL    clindamycin (CLEOCIN) 900mg D5W 50mL IVPB (premix)  900 mg IntraVENous Q8H    LORazepam (ATIVAN) tablet 1 mg  1 mg Oral Q6H PRN    ondansetron (ZOFRAN) injection 4 mg  4 mg IntraVENous Q6H PRN    promethazine (PHENERGAN) with saline injection 6.25 mg  6.25 mg IntraVENous Q4H PRN    diphenhydrAMINE (BENADRYL) capsule 50 mg  50 mg Oral QHS PRN    insulin lispro (HUMALOG) injection   SubCUTAneous AC&HS    hydrALAZINE (APRESOLINE) 20 mg/mL injection 20 mg  20 mg IntraVENous Q6H PRN    metoprolol (LOPRESSOR) injection 5 mg  5 mg IntraVENous Q6H PRN    NUTRITIONAL SUPPORT ELECTROLYTE PRN ORDERS   Does Not Apply PRN    sodium chloride (NS) flush 30 mL  30 mL InterCATHeter Q8H    heparin (porcine) pf 900 Units  900 Units InterCATHeter Q8H    sodium chloride (NS) flush 30 mL  30 mL InterCATHeter PRN    heparin (porcine) pf 900 Units  900 Units InterCATHeter PRN    lisinopril (PRINIVIL, ZESTRIL) tablet 10 mg  10 mg Oral Q12H    amLODIPine (NORVASC) tablet 5 mg  5 mg Oral DAILY    furosemide (LASIX) injection 40 mg  40 mg IntraVENous Q12H    apixaban (ELIQUIS) tablet 5 mg  5 mg Oral Q12H    sodium chloride (NS) flush 5-40 mL  5-40 mL IntraVENous PRN       Physical Exam:  NAD. Pleasant. A bit confused  CV; rrr no m  LUNGS cta b  ABD soft ntnd bs +  EXT mild edema, no calf tenderness  No gross neuro deficits, no lateralizing signs      Functional Assessment:  Gross Assessment  AROM: Generally decreased, functional (11/21/19 1200)  PROM: Generally decreased, functional (11/20/19 1000)  Strength: Generally decreased, functional (11/21/19 1200)     Gait  Base of Support: Center of gravity altered;Narrowed (11/20/19 1000)  Speed/Ginger: Slow;Pace decreased (<100 feet/min) (11/20/19 1000)  Step Length: Right shortened;Left shortened (11/20/19 1000)  Gait Abnormalities: Decreased step clearance; Path deviations; Shuffling gait;Trunk sway increased (11/20/19 1000)  Ambulation - Level of Assistance: Moderate assistance (11/20/19 1000)  Distance (ft): 3 Feet (ft)(side steps next to bed) (11/20/19 1000)  Assistive Device: Walker, rolling; Other (comment)(sling to LUE) (11/20/19 1000)  Interventions: Manual cues; Safety awareness training;Visual/Demos; Verbal cues (11/20/19 1000)     Bed Mobility  Rolling: Contact guard assistance (11/20/19 1000)  Supine to Sit: Minimum assistance (11/21/19 1200)  Scooting: Contact guard assistance (11/21/19 1200)     Balance  Sitting: Impaired (11/21/19 1200)  Sitting - Static: Good (unsupported) (11/21/19 1200)  Sitting - Dynamic: Fair (occasional) (11/21/19 1200)  Standing: Impaired (11/21/19 1200)  Standing - Static: Fair (11/21/19 1200)  Standing - Dynamic : Fair (11/21/19 1200)                       Bed/Mat Mobility  Rolling: Contact guard assistance (11/20/19 1000)  Supine to Sit: Minimum assistance (11/21/19 1200)  Sit to Stand: Moderate assistance (11/21/19 1200)  Stand to Sit: Minimum assistance (11/21/19 1200)  Bed to Chair: Minimum assistance; Moderate assistance (11/21/19 1200)  Scooting: Contact guard assistance (11/21/19 1200)     Labs/Studies:  Recent Results (from the past 72 hour(s))   GLUCOSE, POC    Collection Time: 11/18/19  8:54 PM   Result Value Ref Range    Glucose (POC) 161 (H) 65 - 460 mg/dL   METABOLIC PANEL, BASIC    Collection Time: 11/19/19  3:40 AM   Result Value Ref Range    Sodium 145 136 - 145 mmol/L    Potassium 3.6 3.5 - 5.1 mmol/L    Chloride 104 98 - 107 mmol/L    CO2 36 (H) 21 - 32 mmol/L    Anion gap 5 (L) 7 - 16 mmol/L    Glucose 230 (H) 65 - 100 mg/dL    BUN 35 (H) 8 - 23 MG/DL    Creatinine 0.96 0.8 - 1.5 MG/DL    GFR est AA >60 >60 ml/min/1.73m2    GFR est non-AA >60 >60 ml/min/1.73m2    Calcium 8.2 (L) 8.3 - 10.4 MG/DL   CBC W/O DIFF    Collection Time: 11/19/19  3:40 AM   Result Value Ref Range    WBC 10.2 4.3 - 11.1 K/uL    RBC 4.25 4.23 - 5.6 M/uL    HGB 12.9 (L) 13.6 - 17.2 g/dL    HCT 39.5 (L) 41.1 - 50.3 %    MCV 92.9 79.6 - 97.8 FL    MCH 30.4 26.1 - 32.9 PG    MCHC 32.7 31.4 - 35.0 g/dL    RDW 12.9 11.9 - 14.6 %    PLATELET 358 370 - 168 K/uL    MPV 9.9 9.4 - 12.3 FL    ABSOLUTE NRBC 0.00 0.0 - 0.2 K/uL   PTT    Collection Time: 11/19/19  3:40 AM   Result Value Ref Range    aPTT 94.4 (H) 24.7 - 39.8 SEC   GLUCOSE, POC    Collection Time: 11/19/19  7:22 AM   Result Value Ref Range    Glucose (POC) 196 (H) 65 - 100 mg/dL   EKG, 12 LEAD, INITIAL    Collection Time: 11/19/19 10:14 AM   Result Value Ref Range    Ventricular Rate 73 BPM    Atrial Rate 416 BPM    QRS Duration 182 ms    Q-T Interval 470 ms    QTC Calculation (Bezet) 517 ms    Calculated R Axis 15 degrees    Calculated T Axis 79 degrees    Diagnosis       Atrial fibrillation with occasional ventricular-paced complexes and with   premature ventricular or aberrantly conducted  complexes  Left bundle branch block  Abnormal ECG  When compared with ECG of 12-NOV-2019 15:33,  Electronic ventricular pacemaker has replaced Sinus rhythm  Confirmed by CLEMENTINA YOUSSEF (), [facility] ([id number]) on 11/19/2019 12:39:28 PM     GLUCOSE, POC    Collection Time: 11/19/19 12:06 PM   Result Value Ref Range    Glucose (POC) 157 (H) 65 - 100 mg/dL   GLUCOSE, POC    Collection Time: 11/19/19  5:32 PM   Result Value Ref Range    Glucose (POC) 196 (H) 65 - 100 mg/dL   GLUCOSE, POC    Collection Time: 11/19/19 10:56 PM   Result Value Ref Range    Glucose (POC) 302 (H) 65 - 733 mg/dL   METABOLIC PANEL, BASIC    Collection Time: 11/20/19  7:08 AM   Result Value Ref Range    Sodium 140 136 - 145 mmol/L    Potassium 3.7 3.5 - 5.1 mmol/L    Chloride 102 98 - 107 mmol/L    CO2 28 21 - 32 mmol/L    Anion gap 10 7 - 16 mmol/L    Glucose 190 (H) 65 - 100 mg/dL    BUN 36 (H) 8 - 23 MG/DL    Creatinine 1.19 0.8 - 1.5 MG/DL    GFR est AA >60 >60 ml/min/1.73m2    GFR est non-AA >60 >60 ml/min/1.73m2    Calcium 8.1 (L) 8.3 - 10.4 MG/DL EKG, 12 LEAD, SUBSEQUENT    Collection Time: 11/20/19  7:19 AM   Result Value Ref Range    Ventricular Rate 60 BPM    Atrial Rate 300 BPM    QRS Duration 186 ms    Q-T Interval 568 ms    QTC Calculation (Bezet) 568 ms    Calculated R Axis -69 degrees    Calculated T Axis 96 degrees    Diagnosis       Ventricular-paced rhythm with occasional Premature ventricular complexes  Abnormal ECG  When compared with ECG of 19-NOV-2019 10:14,  Premature ventricular complexes are now Present  Vent.  rate has decreased BY  13 BPM  Confirmed by Leonardo Mendoza MD ()[facility] ([id number]) on 11/20/2019 9:19:57 AM     GLUCOSE, POC    Collection Time: 11/20/19  7:47 AM   Result Value Ref Range    Glucose (POC) 181 (H) 65 - 100 mg/dL   MAGNESIUM    Collection Time: 11/20/19  8:24 AM   Result Value Ref Range    Magnesium 2.6 (H) 1.8 - 2.4 mg/dL   GLUCOSE, POC    Collection Time: 11/20/19 10:54 AM   Result Value Ref Range    Glucose (POC) 190 (H) 65 - 100 mg/dL   EKG, 12 LEAD, INITIAL    Collection Time: 11/20/19 10:56 AM   Result Value Ref Range    Ventricular Rate 62 BPM    Atrial Rate 312 BPM    QRS Duration 166 ms    Q-T Interval 522 ms    QTC Calculation (Bezet) 529 ms    Calculated P Axis 100 degrees    Calculated R Axis -35 degrees    Calculated T Axis 103 degrees    Diagnosis       Atrial flutter with variable A-V block with frequent ventricular-paced   complexes  Left axis deviation  Left bundle branch block  Abnormal ECG    Confirmed by JOSE C YOUSSEF ()JARRETT ([id number]) on 11/20/2019 11:10:30 AM     GLUCOSE, POC    Collection Time: 11/20/19  3:44 PM   Result Value Ref Range    Glucose (POC) 244 (H) 65 - 100 mg/dL   GLUCOSE, POC    Collection Time: 11/20/19  9:42 PM   Result Value Ref Range    Glucose (POC) 157 (H) 65 - 100 mg/dL   EKG, 12 LEAD, INITIAL    Collection Time: 11/21/19  2:22 AM   Result Value Ref Range    Ventricular Rate 60 BPM    Atrial Rate 288 BPM    QRS Duration 182 ms    Q-T Interval 612 ms    QTC Calculation (Bezet) 612 ms    Calculated P Axis 91 degrees    Calculated R Axis -70 degrees    Calculated T Axis 99 degrees    Diagnosis       Electronic ventricular pacemaker  When compared with ECG of 20-NOV-2019 10:56,  Vent.  rate has decreased BY   2 BPM  Confirmed by CLEMENTINA YOUSSEF ()[place] ([id number]) on 11/21/2019 8:03:23 AM     GLUCOSE, POC    Collection Time: 11/21/19  4:04 AM   Result Value Ref Range    Glucose (POC) 165 (H) 65 - 100 mg/dL   MAGNESIUM    Collection Time: 11/21/19  5:43 AM   Result Value Ref Range    Magnesium 2.6 (H) 1.8 - 2.4 mg/dL   METABOLIC PANEL, BASIC    Collection Time: 11/21/19  5:43 AM   Result Value Ref Range    Sodium 139 136 - 145 mmol/L    Potassium 3.7 3.5 - 5.1 mmol/L    Chloride 101 98 - 107 mmol/L    CO2 31 21 - 32 mmol/L    Anion gap 7 7 - 16 mmol/L    Glucose 138 (H) 65 - 100 mg/dL    BUN 47 (H) 8 - 23 MG/DL    Creatinine 1.25 0.8 - 1.5 MG/DL    GFR est AA >60 >60 ml/min/1.73m2    GFR est non-AA 60 (L) >60 ml/min/1.73m2    Calcium 8.4 8.3 - 10.4 MG/DL   GLUCOSE, POC    Collection Time: 11/21/19  7:57 AM   Result Value Ref Range    Glucose (POC) 160 (H) 65 - 100 mg/dL   PLEASE READ & DOCUMENT PPD TEST IN 24 HRS    Collection Time: 11/21/19 10:58 AM   Result Value Ref Range    PPD Negative Negative    mm Induration 0 0 - 5 mm   GLUCOSE, POC    Collection Time: 11/21/19 11:18 AM   Result Value Ref Range    Glucose (POC) 158 (H) 65 - 100 mg/dL   EKG, 12 LEAD, INITIAL    Collection Time: 11/21/19 12:08 PM   Result Value Ref Range    Ventricular Rate 60 BPM    Atrial Rate 277 BPM    QRS Duration 190 ms    Q-T Interval 602 ms    QTC Calculation (Bezet) 602 ms    Calculated R Axis -70 degrees    Calculated T Axis 95 degrees    Diagnosis       Ventricular-paced rhythm  Abnormal ECG  When compared with ECG of 21-NOV-2019 02:22,  No significant change was found  Confirmed by CLEMENTINA YOUSSEF ()[place] ([id number]) on 11/21/2019 3:04:20 PM     GLUCOSE, POC    Collection Time: 11/21/19  4:05 PM   Result Value Ref Range    Glucose (POC) 184 (H) 65 - 100 mg/dL        Assessment:     Principal Problem:    Acute pulmonary edema (HCC) (11/10/2019)    Active Problems:    Chronic combined systolic and diastolic congestive heart failure ([facility] [address] (5/14/2018)      Overview: EF 45-50%       Paroxysmal A-fib (HCC) (5/14/2018)      MARISOL (obstructive sleep apnea) (11/15/2018)      Acute respiratory failure with hypoxia ([facility] [address] (11/10/2019)      Acute heart failure ([facility] [address] (11/10/2019)      ARDS (adult respiratory distress syndrome) ([facility] (11/13/2019)        Plan:     Recommendations: Continue Acute Rehab Program  Coordination of rehab/medical care  Counseling of PM & R care issues management  Monitoring and management of medical conditions per plan of care/orders  Discussion with Family/Caregiver/Staff  Reviewed Therapies/Labs/Medications/Records

## 2019-11-22 NOTE — PROGRESS NOTES
Bedside and Verbal shift change report given to  (oncoming nurse) by Nancy Campbell (offgoing nurse). Report included the following information SBAR, Kardex, Intake/Output, MAR and Recent Results. Bed Alarm plugged in and working properly. Patient instructed to call before getting out of bed, verbalized understanding.

## 2019-11-22 NOTE — PROGRESS NOTES
Patient's previous QTC was 602 at 1208. Sotalol dose due at 2100. Ekg done at 2050. QTC is 576. Per Janae Jaimes NP,  [place] to give sotalol dose and monitor patient.

## 2019-11-22 NOTE — PROGRESS NOTES
TRANSFER - OUT REPORT:    Verbal report given to Francine Mattson RN(name) on Tu Martinez  being transferred to [facility](unit) for routine post - op       Report consisted of patients Situation, Background, Assessment and   Recommendations(SBAR). Information from the following report(s) Procedure Summary was reviewed with the receiving nurse. Opportunity for questions and clarification was provided.

## 2019-11-23 LAB
ANION GAP SERPL CALC-SCNC: 6 MMOL/L (ref 7–16)
ATRIAL RATE: 60 BPM
ATRIAL RATE: 60 BPM
BUN SERPL-MCNC: 33 MG/DL (ref 8–23)
CALCIUM SERPL-MCNC: 7.7 MG/DL (ref 8.3–10.4)
CALCULATED P AXIS, ECG09: 80 DEGREES
CALCULATED P AXIS, ECG09: 95 DEGREES
CALCULATED R AXIS, ECG10: -42 DEGREES
CALCULATED R AXIS, ECG10: -42 DEGREES
CALCULATED T AXIS, ECG11: 122 DEGREES
CALCULATED T AXIS, ECG11: 147 DEGREES
CHLORIDE SERPL-SCNC: 105 MMOL/L (ref 98–107)
CO2 SERPL-SCNC: 28 MMOL/L (ref 21–32)
CREAT SERPL-MCNC: 1.02 MG/DL (ref 0.8–1.5)
DIAGNOSIS, 93000: NORMAL
DIAGNOSIS, 93000: NORMAL
FLUAV RNA SPEC QL NAA+PROBE: NEGATIVE
FLUBV RNA SPEC QL NAA+PROBE: NEGATIVE
GLUCOSE SERPL-MCNC: 110 MG/DL (ref 65–100)
HADV DNA SPEC QL NAA+PROBE: NEGATIVE
HMPV RNA SPEC QL NAA+PROBE: NEGATIVE
HPIV1 RNA SPEC QL NAA+PROBE: NEGATIVE
HPIV2 RNA SPEC QL NAA+PROBE: NEGATIVE
HPIV3 RNA SPEC QL NAA+PROBE: NEGATIVE
MAGNESIUM SERPL-MCNC: 2.5 MG/DL (ref 1.8–2.4)
MM INDURATION POC: 0 MM (ref 0–5)
P-R INTERVAL, ECG05: 160 MS
P-R INTERVAL, ECG05: 214 MS
POTASSIUM SERPL-SCNC: 4 MMOL/L (ref 3.5–5.1)
PPD POC: NEGATIVE NEGATIVE
Q-T INTERVAL, ECG07: 498 MS
Q-T INTERVAL, ECG07: 544 MS
QRS DURATION, ECG06: 170 MS
QRS DURATION, ECG06: 174 MS
QTC CALCULATION (BEZET), ECG08: 498 MS
QTC CALCULATION (BEZET), ECG08: 544 MS
RHINOVIRUS RNA SPEC QL NAA+PROBE: POSITIVE
RSV A RNA SPEC QL NAA+PROBE: NEGATIVE
RSV B RNA SPEC QL NAA+PROBE: NEGATIVE
SODIUM SERPL-SCNC: 139 MMOL/L (ref 136–145)
SPECIMEN SOURCE: ABNORMAL
VENTRICULAR RATE, ECG03: 60 BPM
VENTRICULAR RATE, ECG03: 60 BPM

## 2019-11-23 PROCEDURE — 74011250636 HC RX REV CODE- 250/636: Performed by: INTERNAL MEDICINE

## 2019-11-23 PROCEDURE — 74011250637 HC RX REV CODE- 250/637: Performed by: NURSE PRACTITIONER

## 2019-11-23 PROCEDURE — 65660000000 HC RM CCU STEPDOWN

## 2019-11-23 PROCEDURE — 74011250637 HC RX REV CODE- 250/637: Performed by: INTERNAL MEDICINE

## 2019-11-23 PROCEDURE — 83735 ASSAY OF MAGNESIUM: CPT

## 2019-11-23 PROCEDURE — 36592 COLLECT BLOOD FROM PICC: CPT

## 2019-11-23 PROCEDURE — 80048 BASIC METABOLIC PNL TOTAL CA: CPT

## 2019-11-23 RX ORDER — TEMAZEPAM 15 MG/1
15 CAPSULE ORAL
Status: DISCONTINUED | OUTPATIENT
Start: 2019-11-23 | End: 2019-11-25 | Stop reason: HOSPADM

## 2019-11-23 RX ADMIN — Medication 900 UNITS: at 05:10

## 2019-11-23 RX ADMIN — APIXABAN 5 MG: 5 TABLET, FILM COATED ORAL at 08:12

## 2019-11-23 RX ADMIN — Medication 30 ML: at 05:10

## 2019-11-23 RX ADMIN — FUROSEMIDE 40 MG: 10 INJECTION, SOLUTION INTRAMUSCULAR; INTRAVENOUS at 08:11

## 2019-11-23 RX ADMIN — CLINDAMYCIN PHOSPHATE 900 MG: 900 INJECTION, SOLUTION INTRAVENOUS at 17:55

## 2019-11-23 RX ADMIN — TEMAZEPAM 15 MG: 15 CAPSULE ORAL at 21:38

## 2019-11-23 RX ADMIN — Medication 900 UNITS: at 21:37

## 2019-11-23 RX ADMIN — CLINDAMYCIN PHOSPHATE 900 MG: 900 INJECTION, SOLUTION INTRAVENOUS at 10:07

## 2019-11-23 RX ADMIN — CLINDAMYCIN PHOSPHATE 900 MG: 900 INJECTION, SOLUTION INTRAVENOUS at 01:05

## 2019-11-23 RX ADMIN — LISINOPRIL 10 MG: 5 TABLET ORAL at 21:37

## 2019-11-23 RX ADMIN — AMLODIPINE BESYLATE 5 MG: 5 TABLET ORAL at 08:12

## 2019-11-23 RX ADMIN — Medication 30 ML: at 21:37

## 2019-11-23 RX ADMIN — Medication 30 ML: at 14:32

## 2019-11-23 RX ADMIN — Medication 900 UNITS: at 14:31

## 2019-11-23 RX ADMIN — FUROSEMIDE 40 MG: 10 INJECTION, SOLUTION INTRAMUSCULAR; INTRAVENOUS at 21:37

## 2019-11-23 RX ADMIN — DIPHENHYDRAMINE HYDROCHLORIDE 25 MG: 25 CAPSULE ORAL at 02:33

## 2019-11-23 RX ADMIN — SOTALOL HYDROCHLORIDE 120 MG: 80 TABLET ORAL at 08:12

## 2019-11-23 RX ADMIN — SOTALOL HYDROCHLORIDE 120 MG: 80 TABLET ORAL at 21:37

## 2019-11-23 RX ADMIN — APIXABAN 5 MG: 5 TABLET, FILM COATED ORAL at 21:37

## 2019-11-23 RX ADMIN — LISINOPRIL 10 MG: 5 TABLET ORAL at 08:12

## 2019-11-23 NOTE — PROGRESS NOTES
EKG completed two hours post Sotalol dose given. QTC is 498 and within defined limits. No interventions needed. Will continue to monitor.

## 2019-11-23 NOTE — PROCEDURES
[facility]  CARDIAC CATH    Name:  Ag Kim  MR#:  [id number]  :  1945  ACCOUNT #:  [de-identified]  DATE OF SERVICE:  2019    REFERRING:  Nasir Reid MD    PROCEDURES PERFORMED:  Transesophageal echo and cardioversion. PREOPERATIVE DIAGNOSIS:  Persistent atrial flutter, status post pacemaker implant and sotalol load. POSTOPERATIVE DIAGNOSIS:  Persistent atrial flutter, status post pacemaker implant and sotalol load. SURGEON:  Tana Lobato MD    ASSISTANT:  None. ESTIMATED BLOOD LOSS:  None. SPECIMENS REMOVED:  None. COMPLICATIONS:  There were no complications. IMPLANTS:  None. ANESTHESIA:  The patient was sedated by Juliane Trejo with 4 mg of Versed, 75 mcg fentanyl and monitored from 15:22 to 99:65 without complication. PROCEDURE TECHNIQUE:  After informed consent was obtained, the patient was brought to the cath lab, prepped and draped in usual fashion. The oropharynx was anesthetized with topical Cetacaine spray and viscous lidocaine and transesophageal echo probe easily advanced to approximately 40 cm and appropriate images were obtained. Both atria were moderately dilated with no clot. The left atrial appendage was thoroughly interrogated with two-dimensional, three-dimensional, and x-plane imaging demonstrating the absence of any thrombus. After transesophageal echo was performed, the patient was successfully cardioverted from persistent atrial flutter back into normal sinus rhythm with 150 joules of biphasic energy. The patient was awake, alert and appropriately responsive at the conclusion of the procedure. He completed his sotalol loading this morning. CONCLUSION:  Successful transesophageal echo and cardioversion of persistent atrial flutter, status post pacemaker implant and sotalol loading earlier this week.         MD LAYLA [email or web address]  D:  2019 15:41  T:  2019 2: 21  JOB #:  [id number]  CC:  Dearl MD T[facility] Cardiology

## 2019-11-23 NOTE — PROGRESS NOTES
Bedside and Verbal shift change report given to Diana Gupta RN (oncoming nurse) by self and SN Milan (offgoing nurse). Report included the following information SBAR, Kardex, Intake/Output, MAR and Recent Results.

## 2019-11-23 NOTE — ROUTINE PROCESS
CHF teaching started post introduction to pt.; aware of diagnosis. Planner/scale @ BS and will follow. Smoking/ ETOH/Illicit drug use cessation and maintain a healthy weight covered. Pt/family aware that I can not prescribe nor adjust  medications: 15mins Palliative Care score: 
 
Start 2L/D Fluid restriction/ cardiac diet CHF teaching continues to pt. . Emphasis on taking prescription meds as ordered, to keep F/U appts and to call MD STAT if any of the following occur: ? If you gain 2 lbs in one day or 5 lbs in a week, and short of breath. ? If you can not lay flat without developing short of breath or rapid breathing at night; or if it wakes you up. Develop a cough or wheezing. ? If you notice swollen hands/feet/ankles or stomach with a bloated/ full feeling. Pt verbalizes understanding, will follow to reinforce teaching skills: 20 mins Reinforce prior to transfer to [facility]

## 2019-11-23 NOTE — PROGRESS NOTES
Bedside and Verbal shift change report given to  (oncoming nurse) by Gurwinder Erickson (offgoing nurse). Report included the following information SBAR, Kardex, MAR and Recent Results.

## 2019-11-23 NOTE — ROUTINE PROCESS
Bedside and Verbal shift change report given to  (oncoming nurse) by Maria Dolores Jorge (offgoing nurse). Report included the following information SBAR, Kardex, MAR and Recent Results.

## 2019-11-23 NOTE — PROGRESS NOTES
[facility] CARDIOLOGY PROGRESS NOTE    11/23/2019 11:01 AM    Admit Date: 11/10/2019    Admit Diagnosis: Acute pulmonary edema (HCC) [J81.0]      Subjective:   Stable overnight without angina, CHF, or palpitations. Vitals stable and controlled. Feels much better subjectively s/p cardioversion yesterday. No complaints overnight. Tolerating meds well. Objective:      Vitals:    11/22/19 2045 11/23/19 0102 11/23/19 0506 11/23/19 0840   BP: 145/80 125/74 131/62 118/55   Pulse: 66 67 67 63   Resp:  18 18 18   Temp: 99.2 °F (37.3 °C) 97.9 °F (36.6 °C) 99 °F (37.2 °C) 97.4 °F (36.3 °C)   SpO2: 98% 97% 95% 98%   Weight:   100.9 kg (222 lb 8 oz)    Height:           Physical Exam:  Neck- supple, no JVD  CV- regular rate and rhythm no MRG  Lung- clear bilaterally  Abd- soft, nontender, nondistended  Ext- no edema  Skin- warm and dry    Data Review:   Recent Labs     11/23/19  0509 11/22/19  0508    140   K 4.0 3.7   MG 2.5* 2.5*   BUN 33* 41*   CREA 1.02 1.06   * 126*   WBC  --  13.4*   HGB  --  12.7*   HCT  --  37.9*   PLT  --  286       Assessment and Plan:     Principal Problem:    Acute pulmonary edema (HCC) (11/10/2019)- improved, continue meds, recheck BMP and Mg in AM.      Active Problems:    Chronic combined systolic and diastolic congestive heart failure (HCC) - stable, fairly euvolemic now in sinus s/p HIRO/DCCV.     Overview: EF 45-50%        Paroxysmal A-fib ([address] (5/14/2018)- s/p successful HIRO/DCCV yesterday, QTc 450-544ms on 120mg BID sotalol. Continue current meds, to 9th floor rehab soon, any time fine with me.        MARISOL (obstructive sleep apnea) - CPAP as tolerated       Acute respiratory failure with hypoxia ([address] (11/10/2019)- improved, resolved, follow with pulmonary     BMP, MG, CBC IN AM  CONTINUE SOTALOL MONITORING, IN NSR now.              FREDRICK Okeefe MD  [address] Cardiology  Pager [phone]

## 2019-11-23 NOTE — PROGRESS NOTES
Bedside and Verbal shift change report given to self (oncoming nurse) by Priscila Bateman RN (offgoing nurse). Report included the following information SBAR, Kardex, Intake/Output, MAR and Recent Results.

## 2019-11-23 NOTE — PROGRESS NOTES
Bedside and Verbal shift change report given to Kati Menjivar (oncoming nurse) by  Neri gilbert). Report included the following information SBAR, Kardex, MAR and Recent Results.

## 2019-11-24 LAB
ANION GAP SERPL CALC-SCNC: 4 MMOL/L (ref 7–16)
BUN SERPL-MCNC: 28 MG/DL (ref 8–23)
CALCIUM SERPL-MCNC: 8.5 MG/DL (ref 8.3–10.4)
CHLORIDE SERPL-SCNC: 110 MMOL/L (ref 98–107)
CO2 SERPL-SCNC: 28 MMOL/L (ref 21–32)
CREAT SERPL-MCNC: 0.99 MG/DL (ref 0.8–1.5)
GLUCOSE SERPL-MCNC: 137 MG/DL (ref 65–100)
MAGNESIUM SERPL-MCNC: 2.2 MG/DL (ref 1.8–2.4)
POTASSIUM SERPL-SCNC: 3.8 MMOL/L (ref 3.5–5.1)
SODIUM SERPL-SCNC: 142 MMOL/L (ref 136–145)

## 2019-11-24 PROCEDURE — 65660000000 HC RM CCU STEPDOWN

## 2019-11-24 PROCEDURE — 74011250637 HC RX REV CODE- 250/637: Performed by: INTERNAL MEDICINE

## 2019-11-24 PROCEDURE — 74011250637 HC RX REV CODE- 250/637: Performed by: NURSE PRACTITIONER

## 2019-11-24 PROCEDURE — 74011250636 HC RX REV CODE- 250/636: Performed by: INTERNAL MEDICINE

## 2019-11-24 PROCEDURE — 83735 ASSAY OF MAGNESIUM: CPT

## 2019-11-24 PROCEDURE — 36592 COLLECT BLOOD FROM PICC: CPT

## 2019-11-24 PROCEDURE — 80048 BASIC METABOLIC PNL TOTAL CA: CPT

## 2019-11-24 RX ORDER — POTASSIUM CHLORIDE 20 MEQ/1
40 TABLET, EXTENDED RELEASE ORAL
Status: COMPLETED | OUTPATIENT
Start: 2019-11-24 | End: 2019-11-24

## 2019-11-24 RX ADMIN — SOTALOL HYDROCHLORIDE 120 MG: 80 TABLET ORAL at 21:19

## 2019-11-24 RX ADMIN — Medication 900 UNITS: at 21:22

## 2019-11-24 RX ADMIN — LISINOPRIL 10 MG: 5 TABLET ORAL at 21:21

## 2019-11-24 RX ADMIN — Medication 30 ML: at 05:14

## 2019-11-24 RX ADMIN — POTASSIUM CHLORIDE 40 MEQ: 20 TABLET, EXTENDED RELEASE ORAL at 09:17

## 2019-11-24 RX ADMIN — CLINDAMYCIN PHOSPHATE 900 MG: 900 INJECTION, SOLUTION INTRAVENOUS at 09:17

## 2019-11-24 RX ADMIN — SOTALOL HYDROCHLORIDE 120 MG: 80 TABLET ORAL at 08:43

## 2019-11-24 RX ADMIN — Medication 30 ML: at 14:10

## 2019-11-24 RX ADMIN — APIXABAN 5 MG: 5 TABLET, FILM COATED ORAL at 21:19

## 2019-11-24 RX ADMIN — Medication 900 UNITS: at 05:14

## 2019-11-24 RX ADMIN — APIXABAN 5 MG: 5 TABLET, FILM COATED ORAL at 08:43

## 2019-11-24 RX ADMIN — Medication 900 UNITS: at 14:10

## 2019-11-24 RX ADMIN — TEMAZEPAM 15 MG: 15 CAPSULE ORAL at 21:19

## 2019-11-24 RX ADMIN — FUROSEMIDE 40 MG: 10 INJECTION, SOLUTION INTRAMUSCULAR; INTRAVENOUS at 08:44

## 2019-11-24 RX ADMIN — CLINDAMYCIN PHOSPHATE 900 MG: 900 INJECTION, SOLUTION INTRAVENOUS at 02:52

## 2019-11-24 RX ADMIN — AMLODIPINE BESYLATE 5 MG: 5 TABLET ORAL at 08:43

## 2019-11-24 RX ADMIN — FUROSEMIDE 40 MG: 10 INJECTION, SOLUTION INTRAMUSCULAR; INTRAVENOUS at 21:21

## 2019-11-24 RX ADMIN — Medication 30 ML: at 21:22

## 2019-11-24 RX ADMIN — LISINOPRIL 10 MG: 5 TABLET ORAL at 08:43

## 2019-11-24 RX ADMIN — CLINDAMYCIN PHOSPHATE 900 MG: 900 INJECTION, SOLUTION INTRAVENOUS at 17:31

## 2019-11-24 NOTE — PROGRESS NOTES
Bedside and Verbal shift change report given to Ayo George RN (oncoming nurse) by self Ambrose Goltz nurse). Report included the following information SBAR, Kardex, Intake/Output, MAR and Recent Results.

## 2019-11-24 NOTE — PROGRESS NOTES
[facility] CARDIOLOGY PROGRESS NOTE    11/24/2019 8:51 AM    Admit Date: 11/10/2019    Admit Diagnosis: Acute pulmonary edema (HCC) [J81.0]      Subjective:   Stable overnight without angina, CHF, or palpitations. Vitals stable and controlled. No other complaints overnight. Tolerating meds well. Objective:      Vitals:    11/23/19 1639 11/23/19 2057 11/24/19 0042 11/24/19 0451   BP: 146/72 144/69 135/61 131/79   Pulse: 60 62 69 63   Resp: 18 16 18 18   Temp: 97.8 °F (36.6 °C) 97.4 °F (36.3 °C) 97 °F (36.1 °C) 97.5 °F (36.4 °C)   SpO2: 97% 98% 97% 98%   Weight:    101.2 kg (223 lb 1.6 oz)   Height:           Physical Exam:  Neck- supple, no JVD  CV- regular rate and rhythm no MRG  Lung- clear bilaterally  Abd- soft, nontender, nondistended  Ext- no edema  Skin- warm and dry    Data Review:   Recent Labs     11/24/19  0514 11/23/19  0509 11/22/19  0508    139 140   K 3.8 4.0 3.7   MG 2.2 2.5* 2.5*   BUN 28* 33* 41*   CREA 0.99 1.02 1.06   * 110* 126*   WBC  --   --  13.4*   HGB  --   --  12.7*   HCT  --   --  37.9*   PLT  --   --  286       Assessment and Plan:     Principal Problem:    Acute pulmonary edema (HCC) (11/10/2019)- improved, continue meds, recheck BMP and Mg in AM.      Active Problems:    Chronic combined systolic and diastolic congestive heart failure (HCC) - stable, fairly euvolemic now in sinus s/p HIRO/DCCV.         Overview: EF 45-50%        Paroxysmal A-fib (HCC) (5/14/2018)- s/p successful HIRO/DCCV Friday, QTc 450-544ms on 120mg BID sotalol.  Continue current meds, to 9th floor rehab soon, any time fine with me.        MARISOL (obstructive sleep apnea) - CPAP as tolerated       Acute respiratory failure with hypoxia ([address] (11/10/2019)- improved, resolved, follow with pulmonary     BMP, MG, CBC IN AM  CONTINUE SOTALOL, IN NSR OVERNIGHT  OOB TO CHAIR ALL MEALS, AMBULATE BID TODAY  TO 9TH FLOOR REHAB TOMORROW AM       FREDRICK Smallwood MD  [facility] Cardiology  Pager [phone]

## 2019-11-24 NOTE — PROGRESS NOTES
Problem: Falls - Risk of  Goal: *Absence of Falls  Description  Document Nelda Nichols Fall Risk and appropriate interventions in the flowsheet.   Outcome: Progressing Towards Goal  Note: Fall Risk Interventions:  Mobility Interventions: Communicate number of staff needed for ambulation/transfer, Bed/chair exit alarm, Patient to call before getting OOB    Mentation Interventions: Adequate sleep, hydration, pain control    Medication Interventions: Evaluate medications/consider consulting pharmacy, Patient to call before getting OOB, Teach patient to arise slowly    Elimination Interventions: Elevated toilet seat, Patient to call for help with toileting needs, Toilet paper/wipes in reach, Toileting schedule/hourly rounds, Urinal in reach, Bed/chair exit alarm, Call light in reach    History of Falls Interventions: Consult care management for discharge planning, Bed/chair exit alarm, Evaluate medications/consider consulting pharmacy         Problem: Patient Education: Go to Patient Education Activity  Goal: Patient/Family Education  Outcome: Progressing Towards Goal     Problem: Gas Exchange - Impaired  Goal: *Absence of hypoxia  Outcome: Progressing Towards Goal

## 2019-11-24 NOTE — PROGRESS NOTES
Bedside and Verbal shift change report given to [address] (oncoming nurse) by  Ivy gilbert). Report included the following information SBAR, Kardex, Intake/Output, MAR and Recent Results.

## 2019-11-24 NOTE — PROGRESS NOTES
Bedside and Verbal shift change report given to self (oncoming nurse) by José Youngblood RN (offgoing nurse). Report included the following information SBAR, Kardex, Intake/Output, MAR and Recent Results.

## 2019-11-25 ENCOUNTER — HOSPITAL ENCOUNTER (INPATIENT)
Age: 74
LOS: 5 days | Discharge: HOME | DRG: 291 | End: 2019-11-30
Attending: PHYSICAL MEDICINE & REHABILITATION | Admitting: PHYSICAL MEDICINE & REHABILITATION
Payer: MEDICARE

## 2019-11-25 VITALS
DIASTOLIC BLOOD PRESSURE: 72 MMHG | OXYGEN SATURATION: 95 % | BODY MASS INDEX: 27.57 KG/M2 | RESPIRATION RATE: 18 BRPM | TEMPERATURE: 98.1 F | SYSTOLIC BLOOD PRESSURE: 132 MMHG | WEIGHT: 221.7 LBS | HEIGHT: 75 IN | HEART RATE: 66 BPM

## 2019-11-25 DIAGNOSIS — I50.9 ACUTE HEART FAILURE, UNSPECIFIED HEART FAILURE TYPE (HCC): ICD-10-CM

## 2019-11-25 DIAGNOSIS — R53.81 PHYSICAL DEBILITY: ICD-10-CM

## 2019-11-25 DIAGNOSIS — J96.00 ACUTE RESPIRATORY FAILURE, UNSPECIFIED WHETHER WITH HYPOXIA OR HYPERCAPNIA (HCC): ICD-10-CM

## 2019-11-25 DIAGNOSIS — I10 HYPERTENSION, ESSENTIAL: Chronic | ICD-10-CM
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
## 2019-11-25 DIAGNOSIS — J81.0 ACUTE PULMONARY EDEMA (HCC): ICD-10-CM

## 2019-11-25 DIAGNOSIS — J18.9 HAP (HOSPITAL-ACQUIRED PNEUMONIA): ICD-10-CM

## 2019-11-25 DIAGNOSIS — I48.0 PAROXYSMAL A-FIB (HCC): Chronic | ICD-10-CM

## 2019-11-25 DIAGNOSIS — G47.33 OSA (OBSTRUCTIVE SLEEP APNEA): Chronic | ICD-10-CM

## 2019-11-25 LAB
BACTERIA SPEC CULT: NORMAL
SERVICE CMNT-IMP: NORMAL

## 2019-11-25 PROCEDURE — 74011250637 HC RX REV CODE- 250/637: Performed by: PHYSICAL MEDICINE & REHABILITATION

## 2019-11-25 PROCEDURE — 99223 1ST HOSP IP/OBS HIGH 75: CPT | Performed by: PHYSICAL MEDICINE & REHABILITATION

## 2019-11-25 PROCEDURE — 97166 OT EVAL MOD COMPLEX 45 MIN: CPT

## 2019-11-25 PROCEDURE — 74011250636 HC RX REV CODE- 250/636: Performed by: INTERNAL MEDICINE

## 2019-11-25 PROCEDURE — 74011250637 HC RX REV CODE- 250/637: Performed by: INTERNAL MEDICINE

## 2019-11-25 PROCEDURE — 74011250636 HC RX REV CODE- 250/636: Performed by: PHYSICAL MEDICINE & REHABILITATION

## 2019-11-25 PROCEDURE — 97116 GAIT TRAINING THERAPY: CPT

## 2019-11-25 PROCEDURE — 65310000000 HC RM PRIVATE REHAB

## 2019-11-25 PROCEDURE — 97530 THERAPEUTIC ACTIVITIES: CPT

## 2019-11-25 PROCEDURE — 97162 PT EVAL MOD COMPLEX 30 MIN: CPT

## 2019-11-25 PROCEDURE — 97535 SELF CARE MNGMENT TRAINING: CPT

## 2019-11-25 RX ORDER — SOTALOL HYDROCHLORIDE 120 MG/1
120 TABLET ORAL EVERY 12 HOURS
Qty: 60 TAB | Refills: 11 | Status: SHIPPED
Start: 2019-11-25 | End: 2019-11-30

## 2019-11-25 RX ORDER — PROMETHAZINE HYDROCHLORIDE 25 MG/1
25 TABLET ORAL
Status: DISCONTINUED | OUTPATIENT
Start: 2019-11-25 | End: 2019-11-30

## 2019-11-25 RX ORDER — LIDOCAINE HYDROCHLORIDE 20 MG/ML
15 SOLUTION OROPHARYNGEAL AS NEEDED
Status: CANCELLED | OUTPATIENT
Start: 2019-11-25

## 2019-11-25 RX ORDER — HEPARIN 100 UNIT/ML
900 SYRINGE INTRAVENOUS EVERY 8 HOURS
Status: DISCONTINUED | OUTPATIENT
Start: 2019-11-25 | End: 2019-11-27

## 2019-11-25 RX ORDER — HEPARIN 100 UNIT/ML
900 SYRINGE INTRAVENOUS EVERY 8 HOURS
Status: CANCELLED | OUTPATIENT
Start: 2019-11-25

## 2019-11-25 RX ORDER — POLYETHYLENE GLYCOL 3350 17 G/17G
17 POWDER, FOR SOLUTION ORAL DAILY
Status: CANCELLED | OUTPATIENT
Start: 2019-11-25

## 2019-11-25 RX ORDER — AMLODIPINE BESYLATE 5 MG/1
5 TABLET ORAL DAILY
Qty: 30 TAB | Refills: 5 | Status: SHIPPED
Start: 2019-11-25 | End: 2019-12-11 | Stop reason: DRUGHIGH

## 2019-11-25 RX ORDER — TEMAZEPAM 15 MG/1
15 CAPSULE ORAL
Status: DISCONTINUED | OUTPATIENT
Start: 2019-11-25 | End: 2019-11-30 | Stop reason: HOSPADM

## 2019-11-25 RX ORDER — FUROSEMIDE 20 MG/1
40 TABLET ORAL 2 TIMES DAILY
Status: DISCONTINUED | OUTPATIENT
Start: 2019-11-25 | End: 2019-11-30 | Stop reason: HOSPADM

## 2019-11-25 RX ORDER — MAG HYDROX/ALUMINUM HYD/SIMETH 200-200-20
30 SUSPENSION, ORAL (FINAL DOSE FORM) ORAL
Status: DISCONTINUED | OUTPATIENT
Start: 2019-11-25 | End: 2019-11-30

## 2019-11-25 RX ORDER — SOTALOL HYDROCHLORIDE 80 MG/1
120 TABLET ORAL EVERY 12 HOURS
Status: DISCONTINUED | OUTPATIENT
Start: 2019-11-25 | End: 2019-11-30 | Stop reason: HOSPADM

## 2019-11-25 RX ORDER — ADHESIVE BANDAGE
30 BANDAGE TOPICAL DAILY PRN
Status: CANCELLED | OUTPATIENT
Start: 2019-11-25

## 2019-11-25 RX ORDER — LISINOPRIL 5 MG/1
10 TABLET ORAL EVERY 12 HOURS
Status: CANCELLED | OUTPATIENT
Start: 2019-11-25

## 2019-11-25 RX ORDER — HYDRALAZINE HYDROCHLORIDE 25 MG/1
25 TABLET, FILM COATED ORAL
Status: CANCELLED | OUTPATIENT
Start: 2019-11-25

## 2019-11-25 RX ORDER — SODIUM CHLORIDE 0.9 % (FLUSH) 0.9 %
5-40 SYRINGE (ML) INJECTION AS NEEDED
Status: CANCELLED | OUTPATIENT
Start: 2019-11-25

## 2019-11-25 RX ORDER — FUROSEMIDE 40 MG/1
40 TABLET ORAL 2 TIMES DAILY
Status: CANCELLED | OUTPATIENT
Start: 2019-11-25

## 2019-11-25 RX ORDER — TEMAZEPAM 15 MG/1
15 CAPSULE ORAL
Status: CANCELLED | OUTPATIENT
Start: 2019-11-25

## 2019-11-25 RX ORDER — MAG HYDROX/ALUMINUM HYD/SIMETH 200-200-20
30 SUSPENSION, ORAL (FINAL DOSE FORM) ORAL
Status: CANCELLED | OUTPATIENT
Start: 2019-11-25

## 2019-11-25 RX ORDER — AMLODIPINE BESYLATE 5 MG/1
5 TABLET ORAL DAILY
Status: DISCONTINUED | OUTPATIENT
Start: 2019-11-26 | End: 2019-11-30 | Stop reason: HOSPADM

## 2019-11-25 RX ORDER — PROMETHAZINE HYDROCHLORIDE 25 MG/1
25 TABLET ORAL
Status: CANCELLED | OUTPATIENT
Start: 2019-11-25

## 2019-11-25 RX ORDER — FUROSEMIDE 40 MG/1
40 TABLET ORAL 2 TIMES DAILY
Status: DISCONTINUED | OUTPATIENT
Start: 2019-11-25 | End: 2019-11-25 | Stop reason: HOSPADM

## 2019-11-25 RX ORDER — SOTALOL HYDROCHLORIDE 80 MG/1
120 TABLET ORAL EVERY 12 HOURS
Status: CANCELLED | OUTPATIENT
Start: 2019-11-25

## 2019-11-25 RX ORDER — POLYETHYLENE GLYCOL 3350 17 G/17G
17 POWDER, FOR SOLUTION ORAL DAILY
Status: DISCONTINUED | OUTPATIENT
Start: 2019-11-25 | End: 2019-11-30

## 2019-11-25 RX ORDER — ONDANSETRON 4 MG/1
4 TABLET, ORALLY DISINTEGRATING ORAL
Status: DISCONTINUED | OUTPATIENT
Start: 2019-11-25 | End: 2019-11-30

## 2019-11-25 RX ORDER — DIPHENHYDRAMINE HCL 25 MG
50 CAPSULE ORAL
Status: DISCONTINUED | OUTPATIENT
Start: 2019-11-25 | End: 2019-11-30

## 2019-11-25 RX ORDER — ONDANSETRON 4 MG/1
4 TABLET, ORALLY DISINTEGRATING ORAL
Status: CANCELLED | OUTPATIENT
Start: 2019-11-25

## 2019-11-25 RX ORDER — LISINOPRIL 5 MG/1
10 TABLET ORAL EVERY 12 HOURS
Status: DISCONTINUED | OUTPATIENT
Start: 2019-11-25 | End: 2019-11-30 | Stop reason: HOSPADM

## 2019-11-25 RX ORDER — DOCUSATE SODIUM 100 MG/1
100 CAPSULE, LIQUID FILLED ORAL
Status: DISCONTINUED | OUTPATIENT
Start: 2019-11-25 | End: 2019-11-30

## 2019-11-25 RX ORDER — SODIUM CHLORIDE 0.9 % (FLUSH) 0.9 %
30 SYRINGE (ML) INJECTION AS NEEDED
Status: CANCELLED | OUTPATIENT
Start: 2019-11-25

## 2019-11-25 RX ORDER — AMLODIPINE BESYLATE 5 MG/1
5 TABLET ORAL DAILY
Status: CANCELLED | OUTPATIENT
Start: 2019-11-26

## 2019-11-25 RX ORDER — SODIUM CHLORIDE 0.9 % (FLUSH) 0.9 %
30 SYRINGE (ML) INJECTION AS NEEDED
Status: DISCONTINUED | OUTPATIENT
Start: 2019-11-25 | End: 2019-11-27

## 2019-11-25 RX ORDER — SODIUM CHLORIDE 0.9 % (FLUSH) 0.9 %
30 SYRINGE (ML) INJECTION EVERY 8 HOURS
Status: DISCONTINUED | OUTPATIENT
Start: 2019-11-25 | End: 2019-11-28

## 2019-11-25 RX ORDER — HEPARIN 100 UNIT/ML
900 SYRINGE INTRAVENOUS AS NEEDED
Status: DISCONTINUED | OUTPATIENT
Start: 2019-11-25 | End: 2019-11-30

## 2019-11-25 RX ORDER — HYDRALAZINE HYDROCHLORIDE 25 MG/1
25 TABLET, FILM COATED ORAL
Status: DISCONTINUED | OUTPATIENT
Start: 2019-11-25 | End: 2019-11-30

## 2019-11-25 RX ORDER — LORAZEPAM 1 MG/1
1 TABLET ORAL
Status: DISCONTINUED | OUTPATIENT
Start: 2019-11-25 | End: 2019-11-30

## 2019-11-25 RX ORDER — LISINOPRIL 10 MG/1
10 TABLET ORAL EVERY 12 HOURS
Qty: 60 TAB | Refills: 5 | Status: SHIPPED
Start: 2019-11-25 | End: 2019-11-30

## 2019-11-25 RX ORDER — LIDOCAINE HYDROCHLORIDE 20 MG/ML
15 SOLUTION OROPHARYNGEAL AS NEEDED
Status: DISCONTINUED | OUTPATIENT
Start: 2019-11-25 | End: 2019-11-30

## 2019-11-25 RX ORDER — SODIUM CHLORIDE 0.9 % (FLUSH) 0.9 %
30 SYRINGE (ML) INJECTION EVERY 8 HOURS
Status: CANCELLED | OUTPATIENT
Start: 2019-11-25

## 2019-11-25 RX ORDER — DIPHENHYDRAMINE HCL 25 MG
50 CAPSULE ORAL
Status: CANCELLED | OUTPATIENT
Start: 2019-11-25

## 2019-11-25 RX ORDER — DOCUSATE SODIUM 100 MG/1
100 CAPSULE, LIQUID FILLED ORAL
Status: CANCELLED | OUTPATIENT
Start: 2019-11-25

## 2019-11-25 RX ORDER — LORAZEPAM 1 MG/1
1 TABLET ORAL
Status: CANCELLED | OUTPATIENT
Start: 2019-11-25

## 2019-11-25 RX ORDER — SODIUM CHLORIDE 0.9 % (FLUSH) 0.9 %
5-40 SYRINGE (ML) INJECTION AS NEEDED
Status: DISCONTINUED | OUTPATIENT
Start: 2019-11-25 | End: 2019-11-27

## 2019-11-25 RX ORDER — ADHESIVE BANDAGE
30 BANDAGE TOPICAL DAILY PRN
Status: DISCONTINUED | OUTPATIENT
Start: 2019-11-25 | End: 2019-11-30

## 2019-11-25 RX ORDER — HEPARIN 100 UNIT/ML
900 SYRINGE INTRAVENOUS AS NEEDED
Status: CANCELLED | OUTPATIENT
Start: 2019-11-25

## 2019-11-25 RX ADMIN — APIXABAN 5 MG: 5 TABLET, FILM COATED ORAL at 21:07

## 2019-11-25 RX ADMIN — FUROSEMIDE 40 MG: 20 TABLET ORAL at 17:45

## 2019-11-25 RX ADMIN — Medication 900 UNITS: at 05:18

## 2019-11-25 RX ADMIN — LISINOPRIL 10 MG: 5 TABLET ORAL at 21:07

## 2019-11-25 RX ADMIN — CLINDAMYCIN PHOSPHATE 900 MG: 900 INJECTION, SOLUTION INTRAVENOUS at 01:58

## 2019-11-25 RX ADMIN — AMLODIPINE BESYLATE 5 MG: 5 TABLET ORAL at 08:36

## 2019-11-25 RX ADMIN — SOTALOL HYDROCHLORIDE 120 MG: 80 TABLET ORAL at 21:07

## 2019-11-25 RX ADMIN — Medication 30 ML: at 05:18

## 2019-11-25 RX ADMIN — Medication 30 ML: at 14:44

## 2019-11-25 RX ADMIN — LISINOPRIL 10 MG: 5 TABLET ORAL at 08:35

## 2019-11-25 RX ADMIN — APIXABAN 5 MG: 5 TABLET, FILM COATED ORAL at 08:35

## 2019-11-25 RX ADMIN — FUROSEMIDE 40 MG: 10 INJECTION, SOLUTION INTRAMUSCULAR; INTRAVENOUS at 08:35

## 2019-11-25 RX ADMIN — SOTALOL HYDROCHLORIDE 120 MG: 80 TABLET ORAL at 08:35

## 2019-11-25 RX ADMIN — HEPARIN SODIUM (PORCINE) LOCK FLUSH IV SOLN 100 UNIT/ML 900 UNITS: 100 SOLUTION at 14:44

## 2019-11-25 NOTE — PROGRESS NOTES
Bedside and Verbal shift change report given to Darek Gutierres (oncoming nurse) by Roxie Lee RN (offgoing nurse). Report included the following information SBAR, Kardex, Intake/Output and Recent Results.

## 2019-11-25 NOTE — PROGRESS NOTES
Care Management Interventions  PCP Verified by CM: Yes  Mode of Transport at Discharge: Self  Transition of Care Consult (CM Consult): Discharge Planning(IR)  [address]: Yes  Discharge Durable Medical Equipment: (CPAP)  Physical Therapy Consult: Yes  Occupational Therapy Consult: Yes  Current Support Network: Lives with Spouse, Own Home(Caterina Hamilton ([address] [place] [address]) [phone], son Carmela Huddleston that lives in [facility])  Confirm Follow Up Transport: Family  Plan discussed with Pt/Family/Caregiver: Yes  Freedom of Choice Offered: Yes  [employer] Information Provided?: No  Discharge Location  Discharge Placement: Rehab hospital/unit acute      DC to [facility]. No further CM needs.

## 2019-11-25 NOTE — PROGRESS NOTES
PHYSICAL THERAPY EXAMINATION  TIME IN 1540  TIME OUT 1620  Patient Name: Shahid Ramos  Patient Age: 66 y.o. Past Medical History:   Past Medical History:   Diagnosis Date    Arrhythmia     Cancer ([facility] [address]     basal cell    Elevated PSA     Erectile dysfunction     Hypertension     daily meds    MVP (mitral valve prolapse)     diagnosed many years ago, pt states does not see cardiologist, in youth    MARISOL (obstructive sleep apnea) 11/15/2018    Osteoarthritis     thumbs    Prostate cancer ([facility] [address]     S/P colonoscopy 2015    due in 2020       Medical Diagnosis:  Physical debility [R53.81] <principal problem not specified>    Precautions at Admission: Other (comment)(fall precautions)    Therapy Diagnosis:   Difficulty with bed mobility  [x]     Difficulty with functional transfers  [x]     Difficulty with ambulation  [x]     Difficulty with stair negotiations  [x]       Problem List:    Decreased strength B LE  [x]     Decreased strength trunk/core  [x]     Decreased AROM   []     Decreased PROM  []    Decreased endurance  [x]     Decreased balance sitting  []     Decreased balance standing  [x]     Pain   []     Slow ambulation velocity  [x]    Decreased coordination  [x]    Decreased safety awareness  []      Functional Limitations:   Decreased independence with bed mobility  [x]     Decreased independence with functional transfers  [x]     Decreased independence with ambulation  [x]     Decreased independence with stair negotiation  [x]       Previous Functional Level:  Independent with ADL/IADL and functional mobility without AD, 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    Home Environment: Home Environment: Private residence  # Steps to Enter: 8  Rails to Enter: Yes  [employer] : Right  Wheelchair Ramp: No  One/Two Story Residence: [facility] level  # of Interior Steps: 8  Interior Rails: Right  Living Alone: No  Support Systems: Spouse/Significant Other/Partner  Patient Expects to be Discharged to[de-identified] Private residence  Current DME Used/Available at Home: Raised toilet seat  Tub or Shower Type: Shower         Outcome Measures: Vital Signs:  Patient Vitals for the past 12 hrs:   Temp Pulse Resp BP SpO2   19 1557 97.9 °F (36.6 °C) 67 18 113/70 98 %   19 1115 97.6 °F (36.4 °C) 65 18 (!) 122/91 98 %     Patient on room air, resting 02 sat 99% and HR 68, 02 sat 98% and HR 77 after ambulating 200ft    Pain level:No c/o pain during treatment  Pain location:NA  Pain interventions:NA    Patient education:PT POC and goals, Bed mobility training,transfer training, gait training, fall precautions, activity pacing, body mechanics, Patient verbalizing understanding and demonstrating understanding of patient education. Recommend follow up education.     Interdisciplinary Communication:spoke with OT regarding OS, goals and functional level      Cognition: Orientation:A+O x 3  Communication:able to express needs verbally, able to follow multi step commands  Social Interaction:cooperating, appropriate with PT, participating, motivated to improve  Problem Solving:difficulty with managing body mechanics during functional mobility due to decreased endurance decreased balance  Memory:able to recall name, , PLOF, PMH         MMT Initial Asssessment   Right Lower Extremity Left Lower Extremity   Hip Flexion 5 5   Knee Extension 5 5   Knee Flexion 5 5   Ankle Dorsiflexion 5 5   0/5 No palpable muscle contraction  1/5 Palpable muscle contraction, no joint movement  2-/5 Less than full range of motion in gravity eliminated position  2/5 Able to complete full range of motion in gravity eliminated position  2+/5 Able to initiate movement against gravity  3-/5 More than half but not full range of motion against gravity  3/5 Able to complete full range of motion against gravity  3+/5 Completes full range of motion against gravity with minimal resistance  4-/5 Completes full range of motion against gravity with minimal-moderate resistance  4/5 Completes full range of motion against gravity with moderate resistance  4+/5 Completes full range of motion against gravity with moderate-maximum resistance  5/5 Completes full range of motion against gravity with maximum resistance     AROM: Bilateral LE WFL    PRIMARY MODE OF LOCOMOTION: ambulation      BED/CHAIR/WHEELCHAIR TRANSFERS Initial Assessment   Rolling Right 6 (Modified independent)   Rolling Left 6 (Modified independent)   Supine to Sit 6 (Modified independent)   Sit to Stand Contact guard assistance   Sit to Supine 6 (Modified independent)   Transfer Type SPT without device   Comments Increased time and effort to complete bed mobility using bed rail. Car Transfer Not tested   Car Type         WHEELCHAIR MOBILITY/MANAGEMENT Initial Assessment   Able to Propel     W/C Assistance     Curbs/ramps assistance required 0 (Not tested)   Wheelchair set up assistance required 0 (Not tested)   Wheelchair management         WALKING INDEPENDENCE Initial Assessment   Assistive device Gait belt, Walker, rolling   Ambulation assistance - level surface 4 (Contact guard assistance)   Distance 200 Feet (ft)   Comments slow cont step through gait pattern with excessive hip and knee flexion at midswing with steppage type gait pattern. Decreased ankle DF and knee ext at initial contact. Decreased step length and step clearance   Ambulation assistance - unlevel surface 4 (Contact guard assistance)(up/down 10 ft ramp with single hand rail and CGA)   GAIT TRAINING: gait training without a device and gait belt.  Decreased gait speed, step length, step clearance and balance ambulating without a device vs with RW    STEPS/STAIRS Initial Assessment   Steps/Stairs ambulated 4   Stairs Assistance 2   Rail Use Both   Comments slow reciprocating pattern going up/down steps with decreased knee ext during stance phase going up steps   Curbs/Ramps 4 (Contact guard assistance)(up/down 10 ft ramp)       QUALITY INDICATOR ASSIST COMMENTS   Roll right (&return to back) 6: Independent    Roll left (& return to back) 6: Independent    Supine to sit 4: Supervision or touching A    Sit to stand 4: Supervision or touching A    Chair/bed-to-chair transfer 4: Supervision or touching A    Walk 10 feet 4: Supervision or touching A    Walk 50 feet with 2 turns 4: Supervision or touching A    Walk 150 feet 4: Supervision or touching A    Walk 10 feet on uneven  4: Supervision or touching A    1 step/curb 4: Supervision or touching A    4 steps 4: Supervision or touching A    12 steps Not Tested: Not attempted due to fatigue after ambulating up/down 4 steps     object 4: Supervision or touching A    Wheel 50' w/2 turns Not Tested: Not applicable secondary to pt not completing activity previously    Wheel 150' Not Tested: Not applicable secondary to pt not completing activity previously    Car Transfer Not Tested: Not attempted due to environmental concerns: patient to tall to fit in rehab car             PHYSICAL THERAPY PLAN OF CARE    Therapy Diagnosis:   Please see table above    Order received from MD for physical therapy services and chart reviewed. Pt to be seen at least 5 times per week for at least 1.5 hours of physical therapy per day for 10 days. Thank you for the referral.    Claudette Maltos to care plan for details of LTGs    Pt would benefit from skilled physical therapy in order to improve independent functional mobility within the home. Interventions may include range of motion (AROM, PROM B LE/trunk), motor function (B LE/trunk strengthening/coordination), activity tolerance (vitals, oxygen saturation levels), bed mobility training, balance activities, gait training (progressive ambulation program), and functional transfer training. Please see IRC; Interdisciplinary Eval, Care Plan, and Patient Education for further information regarding physical therapy examination and plan of care.     Patient returned to room at end of treatment. Patient supine in bed with head of bed elevated and bed rails up x 2. Needs placed in reach of patient.      Maddie Ro, PT  11/25/2019

## 2019-11-25 NOTE — PROGRESS NOTES
TRANSFER - OUT REPORT:    Verbal report given to primary RN on Amy Eddy being transferred to 9th Floor Rehab for routine progression of care       Report consisted of patients Situation, Background, Assessment and Recommendations (SBAR). Information from the following report(s) SBAR, Kardex, ED Summary, Procedure Summary, Intake/Output, MAR, Recent Results and Cardiac Rhythm Paced was reviewed with the receiving nurse. Opportunity for questions and clarification was provided.

## 2019-11-25 NOTE — PROGRESS NOTES
Nutrition:  Acknowledge BPA referral for EN PTA. The patient received enteral nutrition support when he required ventilator support in the CCU during his 11/10/19 admission. He self-extubated on 11/18/19 and was started on a cardiac diet. Excellent oral intake was reported. Follow-up based on standards of care. Makenna Brandon.  [facility] Billing  [phone]

## 2019-11-25 NOTE — PROGRESS NOTES
CASE MANAGEMENT ASSESSMENT      AGE:   66            DIAGNOSIS:    Acute Pulmonary Edema                     DPOA/ LIVING WILL:    Yes, Deshawn Chung is DPOA           PCP? LAST VISIT?     FAMILY ASSESSMENT:    Patient lives with life partner Deshawn Chung [phone], son Maddie Lake lives in [facility] [address]:    Local is Deshawn Chung life partner                      CURRENT LIVING ARRANGEMENTS:    Lives with Deshawn Chung            HOUSE/ APARTMENT/ TRAILER:    Three story house all bedrooms on third floor   TUB-SHOWER COMBO OR WALK IN SHOWER:    Walk in shower with  bars in place         STEPS TO ENTER HOME:    8 steps plus a hill to the front door, no steps from the garage   INTERIOR STEPS:   Multiple    PRIOR FUNCTION:    Independent          ADL'S:    Independent  DRIVING:    Yes    DME:  CPAP  PROVIDER FOR OXYGEN/ NEBULIZER:    None  AIDES/ SITTERS:    None    HOME HEALTH AGENCY PRIOR:    None  HOME HEALTH AGENCY DESIRED AT DISCHARGE IF NEEDED:    Patient agrees with [facility] after discharge if needed    :    Yes, Served in the [address] [place] [address] Road:   Retired from [employer]   [employer], social security, and as needed work income  ARE [address]:    No, covered by insurance    PRIMARY CAREGIVER ABILITY:    German Kellogg able to provide 24/7 care  [address]:    None  HOBBIES:  Golf, anything sports    CM will continue to follow    Care Management Interventions  PCP Verified by CM:  Yes  Mode of Transport at Discharge: Self  Transition of Care Consult (CM Consult): [facility]charge [address] [facility]: Yes  Physical Therapy Consult: Yes  Occupational Therapy Consult: Yes  Current Support Network: Lives with Spouse, Own Home(Significant other Samir Aguirre)  Confirm Follow Up Transport: Family  Plan discussed with Pt/Family/Caregiver: Yes  Freedom of Choice Offered: Yes  [place] Resource Information Provided?: Yes  Discharge Location  Discharge Placement: Unable to determine at this time

## 2019-11-25 NOTE — PROGRESS NOTES
Bedside and Verbal shift change report given to [address] [place] [address] (oncoming nurse) by Marianne Daniels (offgoing nurse). Report included the following information SBAR, Kardex, Intake/Output and Recent Results.

## 2019-11-25 NOTE — DISCHARGE INSTRUCTIONS
DISCHARGE SUMMARY from Nurse    PATIENT INSTRUCTIONS:    After general anesthesia or intravenous sedation, for 24 hours or while taking prescription Narcotics:  · Limit your activities  · Do not drive and operate hazardous machinery  · Do not make important personal or business decisions  · Do  not drink alcoholic beverages  · If you have not urinated within 8 hours after discharge, please contact your surgeon on call. Report the following to your surgeon:  · Excessive pain, swelling, redness or odor of or around the surgical area  · Temperature over 100.5  · Nausea and vomiting lasting longer than 4 hours or if unable to take medications  · Any signs of decreased circulation or nerve impairment to extremity: change in color, persistent  numbness, tingling, coldness or increase pain  · Any questions    What to do at Home:  Recommended activity: Activity as tolerated,     If you experience any of the following symptoms chest pain, shortness of breath, edema, or weight gain, please follow up with Cardiology. *  Please give a list of your current medications to your Primary Care Provider. *  Please update this list whenever your medications are discontinued, doses are      changed, or new medications (including over-the-counter products) are added. *  Please carry medication information at all times in case of emergency situations. These are general instructions for a healthy lifestyle:    No smoking/ No tobacco products/ Avoid exposure to second hand smoke  Surgeon General's Warning:  Quitting smoking now greatly reduces serious risk to your health.     Obesity, smoking, and sedentary lifestyle greatly increases your risk for illness    A healthy diet, regular physical exercise & weight monitoring are important for maintaining a healthy lifestyle    You may be retaining fluid if you have a history of heart failure or if you experience any of the following symptoms:  Weight gain of 3 pounds or more overnight or 5 pounds in a week, increased swelling in our hands or feet or shortness of breath while lying flat in bed. Please call your doctor as soon as you notice any of these symptoms; do not wait until your next office visit. The discharge information has been reviewed with the patient. The patient verbalized understanding. Discharge medications reviewed with the patient and appropriate educational materials and side effects teaching were provided.   ___________________________________________________________________________________________________________________________________

## 2019-11-25 NOTE — PROGRESS NOTES
Patient admitted to the floor under service of Dr. Woo Coronado. Patient a/o to room/call system, verbalized understanding. Left upper chest pacer dressing c/d/i, no hematoma noted. Dual skin assessment performed x 2 nurses with Travis Strickland RN. Patient has redness to inner gluteal folds, blanches. Redness also noted to bilateral heels, blanches also. Bilateral heels also with dry/flaky skin, has cream at bedside from previous floor. Will apply barrier cream to buttocks, and elevate heels while in bed. Resting comfortably, call bell within reach.

## 2019-11-25 NOTE — PROGRESS NOTES
Problem: Falls - Risk of  Goal: *Absence of Falls  Description  Document Linda Lipscomb Fall Risk and appropriate interventions in the flowsheet. Outcome: Progressing Towards Goal  Note: Fall Risk Interventions:  Mobility Interventions: Patient to call before getting OOB    Mentation Interventions: More frequent rounding, Update white board, Toileting rounds    Medication Interventions: Teach patient to arise slowly, Patient to call before getting OOB    Elimination Interventions: Call light in reach, Urinal in reach    History of Falls Interventions: Door open when patient unattended         Problem: Pressure Injury - Risk of  Goal: *Prevention of pressure injury  Description  Document Faizan Scale and appropriate interventions in the flowsheet.   Outcome: Progressing Towards Goal  Note: Pressure Injury Interventions:  Sensory Interventions: Assess changes in LOC         Activity Interventions: Increase time out of bed    Mobility Interventions: Pressure redistribution bed/mattress (bed type)    Nutrition Interventions: Document food/fluid/supplement intake    Friction and Shear Interventions: HOB 30 degrees or less, Lift sheet, Minimize layers

## 2019-11-25 NOTE — H&P
HISTORY AND PHYSICAL  C       Admit Date: 11/25/2019  Surgeon:   Primary Care Physician: John Brown MD  Specialty Group: Cardiology, Pulmonary, Hospitalists    Chief Complaint : Gait dysfunction secondary to below. Admit Diagnosis: Physical debility [R53.81]    Acute Rehab Dx:  Gait impairment/ gait dysfunction  Debility    deconditioning  Mobility and ambulation deficits  Self Care/ADL deficits    Medical Dx:  Past Medical History:   Diagnosis Date    Arrhythmia     Cancer ([address]     basal cell    Elevated PSA     Erectile dysfunction     Hypertension     daily meds    MVP (mitral valve prolapse)     diagnosed many years ago, pt states does not see cardiologist, in youth    MARISOL (obstructive sleep apnea) 11/15/2018    Osteoarthritis     thumbs    Prostate cancer ([address]     S/P colonoscopy 2015    due in 2020       Date of Evaluation:  November 25, 2019    HPI: Alli Pompa is a 66 y.o. male patient at [facility] who was admitted on 11/25/2019  by Quan Jade MD with below mentioned medical history ,is being seen for Physical Medicine and Rehabilitation. HPI: Mr Dottie Dan is a previously functionally independent 72yo male with a PMH of PAFm MARISOL, HTN, prostate CA, and MVP who presented to [facility] shortly after dc from [facility] earlier that day where he had presented with inc sob and cough. He originally was suspected to have community acquired PNA that improved with 24hrs of IV lasix and abx. However, after discharging home, his wife reports that he was sleeping and awoke suddenly very SOB with BP in the 180-190's. Cough with pink frothy sputum. Went to urgent care and was transported here via EMS. Was intubated en route and has had copious pink frothy sputum from ETT since. CXR with B infiltrates c/w edema. Admission date 11/10 to ICU.  He extubated the following day but reintub the next morning, 11/13, with reports of increase BP,agitation just prior to intubation.    Self extubated 11/17 and placed on NC.  he has diuresed 35lbs since admission.    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 He was started on sotalol load. The following am on 11/201/9, the patient's device was interrogated showing normal function. EP Cardiology Dr. Rod Stephenson was consulted on 11/20/19 and felt likely needing DCCV. It was felt that his pulm edema could have been caused by Afib with RVR. ECHO showed a nl EF, thus ICD not needed. PPM placed 11/19. CXR now clear. weaning steroids. Pro BNP 2490. His QTc was 600 ms and sotalol was decreased on 11/21/19. He was continued on IV lasix and diuresed well for his diastolic HF for total of 17 liters. He continued in A. Fib/flutter and underwent HIRO that was negative for thrombus and then underwent successful cardioversion from atrial fibrillation to sinus rhythm on 11/22/19. He remained euvolemic and continued to improve. Physical therapy was initiated and patient was starting to mobilize. However, Patient shows significant functional deficits due to prolonged immobility and hospitalization with medical conditions as noted above. Our service was consulted for rehab needs and we recommended inpatient hospital rehabilitation is reasonable and necessary due to ongoing acute medical issues which have not changed since initial pre-admission evaluation. I reviewed the preadmission screening and have approved for admission to the [facility]. The patient was cleared for transfer to rehab today. Patient continues to have ongoing  medical issues outlined above requiring physician medical supervision and functional deficits which would benefit from continued intensive therapies.        Current Level of Function: (evaluated by acute therapy staff, with bed mobility, transfers, balance personally observed post-admission in the IRF setting minutes prior to submission of document) pt SBA bed mobility, CGA /SBA STS, amb 20ft CGA /min assist with RW. No recent OT notes    Prior Level of Function/Home Situation:   Home Environment: Private residence  # Steps to Enter: 8  Rails to Enter: Yes  Hand Rails : Right  One/Two Story Residence: (three story)  # of Interior Steps: 18  Interior Rails: Right  Lift Chair Available: No  Living Alone: No  Support Systems: Spouse/Significant Other/Partner  Patient Expects to be Discharged to[de-identified] Unknown  Current DME Used/Available at Home: CPAP  Tub or Shower Type: Shower  Prior Level of Function/Work/Activity:  He lives with his \"common law wife,\" in a 3 story home and typically ambulates and performs ADLs independently. He reports no falls in the past 6 months and has experienced a prolonged hospitalization this admission with x2 intubations.   Past Medical History:   Diagnosis Date    Arrhythmia     [address]     basal cell    Elevated PSA     Erectile dysfunction     Hypertension     daily meds    MVP (mitral valve prolapse)     diagnosed many years ago, pt states does not see cardiologist, in youth    MARISOL (obstructive sleep apnea) 11/15/2018    Osteoarthritis     thumbs    Prostate cancer ([facility]     S/P colonoscopy     due in       Past Surgical History:   Procedure Laterality Date    BIOPSY PROSTATE      HX APPENDECTOMY       Allergies   Allergen Reactions    Amoxicillin Swelling     Diff breathing, lip swelling    Levaquin [Levofloxacin] Shortness of Breath    Zithromax [Azithromycin] Shortness of Breath      Family History   Problem Relation Age of Onset    Cancer Father         prostate    Stroke Father     Cancer Paternal Uncle     Heart Disease Mother     Heart Attack Maternal Uncle     Prostate Cancer Sister       Social History     Tobacco Use    Smoking status: Former Smoker     Last attempt to quit: 1997     Years since quittin.2    Smokeless tobacco: Never Used   Substance Use Topics    Alcohol use: Yes     Comment: 15 drinks per wk      Current Facility-Administered Medications   Medication Dose Route Frequency    sodium chloride (NS) flush 5-40 mL  5-40 mL IntraVENous PRN    alum-mag hydroxide-simeth (MYLANTA) oral suspension 30 mL  30 mL Oral Q4H PRN    [START ON 11/26/2019] amLODIPine (NORVASC) tablet 5 mg  5 mg Oral DAILY    apixaban (ELIQUIS) tablet 5 mg  5 mg Oral Q12H    diphenhydrAMINE (BENADRYL) capsule 50 mg  50 mg Oral QHS PRN    docusate sodium (COLACE) capsule 100 mg  100 mg Oral BID PRN    furosemide (LASIX) tablet 40 mg  40 mg Oral BID    heparin (porcine) pf 900 Units  900 Units InterCATHeter Q8H    heparin (porcine) pf 900 Units  900 Units InterCATHeter PRN    hydrALAZINE (APRESOLINE) tablet 25 mg  25 mg Oral Q6H PRN    lidocaine (XYLOCAINE) 2 % viscous solution 15 mL  15 mL Mouth/Throat PRN    lisinopril (PRINIVIL, ZESTRIL) tablet 10 mg  10 mg Oral Q12H    LORazepam (ATIVAN) tablet 1 mg  1 mg Oral Q6H PRN    magnesium hydroxide (MILK OF MAGNESIA) 400 mg/5 mL oral suspension 30 mL  30 mL Oral DAILY PRN    ondansetron (ZOFRAN ODT) tablet 4 mg  4 mg Oral Q6H PRN    polyethylene glycol (MIRALAX) packet 17 g  17 g Oral DAILY    promethazine (PHENERGAN) tablet 25 mg  25 mg Oral Q6H PRN    sodium chloride (NS) flush 30 mL  30 mL InterCATHeter Q8H    sodium chloride (NS) flush 30 mL  30 mL InterCATHeter PRN    sotalol (BETAPACE) tablet 120 mg  120 mg Oral Q12H    temazepam (RESTORIL) capsule 15 mg  15 mg Oral QHS PRN       Review of Systems   Constitution: Positive for malaise/fatigue. Negative for chills, diaphoresis, fever, weight gain and weight loss. HENT: Negative.    Eyes: Negative.    Cardiovascular: Positive for dyspnea on exertion. Negative for chest pain ,claudication, cyanosis, irregular heartbeat, leg swelling, near-syncope, orthopnea, palpitations, paroxysmal nocturnal dyspnea and syncope. Respiratory: Negative for cough, shortness of breath and wheezing.    Endocrine: Negative.    Skin: Negative.    Musculoskeletal: Negative.    Gastrointestinal: Negative for nausea and vomiting. Genitourinary: Negative.    Neurological: + for dizziness with mobilization oob  Psychiatric/Behavioral: Negative.    Allergic/Immunologic: Negative.      Objective:     Visit Vitals  BP (!) 122/91   Pulse 65   Temp 97.6 °F (36.4 °C)   Resp 18   SpO2 98%      Intake and Output:  11/23 1901 - 11/25 0700  In: 6407 [P.O.:1670]  Out: 9585 [Urine:3735]    Physical Exam:   Psych: Patient was oriented to person, place, and time. Without hallucinations, abnormal affect or abnormal behaviors during the examination. General:   Alert, appears stated age, No acute distress. HEENT:  Normocephalic, EOMI, facial symmetry  Intact. Oral mucosa pink and moist. No nasal discharge. Lungs:  CTA Bilaterally,Respiration even and unlabored   No apparent use of accessory muscles for respiration. Heart:  Regular rate and rhythm, S1, S2, No obvious murmur, click, rub or gallop. Genitourinary: defered   Abdomen:  Soft, non-tender to palpation in all four quadrants. Bowel sounds present. No obvious masses palpated.     Neuromuscular:  PERRL, EOMI  Generalized prox >distal weakness  Sensation intact  Non focal neuro exam  Attn is fair, concentration fair   Skin:  Intact, dry, good skin turgor, age related changes present   Edema: none   Incision:  Left upper chest pacer incision c/d/i        Lab Review:    Recent Results (from the past 72 hour(s))   EKG, 12 LEAD, INITIAL    Collection Time: 11/22/19  3:47 PM   Result Value Ref Range    Ventricular Rate 60 BPM    Atrial Rate 60 BPM    P-R Interval 160 ms    QRS Duration 174 ms    Q-T Interval 544 ms    QTC Calculation (Bezet) 544 ms    Calculated P Axis 95 degrees    Calculated R Axis -42 degrees    Calculated T Axis 122 degrees    Diagnosis       Normal sinus rhythm  Left axis deviation  Left bundle branch block  Abnormal ECG  When compared with ECG of 22-NOV-2019 10:30,  Sinus rhythm has replaced Electronic ventricular pacemaker  Confirmed by [email or web address] ([id number]) on 11/23/2019 6:28:44 AM     EKG, 12 LEAD, INITIAL    Collection Time: 11/22/19 10:50 PM   Result Value Ref Range    Ventricular Rate 60 BPM    Atrial Rate 60 BPM    P-R Interval 214 ms    QRS Duration 170 ms    Q-T Interval 498 ms    QTC Calculation (Bezet) 498 ms    Calculated P Axis 80 degrees    Calculated R Axis -42 degrees    Calculated T Axis 147 degrees    Diagnosis       Atrial-paced rhythm with prolonged AV conduction  Left axis deviation  Left bundle branch block  Abnormal ECG  When compared with ECG of 22-NOV-2019 15:47,  Electronic atrial pacemaker has replaced Sinus rhythm  Nonspecific T wave abnormality now evident in Inferior leads  Confirmed by [email or web address] ([id number]) on 11/23/2019 1:75:40 AM     METABOLIC PANEL, BASIC    Collection Time: 11/23/19  5:09 AM   Result Value Ref Range    Sodium 139 136 - 145 mmol/L    Potassium 4.0 3.5 - 5.1 mmol/L    Chloride 105 98 - 107 mmol/L    CO2 28 21 - 32 mmol/L    Anion gap 6 (L) 7 - 16 mmol/L    Glucose 110 (H) 65 - 100 mg/dL    BUN 33 (H) 8 - 23 MG/DL    Creatinine 1.02 0.8 - 1.5 MG/DL    GFR est AA >60 >60 ml/min/1.73m2    GFR est non-AA >60 >60 ml/min/1.73m2    Calcium 7.7 (L) 8.3 - 10.4 MG/DL   MAGNESIUM    Collection Time: 11/23/19  5:09 AM   Result Value Ref Range    Magnesium 2.5 (H) 1.8 - 2.4 mg/dL   PLEASE READ & DOCUMENT PPD TEST IN 72 HRS    Collection Time: 11/23/19 11:00 AM   Result Value Ref Range    PPD Negative Negative    mm Induration 0 0 - 5 mm   METABOLIC PANEL, BASIC    Collection Time: 11/24/19  5:14 AM   Result Value Ref Range    Sodium 142 136 - 145 mmol/L    Potassium 3.8 3.5 - 5.1 mmol/L    Chloride 110 (H) 98 - 107 mmol/L    CO2 28 21 - 32 mmol/L    Anion gap 4 (L) 7 - 16 mmol/L    Glucose 137 (H) 65 - 100 mg/dL    BUN 28 (H) 8 - 23 MG/DL    Creatinine 0.99 0.8 - 1.5 MG/DL    GFR est AA >60 >60 ml/min/1.73m2    GFR est non-AA >60 >60 ml/min/1.73m2    Calcium 8.5 8.3 - 10.4 MG/DL   MAGNESIUM    Collection Time: 11/24/19  5:14 AM   Result Value Ref Range    Magnesium 2.2 1.8 - 2.4 mg/dL       Condition on Admission: Good      Assessment: Physical Debilitation s/p Acute Pulm Edema/Respiratory failure/acute heart failure    The Post Assessment Physician Evaluation (RORO) found the current functional status to be comparable with the Pre-admission Screening. The Patient is a good candidate for acute inpatient rehabilitation. Nothing since the Pre-admission screen has changed that determination. Rehabilitation Plan  The patient has shown the ability to tolerate and benefit from 3 hours of therapy daily and is being admitted to a comprehensive acute inpatient rehabilitation program consisting of at least 3 hours of combined physical and occupational therapies. Begin intensive Physical Therapy for a minimum of 1.5 hours a day, at least 5 out of 7 days per week to address bed mobility, transfers, ambulation, strengthening, balance, and endurance. Begin intensive Occupational Therapy for a minimum of 1.5 hours a day, at least 5 out of 7 days per week to address ADL ( bathing, LE dressing, toileting) and adaptive equipment as needed. The patient will also require 24-hour skilled rehabilitation nursing for bowel and bladder management, skin care for decubitus ulcer prevention , pain management and ongoing medication administration     The patient may benefit from a psychology consult for depression, adjustment disorder. Physical Debilitation s/p Acute Pulm Edema/Respiratory failure/acute heart failure    Continue daily physician medical management:    S/p Acute Hypoxic Resp failure- Incentive spirometer every hour while awake  -s/p HCAP; chest xray neg in f/u . Reported hx of MARISOL/ ? CPAP    DVT risk / DVT Prophylaxis- Will require daily physician exam to assess for signs and symptoms as patient is at increased risk for of thromboembolism. Mobilization as tolerated. Intermittent pneumatic compression devices when in bed Thigh-high or knee-high thromboembolic deterrent hose when out of bed. Pt is on Eliquis. Pain Control: stable, mild-to-moderate joint symptoms intermittently, reasonably well controlled by PRN meds. Will require regular pain assessment and comprenhensive pain management, generalized pain /arthritic pain; narcotics avoided    Wound Care: Monitor wound status daily per staff and physician. At risk for failure. Will require 24/7 rehab nursing. Keep wound clean and dry; left upper chest pacermaker implantation sight; healing well    Afib/flutter; s/p cardioversion. Cont sotalol. Cont Eliquis. The patient's device was interrogated prior to d/c to inpatient rehab showing PMT requiring extending out PVARP to 275 ms. The patient will follow up with [facility] device clinic on 12/3/19 at 1130 am for device/site check or, most likely, post IRC dc  -HIRO 11/22 that was negative for thrombus     Hypertension - BP uncontrolled, fluctuating, managed medically. Cont Norvasc and Zestril. CHF; 17L diuresis during acute hospital stay. Weigh daily. Strict I/o . Cont Lasix. Monitor renal fxn    Prerenal Azotemia; BUN 28 from 33. Creat nl at 0.99 11/24    Urinary retention/ neurogenic bladder - schedule voids q6-8 hrs. Check post-void residual as needed; In and Out catheterize if post-void residual is more than 400 cc.    bowel program - add miralax; prn colace, mom, colace    GERD - resume PPI. At times may need additional antacids, Maalox prn. The patient's prognosis for significant practical improvement within a reasonable period of time appears good and the estimated length of stay is 14 days and patient is expected to return home with spouse and continued rehabilitation with home health therapy.      Given the patient's complex neurologic/medical condition and the risk of further medical complications including: DVT, PE, skin breakdown, pneumonia due to decreased mobility,   infection at surgical site , CVA, MI, cardiac arrhythmias due to HTN, increased risk of thromboembolism secondary to decreased blood volume and increased energy expenditure in a patient with known acute blood loss could potentially impact the IRF program with decreased cardiovascular efficiency, postural hypotension and cardiac arrhythmia. For these ongoing medical issues, rehabilitation services could not be safely provided at a lower level of care such as a skilled nursing facility or nursing home.         Signed By: Lucille Dorado MD     November 25, 2019

## 2019-11-25 NOTE — PROGRESS NOTES
Late entry  Problem: Mobility Impaired (Adult and Pediatric)  Goal: *Acute Goals and Plan of Care (Insert Text)  Description  STG:  (1.)Mr. Shauna Stafford will move from supine to sit and sit to supine , scoot up and down and roll side to side with CONTACT GUARD ASSIST within 3 treatment day(s). (2.)Mr. Shauna Stafford will transfer from bed to chair and chair to bed with MINIMAL ASSIST using the least restrictive device within 3 treatment day(s). (3.)Mr. Shauna Stafford will ambulate with MINIMAL ASSIST for 25 feet with the least restrictive device within 3 treatment day(s). (4.)Mr. Shauna Stafford will perform standing static and dynamic balance activities x 15 minutes with MINIMAL ASSIST to improve safety within 3 day(s). (5.)Mr. Shauna Stafford will tolerate 45+ minutes of therapeutic activity/exercise within 3 days in order to  Improve activity tolerance for mobility. LTG:  (1.)Mr. Shauna Stafford will move from supine to sit and sit to supine , scoot up and down and roll side to side in bed with SUPERVISION within 7 treatment day(s). (2.)Mr. Shauna Stafford will transfer from bed to chair and chair to bed with CONTACT GUARD ASSIST using the least restrictive device within 7 treatment day(s). (3.)Mr. Shauna Stafford will ambulate with CONTACT GUARD ASSIST for 75 feet with the least restrictive device within 7 treatment day(s). (4.)Mr. Shauna Stafford will perform standing static and dynamic balance activities x 15 minutes with CONTACT GUARD ASSIST to improve safety within 7 day(s).   ________________________________________________________________________________________________     Outcome: Progressing Towards Goal     PHYSICAL THERAPY: Initial Assessment, Daily Note, and AM 11/22/19  INPATIENT: PT Visit Days : 2  [place] / [place] B / Product Type: Medicare /       NAME/AGE/GENDER: Anthony Thibodeaux is a 66 y.o. male   PRIMARY DIAGNOSIS: Acute pulmonary edema (HCC) [J81.0] Acute pulmonary edema (HCC) Acute pulmonary edema ([facility]  Procedure(s) (LRB):  BRONCHOSCOPY (N/A)  BRONCHIAL ALVEOLAR LAVAGE (N/A)  9 Days Post-Op  ICD-10: Treatment Diagnosis:    · Difficulty in walking, Not elsewhere classified (R26.2)  · Repeated Falls (R29.6)   Precaution/Allergies:  Amoxicillin; Levaquin [levofloxacin]; and Zithromax [azithromycin]      ASSESSMENT:     Mr. Vargas Tripp is a 66 y.o. male admitted for acute pulmonary edema. He lives with his \"common law wife,\" in a 3 story home and typically ambulates and performs ADLs independently. He reports no falls in the past 6 months and has experienced a prolonged hospitalization this admission with x2 intubations. He is now on room air. He is supine and agreeable to physical therapy treatment. He required stand by assist to transfer to sitting this date, cues for safety. He stood with CGA and ambulated with rolling walker, minimal assist and chair follow for approximately 20'. He fatigued quickly (due to NPO status for cardioversion later today), however significant improvement since last therapy session in gait distance. He returned to supine with little assistance. Kisha Rich is currently functioning below his baseline and would benefit from skilled PT during acute care stay to maximize safety and independence with functional mobility. This section established at most recent assessment   PROBLEM LIST (Impairments causing functional limitations):  1. Decreased Strength  2. Decreased ADL/Functional Activities  3. Decreased Transfer Abilities  4. Decreased Ambulation Ability/Technique  5. Decreased Balance  6. Increased Pain  7. Decreased Activity Tolerance  8. Decreased Pacing Skills  9. Decreased Knowledge of Precautions  10. Decreased [place] with Home Exercise Program   INTERVENTIONS PLANNED: (Benefits and precautions of physical therapy have been discussed with the patient.)  1. Balance Exercise  2. Bed Mobility  3. Family Education  4. Gait Training  5.  Home Exercise Program (HEP)  6. Therapeutic Activites  7. Therapeutic Exercise/Strengthening  8. Transfer Training     TREATMENT PLAN: Frequency/Duration: 3 times a week for duration of hospital stay  Rehabilitation Potential For Stated Goals: Good     REHAB RECOMMENDATIONS (at time of discharge pending progress):    Placement: It is my opinion, based on this patient's performance to date, that Mr. Davide Hope may benefit from intensive therapy at an [address] [place] [address] after discharge due to a probable need for close medical supervision by a rehab physician, a probable need for multiple therapy disciplines, and potential to make ongoing and sustainable functional improvement that is of practical value. .  Equipment:    None at this time              HISTORY:   History of Present Injury/Illness (Reason for Referral):  Patient is a 66 y.o.  male presents with respiratory failure. He was just discharged from [facility] today. He presented there yesterday with increased SOB of cough. No fever, chills, LE edema, or chest pain. It was originally suspected to represent PNA but the patient improved after just 24 hours of treatment with lasix and abx and was discharged. He was resting at home today per wife and awoke suddenly very SOB with BP in the 180-190's. Cough with pink frothy sputum. Went to urgent care and was transported here via EMS. Was intubated en route and has had copious pink frothy sputum from ETT since. CXR with B infiltrates c/w edema. Pro BNP better than yesterday but still elevated. No fever or chills still reported. No vaping reported by wife. Past Medical History/Comorbidities:   Mr. Davide Hope  has a past medical history of Arrhythmia, Cancer ([address] Elevated PSA, Erectile dysfunction, Hypertension, MVP (mitral valve prolapse), MARISOL (obstructive sleep apnea) (11/15/2018), Osteoarthritis, Prostate cancer ([address] and S/P colonoscopy (2015).  He also has no past medical history of Chronic kidney disease, Chronic obstructive pulmonary disease ([facility] Difficult intubation, Liver disease, Malignant hyperthermia due to anesthesia, Nausea & vomiting, Pseudocholinesterase deficiency, Psychiatric disorder, or PUD (peptic ulcer disease). Mr. Kavon Fitzpatrick  has a past surgical history that includes hx appendectomy and biopsy prostate. Social History/Living Environment:   Home Environment: Private residence  # Steps to Enter: 8  Rails to Enter: Yes  Hand Rails : Right  One/Two Story Residence: (3 L home)  # of Interior Steps: 18  Interior Rails: Right  Lift Chair Available: No  Living Alone: No  Support Systems: Spouse/Significant Other/Partner  Patient Expects to be Discharged to[de-identified] Rehabilitation facility  Current DME Used/Available at Home: None  Tub or Shower Type: Shower  Prior Level of Function/Work/Activity:  He lives with his \"common law wife,\" in a 3 story home and typically ambulates and performs ADLs independently. He reports no falls in the past 6 months and has experienced a prolonged hospitalization this admission with x2 intubations. Number of Personal Factors/Comorbidities that affect the Plan of Care: 3+: HIGH COMPLEXITY   EXAMINATION:   Most Recent Physical Functioning:   Gross Assessment:  AROM: Generally decreased, functional  PROM: Generally decreased, functional  Strength: Generally decreased, functional               Posture:  Posture (WDL): Exceptions to WDL  Posture Assessment: Forward head, Rounded shoulders  Balance:    Bed Mobility:     Wheelchair Mobility:     Transfers:     Gait:            Body Structures Involved:  1. Nerves  2. Heart  3. Lungs  4. Muscles Body Functions Affected:  1. Sensory/Pain  2. Cardio  3. Respiratory  4. Neuromusculoskeletal  5. Movement Related Activities and Participation Affected:  1. Mobility  2. Self Care  3. Domestic Life  4. Interpersonal Interactions and Relationships  5.  Community, Social and [place]   Number of elements that affect the Plan of Care: 4+: HIGH COMPLEXITY   CLINICAL PRESENTATION:   Presentation: Evolving clinical presentation with changing clinical characteristics: MODERATE COMPLEXITY   CLINICAL DECISION MAKIN [facility] Mobility Inpatient Short Form  How much difficulty does the patient currently have. .. Unable A Lot A Little None   1. Turning over in bed (including adjusting bedclothes, sheets and blankets)? [] 1   [] 2   [x] 3   [] 4   2. Sitting down on and standing up from a chair with arms ( e.g., wheelchair, bedside commode, etc.)   [] 1   [x] 2   [] 3   [] 4   3. Moving from lying on back to sitting on the side of the bed? [] 1   [] 2   [x] 3   [] 4   How much help from another person does the patient currently need. .. Total A Lot A Little None   4. Moving to and from a bed to a chair (including a wheelchair)? [] 1   [x] 2   [] 3   [] 4   5. Need to walk in hospital room? [] 1   [x] 2   [] 3   [] 4   6. Climbing 3-5 steps with a railing? [x] 1   [] 2   [] 3   [] 4   © , Trustees of [address] [place] [address] [facility], under license to [employer]. All rights reserved      Score:  Initial: 13 Most Recent: X (Date: -- )    Interpretation of Tool:  Represents activities that are increasingly more difficult (i.e. Bed mobility, Transfers, Gait). Medical Necessity:     · Patient demonstrates   · good  ·  rehab potential due to higher previous functional level. Reason for Services/Other Comments:  · Patient   · continues to require modification of therapeutic interventions to increase complexity of exercises  · .    Use of outcome tool(s) and clinical judgement create a POC that gives a: Questionable prediction of patient's progress: MODERATE COMPLEXITY            TREATMENT:   (In addition to Assessment/Re-Assessment sessions the following treatments were rendered)   Pre-treatment Symptoms/Complaints:  some L pacemaker site discomfort  Pain: Initial:   Pain Intensity 1: 0  Post Session:  0/10 Therapeutic Activity: (    10 minutes): Therapeutic activities including Ambulation on level ground and sit to stand transfers, bed mobility, sitting balance activities to improve mobility, strength, balance, and activity tolerance . Required minimal   to promote static and dynamic balance in standing. Braces/Orthotics/Lines/Etc:   · O2 Device: Room air  Treatment/Session Assessment:    · Response to Treatment:  Patient stood x2 and took side steps at edge of bed. · Interdisciplinary Collaboration:   o Physical Therapist  o Registered Nurse  · After treatment position/precautions:   o Supine in bed  o Bed/Chair-wheels locked  o Bed in low position  o Call light within reach  o RN notified  o Family at bedside  o Nurse at bedside   · Compliance with Program/Exercises: Will assess as treatment progresses  · Recommendations/Intent for next treatment session: \"Next visit will focus on advancements to more challenging activities and reduction in assistance provided\".   Total Treatment Duration:  PT Patient Time In/Time Out  Time In: 1314  Time Out: [address], PT, DPT

## 2019-11-25 NOTE — DISCHARGE SUMMARY
[facility] Cardiology Discharge Summary     Patient ID:  Odciro Houston  [id number]  72 y.o.  1945    Admit date: 11/10/2019    Discharge date:  11/25/19    Admitting Physician: Rosa Lemus MD     Discharge Physician: Yuri Cano NP/Dr. Ani Veliz    Admission Diagnoses: Acute pulmonary edema [facility]) [J81.0]    Discharge Diagnoses:   Patient Active Problem List    Diagnosis Date Noted    Pneumonia of lower lobe of lung ([address] 11/10/2019    Acute respiratory failure with hypoxia ([address] 11/10/2019    Acute pulmonary edema ([address] 11/10/2019    Acute heart failure ([address] 11/10/2019    MARISOL (obstructive sleep apnea) 11/15/2018    Inadequate sleep hygiene 11/15/2018    C. difficile colitis 07/25/2018    HAP (hospital-acquired pneumonia) 05/16/2018    Acute respiratory failure ([address] 05/14/2018    Chronic combined systolic and diastolic congestive heart failure ([address] 05/14/2018    Paroxysmal A-fib ([address] 05/14/2018    LBBB (left bundle branch block) 05/14/2018    Leukocytosis 05/14/2018    Medicare annual wellness visit, subsequent 04/01/2016    Hypertension, essential 01/13/2016       Cardiology Procedures this admission:  Diagnostic left heart catheterization  EchoCardiogram  HIRO/Cardioversion then repeat HIRO/DCCV. Consults: Cardiology and EP Cardiology, [facility] Course: Patient with h/o PAF on Eliquis, HTN, MVP and MARISOL who presented on 11/10 with respiratory failure. He was just discharged from [facility] on 11/10/19. He reports feeling SOB for weeks and noted he was back in AF last week. On Saturday, he was blowing leaves and became very SOB. He was seen at [facility] and it was originally thought to have PNA but the patient improved after just 24 hours of treatment with lasix and abx and he was discharged home. He states he went home and took a nap on his CPAP and woke up unable to breath well. His SBP was in the 180-190's and he noted cough with pink frothy sputum. He presented to [facility] and EMS was called. He was intubated in route to [facility] ED. He denied fever, chills, LE edema or chest pain. In the [facility] ED, CXR with B infiltrates c/w edema, Pro . He was admitted by pulmonary and given IV lasix with good UOP. He was then extubated. He is in AF with LBBB. Cardiology was consulted for A. Fib with RVR and pulmonary edema. He was made NPO for likely HIRO/eCV and LHC in am. An echocardiogram showed -  Left ventricle: Systolic function was normal. Ejection fraction was  estimated in the range of 55 % to 60 %. There were no regional wall motion abnormalities. Wall thickness was mildly increased. The E/e' ratio was 15.65. Diastolic function was indeterminate. -  Left atrium: The atrium was mildly dilated. -  Mitral valve: There was mild to moderate regurgitation. The regurgitant jet was eccentric and posteriorly directed. The patient underwent LHC on 11/12/19 by Dr. Cherelle Johnson that showed low normal left ventricular systolic function with normal end-diastolic pressures. No significant valvular heart disease and minimal atherosclerotic coronary disease. The patient  is chronically anticoagulated with Eliquis. He underwent a successful cardioversion and restoration of sinus rhythm. He was then started on IV amiodarone. The patient's respiratory once again declined requiring re-intubation on 11/13/19 and placed on pressors. With his recurrent respiratory issues he converted back to A. Fib with RVR. He converted back to NSR on 11/15/19 . He underwent bronchoscopy by pulmonary on 11/16/19. He self extubated on 11/17/19. The patient underwent a Bi[employer]ron[employer] MRI compatible dual chamber pacemaker place by Dr. Ruth Leonard on 11/19/19 for symptomatic bradycardia. He was started on sotalol load. The following am on 11/201/9, the patient's device was interrogated showing normal function.    EP Cardiology Dr. Jamie Bonilla was consulted on 11/20/19 and felt likely needing DCCV over next several days. Cardiology assumed care. Dr. Keila Garcia with Physiatry medicine saw patient as well and felt candidate for inpatient rehab on Monday d/t physical debility. His QTc was 600 ms and sotalol was decreased on 11/21/19. He was continued on IV lasix and diuresed well for his diastolic HF for total of 17 liters. He continued in A. Fib/flutter and underwent HIRO that was negative for thrombus and then underwent successful cardioversion from atrial fibrillation to sinus rhythm on 11/22/19. He remained euvolemic and continued to improve. The morning of 11/25/19, inpatient rehab as a bed and ready to be transfer for further care. He will remain on sotalol 120mg bid. The patient was seen and examined by Dr. Flavia Guzmán and was determined stable and ready for discharge to inpatient rehab. The patient was instructed on the importance of medication compliance and outpatient follow up. The patient will continue sotalol and eliquis for his PAF. For his diastolic HF, the continue po lasix. The patient's device was interrogated prior to d/c to inpatient rehab showing PMT requiring extending out PVARP to 275 ms. The patient will follow up with [facility] Cardiology device clinic on 12/3/19 at 1130 am for device/site check. DISPOSITION: The patient is being discharged to inpatient rehab  in stable condition on a low saturated fat, low cholesterol and low salt diet. The patient is instructed to advance activities as tolerated to the limit of fatigue or shortness of breath. The patient is instructed to call the office or return to the ER for immediate evaluation for any severe shortness of breath, chest pain, prolonged palpitations, near syncope or syncope.     Discharge Exam:   Visit Vitals  /83 (BP 1 Location: Left arm, BP Patient Position: At rest)   Pulse 73   Temp 98.9 °F (37.2 °C)   Resp 18   Ht 6' 3\" (1.905 m)   Wt 221 lb 11.2 oz (100.6 kg)   SpO2 97%   BMI 27.71 kg/m²     Patient has been seen by Dr. Flavia Guzmán: see his progress note for exam details. No results found for this or any previous visit (from the past 24 hour(s)). Patient Instructions:     Current Discharge Medication List      START taking these medications    Details   lisinopril (PRINIVIL, ZESTRIL) 10 mg tablet Take 1 Tab by mouth every twelve (12) hours. Qty: 60 Tab, Refills: 5      sotalol (BETAPACE) 120 mg tablet Take 1 Tab by mouth every twelve (12) hours. Qty: 60 Tab, Refills: 11         CONTINUE these medications which have CHANGED    Details   amLODIPine (NORVASC) 5 mg tablet Take 1 Tab by mouth daily. Qty: 30 Tab, Refills: 5         CONTINUE these medications which have NOT CHANGED    Details   apixaban (ELIQUIS) 5 mg tablet Take 1 Tab by mouth two (2) times a day. Qty: 180 Tab, Refills: 1      furosemide (LASIX) 40 mg tablet Take 1 Tab by mouth two (2) times a day. Qty: 180 Tab, Refills: 3      miscellaneous medical supply misc by Does Not Apply route. DISABLED PLACARD (DISABLED PLACARD) DMV Apply to car  Qty: 1 Each, Refills: 0    Associated Diagnoses: Acute congestive heart failure, unspecified heart failure type ([facility]         STOP taking these medications       cephALEXin (KEFLEX) 500 mg capsule Comments:   Reason for Stopping:         azithromycin (ZITHROMAX) 250 mg tablet Comments:   Reason for Stopping:         cloNIDine HCl (CATAPRES) 0.1 mg tablet Comments:   Reason for Stopping:         cloNIDine HCl (CATAPRES) 0.2 mg tablet Comments:   Reason for Stopping:         olmesartan (BENICAR) 40 mg tablet Comments:   Reason for Stopping:         carvedilol (COREG) 6.25 mg tablet Comments:   Reason for Stopping:         Blood Pressure Kit-Extra Large kit Comments:   Reason for Stopping:         B infantis/B ani/B taco/B bifid (PROBIOTIC 4X PO) Comments:   Reason for Stopping:                 Signed:  Margot Wood NP  11/25/2019  7:35 AM\  ATTENDING ADDENDUM:    Patient seen and examined by me.   Agree with above note by physician extender. Key findings are:  No CP or AMADOR  CV- RRR without murmur  Lungs- Clear bilaterally  Abd- soft, nontender, nondistended  Ext- no edema, PACER SITE CDI    TELE - NSR WITH V-PACING AND INTERMITTENT PMT (ADJUSTED AND RESOLVED BY [employer] REP TODAY)    Plan: As above.     Nicolette Claudio MD  [facility] Cardiology  Pager [phone]

## 2019-11-25 NOTE — PROGRESS NOTES
[facility] OCCUPATIONAL THERAPY INITIAL EVALUATION    Time In: 2428  Time Out: 1353    Precautions: Falls and New Pacemaker    Pain: Tightness/discomfort around L side of neck and shoulder, likely d/t limited ROM d/t new pacemaker. Eased by hot shower. History of Presenting Illness (per previous reports): Mr Abeba Vail is a previously functionally independent 74yo male with a PMH of PAFm MARISOL, HTN, prostate CA, and MVP who presented to [facility] shortly after dc from [address] [place] [address] earlier that day where he had presented with inc sob and cough. He originally was suspected to have community acquired PNA that improved with 24hrs of IV lasix and abx. However, after discharging home, his wife reports that he was sleeping and awoke suddenly very SOB with BP in the 180-190's. Cough with pink frothy sputum. Went to urgent care and was transported here via EMS. Was intubated en route and has had copious pink frothy sputum from ETT since. CXR with B infiltrates c/w edema. Admission date 11/10 to ICU. He extubated the following day but reintub the next morning, 11/13, with reports of increase BP,agitation just prior to intubation.    Self extubated 11/17 and placed on NC.  he has diuresed 35lbs since admission.    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 Clemente Fuchs was started on sotalol load. The following am on 11/201/9, the patient's device was interrogated showing normal function.   EP Cardiology Dr. Yaya Lowery was consulted on 11/20/19 and felt likely needing DCCV. It was felt that his pulm edema could have been caused by Afib with RVR. ECHO showed a nl EF, thus ICD not needed. PPM placed 11/19. CXR now clear. weaning steroids. Pro BNP 2490. His QTc was 600 ms and sotalol was decreased on 11/21/19. He was continued on IV lasix and diuresed well for his diastolic HF for total of 17 liters.  He continued in A. Fib/flutter and underwent HIRO that was negative for thrombus and then underwent successful cardioversion from atrial fibrillation to sinus rhythm on 11/22/19. He remained euvolemic and continued to improve.        Past Medical History/ Co-morbidities:   Past Medical History:   Diagnosis Date    Arrhythmia     Cancer ([address]     basal cell    Elevated PSA     Erectile dysfunction     Hypertension     daily meds    MVP (mitral valve prolapse)     diagnosed many years ago, pt states does not see cardiologist, in youth    MARISOL (obstructive sleep apnea) 11/15/2018    Osteoarthritis     thumbs    Prostate cancer ([address]     S/P colonoscopy 2015    due in 2020       Patient's Goal: \"Live independently again. 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 [facility] residence   Lives Alone No   [address]   Current DME Raised toilet seat   Lift Chair     Stairs to [address]      Rails Right      [address]       Upper Extremity Function   LUE RUE   [address] To formally assess To formally assess   GMC Intact Intact   Light Touch No apparent deficit No apparent deficit   Sharp/Dull Discrimination       Hot/Cold No apparent deficit No apparent deficit   Proprioception       Stereognosis       [address] Peg Test          LUE RUE   General Evalutaion  (Limited by pacemaker precautions)     Shoulder Flexion 2+ 4   Shoulder Extension        Shoulder Abduction 2+ 4   Shoulder Adduction       Elbow Flexion 3 4+   Elbow Extension  3 4+    3 4+   0/5 No palpable muscle contraction  1/5 Palpable muscle contraction, no joint movement  2-/5 Less than full range of motion in gravity eliminated position  2/5 Able to complete full range of motion in gravity eliminated position  2+/5 Able to initiate movement against gravity  3-/5 More than half but not full range of motion against gravity  3/5 Able to complete full range of motion against gravity  3+/5 Completes full range of motion against gravity with minimal resistance  4-/5 Completes full range of motion against gravity with minimal-moderate resistance  4/5 Completes full range of motion against gravity with moderate resistance  4+/5 Completes full range of motion against gravity with moderate-maximum resistance  5/5 Completes full range of motion against gravity with maximum resistance      Functional Mobility   Score Comments   Sit to Stand 3: Partial/Moderate A Minimal assist EOB to RW   Transfer Assist 3: Partial/Moderate A Transfer Type: SPT   Equipment: Rolling Walker   Comments: Minimal assist     Activities of Daily Living    Score Comments   Oral Hyigene 5: S/U or clean-up assist Setup seated at sink   Bathing 3: Partial/Moderate A Type of Shower: Shower  Position: Standing PRN and Unsupported Sitting   Adaptive  Equipment: Tub Transfer Bench and Grab Bars  Comments: Patient stood with minimal assist and use of grab bar to bathe bottom, increased time/effort to bathe feet with decreased thoroughness noted   Upper Body  Dressing 4: Supervision or touching A Items Applied: Pullover  Position: Unsupported Sitting  Comments: Supervision for pacemaker precautions   Lower Body Dressing 3: Partial/Moderate A Items Applied: Underwear and Elastic pants  Position: Standing PRN and Unsupported Sitting  Adaptive Equipment: Grab bar  Comments: Minimal assist and use of grab bar to stand to manage clothing over hips   Donning/[place] Footwear 4: Supervision or touching A Items Applied: Socks and Slip-on shoes  Adaptive Equipment: N/A  Comments: Able to doff socks and don shoes with supervision     Cognition: 3/3 words recalled on IRF VEDA, good recall demonstrated for reporting hospital course and history. A&Ox4. Working part time as  at [employer]. Will continue to assess. Vision/Perception: No deficits noted.   History of cataracts removal bilaterally, reading and distance vision glasses required. Session: Patient seen for OT interview, ADL evaluation (patient requesting shower this PM) and initiation of UE/vision assessment this PM, see above for details. Patient demonstrates impaired activity tolerance and is limited by new pacemaker/precautions for functional use of LUE requiring supervision. Life partner, Cam Macedo, at bedside beginning of session and able to provide 24/7 assistance at discharge. Patient active and independent at [facility], reports playing golf, going to gym, and doing yardwork. Rehabilitation potential is good to meet goals as stated in POC. Note more accessible home available in [facility] if needed at discharge per patient/significant other. Interdisciplinary Communication: Collaborated with PT and nursing for current level of function, plan of care, and measures to assure safety during their stay. Updated board with current level of function to increase carryover between disciplines. Patient/Family Education: Patient and Family was/were educated On the role of OT, On POC and On IRC expectations. Problem List: Activity Tolerance, Safety Awareness, Strength, Pain and Standing Balance    Functional Limitations: ADL, IADL, Functional Transfers and Functional Mobility    Goals: Please see Care Plan    OT order received and chart reviewed. OT orders have been acknowledged. Patient will benefit from skilled OT services to address ADL, functional transfers, UE strength, balance, cognition and activity tolerance to maximize functional performance with daily self-care tasks and functional mobility. Treatment is likely to include ADL, Balance, Strength, Activity tolerance, Cognitive, DME, AE, Family  and Safety awareness training to increase independence with self-care. Patient will be seen for 1.5-2 hours of skilled OT services 5-6 days a week as appropriate. Initate POC.      Best Diss, OT  11/25/2019

## 2019-11-25 NOTE — PROGRESS NOTES
Resting in bed with C-pap on, respirations even/unlabored. Call bell within reach, hourly rounds completed this shift.

## 2019-11-25 NOTE — PROGRESS NOTES
Bedside and Verbal shift change report given to self (oncoming nurse) by Oskar Bills RN (offgoing nurse). Report included the following information SBAR, Kardex, ED Summary, Procedure Summary, Intake/Output, MAR, Recent Results and Cardiac Rhythm SR/Paced. L subclavian pacer site c/d/i. No hematoma or bruising noted.

## 2019-11-25 NOTE — PROGRESS NOTES
Discharge instructions reviewed with Patient. Opportunity for questions provided. Patient voiced understanding of all discharge instructions. IV and telemetry monitor removed by primary RN. Patient is discharge to 9th floor for Rehab.

## 2019-11-26 ENCOUNTER — PATIENT OUTREACH (OUTPATIENT)
Dept: CASE MANAGEMENT | Age: 74
End: 2019-11-26

## 2019-11-26 LAB
ANION GAP SERPL CALC-SCNC: 4 MMOL/L (ref 7–16)
BUN SERPL-MCNC: 15 MG/DL (ref 8–23)
CALCIUM SERPL-MCNC: 7.9 MG/DL (ref 8.3–10.4)
CHLORIDE SERPL-SCNC: 108 MMOL/L (ref 98–107)
CO2 SERPL-SCNC: 28 MMOL/L (ref 21–32)
CREAT SERPL-MCNC: 0.95 MG/DL (ref 0.8–1.5)
ERYTHROCYTE [DISTWIDTH] IN BLOOD BY AUTOMATED COUNT: 13.2 % (ref 11.9–14.6)
GLUCOSE SERPL-MCNC: 118 MG/DL (ref 65–100)
HCT VFR BLD AUTO: 32.6 % (ref 41.1–50.3)
HGB BLD-MCNC: 11.1 G/DL (ref 13.6–17.2)
MAGNESIUM SERPL-MCNC: 2 MG/DL (ref 1.8–2.4)
MCH RBC QN AUTO: 31.4 PG (ref 26.1–32.9)
MCHC RBC AUTO-ENTMCNC: 34 G/DL (ref 31.4–35)
MCV RBC AUTO: 92.4 FL (ref 79.6–97.8)
NRBC # BLD: 0 K/UL (ref 0–0.2)
PLATELET # BLD AUTO: 268 K/UL (ref 150–450)
PMV BLD AUTO: 10.1 FL (ref 9.4–12.3)
POTASSIUM SERPL-SCNC: 3.6 MMOL/L (ref 3.5–5.1)
RBC # BLD AUTO: 3.53 M/UL (ref 4.23–5.6)
SODIUM SERPL-SCNC: 140 MMOL/L (ref 136–145)
SPECIMEN SOURCE: NORMAL
VIRUS SPEC CULT: NORMAL
WBC # BLD AUTO: 11 K/UL (ref 4.3–11.1)

## 2019-11-26 PROCEDURE — 83735 ASSAY OF MAGNESIUM: CPT

## 2019-11-26 PROCEDURE — 97116 GAIT TRAINING THERAPY: CPT

## 2019-11-26 PROCEDURE — 97530 THERAPEUTIC ACTIVITIES: CPT

## 2019-11-26 PROCEDURE — 97110 THERAPEUTIC EXERCISES: CPT

## 2019-11-26 PROCEDURE — 97535 SELF CARE MNGMENT TRAINING: CPT

## 2019-11-26 PROCEDURE — 85027 COMPLETE CBC AUTOMATED: CPT

## 2019-11-26 PROCEDURE — 99232 SBSQ HOSP IP/OBS MODERATE 35: CPT | Performed by: PHYSICAL MEDICINE & REHABILITATION

## 2019-11-26 PROCEDURE — 65310000000 HC RM PRIVATE REHAB

## 2019-11-26 PROCEDURE — 74011250637 HC RX REV CODE- 250/637: Performed by: PHYSICAL MEDICINE & REHABILITATION

## 2019-11-26 PROCEDURE — 80048 BASIC METABOLIC PNL TOTAL CA: CPT

## 2019-11-26 PROCEDURE — 74011250636 HC RX REV CODE- 250/636: Performed by: PHYSICAL MEDICINE & REHABILITATION

## 2019-11-26 RX ADMIN — LISINOPRIL 10 MG: 5 TABLET ORAL at 20:49

## 2019-11-26 RX ADMIN — HEPARIN SODIUM (PORCINE) LOCK FLUSH IV SOLN 100 UNIT/ML 900 UNITS: 100 SOLUTION at 14:58

## 2019-11-26 RX ADMIN — SOTALOL HYDROCHLORIDE 120 MG: 80 TABLET ORAL at 08:08

## 2019-11-26 RX ADMIN — APIXABAN 5 MG: 5 TABLET, FILM COATED ORAL at 20:49

## 2019-11-26 RX ADMIN — HEPARIN SODIUM (PORCINE) LOCK FLUSH IV SOLN 100 UNIT/ML 900 UNITS: 100 SOLUTION at 20:52

## 2019-11-26 RX ADMIN — LISINOPRIL 10 MG: 5 TABLET ORAL at 08:08

## 2019-11-26 RX ADMIN — FUROSEMIDE 40 MG: 20 TABLET ORAL at 17:05

## 2019-11-26 RX ADMIN — SOTALOL HYDROCHLORIDE 120 MG: 80 TABLET ORAL at 20:49

## 2019-11-26 RX ADMIN — HEPARIN SODIUM (PORCINE) LOCK FLUSH IV SOLN 100 UNIT/ML 900 UNITS: 100 SOLUTION at 05:06

## 2019-11-26 RX ADMIN — APIXABAN 5 MG: 5 TABLET, FILM COATED ORAL at 08:08

## 2019-11-26 RX ADMIN — AMLODIPINE BESYLATE 5 MG: 5 TABLET ORAL at 08:08

## 2019-11-26 RX ADMIN — Medication 30 ML: at 20:53

## 2019-11-26 RX ADMIN — FUROSEMIDE 40 MG: 20 TABLET ORAL at 08:08

## 2019-11-26 RX ADMIN — POLYETHYLENE GLYCOL 3350 17 G: 17 POWDER, FOR SOLUTION ORAL at 08:11

## 2019-11-26 RX ADMIN — Medication 30 ML: at 14:58

## 2019-11-26 RX ADMIN — Medication 30 ML: at 05:06

## 2019-11-26 NOTE — PROGRESS NOTES
Problem: Falls - Risk of  Goal: *Absence of Falls  Description  Document Marytracy Perez Fall Risk and appropriate interventions in the flowsheet.   Outcome: Progressing Towards Goal  Note: Fall Risk Interventions:  Mobility Interventions: OT consult for ADLs, Patient to call before getting OOB, PT Consult for mobility concerns, PT Consult for assist device competence, Utilize walker, cane, or other assistive device    Mentation Interventions: Door open when patient unattended, More frequent rounding, Toileting rounds    Medication Interventions: Patient to call before getting OOB    Elimination Interventions: Call light in reach, Patient to call for help with toileting needs, Toileting schedule/hourly rounds, Urinal in reach    History of Falls Interventions: Bed/chair exit alarm, Door open when patient unattended

## 2019-11-26 NOTE — PROGRESS NOTES
Shira Stovall MD,   Medical Director  [address] [facility], [address] [facility]  Tel: [phone]       SFD PROGRESS NOTE    Karen Greene  Admit Date: 11/25/2019  Admit Diagnosis: Physical debility [R53.81]    Subjective     Patient seen and examined. Vss. No acute complaints. PT, OT well tolerated. Steady gains made. No new barrier to progress noted. -no cp, sob,n. \"I feel like a new man\" .    Objective:     Current Facility-Administered Medications   Medication Dose Route Frequency    sodium chloride (NS) flush 5-40 mL  5-40 mL IntraVENous PRN    alum-mag hydroxide-simeth (MYLANTA) oral suspension 30 mL  30 mL Oral Q4H PRN    amLODIPine (NORVASC) tablet 5 mg  5 mg Oral DAILY    apixaban (ELIQUIS) tablet 5 mg  5 mg Oral Q12H    diphenhydrAMINE (BENADRYL) capsule 50 mg  50 mg Oral QHS PRN    docusate sodium (COLACE) capsule 100 mg  100 mg Oral BID PRN    furosemide (LASIX) tablet 40 mg  40 mg Oral BID    heparin (porcine) pf 900 Units  900 Units InterCATHeter Q8H    heparin (porcine) pf 900 Units  900 Units InterCATHeter PRN    hydrALAZINE (APRESOLINE) tablet 25 mg  25 mg Oral Q6H PRN    lidocaine (XYLOCAINE) 2 % viscous solution 15 mL  15 mL Mouth/Throat PRN    lisinopril (PRINIVIL, ZESTRIL) tablet 10 mg  10 mg Oral Q12H    LORazepam (ATIVAN) tablet 1 mg  1 mg Oral Q6H PRN    magnesium hydroxide (MILK OF MAGNESIA) 400 mg/5 mL oral suspension 30 mL  30 mL Oral DAILY PRN    ondansetron (ZOFRAN ODT) tablet 4 mg  4 mg Oral Q6H PRN    polyethylene glycol (MIRALAX) packet 17 g  17 g Oral DAILY    promethazine (PHENERGAN) tablet 25 mg  25 mg Oral Q6H PRN    sodium chloride (NS) flush 30 mL  30 mL InterCATHeter Q8H    sodium chloride (NS) flush 30 mL  30 mL InterCATHeter PRN    sotalol (BETAPACE) tablet 120 mg  120 mg Oral Q12H    temazepam (RESTORIL) capsule 15 mg  15 mg Oral QHS PRN     Review of Systems:Denies chest pain, shortness of breath, cough, headache, visual problems, abdominal pain, dysurea, calf pain. Pertinent positives are as noted in the medical records and unremarkable otherwise. Visit Vitals  /74   Pulse 60   Temp 97.4 °F (36.3 °C)   Resp 14   SpO2 97%        Physical Exam:   General: Alert and age appropriately oriented. No acute cardio respiratory distress. HEENT: Normocephalic,no scleral icterus  Oral mucosa moist without cyanosis   Lungs: Clear to auscultation  bilaterally. Respiration even and unlabored   Heart: Regular rate and rhythm, S1, S2   No  murmurs, clicks, rub or gallops   Abdomen: Soft, non-tender, nondistended. Bowel sounds present. No organomegaly. Genitourinary: defer. Neuromuscular:      Grossly no focal motor deficits noted. Moves ankles. Ankle dorsiflexion 4+/5  Ankle plantarflexion 5/5  No sensory deficits distally. Skin/extremity: No rashes, no erythema. No calf tenderness BLE  Wound covered; L upper chest pacer site c/d/i                                                                            Functional Assessment:          Balance  Sitting - Static: Good (unsupported) (11/25/19 1500)  Sitting - Dynamic: Good (unsupported) (11/25/19 1500)  Standing - Static: Fair (11/25/19 1500)                     [facility] Fall Risk Assessment:  [facility] Fall Risk  Mobility: Ambulates or transfers with assist devices or assistance (11/26/19 0739)  Mobility Interventions: OT consult for ADLs; Patient to call before getting OOB;PT Consult for mobility concerns;PT Consult for assist device competence;Utilize walker, cane, or other assistive device (11/26/19 0739)  Mentation: Alert, oriented x 3 (11/26/19 0739)  Mentation Interventions: Door open when patient unattended;More frequent rounding; Toileting rounds (11/26/19 0739)  Medication: Patient receiving anticonvulsants, sedatives(tranquilizers), psychotropics or hypnotics, hypoglycemics, narcotics, sleep aids, antihypertensives, laxatives, or diuretics (11/26/19 0722)  Medication Interventions: Patient to call before getting OOB (11/26/19 0739)  Elimination: Independent in elimination (11/26/19 0739)  Elimination Interventions: Call light in reach; Patient to call for help with toileting needs; Toileting schedule/hourly rounds;Urinal in reach (11/26/19 0739)  Prior Fall History: During admission (Date - Comment) (11/26/19 3679)  History of Falls Interventions: Bed/chair exit alarm; Door open when patient unattended (11/26/19 0739)  Total Score: 4 (11/26/19 0739)  Standard Fall Precautions: Yes (11/25/19 1256)  High Fall Risk: Yes (11/26/19 0739)     Speech Assessment:         Ambulation:  Gait  Distance (ft): 200 Feet (ft) (11/25/19 1500)  Assistive Device: Gait belt;Walker, rolling (11/25/19 1500)  Rail Use: Both (11/25/19 1500)     Labs/Studies:  Recent Results (from the past 72 hour(s))   PLEASE READ & DOCUMENT PPD TEST IN 72 HRS    Collection Time: 11/23/19 11:00 AM   Result Value Ref Range    PPD Negative Negative    mm Induration 0 0 - 5 mm   METABOLIC PANEL, BASIC    Collection Time: 11/24/19  5:14 AM   Result Value Ref Range    Sodium 142 136 - 145 mmol/L    Potassium 3.8 3.5 - 5.1 mmol/L    Chloride 110 (H) 98 - 107 mmol/L    CO2 28 21 - 32 mmol/L    Anion gap 4 (L) 7 - 16 mmol/L    Glucose 137 (H) 65 - 100 mg/dL    BUN 28 (H) 8 - 23 MG/DL    Creatinine 0.99 0.8 - 1.5 MG/DL    GFR est AA >60 >60 ml/min/1.73m2    GFR est non-AA >60 >60 ml/min/1.73m2    Calcium 8.5 8.3 - 10.4 MG/DL   MAGNESIUM    Collection Time: 11/24/19  5:14 AM   Result Value Ref Range    Magnesium 2.2 1.8 - 2.4 mg/dL   CBC W/O DIFF    Collection Time: 11/26/19  5:11 AM   Result Value Ref Range    WBC 11.0 4.3 - 11.1 K/uL    RBC 3.53 (L) 4.23 - 5.6 M/uL    HGB 11.1 (L) 13.6 - 17.2 g/dL    HCT 32.6 (L) 41.1 - 50.3 %    MCV 92.4 79.6 - 97.8 FL    MCH 31.4 26.1 - 32.9 PG    MCHC 34.0 31.4 - 35.0 g/dL    RDW 13.2 11.9 - 14.6 %    PLATELET 138 311 - 846 K/uL    MPV 10.1 9.4 - 12.3 FL    ABSOLUTE NRBC 0.00 0.0 - 0.2 K/uL   MAGNESIUM    Collection Time: 11/26/19  5:11 AM   Result Value Ref Range    Magnesium 2.0 1.8 - 2.4 mg/dL   METABOLIC PANEL, BASIC    Collection Time: 11/26/19  5:11 AM   Result Value Ref Range    Sodium 140 136 - 145 mmol/L    Potassium 3.6 3.5 - 5.1 mmol/L    Chloride 108 (H) 98 - 107 mmol/L    CO2 28 21 - 32 mmol/L    Anion gap 4 (L) 7 - 16 mmol/L    Glucose 118 (H) 65 - 100 mg/dL    BUN 15 8 - 23 MG/DL    Creatinine 0.95 0.8 - 1.5 MG/DL    GFR est AA >60 >60 ml/min/1.73m2    GFR est non-AA >60 >60 ml/min/1.73m2    Calcium 7.9 (L) 8.3 - 10.4 MG/DL       Assessment:     Problem List as of 11/26/2019 Date Reviewed: 11/10/2019          Codes Class Noted - Resolved    Physical debility ICD-10-CM: R53.81  ICD-9-CM: 799.3  11/25/2019 - Present        Pneumonia of lower lobe of lung ([facility] ICD-10-CM: J18.1  ICD-9-CM: 137  11/10/2019 - Present        Acute respiratory failure with hypoxia ([facility] ICD-10-CM: J96.01  ICD-9-CM: 518.81  11/10/2019 - Present        Acute pulmonary edema (HCC) ICD-10-CM: J81.0  ICD-9-CM: 518.4  11/10/2019 - Present        Acute heart failure ([facility] ICD-10-CM: I50.9  ICD-9-CM: 428.9  11/10/2019 - Present        MARISOL (obstructive sleep apnea) (Chronic) ICD-10-CM: G47.33  ICD-9-CM: 327.23  11/15/2018 - Present        Inadequate sleep hygiene ICD-10-CM: Z72.821  ICD-9-CM: 307.49  11/15/2018 - Present        C. difficile colitis ICD-10-CM: A04.72  ICD-9-CM: 008.45  7/25/2018 - Present    Overview Signed 7/25/2018 10:01 AM by Levi Khalil MD     2018             HAP (hospital-acquired pneumonia) ICD-10-CM: J18.9, Y95  ICD-9-CM: 811  5/16/2018 - Present        Acute respiratory failure ([address] ICD-10-CM: J96.00  ICD-9-CM: 518.81  5/14/2018 - Present        Chronic combined systolic and diastolic congestive heart failure (HCC) (Chronic) ICD-10-CM: I50.42  ICD-9-CM: 428.42, 428.0  5/14/2018 - Present    Overview Signed 11/9/2019  5:54 PM by Princess Ez MD     EF 45-50% Paroxysmal A-fib (HCC) (Chronic) ICD-10-CM: I48.0  ICD-9-CM: 427.31  5/14/2018 - Present        LBBB (left bundle branch block) ICD-10-CM: I44.7  ICD-9-CM: 426.3  5/14/2018 - Present        Leukocytosis ICD-10-CM: V38.986  ICD-9-CM: 288.60  5/14/2018 - Present        Medicare annual wellness visit, subsequent (Chronic) ICD-10-CM: Z00.00  ICD-9-CM: V70.0  4/1/2016 - Present        Hypertension, essential (Chronic) ICD-10-CM: I10  ICD-9-CM: 401.9  1/13/2016 - Present        RESOLVED: Lactic acidosis ICD-10-CM: E87.2  ICD-9-CM: 276.2  11/10/2019 - 11/11/2019        RESOLVED: SAMSON (acute kidney injury) ([address] ICD-10-CM: N17.9  ICD-9-CM: 584.9  11/10/2019 - 11/11/2019        RESOLVED: Acute respiratory failure with hypoxia [facility]) ICD-10-CM: J96.01  ICD-9-CM: 518.81  11/9/2019 - 11/10/2019             Physical Debilitation s/p Acute Pulm Edema/Respiratory failure/acute heart failure     Continue daily physician medical management:     S/p Acute Hypoxic Resp failure- Incentive spirometer every hour while awake  -s/p HCAP; chest xray neg in f/u . Reported hx of MARISOL/ ? CPAP     DVT risk / DVT Prophylaxis- Will require daily physician exam to assess for signs and symptoms as patient is at increased risk for of thromboembolism. Mobilization as tolerated. Intermittent pneumatic compression devices when in bed Thigh-high or knee-high thromboembolic deterrent hose when out of bed. Pt is on Eliquis.      Pain Control: stable, mild-to-moderate joint symptoms intermittently, reasonably well controlled by PRN meds. Will require regular pain assessment and comprenhensive pain management, generalized pain /arthritic pain; narcotics avoided     Wound Care: Monitor wound status daily per staff and physician. At risk for failure. Will require 24/7 rehab nursing. Keep wound clean and dry; left upper chest pacermaker implantation sight; healing well     Afib/flutter; s/p cardioversion. Cont sotalol. Cont Eliquis.  The patient's device was interrogated prior to d/c to inpatient rehab showing PMT requiring extending out PVARP to 275 ms.  The patient will follow up with [address] Cardiology device clinic on 12/3/19 at 1130 am for device/site check or, most likely, post IRC dc  -HIRO 11/22 that was negative for thrombus   -11/26 NSR on sotolol     Hypertension - BP uncontrolled, fluctuating, managed medically. Cont Norvasc and Zestril. -11/26 bp 113-132/70s; well controlled     CHF; 17L diuresis during acute hospital stay. Weigh daily. Strict I/o . Cont Lasix. Monitor renal fxn  -11/26 wt 221; monitor       Prerenal Azotemia; BUN 28 from 33. Creat nl at 0.99 11/24  -11/26 creat 0.95/ BUN now 15; functioning well     Urinary retention/ neurogenic bladder - schedule voids q6-8 hrs. Check post-void residual as needed; In and Out catheterize if post-void residual is more than 400 cc.     bowel program - add miralax; prn colace, mom, colace     GERD - resume PPI. At times may need additional antacids, Maalox prn.         Time spent was 25 minutes with over 1/2 in direct patient care/examination, consultation and coordination of care.      Signed By: Kamila Avery MD     November 26, 2019

## 2019-11-26 NOTE — PROGRESS NOTES
Time In 0832   Time Out 0916     Mobility   Score Comments   Sit to Stand 4: Supervision or touching A CGA   Transfer Assist 4: Supervision or touching A Transfer Type: SPT   Equipment: Rolling Walker   Comments: CGA, cues for RW management/positioning and safe sequencing for shower transfer     Activities of Daily Living    Score Comments   Oral Hyigene 4: Supervision or touching A CGA standing at sink   Bathing 4: Supervision or touching A Type of Shower: Shower  Position: Standing PRN and Unsupported Sitting   Adaptive  Equipment: Tub Transfer Bench and Grab Bars  Comments: CGA in stance as patient bathes bottom   Upper Body  Dressing 5: S/U or clean-up assist Items Applied: Pullover  Position: Unsupported Sitting  Comments: Setup   Lower Body Dressing 4: Supervision or touching A Items Applied: Underwear and Elastic pants  Position: Standing PRN and Unsupported Sitting  Adaptive Equipment: Grab bar  Comments: CGA in stance managing clothing over hips, cue to sit to manage clothing off of feet for safety   Donning/[place] Footwear 5: S/U or clean-up assist Items Applied: Socks and Slip-on shoes  Adaptive Equipment: N/A  Comments: Doffed grippy socks and donned slip-on tennis shoes   Education  Purpose of therapy       S: \"I don't remember [multiple days of acute hospital stay]. .. I'm doing better [cognitively, patient prompted], I'm doing spreadsheets for work now. \" Agreeable to therapy. Patient was able to ambulate ~10 feet x 2 using a RW with CGA. Pain not expressed. Completed formal [address] assessment with patient seated in wheelchair in therapy gym. Scores as follows:  RUE (Dominant): 20 seconds  LUE: 27 seconds    Patient completed x 5 minutes UBE on moderate-maximal resistance using BUE seated in wheelchair. Patient completed x 2.5 minutes forward rotation, x 2.5 minutes backward rotation.     Patient completed RUE strengthening/endurance task seated in wheelchair at tabletop with 2.5# cuff weight donphil. Patient prompted to retrieve large clothespins of varied resistance (1-8#) from container at midline using R hand only and attach to vertical pole at midline according to 5-part color pattern. Patient completed with 100% accuracy provided minimally increased time d/t fatigue. Patient then prompted to move clothespins from vertical to horizontal poles at midline in sections according to color. Patient again completed with good accuracy and minimal fatigue. For final task, patient removed all clothespins from horizontal poles to replace into container at midline. Collaborated with Iman ELISE regarding patient performance and POC. Patient tolerated session well, but activity tolerance, balance, functional mobility, strength, coordination, cognition are still below baseline and require skilled facilitation to successfully and safely complete ADLs and transfers. Patient ended session in wheelchair with Iman ELISE assuming care.      Yazmin Tavarez, OTR/L

## 2019-11-26 NOTE — PROGRESS NOTES
Problem: Falls - Risk of  Goal: *Absence of Falls  Description  Document Girma Perales Fall Risk and appropriate interventions in the flowsheet. Outcome: Progressing Towards Goal  Note: Fall Risk Interventions:  Mobility Interventions: Communicate number of staff needed for ambulation/transfer, Patient to call before getting OOB, Utilize walker, cane, or other assistive device    Mentation Interventions: Adequate sleep, hydration, pain control, Bed/chair exit alarm, Evaluate medications/consider consulting pharmacy    Medication Interventions: Evaluate medications/consider consulting pharmacy, Patient to call before getting OOB    Elimination Interventions: Call light in reach, Patient to call for help with toileting needs, Stay With Me (per policy)    History of Falls Interventions: Bed/chair exit alarm, Door open when patient unattended, Evaluate medications/consider consulting pharmacy         Problem: Patient Education: Go to Patient Education Activity  Goal: Patient/Family Education  Outcome: Progressing Towards Goal     Problem: Patient Education: Go to Patient Education Activity  Goal: Patient/Family Education  Description  Pt/caregiver will demonstrate and verbalize good understanding of OT recommendations regarding ADL status, functional transfer status, home safety, DME, AE, energy conservation techniques, follow-up therapy, for increasing safety with functional tasks upon discharge. Outcome: Progressing Towards Goal     Problem: Nutrition Deficit  Goal: *Optimize nutritional status  Outcome: Progressing Towards Goal     Problem: Patient Education: Go to Patient Education Activity  Goal: Patient/Family Education  Description  Patient / Herb Chavo family will verbalize understanding of PT safety recommendations, demonstrate appropriate assist for current functional mobility status, safety, and home exercise program by time of discharge.      Outcome: Progressing Towards Goal

## 2019-11-26 NOTE — PROGRESS NOTES
OT Daily Note  Time In 1300   Time Out 1345     Pain: Patient had no complaint of pain. Functional Mobility      Score Comments   Sit to Stand 4: Supervision or touching A S   Transfer Assist 4: Supervision or touching A Transfer Type: SPT   Equipment: Rolling Walker   Comments: S   Pt walked around the gym with no issues. Activity Tolerance   Pt completed 20 squats and alternating lunges with good quality and S. Pt used 5 lb medicine to complete 20 anterior punches. Pt completed 10 sit to stands with min cueing for hand placement. Pt completed 10 reps of placing something on the floor needing max cueing to come back to an upright posture. Self-Care   Pt got a glass of water from the kitchen      Education   Benefits of rest     Plan: Continue with POC. Pt was left with PT Charlotte Sue.      Lexi Lara OTR/L  11/26/2019

## 2019-11-26 NOTE — PROGRESS NOTES
OT Daily Note  Time In 1115   Time Out 1159     Pain: Patient had no complaint of pain. Functional Mobility      Score Comments   Sit to Stand 4: Supervision or touching A S   Transfer Assist 4: Supervision or touching A Transfer Type: SPT   Equipment: Rolling Walker   Comments: S   Pt walked back to room. Activity Tolerance   Pt completed the following exercises with 5 lb janki to promote UB strength, activity tolerance, and shoulder stabilization for integration into functional mobility:  Exercise Reps Comments   Protraction/Retraction 3x20    Abduction/Adduction 3x20    Circles 3x40 1/2 CW, 1/2 CCW   Vs 3x20    Pt demonstrated good quality during all exercises. Pt stood 2x5 minutes with S. Pt was fatigued at the end of the period. Pt completed 9 sit to stands with poor control when going to sitting. Education   Pacemaker precautions     Plan: Continue with POC. Pt was left in w/c with all needs within reach.      Brianna Erickson OTR/L  11/26/2019

## 2019-11-26 NOTE — PROGRESS NOTES
PHYSICAL THERAPY DAILY NOTE  Time In: 1346  Time Out: 1426  Patient Seen For: PM;Balance activities;Gait training;Patient education; Therapeutic exercise;Transfer training; Other (see progress notes)    Subjective: Patient reporting he feels like he is walking better. Reports his legs get tired easily when walking without the walker         Objective:Vital Signs:  Patient Vitals for the past 12 hrs:   Temp Pulse Resp BP SpO2   11/26/19 1603 97.5 °F (36.4 °C) 79 16 138/79 97 %   11/26/19 0742 97.4 °F (36.3 °C) 60 14 123/74 97 %     Pain level:No c/o pain during treatment  Pain location:NA  Pain interventions:NA    Patient education:Balance training,transfer training, gait training, fall precautions, activity pacing,body mechanics,Patient verbalizing understanding and demonstrating understanding of patient education. Recommend follow up education.     Interdisciplinary Communication:NA      Other (comment)(Fall Risk)  GROSS ASSESSMENT Daily Assessment     NA       BED/MAT MOBILITY Daily Assessment    Rolling Right : 0 (Not tested)  Rolling Left : 0 (Not tested)  Supine to Sit : 0 (Not tested)  Sit to Supine : 0 (Not tested)       TRANSFERS Daily Assessment   Increased time and effort to complete with cues for body mechanics   Transfer Type: SPT without device  Transfer Assistance : 5 (Stand-by assistance)  Sit to Stand Assistance: Stand-by assistance  Car Transfers: Not tested       GAIT Daily Assessment    Amount of Assistance: 5 (Stand-by assistance)  Distance (ft): 150 Feet (ft)(150ft x 2 with RW, 50ft x 2 without a device)  Assistive Device: Gait belt;Walker, rolling   Gait training with and without a device with cues for posture correction and for improved step length and improved ankle DF and knee ext at initial contact    STEPS or STAIRS Daily Assessment    Steps/Stairs Ambulated (#): 0  Level of Assist : 0 (Not tested)  Rail Use: NA       BALANCE Daily Assessment   Static standing balance activities while putting golf ball facilitating balance control with anterior<>posterior and lateral wt shifts in putting position. Able to complete with SBA and cues Sitting - Static: Good (unsupported)  Sitting - Dynamic: Good (unsupported)  Standing - Static: Fair  Standing - Dynamic : Impaired       WHEELCHAIR MOBILITY Daily Assessment    Curbs/Ramps Assist Required (FIM Score): 0 (Not tested)  Wheelchair Setup Assist Required : 0 (Not tested)       LOWER EXTREMITY EXERCISES Daily Assessment    Extremity: Both  Exercise Type #1: Other (comment)(LE motomed x 10 mins at level 3)  Level of Assist: Supervision          Assessment: Standing balance improving. Overall functional endurance improving       Patient return to room at end of treatment and remained up in wheelchair with needs in reach. Plan of Care: Continue with POC and progress as tolerated.      Landy Recinos, PT  11/26/2019

## 2019-11-26 NOTE — PROGRESS NOTES
PHYSICAL THERAPY DAILY NOTE  Time In: 0916  Time Out: 1000  Patient Seen For: PM;Gait training;Patient education; Therapeutic exercise;Transfer training; Other (see progress notes)    Subjective: Patient reporting he feels good today. Reports he thinks he will be ready to go home by this weekend. Reports MD stated he can go home next Monday. Reports he will have help at home         Objective:Vital Signs:on room air, 02 sat 96 to 98% and HR 77 to 80 during treatment  Patient Vitals for the past 12 hrs:   Temp Pulse Resp BP SpO2   11/26/19 0742 97.4 °F (36.3 °C) 60 14 123/74 97 %   11/26/19 0429 98.2 °F (36.8 °C) 61 16 118/64 98 %     Pain level:No c/o pain during treatment  Pain location:NA  Pain interventions:NA    Patient education:Balance training,transfer training, gait training, fall precautions, activity pacing, body mechanics,energy conservation, Patient verbalizing understanding and demonstrating understanding of patient education. Recommend follow up education. Interdisciplinary Communication:NA    Other (comment)(Fall Risk)  GROSS ASSESSMENT Daily Assessment     NA           BED/MAT MOBILITY Daily Assessment    Rolling Right : 0 (Not tested)  Rolling Left : 0 (Not tested)  Supine to Sit : 0 (Not tested)  Sit to Supine : 0 (Not tested)       TRANSFERS Daily Assessment   Increased time and effort to complete with cues for body mechanics   Transfer Type: SPT without device  Transfer Assistance : 4 (Contact guard assistance)  Sit to Stand Assistance: Contact guard assistance  Car Transfers: Not tested       GAIT Daily Assessment    Amount of Assistance: 5 (Stand-by assistance)  Distance (ft): 100 Feet (ft)  Assistive Device: Gait belt; Other (comment)(II bars)   Gait training with cues for symmetrical step length with improved step length, ankle DF and knee ext at initial contact.  Performed 40 ft with bilateral UE support and 60 ft with single UE support    STEPS or STAIRS Daily Assessment   Slow reciprocating pattern going up/down steps with bilateral hand rails on first attempt. Slow single step at a time going up/ down steps with both hands on right hand rail going up and left hand rail going down. Cues for gait sequence Steps/Stairs Ambulated (#): 12( 4 steps x 3 with 1 min rest break between each attempt)  Level of Assist : 4 (Contact guard assistance)  Rail Use: Both       BALANCE Daily Assessment   Static standing without a device and SBA while patient managing lower body clothing up and down. No loss of balance Sitting - Static: Good (unsupported)  Sitting - Dynamic: Good (unsupported)  Standing - Static: Fair  Standing - Dynamic : Impaired       WHEELCHAIR MOBILITY Daily Assessment    Curbs/Ramps Assist Required (FIM Score): 0 (Not tested)  Wheelchair Setup Assist Required : 0 (Not tested)       LOWER EXTREMITY EXERCISES Daily Assessment    Extremity: Both  Exercise Type #1: Other (comment)(LE motomed x 10 mins at level 3)  Level of Assist: Supervision          Assessment: improved gait balance and knee stability going up steps single step at a time with single hand rail simulating head rail set up at home. Gait pattern and gait balance improved in II bar gait training. HR and 02 sat stable on room air during treatment. Patient return to room at end of treatment and remained up in wheelchair with needs in reach    Plan of Care: Continue with POC and progress as tolerated.      Nohemi Navarro, PT  11/26/2019

## 2019-11-26 NOTE — PROGRESS NOTES
This note will not be viewable in [address] [facility]AN[facility] outreach postponed for 7 days due to discharge to [address] [place] [address].

## 2019-11-27 PROCEDURE — 97535 SELF CARE MNGMENT TRAINING: CPT

## 2019-11-27 PROCEDURE — 65310000000 HC RM PRIVATE REHAB

## 2019-11-27 PROCEDURE — 74011250636 HC RX REV CODE- 250/636: Performed by: PHYSICAL MEDICINE & REHABILITATION

## 2019-11-27 PROCEDURE — 97530 THERAPEUTIC ACTIVITIES: CPT

## 2019-11-27 PROCEDURE — 74011250637 HC RX REV CODE- 250/637: Performed by: PHYSICAL MEDICINE & REHABILITATION

## 2019-11-27 PROCEDURE — 97110 THERAPEUTIC EXERCISES: CPT

## 2019-11-27 PROCEDURE — 97116 GAIT TRAINING THERAPY: CPT

## 2019-11-27 PROCEDURE — 99232 SBSQ HOSP IP/OBS MODERATE 35: CPT | Performed by: PHYSICAL MEDICINE & REHABILITATION

## 2019-11-27 PROCEDURE — 97150 GROUP THERAPEUTIC PROCEDURES: CPT

## 2019-11-27 PROCEDURE — C1751 CATH, INF, PER/CENT/MIDLINE: HCPCS

## 2019-11-27 RX ADMIN — SOTALOL HYDROCHLORIDE 120 MG: 80 TABLET ORAL at 08:29

## 2019-11-27 RX ADMIN — AMLODIPINE BESYLATE 5 MG: 5 TABLET ORAL at 08:29

## 2019-11-27 RX ADMIN — APIXABAN 5 MG: 5 TABLET, FILM COATED ORAL at 08:29

## 2019-11-27 RX ADMIN — SOTALOL HYDROCHLORIDE 120 MG: 80 TABLET ORAL at 20:38

## 2019-11-27 RX ADMIN — LISINOPRIL 10 MG: 5 TABLET ORAL at 08:28

## 2019-11-27 RX ADMIN — APIXABAN 5 MG: 5 TABLET, FILM COATED ORAL at 20:38

## 2019-11-27 RX ADMIN — FUROSEMIDE 40 MG: 20 TABLET ORAL at 17:00

## 2019-11-27 RX ADMIN — POLYETHYLENE GLYCOL 3350 17 G: 17 POWDER, FOR SOLUTION ORAL at 08:29

## 2019-11-27 RX ADMIN — FUROSEMIDE 40 MG: 20 TABLET ORAL at 08:29

## 2019-11-27 RX ADMIN — HEPARIN SODIUM (PORCINE) LOCK FLUSH IV SOLN 100 UNIT/ML 900 UNITS: 100 SOLUTION at 05:42

## 2019-11-27 RX ADMIN — Medication 30 ML: at 05:40

## 2019-11-27 RX ADMIN — LISINOPRIL 10 MG: 5 TABLET ORAL at 20:37

## 2019-11-27 NOTE — PROGRESS NOTES
PHYSICAL THERAPY DAILY NOTE  Time In: 1004  Time Out: 8135  Patient Seen For: AM;Other (see progress notes);Gait training; Therapeutic exercise;Transfer training    Subjective: \" My legs are sore today. \"         Objective: NO pain just soreness. Other (comment)(Fall Risk)  GROSS ASSESSMENT Daily Assessment            COGNITION Daily Assessment           BED/MAT MOBILITY Daily Assessment    Rolling Right : 0 (Not tested)  Rolling Left : 0 (Not tested)  Supine to Sit : 0 (Not tested)  Sit to Supine : 0 (Not tested)       TRANSFERS Daily Assessment    Transfer Type: SPT without device  Transfer Assistance : 5 (Stand-by assistance)  Sit to Stand Assistance: Stand-by assistance  Car Transfers: Not tested       GAIT Daily Assessment    Amount of Assistance: 5 (Stand-by assistance)  Distance (ft): 150 Feet (ft)  Assistive Device: Walker, rolling       STEPS or STAIRS Daily Assessment    Level of Assist : 0 (Not tested)       BALANCE Daily Assessment            WHEELCHAIR MOBILITY Daily Assessment            LOWER EXTREMITY EXERCISES Daily Assessment    Extremity: Both  Exercise Type #1: Other (comment)(standing exs in II bars working on strenf=gthening and gait pattern)  Sets Performed: 1  Reps Performed: 5  Level of Assist: Supervision          Assessment: Patient making good progress. Plan of Care: Continue with plan of care.     Rosalinda Santamaria, PTA  11/27/2019

## 2019-11-27 NOTE — PROGRESS NOTES
Time In 0751   Time Out 0830     Mobility   Score Comments   Sit to Stand 4: Supervision or touching A S   Transfer Assist 4: Supervision or touching A Transfer Type: SPT   Equipment: N/A   Comments: S     Activities of Daily Living    Score Comments   Oral Hyigene 6: Independent I   Bathing 4: Supervision or touching A Type of Shower: Shower  Position: Supported sitting and Standing PRN   Adaptive  Equipment: Tub Transfer Bench and Grab Bars  Comments: S   Upper Body  Dressing 6: Independent Items Applied: Pullover  Position: Unsupported Sitting  Comments: I   Lower Body Dressing 6: Independent Items Applied: Underwear and Pants with fasteners  Position: Supported sitting and Standing PRN  Adaptive Equipment: N/A  Comments: I   Donning/[place] Footwear 6: Independent Items Applied: Socks and Shoes with fasteners   Adaptive Equipment: N/A  Comments: I   Toilet Transfer 4: Supervision or touching A Transfer Type: SPT   Equipment: N/A   Comments: S   Toileting Hygiene 6: Independent Output: Urine  Comments: I   Education  Team conference     Pt was in w/c and agreeable to therapy. Pt's performance with ADL is reflected in above chart. Talked about using RW for community distances and not using a device in the house. Pt completed 5 minutes on the ergometer frontwards and backwards with significant resistance to increase UB strength and activity tolerance for integration into functional mobility. Pt was returned to room with all needs within reach.        Demetrio Lee OT   11/27/2019

## 2019-11-27 NOTE — PROGRESS NOTES
Problem: Falls - Risk of  Goal: *Absence of Falls  Description  Document [facility] Fall Risk and appropriate interventions in the flowsheet.   Outcome: Progressing Towards Goal  Note: Fall Risk Interventions:  Mobility Interventions: Patient to call before getting OOB, Utilize walker, cane, or other assistive device    Mentation Interventions: Door open when patient unattended, More frequent rounding, Toileting rounds    Medication Interventions: Patient to call before getting OOB    Elimination Interventions: Call light in reach, Patient to call for help with toileting needs, Urinal in reach    History of Falls Interventions: Door open when patient unattended         Problem: Inpatient Rehab (Adult)  Goal: *LTG: Avoids injury/falls 100% of time related to deficits  Outcome: Progressing Towards Goal  Goal: *LTG: Avoids infection 100% of time related to deficits  Outcome: Progressing Towards Goal

## 2019-11-27 NOTE — PROGRESS NOTES
Interdisciplinary Conference Notes    Interdisciplinary conference completed to discuss plan of care. Estimated D/C Date: 11/30/2019    Recommended Follow-Up Therapy: Outpatient  PT    Communication with family/caregivers: Spoke with patient aware of discharge date and services recommended.   Ordered [facility] [place] per patient request.

## 2019-11-27 NOTE — PROGRESS NOTES
DME ordered through [facility]. Faxed to [phone]. Confirmation received

## 2019-11-27 NOTE — PROGRESS NOTES
Problem: Falls - Risk of  Goal: *Absence of Falls  Description  Document Girma Perales Fall Risk and appropriate interventions in the flowsheet.   Outcome: Progressing Towards Goal  Note: Fall Risk Interventions:  Mobility Interventions: Communicate number of staff needed for ambulation/transfer    Mentation Interventions: Door open when patient unattended    Medication Interventions: Patient to call before getting OOB    Elimination Interventions: Call light in reach    History of Falls Interventions: Bed/chair exit alarm         Problem: Patient Education: Go to Patient Education Activity  Goal: Patient/Family Education  Outcome: Progressing Towards Goal

## 2019-11-27 NOTE — PROGRESS NOTES
OT Daily Note  Time In 1047   Time Out 1114     Pain: Patient had no complaint of pain. Functional Mobility      Score Comments   Sit to Stand 6: Independent I   Transfer Assist 6: Independent Transfer Type: SPT   Equipment: Rolling Walker   Comments: I   Pt walked to gift store and back, as well as toileted in public restroom, and shopped in gift store with no LOB or rest breaks. Pt then walked back to room later. Strengthening   Pt engaged with medium soft putty with 20 red beads to improve activity tolerance. Pt demonstrated fair quality during activity. Education   Benefits of outpatient therapy     Interdisciplinary Communication: Team conference    Plan: Continue with POC. Pt was left in room with all needs within reach.      Esme Yan OTR/L  11/27/2019

## 2019-11-27 NOTE — PROGRESS NOTES
PHYSICAL THERAPY DAILY NOTE  Time In: 1300  Time Out: 8973  Patient Seen For: PM;Other (see progress notes);Gait training; Therapeutic exercise;Transfer training    Subjective: Patient had no complaints. Objective: NO pain noted. Other (comment)(Fall Risk)  GROSS ASSESSMENT Daily Assessment            COGNITION Daily Assessment           BED/MAT MOBILITY Daily Assessment    Rolling Right : 0 (Not tested)  Rolling Left : 0 (Not tested)  Supine to Sit : 0 (Not tested)  Sit to Supine : 0 (Not tested)       TRANSFERS Daily Assessment    Transfer Type: SPT without device  Transfer Assistance : 5 (Stand-by assistance)  Sit to Stand Assistance: Stand-by assistance  Car Transfers: Not tested       GAIT Daily Assessment    Amount of Assistance: 5 (Stand-by assistance)  Distance (ft): 150 Feet (ft)  Assistive Device: Walker, rolling       STEPS or STAIRS Daily Assessment    Steps/Stairs Ambulated (#): 12  Level of Assist : 5 (Stand-by assistance)  Rail Use: Both       BALANCE Daily Assessment            WHEELCHAIR MOBILITY Daily Assessment            LOWER EXTREMITY EXERCISES Daily Assessment    Extremity: Both  Exercise Type #1: Other (comment)(standing balance exs on BAPS board , tandem stepping, and hitting balloon on blue foam)  Sets Performed: 1  Reps Performed: 0  Level of Assist: Contact guard assistance          Assessment: Patient making progress. Ordered rolling walker for home. Plan of Care: Continue with plan of care.     Kyra August, PTA  11/27/2019

## 2019-11-27 NOTE — PROGRESS NOTES
Kristen Raygoza MD,   Medical Director  [address] [facility], [address] [facility]  Tel: [phone]       SFD PROGRESS NOTE    Jazz Dears  Admit Date: 11/25/2019  Admit Diagnosis: Physical debility [R53.81]    Subjective     Patient seen and examined. Vss. No acute complaints. PT, OT well tolerated. Steady gains made. No new barrier to progress noted. - feels very well. \" my heart is great\" . Improved endurance.  No cp , sob or AMADOR  Objective:     Current Facility-Administered Medications   Medication Dose Route Frequency    sodium chloride (NS) flush 5-40 mL  5-40 mL IntraVENous PRN    alum-mag hydroxide-simeth (MYLANTA) oral suspension 30 mL  30 mL Oral Q4H PRN    amLODIPine (NORVASC) tablet 5 mg  5 mg Oral DAILY    apixaban (ELIQUIS) tablet 5 mg  5 mg Oral Q12H    diphenhydrAMINE (BENADRYL) capsule 50 mg  50 mg Oral QHS PRN    docusate sodium (COLACE) capsule 100 mg  100 mg Oral BID PRN    furosemide (LASIX) tablet 40 mg  40 mg Oral BID    heparin (porcine) pf 900 Units  900 Units InterCATHeter Q8H    heparin (porcine) pf 900 Units  900 Units InterCATHeter PRN    hydrALAZINE (APRESOLINE) tablet 25 mg  25 mg Oral Q6H PRN    lidocaine (XYLOCAINE) 2 % viscous solution 15 mL  15 mL Mouth/Throat PRN    lisinopril (PRINIVIL, ZESTRIL) tablet 10 mg  10 mg Oral Q12H    LORazepam (ATIVAN) tablet 1 mg  1 mg Oral Q6H PRN    magnesium hydroxide (MILK OF MAGNESIA) 400 mg/5 mL oral suspension 30 mL  30 mL Oral DAILY PRN    ondansetron (ZOFRAN ODT) tablet 4 mg  4 mg Oral Q6H PRN    polyethylene glycol (MIRALAX) packet 17 g  17 g Oral DAILY    promethazine (PHENERGAN) tablet 25 mg  25 mg Oral Q6H PRN    sodium chloride (NS) flush 30 mL  30 mL InterCATHeter Q8H    sodium chloride (NS) flush 30 mL  30 mL InterCATHeter PRN    sotalol (BETAPACE) tablet 120 mg  120 mg Oral Q12H    temazepam (RESTORIL) capsule 15 mg  15 mg Oral QHS PRN     Review of Systems:Denies chest pain, shortness of breath, cough, headache, visual problems, abdominal pain, dysurea, calf pain. Pertinent positives are as noted in the medical records and unremarkable otherwise. Visit Vitals  /81   Pulse 66   Temp 98 °F (36.7 °C)   Resp 16   Wt 217 lb 12.8 oz (98.8 kg)   SpO2 96%   BMI 27.22 kg/m²        Physical Exam:   General: Alert and age appropriately oriented. No acute cardio respiratory distress. HEENT: Normocephalic,no scleral icterus  Oral mucosa moist without cyanosis   Lungs: Clear to auscultation  bilaterally. Respiration even and unlabored   Heart: Regular rate and rhythm, S1, S2   No  murmurs, clicks, rub or gallops   Abdomen: Soft, non-tender, nondistended. Bowel sounds present. No organomegaly. Genitourinary: defer   Neuromuscular:      Generalized prox > distal weakness  No neuro deficits  Poor insight. Skin/extremity: No rashes, no erythema. No calf tenderness BLE  Wound; left upper chest wall pacer site; inc looks good. C/d/i                                                                            Functional Assessment:          Balance  Sitting - Static: Good (unsupported) (11/26/19 1400)  Sitting - Dynamic: Good (unsupported) (11/26/19 1400)  Standing - Static: Fair (11/26/19 1400)  Standing - Dynamic : Impaired (11/26/19 1400)                     Suhas Charles Fall Risk Assessment:  Suhas Charles Fall Risk  Mobility: Ambulates or transfers with assist devices or assistance (11/27/19 0730)  Mobility Interventions: Patient to call before getting OOB;Utilize walker, cane, or other assistive device (11/27/19 0730)  Mentation: Alert, oriented x 3 (11/27/19 0730)  Mentation Interventions: Door open when patient unattended;More frequent rounding; Toileting rounds (11/27/19 0730)  Medication: Patient receiving anticonvulsants, sedatives(tranquilizers), psychotropics or hypnotics, hypoglycemics, narcotics, sleep aids, antihypertensives, laxatives, or diuretics (11/27/19 0730)  Medication Interventions: Patient to call before getting OOB (11/27/19 0730)  Elimination: Independent in elimination (11/27/19 0730)  Elimination Interventions: Call light in reach; Patient to call for help with toileting needs;Urinal in reach (11/27/19 0730)  Prior Fall History: During admission (Date - Comment) (11/27/19 0730)  History of Falls Interventions: Door open when patient unattended (11/27/19 0730)  Total Score: 4 (11/27/19 0730)  Standard Fall Precautions: Yes (11/25/19 1256)  High Fall Risk: Yes (11/27/19 0730)     Speech Assessment:         Ambulation:  Gait  Distance (ft): 150 Feet (ft)(150ft x 2 with RW, 50ft x 2 without a device) (11/26/19 1400)  Assistive Device: Gait belt;Walker, rolling (11/26/19 1400)  Rail Use: Both (11/26/19 1200)     Labs/Studies:  Recent Results (from the past 72 hour(s))   CBC W/O DIFF    Collection Time: 11/26/19  5:11 AM   Result Value Ref Range    WBC 11.0 4.3 - 11.1 K/uL    RBC 3.53 (L) 4.23 - 5.6 M/uL    HGB 11.1 (L) 13.6 - 17.2 g/dL    HCT 32.6 (L) 41.1 - 50.3 %    MCV 92.4 79.6 - 97.8 FL    MCH 31.4 26.1 - 32.9 PG    MCHC 34.0 31.4 - 35.0 g/dL    RDW 13.2 11.9 - 14.6 %    PLATELET 203 243 - 838 K/uL    MPV 10.1 9.4 - 12.3 FL    ABSOLUTE NRBC 0.00 0.0 - 0.2 K/uL   MAGNESIUM    Collection Time: 11/26/19  5:11 AM   Result Value Ref Range    Magnesium 2.0 1.8 - 2.4 mg/dL   METABOLIC PANEL, BASIC    Collection Time: 11/26/19  5:11 AM   Result Value Ref Range    Sodium 140 136 - 145 mmol/L    Potassium 3.6 3.5 - 5.1 mmol/L    Chloride 108 (H) 98 - 107 mmol/L    CO2 28 21 - 32 mmol/L    Anion gap 4 (L) 7 - 16 mmol/L    Glucose 118 (H) 65 - 100 mg/dL    BUN 15 8 - 23 MG/DL    Creatinine 0.95 0.8 - 1.5 MG/DL    GFR est AA >60 >60 ml/min/1.73m2    GFR est non-AA >60 >60 ml/min/1.73m2    Calcium 7.9 (L) 8.3 - 10.4 MG/DL       Assessment:     Problem List as of 11/27/2019 Date Reviewed: 11/10/2019          Codes Class Noted - Resolved    Physical debility ICD-10-CM: R53.81  ICD-9-CM: 799.3  11/25/2019 - Present        Pneumonia of lower lobe of lung ([facility] ICD-10-CM: J18.1  ICD-9-CM: 491  11/10/2019 - Present        Acute respiratory failure with hypoxia (HCC) ICD-10-CM: J96.01  ICD-9-CM: 518.81  11/10/2019 - Present        Acute pulmonary edema (HCC) ICD-10-CM: J81.0  ICD-9-CM: 518.4  11/10/2019 - Present        Acute heart failure (HCC) ICD-10-CM: I50.9  ICD-9-CM: 428.9  11/10/2019 - Present        MARISOL (obstructive sleep apnea) (Chronic) ICD-10-CM: G47.33  ICD-9-CM: 327.23  11/15/2018 - Present        Inadequate sleep hygiene ICD-10-CM: Z72.821  ICD-9-CM: 307.49  11/15/2018 - Present        C. difficile colitis ICD-10-CM: A04.72  ICD-9-CM: 008.45  7/25/2018 - Present    Overview Signed 7/25/2018 10:01 AM by Ajay Stovall MD     2018             HAP (hospital-acquired pneumonia) ICD-10-CM: J18.9, Y95  ICD-9-CM: 256  5/16/2018 - Present        Acute respiratory failure ([facility] ICD-10-CM: J96.00  ICD-9-CM: 518.81  5/14/2018 - Present        Chronic combined systolic and diastolic congestive heart failure (HCC) (Chronic) ICD-10-CM: I50.42  ICD-9-CM: 428.42, 428.0  5/14/2018 - Present    Overview Signed 11/9/2019  5:54 PM by Rubens Norton MD     EF 45-50%              [facility] ([facility]) ICD-10-CM: I48.0  ICD-9-CM: 427.31  5/14/2018 - Present        LBBB (left bundle branch block) ICD-10-CM: I44.7  ICD-9-CM: 426.3  5/14/2018 - Present        Leukocytosis ICD-10-CM: M11.873  ICD-9-CM: 288.60  5/14/2018 - Present        Medicare annual wellness visit, subsequent (Chronic) ICD-10-CM: Z00.00  ICD-9-CM: V70.0  4/1/2016 - Present        Hypertension, essential (Chronic) ICD-10-CM: I10  ICD-9-CM: 401.9  1/13/2016 - Present        RESOLVED: Lactic acidosis ICD-10-CM: E87.2  ICD-9-CM: 276.2  11/10/2019 - 11/11/2019        RESOLVED: SAMSON (acute kidney injury) ([facility] ICD-10-CM: N17.9  ICD-9-CM: 584.9  11/10/2019 - 11/11/2019        RESOLVED: Acute respiratory failure with hypoxia [facility]) ICD-10-CM: J96.01  ICD-9-CM: 518.81  11/9/2019 - 11/10/2019               Physical Debilitation s/p Acute Pulm Edema/Respiratory failure/acute heart failure     Continue daily physician medical management:     S/p Acute Hypoxic Resp failure- Incentive spirometer every hour while awake  -s/p HCAP; chest xray neg in f/u . Reported hx of MARISOL/ ? CPAP     DVT risk / DVT Prophylaxis- Will require daily physician exam to assess for signs and symptoms as patient is at increased risk for of thromboembolism. Mobilization as tolerated. Intermittent pneumatic compression devices when in bed Thigh-high or knee-high thromboembolic deterrent hose when out of bed. Pt is on Eliquis.      Pain Control: stable, mild-to-moderate joint symptoms intermittently, reasonably well controlled by PRN meds. Will require regular pain assessment and comprenhensive pain management, generalized pain /arthritic pain; narcotics avoided     Wound Care: Monitor wound status daily per staff and physician. At risk for failure. Will require 24/7 rehab nursing. Keep wound clean and dry; left upper chest pacermaker implantation sight; healing well     Afib/flutter; s/p cardioversion. Cont sotalol. Cont Eliquis. The patient's device was interrogated prior to d/c to inpatient rehab showing PMT requiring extending out PVARP to 275 ms.  The patient will follow up with [facility] device clinic on 12/3/19 at 1130 am for device/site check or, most likely, post IRC dc  -HIRO 11/22 that was negative for thrombus   -11/27 NSR on sotolol     Hypertension - BP uncontrolled, fluctuating, managed medically. Cont Norvasc and Zestril. -11/26 bp 113-132/70s; well controlled; 11/27 127/81 HR 60-70s     CHF; 17L diuresis during acute hospital stay. Weigh daily. Strict I/o . Cont Lasix. Monitor renal fxn  -11/26 wt 221; monitor  -11/27 wt 217; no significant edema.  Cont Lasix; do not believe I/Os accurate; has voided more than 2x in last 24 hrs        Prerenal Azotemia; BUN 28 from 33. Creat nl at 0.99 11/24  -11/26 creat 0.95/ BUN now 15; functioning well     Urinary retention/ neurogenic bladder - schedule voids q6-8 hrs. Check post-void residual as needed; In and Out catheterize if post-void residual is more than 400 cc.     bowel program - add miralax; prn colace, mom, colace     GERD - resume PPI. At times may need additional antacids, Maalox prn.          Time spent was 25 minutes with over 1/2 in direct patient care/examination, consultation and coordination of care.      Signed By: Bree Heard MD     November 27, 2019

## 2019-11-27 NOTE — PROGRESS NOTES
OT Daily Note  Time In 0914   Time Out 0954     Pain: Patient had no complaint of pain. Functional Mobility      Score Comments   Sit to Stand 4: Supervision or touching A S   Transfer Assist 4: Supervision or touching A Transfer Type: SPT   Equipment: Rolling Walker   Comments: S   Pt walked 300' with S. Activity Tolerance   Pt completed 2 designs on the electronic board needing min A for problem solving. Pt refused to read directions, but was able to locate 80% of errors and correct them. Pt stood and engaged in reaching outside base of support. Pt had no LOB and good activity tolerance. Education   Benefits of activity tolerance     Interdisciplinary Communication: Team conference    Plan: Continue with POC. Pt was left with DONTE Mcmillan.      Jose Mcneal OTR/L  11/27/2019

## 2019-11-27 NOTE — PROGRESS NOTES
Faxed orders to [facility]. Faxed to [phone]. Confirmation fax received.

## 2019-11-28 PROCEDURE — 97116 GAIT TRAINING THERAPY: CPT

## 2019-11-28 PROCEDURE — 99232 SBSQ HOSP IP/OBS MODERATE 35: CPT | Performed by: PHYSICAL MEDICINE & REHABILITATION

## 2019-11-28 PROCEDURE — 97535 SELF CARE MNGMENT TRAINING: CPT

## 2019-11-28 PROCEDURE — 65310000000 HC RM PRIVATE REHAB

## 2019-11-28 PROCEDURE — 74011250637 HC RX REV CODE- 250/637: Performed by: PHYSICAL MEDICINE & REHABILITATION

## 2019-11-28 PROCEDURE — 97530 THERAPEUTIC ACTIVITIES: CPT

## 2019-11-28 PROCEDURE — 77030018846 HC SOL IRR STRL H20 ICUM -A

## 2019-11-28 RX ADMIN — AMLODIPINE BESYLATE 5 MG: 5 TABLET ORAL at 08:23

## 2019-11-28 RX ADMIN — TEMAZEPAM 15 MG: 15 CAPSULE ORAL at 02:27

## 2019-11-28 RX ADMIN — TEMAZEPAM 15 MG: 15 CAPSULE ORAL at 20:57

## 2019-11-28 RX ADMIN — LISINOPRIL 10 MG: 5 TABLET ORAL at 08:22

## 2019-11-28 RX ADMIN — SOTALOL HYDROCHLORIDE 120 MG: 80 TABLET ORAL at 08:23

## 2019-11-28 RX ADMIN — LISINOPRIL 10 MG: 5 TABLET ORAL at 20:57

## 2019-11-28 RX ADMIN — FUROSEMIDE 40 MG: 20 TABLET ORAL at 17:13

## 2019-11-28 RX ADMIN — FUROSEMIDE 40 MG: 20 TABLET ORAL at 08:22

## 2019-11-28 RX ADMIN — SOTALOL HYDROCHLORIDE 120 MG: 80 TABLET ORAL at 20:58

## 2019-11-28 RX ADMIN — APIXABAN 5 MG: 5 TABLET, FILM COATED ORAL at 20:58

## 2019-11-28 RX ADMIN — APIXABAN 5 MG: 5 TABLET, FILM COATED ORAL at 08:23

## 2019-11-28 NOTE — PROGRESS NOTES
Time In 0757   Time Out 0842     Occupational Therapy Daily Note  Mobility   Score Comments   Rolling     Supine to Sit     Sit to Supine     Transfer Assist 4: Supervision or touching A Transfer Type: SPT   Equipment: N/A   Comments: supervision for safety when ambulating in room without shoes on; decreased dynamic balance and patient reporting numbness in his feet that was premorbid. Educated patient on wearing shoes due to noted decreased balance when ambulating without shoes vs with shoes      Activities of Daily Living    Score Comments   Eating 6: Independent    Oral Hygiene 6: Independent    Bathing 6: Independent Type of Shower: Shower  Position: Supported sitting and Standing PRN   Adaptive  Equipment: Tub Transfer Bench and Grab Bars  Comments:    Upper Body  Dressing 6: Independent Items Applied: Pullover  Position: Supported Sitting  Comments:    Lower Body Dressing 6: Independent Items Applied: Underwear and Pants with fasteners  Position: Supported sitting and Standing PRN  Adaptive Equipment: W/C  Comments:    Donning/[place] Footwear 6: Independent Items Applied: Shoes with fasteners   Adaptive Equipment: N/A  Comments: Toilet Transfer     Toileting Hygiene     Education  Wearing shoes during ambulation instead of walking barefoot; signs/symptoms of stroke (BEFAST acronym) due to patient with afib, cardiac history, family history of stroke      Objective: Patient presented seated up in [facility] in room. Patient completed ADL; see above for details. In gym, patient transferred to Nu-Step and completed Nu-Step x 10:04 x 69 steps/minute x 0.48 miles with focus on pacing and increasing activity tolerance. Assessment: Making steady progress. On track. Plan: Continue OT POC with focus on ADL/IADL skills, functional transfers, functional mobility, coordination, strength, static and dynamic balance, and activity tolerance to maximize safety and independence with ADLs and functional transfers.      Patient tolerated session well with no complaint of pain and was left seated up in [facility] in room with call light/all needs in reach and in no distress.      Shlebie Tolentino MS, OTR/L  11/28/2019

## 2019-11-28 NOTE — PROGRESS NOTES
PHYSICAL THERAPY DAILY NOTE  Time In: 0920  Time Out: 1009  Patient Seen For: AM;Balance activities;Gait training;Patient education;Transfer training    Subjective: Patient agreeable to physical therapy treatment today. States no C/O pain or discomfort at this time. Medications taken about 2 hour ago per RN. Last PT treatment went good. No new complaints at this time. Anxious with stairs. Objective:  Patient up sitting in wc. SPT from wc to standing with no AD and SBA. .    Orientation Assessment :   Alert and age appropriately oriented. Affect/Behavior:   Appropriate and Cooperative. Basic Command Following:   intact. Safety/Judgment:   intact. Pain Present:   No.    Other (comment)(Fall Risk)  GROSS ASSESSMENT Daily Assessment    AROM: Within functional limits  Strength: Generally decreased, functional       BED/MAT MOBILITY Daily Assessment   Patient up sitting in wc in room. TRANSFERS Daily Assessment    Transfer Type: SPT without device  Transfer Assistance : 5 (Stand-by assistance)  Sit to Stand Assistance: Stand-by assistance  Car Transfers: Not tested       GAIT Daily Assessment   Base of Support: [facility]; Center of Gravity altered at times. Speed/Ginger: Quick steps. Step Length: WFL. Gait Abnormalities: Decreased step clearance on stairs; Path deviations with distractions; Step thru gait. Distance (ft): 300 total Feet (ft). Assistive Device: Walker, rolling; No AD, Straight cane. Ambulation - Level of assistance: SBA/SPV. Interventions: Safety awareness training; Tactile cues; Verbal cues Amount of Assistance: 5 (Stand-by assistance)(to SPV)  Distance (ft): 300 Feet (ft)(total feet)  Assistive Device: Walker, rolling(with straight cane; without AD)       COGNITION Daily Assessment   Communication: extra time needed to understand requests. Social Interaction: patient presents appropriate, cooperating and participates in treatment.   Problem Solving: Verbal cues to teach techniques for completing tasks. Memory: Executing requests without distractions. Communication:  Social Interaction:  Problem Solving:  Memory:     STEPS or STAIRS Daily Assessment   Numerous verbal cues for ascending/desending stairs. Steps/Stairs Ambulated (#): 36(in stairwell)  Level of Assist : 5 (Stand-by assistance)(numerous verbal cues)  Rail Use: Right        BALANCE Daily Assessment   Standing balance activities of various stance positions, eyes open/closed, 20 seconds each with CGA. Sitting - Static: Good (unsupported)  Sitting - Dynamic: Good (unsupported)  Standing - Static: Good  Standing - Dynamic : Impaired       WHEELCHAIR MOBILITY Daily Assessment   NT Curbs/Ramps Assist Required (FIM Score): 0 (Not tested)  Wheelchair Setup Assist Required : 0 (Not tested)        LOWER EXTREMITY EXERCISES Daily Assessment    Extremity: Both  Exercise Type #1: Other (comment)(standing balance activities without assistance)  Sets Performed: 3  Reps Performed: (15 second count)  Level of Assist: Stand-by assistance  Exercise Type #2: Other (comment)(sit<>stand; stepping onto curb activities)  Reps Performed: (along gait ttraining route)  Level of Assist: Stand-by assistance          Assessment: Education:  Teaching Method:   Demonstration, Explanation. PT Impairments or Limitations:   Balance deficits, Endurance deficits, Strength deficits with stairs. Rehab Potential Physical Therapy:   Good. Grossly no focal motor deficits noted. No rashes, no erythema. No calf tenderness BLE. Patient performed all given exercises listed. Patient was taken back to room. Left in sitting position in wc, call bell and necessities in reach. Nurse notified of completion of treatment. Plan of Care:  The patient has shown the ability to tolerate and benefit from physical therapy daily in a comprehensive inpatient rehabilitation program.  Continue intensive Physical Therapy  to address bed mobility, transfers, ambulation, strengthening, balance, and endurance. Continue with plan of care and focus on goals.     Olam Bloch, PTA  11/28/2019

## 2019-11-28 NOTE — PROGRESS NOTES
Problem: Falls - Risk of  Goal: *Absence of Falls  Description  Document Ernesto Dorado Fall Risk and appropriate interventions in the flowsheet. Outcome: Progressing Towards Goal  Note: Fall Risk Interventions:  Mobility Interventions: Communicate number of staff needed for ambulation/transfer, Patient to call before getting OOB, Utilize walker, cane, or other assistive device    Mentation Interventions: Door open when patient unattended, Evaluate medications/consider consulting pharmacy    Medication Interventions: Evaluate medications/consider consulting pharmacy, Patient to call before getting OOB, Teach patient to arise slowly    Elimination Interventions: Call light in reach, Patient to call for help with toileting needs, Urinal in reach    History of Falls Interventions: Bed/chair exit alarm, Evaluate medications/consider consulting pharmacy         Problem: Patient Education: Go to Patient Education Activity  Goal: Patient/Family Education  Outcome: Progressing Towards Goal     Problem: Patient Education: Go to Patient Education Activity  Goal: Patient/Family Education  Description  Pt/caregiver will demonstrate and verbalize good understanding of OT recommendations regarding ADL status, functional transfer status, home safety, DME, AE, energy conservation techniques, follow-up therapy, for increasing safety with functional tasks upon discharge. Outcome: Progressing Towards Goal     Problem: Nutrition Deficit  Goal: *Optimize nutritional status  Outcome: Progressing Towards Goal     Problem: Patient Education: Go to Patient Education Activity  Goal: Patient/Family Education  Description  Patient / Vucht family will verbalize understanding of PT safety recommendations, demonstrate appropriate assist for current functional mobility status, safety, and home exercise program by time of discharge.      Outcome: Progressing Towards Goal     Problem: Inpatient Rehab (Adult)  Goal: *LTG: Avoids injury/falls 100% of time related to deficits  Outcome: Progressing Towards Goal  Goal: *LTG: Avoids infection 100% of time related to deficits  Outcome: Progressing Towards Goal  Goal: *LTG: Verbalize understanding of diagnosis and risk factors for recurring stroke  Outcome: Progressing Towards Goal  Goal: *LTG: Absence of DVT during hospitalization  Outcome: Progressing Towards Goal  Goal: *LTG: Maintains Skin Integrity With No Evidence of Pressure Injury 100% of Time  Outcome: Progressing Towards Goal  Goal: *Nursing Goal (Insert Text)  Outcome: Progressing Towards Goal  Goal: *Nursing Goal (Insert Text)  Outcome: Progressing Towards Goal  Goal: Interventions  Outcome: Progressing Towards Goal     Problem: Falls - Risk of  Goal: *Absence of Falls  Description  Document Helene Fall Risk and appropriate interventions in the flowsheet.   Outcome: Progressing Towards Goal  Note: Fall Risk Interventions:  Mobility Interventions: Communicate number of staff needed for ambulation/transfer, Patient to call before getting OOB, Utilize walker, cane, or other assistive device    Mentation Interventions: Door open when patient unattended, Evaluate medications/consider consulting pharmacy    Medication Interventions: Evaluate medications/consider consulting pharmacy, Patient to call before getting OOB, Teach patient to arise slowly    Elimination Interventions: Call light in reach, Patient to call for help with toileting needs, Urinal in reach    History of Falls Interventions: Bed/chair exit alarm, Evaluate medications/consider consulting pharmacy

## 2019-11-29 LAB
ANION GAP SERPL CALC-SCNC: 8 MMOL/L (ref 7–16)
BASOPHILS # BLD: 0 K/UL (ref 0–0.2)
BASOPHILS NFR BLD: 0 % (ref 0–2)
BUN SERPL-MCNC: 12 MG/DL (ref 8–23)
CALCIUM SERPL-MCNC: 8.1 MG/DL (ref 8.3–10.4)
CHLORIDE SERPL-SCNC: 108 MMOL/L (ref 98–107)
CO2 SERPL-SCNC: 26 MMOL/L (ref 21–32)
CREAT SERPL-MCNC: 0.75 MG/DL (ref 0.8–1.5)
DIFFERENTIAL METHOD BLD: ABNORMAL
EOSINOPHIL # BLD: 0.1 K/UL (ref 0–0.8)
EOSINOPHIL NFR BLD: 1 % (ref 0.5–7.8)
ERYTHROCYTE [DISTWIDTH] IN BLOOD BY AUTOMATED COUNT: 13.1 % (ref 11.9–14.6)
GLUCOSE SERPL-MCNC: 113 MG/DL (ref 65–100)
HCT VFR BLD AUTO: 32.6 % (ref 41.1–50.3)
HGB BLD-MCNC: 11 G/DL (ref 13.6–17.2)
IMM GRANULOCYTES # BLD AUTO: 0 K/UL (ref 0–0.5)
IMM GRANULOCYTES NFR BLD AUTO: 1 % (ref 0–5)
LYMPHOCYTES # BLD: 1.4 K/UL (ref 0.5–4.6)
LYMPHOCYTES NFR BLD: 21 % (ref 13–44)
MCH RBC QN AUTO: 31.2 PG (ref 26.1–32.9)
MCHC RBC AUTO-ENTMCNC: 33.7 G/DL (ref 31.4–35)
MCV RBC AUTO: 92.4 FL (ref 79.6–97.8)
MONOCYTES # BLD: 0.7 K/UL (ref 0.1–1.3)
MONOCYTES NFR BLD: 11 % (ref 4–12)
NEUTS SEG # BLD: 4.3 K/UL (ref 1.7–8.2)
NEUTS SEG NFR BLD: 66 % (ref 43–78)
NRBC # BLD: 0 K/UL (ref 0–0.2)
PLATELET # BLD AUTO: 284 K/UL (ref 150–450)
PMV BLD AUTO: 9.7 FL (ref 9.4–12.3)
POTASSIUM SERPL-SCNC: 3.5 MMOL/L (ref 3.5–5.1)
RBC # BLD AUTO: 3.53 M/UL (ref 4.23–5.6)
SODIUM SERPL-SCNC: 142 MMOL/L (ref 136–145)
WBC # BLD AUTO: 6.4 K/UL (ref 4.3–11.1)

## 2019-11-29 PROCEDURE — 85025 COMPLETE CBC W/AUTO DIFF WBC: CPT

## 2019-11-29 PROCEDURE — 65310000000 HC RM PRIVATE REHAB

## 2019-11-29 PROCEDURE — 36415 COLL VENOUS BLD VENIPUNCTURE: CPT

## 2019-11-29 PROCEDURE — 74011250637 HC RX REV CODE- 250/637: Performed by: PHYSICAL MEDICINE & REHABILITATION

## 2019-11-29 PROCEDURE — 97530 THERAPEUTIC ACTIVITIES: CPT

## 2019-11-29 PROCEDURE — 97535 SELF CARE MNGMENT TRAINING: CPT

## 2019-11-29 PROCEDURE — 97116 GAIT TRAINING THERAPY: CPT

## 2019-11-29 PROCEDURE — 99232 SBSQ HOSP IP/OBS MODERATE 35: CPT | Performed by: PHYSICAL MEDICINE & REHABILITATION

## 2019-11-29 PROCEDURE — 97110 THERAPEUTIC EXERCISES: CPT

## 2019-11-29 PROCEDURE — 80048 BASIC METABOLIC PNL TOTAL CA: CPT

## 2019-11-29 RX ADMIN — LISINOPRIL 10 MG: 5 TABLET ORAL at 20:47

## 2019-11-29 RX ADMIN — TEMAZEPAM 15 MG: 15 CAPSULE ORAL at 20:45

## 2019-11-29 RX ADMIN — SOTALOL HYDROCHLORIDE 120 MG: 80 TABLET ORAL at 08:34

## 2019-11-29 RX ADMIN — SOTALOL HYDROCHLORIDE 120 MG: 80 TABLET ORAL at 20:45

## 2019-11-29 RX ADMIN — FUROSEMIDE 40 MG: 20 TABLET ORAL at 08:35

## 2019-11-29 RX ADMIN — FUROSEMIDE 40 MG: 20 TABLET ORAL at 17:23

## 2019-11-29 RX ADMIN — APIXABAN 5 MG: 5 TABLET, FILM COATED ORAL at 08:35

## 2019-11-29 RX ADMIN — AMLODIPINE BESYLATE 5 MG: 5 TABLET ORAL at 08:34

## 2019-11-29 RX ADMIN — LISINOPRIL 10 MG: 5 TABLET ORAL at 08:34

## 2019-11-29 RX ADMIN — APIXABAN 5 MG: 5 TABLET, FILM COATED ORAL at 20:45

## 2019-11-29 NOTE — PROGRESS NOTES
Time In 0741   Time Out 0830     Mobility   Score Comments   Sit to Stand 6: Independent I   Transfer Assist 6: Independent Transfer Type: SPT   Equipment: Rolling Walker   Comments: I     Activities of Daily Living    Score Comments   Oral Hyigene 6: Independent I   Bathing 6: Independent Type of Shower: Shower  Position: Supported sitting and Standing PRN   Adaptive  Equipment: Tub Transfer Bench, Grab Bars and Walker  Comments: I   Upper Body  Dressing 6: Independent Items Applied: Pullover  Position: Supported Sitting  Comments: I   Lower Body Dressing 6: Independent Items Applied: Underwear and Pants with fasteners  Position: Supported sitting and Standing PRN  Adaptive Equipment: RW  Comments: I   Donning/[place] Footwear 6: Independent Items Applied: Socks and Shoes with fasteners   Adaptive Equipment: RW  Comments: I   Toilet Transfer 6: Independent Transfer Type: SPT   Equipment: Rolling Walker   Comments: I   Toileting Hygiene 6: Independent Output: Urine  Comments: I   Education  Green sock policy     Pt was in bed and agreeable to tx. Pt's performance with ADL is reflected in above chart. Pt then walked to gym and completed approximately 15 sit to stands with no control while engaging in a functional reach activity. Pt required a few rest breaks, but did well. Pt was left in room with all needs within reach. RN Jose M Miller and [employer] notified of pt being issued green socks. Continue with POC.      Phill Shaikh OTR/L  11/29/2019

## 2019-11-29 NOTE — PROGRESS NOTES
Problem: Falls - Risk of  Goal: *Absence of Falls  Description  Document Shannan Lobolio Fall Risk and appropriate interventions in the flowsheet.   Outcome: Progressing Towards Goal  Note: Fall Risk Interventions:  Mobility Interventions: Patient to call before getting OOB    Mentation Interventions: Door open when patient unattended    Medication Interventions: Patient to call before getting OOB    Elimination Interventions: Call light in reach    History of Falls Interventions: Bed/chair exit alarm         Problem: Patient Education: Go to Patient Education Activity  Goal: Patient/Family Education  Outcome: Progressing Towards Goal     Problem: Inpatient Rehab (Adult)  Goal: *LTG: Avoids injury/falls 100% of time related to deficits  Outcome: Progressing Towards Goal  Goal: *LTG: Avoids infection 100% of time related to deficits  Outcome: Progressing Towards Goal  Goal: *LTG: Verbalize understanding of diagnosis and risk factors for recurring stroke  Outcome: Progressing Towards Goal  Goal: *LTG: Absence of DVT during hospitalization  Outcome: Progressing Towards Goal  Goal: *LTG: Maintains Skin Integrity With No Evidence of Pressure Injury 100% of Time  Outcome: Progressing Towards Goal

## 2019-11-29 NOTE — PROGRESS NOTES
PHYSICAL THERAPY DAILY NOTE  Time In: 3778  Time Out: 1247  Patient Seen For: AM;Balance activities;Gait training;Patient education; Therapeutic exercise;Transfer training    Subjective: Patient states being dc'c tomorrow. Wants to \"build up\" endurance. Objective:  Patient up sitting in chair. SPT from chair to standing without AD and SPV. .    Orientation Assessment :   Alert and age appropriately oriented. Affect/Behavior:   Appropriate and Cooperative. Basic Command Following:   intact. Safety/Judgment:   intact. Pain Present:   No.  Vital Signs:  /83 ; O2 99%    Other (comment)(Fall Risk)  GROSS ASSESSMENT Daily Assessment    AROM: Within functional limits  Strength: Generally decreased, functional       BED/MAT MOBILITY Daily Assessment   Patient up sitting in chair         TRANSFERS Daily Assessment    Transfer Type: SPT without device  Transfer Assistance : 5 (Supervision/setup)  Sit to Stand Assistance: Supervision  Car Transfers: Not tested       GAIT Daily Assessment   Base of Support: [facility]. Speed/Ginger: Fast pace. Step Length: Long. Gait Abnormalities: Trunk sway increased; Decreased step clearance LLE with fatigue. Step thru gait. Distance (ft): 350 total Feet (ft). Assiistive Device: None. Ambulation - Level of assistance: SPV. Interventions: Safety awareness training; Tactile cues; Verbal cues Amount of Assistance: 5 (Supervision/setup)  Distance (ft): 350 Feet (ft)(total distance during treatment session)  Assistive Device: Other (comment)(None)       COGNITION Daily Assessment   Communication: Understands requests. Social Interaction: patient presents appropriate, cooperating and participates in treatment. Problem Solving: Verbal cues to teach techniques for completing tasks. Memory: Executing requests without distractions.    Communication:  Social Interaction:  Problem Solving:  Memory:     STEPS or STAIRS Daily Assessment    Steps/Stairs Ambulated (#): 30(in jolie)  Level of Assist : 5 (Stand-by assistance)  Rail Use: Right        BALANCE Daily Assessment   Standing balance activities of various stance positions, eyes open/closed, 15 seconds each with CGA. Sitting - Static: Good (unsupported)  Sitting - Dynamic: Good (unsupported)  Standing - Static: Good  Standing - Dynamic : Impaired(with fatigue)       WHEELCHAIR MOBILITY Daily Assessment   NT         LOWER EXTREMITY EXERCISES Daily Assessment    Extremity: Both  Exercise Type #1: Other (comment)(standing balance activities without assistance)  Sets Performed: 3  Reps Performed: (15 second count)  Level of Assist: Supervision          Assessment: Education:  Teaching Method:   Demonstration, Explanation. PT Impairments or Limitations:   Ambulation deficits, Balance deficits, Endurance deficits, Strength deficits, Transfer deficits. Rehab Potential Physical Therapy:   Good. Grossly no focal motor deficits noted. No rashes, no erythema. No calf tenderness BLE. Patient performed all given exercises listed. Patient was taken back to room. Left in sitting position in chair, call bell and necessities in reach. Nurse notified of completion of treatment. Plan of Care: The patient has shown the ability to tolerate and benefit from physical therapy daily in a comprehensive inpatient rehabilitation program.  Continue intensive Physical Therapy  to address bed mobility, transfers, ambulation, strengthening, balance, and endurance. Continue with plan of care and focus on goals.      Syl Kaur, PTA  11/29/2019

## 2019-11-29 NOTE — PROGRESS NOTES
SFD PROGRESS NOTE    Kisha Rich  Admit Date: 11/25/2019  Admit Diagnosis: Physical debility [R53.81]    Subjective     Vss. Afebrile. No new neurologic, functional setbacks. Reaching short term goals for safe home discharge tomorrow. Objective:     Current Facility-Administered Medications   Medication Dose Route Frequency    alum-mag hydroxide-simeth (MYLANTA) oral suspension 30 mL  30 mL Oral Q4H PRN    amLODIPine (NORVASC) tablet 5 mg  5 mg Oral DAILY    apixaban (ELIQUIS) tablet 5 mg  5 mg Oral Q12H    diphenhydrAMINE (BENADRYL) capsule 50 mg  50 mg Oral QHS PRN    docusate sodium (COLACE) capsule 100 mg  100 mg Oral BID PRN    furosemide (LASIX) tablet 40 mg  40 mg Oral BID    heparin (porcine) pf 900 Units  900 Units InterCATHeter PRN    hydrALAZINE (APRESOLINE) tablet 25 mg  25 mg Oral Q6H PRN    lidocaine (XYLOCAINE) 2 % viscous solution 15 mL  15 mL Mouth/Throat PRN    lisinopril (PRINIVIL, ZESTRIL) tablet 10 mg  10 mg Oral Q12H    LORazepam (ATIVAN) tablet 1 mg  1 mg Oral Q6H PRN    magnesium hydroxide (MILK OF MAGNESIA) 400 mg/5 mL oral suspension 30 mL  30 mL Oral DAILY PRN    ondansetron (ZOFRAN ODT) tablet 4 mg  4 mg Oral Q6H PRN    polyethylene glycol (MIRALAX) packet 17 g  17 g Oral DAILY    promethazine (PHENERGAN) tablet 25 mg  25 mg Oral Q6H PRN    sotalol (BETAPACE) tablet 120 mg  120 mg Oral Q12H    temazepam (RESTORIL) capsule 15 mg  15 mg Oral QHS PRN     Review of Systems: Denies chest pain, shortness of breath, cough, headache, visual problems, abdominal pain, dysurea, calf pain. Pertinent positives are as noted in the medical records and unremarkable otherwise. Visit Vitals  /66   Pulse 77   Temp 97.9 °F (36.6 °C)   Resp 18   Wt 217 lb 9.6 oz (98.7 kg)   SpO2 99%   BMI 27.20 kg/m²        Physical Exam:   General: no acute cardio respiratory distress. Alert.     HEENT: Normocephalic,noJVD   Lungs: Clear to auscultation    Heart: Regular rate and rhythm, S1, S2   No  Murmurs    Abdomen: Soft, non-tender, nondistended. Bowel sounds present. Genitourinary: defer   Neuromuscular:      Speech fluent. Moves all extremities well. Sensation intact grossly   Skin/extremity: No pedal edema.  No calf tenderness BLE                                                                            Functional Assessment:  Gross Assessment  AROM: Within functional limits (11/28/19 0920)  Strength: Generally decreased, functional (11/28/19 0920)       Balance  Sitting - Static: Good (unsupported) (11/28/19 0920)  Sitting - Dynamic: Good (unsupported) (11/28/19 0920)  Standing - Static: Good (11/28/19 0920)  Standing - Dynamic : Impaired (11/28/19 0920)                     Donata Carrel Fall Risk Assessment:  Donata Carrel Fall Risk  Mobility: Ambulates or transfers with assist devices or assistance (11/28/19 1908)  Mobility Interventions: Patient to call before getting OOB (11/28/19 1908)  Mentation: Alert, oriented x 3 (11/28/19 1908)  Mentation Interventions: Door open when patient unattended (11/28/19 1908)  Medication: Patient receiving anticonvulsants, sedatives(tranquilizers), psychotropics or hypnotics, hypoglycemics, narcotics, sleep aids, antihypertensives, laxatives, or diuretics (11/28/19 1908)  Medication Interventions: Patient to call before getting OOB (11/28/19 1908)  Elimination: Needs assistance with toileting (11/28/19 1908)  Elimination Interventions: Call light in reach (11/28/19 1908)  Prior Fall History: During admission (Date - Comment) (11/28/19 1908)  History of Falls Interventions: Bed/chair exit alarm (11/28/19 1908)  Total Score: 5 (11/28/19 1908)  Standard Fall Precautions: Yes (11/25/19 1256)  High Fall Risk: Yes (11/28/19 1908)     Speech Assessment:         Ambulation:  Gait  Distance (ft): 300 Feet (ft)(total feet) (11/28/19 0920)  Assistive Device: Anabell Dominick, rolling(with straight cane; without AD) (11/28/19 0920)  Rail Use: Right  (11/28/19 0920) Labs/Studies:  Recent Results (from the past 72 hour(s))   CBC WITH AUTOMATED DIFF    Collection Time: 11/29/19  7:13 AM   Result Value Ref Range    WBC 6.4 4.3 - 11.1 K/uL    RBC 3.53 (L) 4.23 - 5.6 M/uL    HGB 11.0 (L) 13.6 - 17.2 g/dL    HCT 32.6 (L) 41.1 - 50.3 %    MCV 92.4 79.6 - 97.8 FL    MCH 31.2 26.1 - 32.9 PG    MCHC 33.7 31.4 - 35.0 g/dL    RDW 13.1 11.9 - 14.6 %    PLATELET 523 384 - 872 K/uL    MPV 9.7 9.4 - 12.3 FL    ABSOLUTE NRBC 0.00 0.0 - 0.2 K/uL    DF AUTOMATED      NEUTROPHILS 66 43 - 78 %    LYMPHOCYTES 21 13 - 44 %    MONOCYTES 11 4.0 - 12.0 %    EOSINOPHILS 1 0.5 - 7.8 %    BASOPHILS 0 0.0 - 2.0 %    IMMATURE GRANULOCYTES 1 0.0 - 5.0 %    ABS. NEUTROPHILS 4.3 1.7 - 8.2 K/UL    ABS. LYMPHOCYTES 1.4 0.5 - 4.6 K/UL    ABS. MONOCYTES 0.7 0.1 - 1.3 K/UL    ABS. EOSINOPHILS 0.1 0.0 - 0.8 K/UL    ABS. BASOPHILS 0.0 0.0 - 0.2 K/UL    ABS. IMM.  GRANS. 0.0 0.0 - 0.5 K/UL   METABOLIC PANEL, BASIC    Collection Time: 11/29/19  7:13 AM   Result Value Ref Range    Sodium 142 136 - 145 mmol/L    Potassium 3.5 3.5 - 5.1 mmol/L    Chloride 108 (H) 98 - 107 mmol/L    CO2 26 21 - 32 mmol/L    Anion gap 8 7 - 16 mmol/L    Glucose 113 (H) 65 - 100 mg/dL    BUN 12 8 - 23 MG/DL    Creatinine 0.75 (L) 0.8 - 1.5 MG/DL    GFR est AA >60 >60 ml/min/1.73m2    GFR est non-AA >60 >60 ml/min/1.73m2    Calcium 8.1 (L) 8.3 - 10.4 MG/DL       Assessment:     Problem List as of 11/29/2019 Date Reviewed: 11/10/2019          Codes Class Noted - Resolved    Physical debility ICD-10-CM: R53.81  ICD-9-CM: 799.3  11/25/2019 - Present        Pneumonia of lower lobe of lung ([facility] ICD-10-CM: J18.1  ICD-9-CM: 073  11/10/2019 - Present        Acute respiratory failure with hypoxia ([facility] ICD-10-CM: J96.01  ICD-9-CM: 518.81  11/10/2019 - Present        Acute pulmonary edema (HCC) ICD-10-CM: J81.0  ICD-9-CM: 518.4  11/10/2019 - Present        Acute heart failure (HCC) ICD-10-CM: I50.9  ICD-9-CM: 428.9  11/10/2019 - Present        MARISOL (obstructive sleep apnea) (Chronic) ICD-10-CM: G47.33  ICD-9-CM: 327.23  11/15/2018 - Present        Inadequate sleep hygiene ICD-10-CM: Z72.821  ICD-9-CM: 307.49  11/15/2018 - Present        C. difficile colitis ICD-10-CM: A04.72  ICD-9-CM: 008.45  7/25/2018 - Present    Overview Signed 7/25/2018 10:01 AM by Talia Arredondo MD     2018             HAP (hospital-acquired pneumonia) ICD-10-CM: J18.9, Y95  ICD-9-CM: 855  5/16/2018 - Present        Acute respiratory failure ([facility] ICD-10-CM: J96.00  ICD-9-CM: 518.81  5/14/2018 - Present        Chronic combined systolic and diastolic congestive heart failure (HCC) (Chronic) ICD-10-CM: I50.42  ICD-9-CM: 428.42, 428.0  5/14/2018 - Present    Overview Signed 11/9/2019  5:54 PM by Aakash Shah MD     EF 45-50%              Paroxysmal A-fib [facility]) (Chronic) ICD-10-CM: I48.0  ICD-9-CM: 427.31  5/14/2018 - Present        LBBB (left bundle branch block) ICD-10-CM: I44.7  ICD-9-CM: 426.3  5/14/2018 - Present        Leukocytosis ICD-10-CM: F44.060  ICD-9-CM: 288.60  5/14/2018 - Present        Medicare annual wellness visit, subsequent (Chronic) ICD-10-CM: Z00.00  ICD-9-CM: V70.0  4/1/2016 - Present        Hypertension, essential (Chronic) ICD-10-CM: I10  ICD-9-CM: 401.9  1/13/2016 - Present        RESOLVED: Lactic acidosis ICD-10-CM: E87.2  ICD-9-CM: 276.2  11/10/2019 - 11/11/2019        RESOLVED: SAMSON (acute kidney injury) ([facility] ICD-10-CM: N17.9  ICD-9-CM: 584.9  11/10/2019 - 11/11/2019        RESOLVED: Acute respiratory failure with hypoxia [facility]) ICD-10-CM: J96.01  ICD-9-CM: 518.81  11/9/2019 - 11/10/2019              Physical Debilitation s/p Acute Pulm Edema/Respiratory failure/acute heart failure     Continue daily physician medical management:  S/p Acute Hypoxic Resp failure- Incentive spirometer every hour while awake  -s/p HCAP; chest xray neg in f/u . Reported hx of MARISOL/ ? CPAP  11/28 - stable pulmonary exam. SaO2 good on RA.  11/29 - comfortable on RA.    DVT risk / DVT Prophylaxis-  on Eliquis.   11/29 - no s/s of acute DVT.      Wound Care: - left upper chest pacermaker implantation sight without issues.     Afib/flutter; s/p cardioversion. The patient's device was interrogated prior to d/c to inpatient rehab showing PMT requiring extending out PVARP to 275 ms.    -HIRO 11/22 that was negative for thrombus   11/28 - HR/ BP in good range,. Continued sotalol, Eliquis. 11/29 clinically in SR. HR in good range. Continue sotalol. Continue eliquis.      Hypertension - BP uncontrolled, fluctuating, managed medically. Cont Norvasc and Zestril. -11/28 - controlled fairly.      CHF; 17L diuresis during acute hospital stay. Weigh daily. Strict I/o . Cont Lasix. Monitor renal fxn  -11/26 wt 221; monitor  -11/27 wt 217; no significant edema. Cont Lasix; do not believe I/Os accurate; has voided more than 2x in last 24 hrs  11/28 - continue lasix 40 bid. Monitor for decompensation. Monitor renal function.   11/29 -lasix continued. No signs of decompensation. Renal function good; 12/0.75     Urinary retention/ neurogenic bladder - schedule voids q6-8 hrs. Check post-void residual as needed; In and Out catheterize if post-void residual is more than 400 cc.     bowel program - add miralax; prn colace, mom, colace     GERD - resume PPI. At times may need additional antacids, Maalox prn.          Time spent was 25 minutes with over 1/2 in direct patient care/examination, consultation and coordination of care.      Signed By: Clement Antonio MD     November 29, 2019

## 2019-11-29 NOTE — PROGRESS NOTES
SFD PROGRESS NOTE    Yordy Caputo  Admit Date: 11/25/2019  Admit Diagnosis: Physical debility [R53.81]    Subjective     Vss. Afebrile. Denies chest pain,SOB, Cough. Participating in therapies very well. No new barriers reported. Ambulating at SBA level. Objective:     Current Facility-Administered Medications   Medication Dose Route Frequency    alum-mag hydroxide-simeth (MYLANTA) oral suspension 30 mL  30 mL Oral Q4H PRN    amLODIPine (NORVASC) tablet 5 mg  5 mg Oral DAILY    apixaban (ELIQUIS) tablet 5 mg  5 mg Oral Q12H    diphenhydrAMINE (BENADRYL) capsule 50 mg  50 mg Oral QHS PRN    docusate sodium (COLACE) capsule 100 mg  100 mg Oral BID PRN    furosemide (LASIX) tablet 40 mg  40 mg Oral BID    heparin (porcine) pf 900 Units  900 Units InterCATHeter PRN    hydrALAZINE (APRESOLINE) tablet 25 mg  25 mg Oral Q6H PRN    lidocaine (XYLOCAINE) 2 % viscous solution 15 mL  15 mL Mouth/Throat PRN    lisinopril (PRINIVIL, ZESTRIL) tablet 10 mg  10 mg Oral Q12H    LORazepam (ATIVAN) tablet 1 mg  1 mg Oral Q6H PRN    magnesium hydroxide (MILK OF MAGNESIA) 400 mg/5 mL oral suspension 30 mL  30 mL Oral DAILY PRN    ondansetron (ZOFRAN ODT) tablet 4 mg  4 mg Oral Q6H PRN    polyethylene glycol (MIRALAX) packet 17 g  17 g Oral DAILY    promethazine (PHENERGAN) tablet 25 mg  25 mg Oral Q6H PRN    sotalol (BETAPACE) tablet 120 mg  120 mg Oral Q12H    temazepam (RESTORIL) capsule 15 mg  15 mg Oral QHS PRN     Review of Systems: Denies chest pain, shortness of breath, cough, headache, visual problems, abdominal pain, dysurea, calf pain. Pertinent positives are as noted in the medical records and unremarkable otherwise. Visit Vitals  /77   Pulse 63   Temp 97.5 °F (36.4 °C)   Resp 18   Wt 217 lb 12.8 oz (98.8 kg)   SpO2 99%   BMI 27.22 kg/m²        Physical Exam:   General: no acute cardio respiratory distress.    HEENT: Normocephalic,noJVD   Lungs: Clear to auscultation    Heart: Regular rate and rhythm, S1, S2   No  Murmurs    Abdomen: Soft, non-tender, nondistended. Bowel sounds present. Genitourinary: defer   Neuromuscular:      Generalized prox > distal weakness  Moves all extremities well. Sensation intact grossly   Skin/extremity: No pedal edema.  No calf tenderness BLE                                                                            Functional Assessment:  Gross Assessment  AROM: Within functional limits (11/28/19 0920)  Strength: Generally decreased, functional (11/28/19 0920)       Balance  Sitting - Static: Good (unsupported) (11/28/19 0920)  Sitting - Dynamic: Good (unsupported) (11/28/19 0920)  Standing - Static: Good (11/28/19 0920)  Standing - Dynamic : Impaired (11/28/19 0920)                     Linda Patches Fall Risk Assessment:  Linda Patches Fall Risk  Mobility: Ambulates or transfers with assist devices or assistance (11/28/19 1908)  Mobility Interventions: Patient to call before getting OOB (11/28/19 1908)  Mentation: Alert, oriented x 3 (11/28/19 1908)  Mentation Interventions: Door open when patient unattended (11/28/19 1908)  Medication: Patient receiving anticonvulsants, sedatives(tranquilizers), psychotropics or hypnotics, hypoglycemics, narcotics, sleep aids, antihypertensives, laxatives, or diuretics (11/28/19 1908)  Medication Interventions: Patient to call before getting OOB (11/28/19 1908)  Elimination: Needs assistance with toileting (11/28/19 1908)  Elimination Interventions: Call light in reach (11/28/19 1908)  Prior Fall History: During admission (Date - Comment) (11/28/19 1908)  History of Falls Interventions: Bed/chair exit alarm (11/28/19 1908)  Total Score: 5 (11/28/19 1908)  Standard Fall Precautions: Yes (11/25/19 1256)  High Fall Risk: Yes (11/28/19 1908)     Speech Assessment:         Ambulation:  Gait  Distance (ft): 300 Feet (ft)(total feet) (11/28/19 0920)  Assistive Device: [place] Flicker, rolling(with straight cane; without AD) (11/28/19 0920)  Rail Use: Right (11/28/19 3511)     Labs/Studies:  Recent Results (from the past 72 hour(s))   CBC W/O DIFF    Collection Time: 11/26/19  5:11 AM   Result Value Ref Range    WBC 11.0 4.3 - 11.1 K/uL    RBC 3.53 (L) 4.23 - 5.6 M/uL    HGB 11.1 (L) 13.6 - 17.2 g/dL    HCT 32.6 (L) 41.1 - 50.3 %    MCV 92.4 79.6 - 97.8 FL    MCH 31.4 26.1 - 32.9 PG    MCHC 34.0 31.4 - 35.0 g/dL    RDW 13.2 11.9 - 14.6 %    PLATELET 449 090 - 510 K/uL    MPV 10.1 9.4 - 12.3 FL    ABSOLUTE NRBC 0.00 0.0 - 0.2 K/uL   MAGNESIUM    Collection Time: 11/26/19  5:11 AM   Result Value Ref Range    Magnesium 2.0 1.8 - 2.4 mg/dL   METABOLIC PANEL, BASIC    Collection Time: 11/26/19  5:11 AM   Result Value Ref Range    Sodium 140 136 - 145 mmol/L    Potassium 3.6 3.5 - 5.1 mmol/L    Chloride 108 (H) 98 - 107 mmol/L    CO2 28 21 - 32 mmol/L    Anion gap 4 (L) 7 - 16 mmol/L    Glucose 118 (H) 65 - 100 mg/dL    BUN 15 8 - 23 MG/DL    Creatinine 0.95 0.8 - 1.5 MG/DL    GFR est AA >60 >60 ml/min/1.73m2    GFR est non-AA >60 >60 ml/min/1.73m2    Calcium 7.9 (L) 8.3 - 10.4 MG/DL       Assessment:     Problem List as of 11/28/2019 Date Reviewed: 11/10/2019          Codes Class Noted - Resolved    Physical debility ICD-10-CM: R53.81  ICD-9-CM: 799.3  11/25/2019 - Present        Pneumonia of lower lobe of lung ([facility] ICD-10-CM: J18.1  ICD-9-CM: 486  11/10/2019 - Present        Acute respiratory failure with hypoxia ([facility] ICD-10-CM: J96.01  ICD-9-CM: 518.81  11/10/2019 - Present        Acute pulmonary edema ([address] ICD-10-CM: J81.0  ICD-9-CM: 518.4  11/10/2019 - Present        Acute heart failure (HCC) ICD-10-CM: I50.9  ICD-9-CM: 428.9  11/10/2019 - Present        MARISOL (obstructive sleep apnea) (Chronic) ICD-10-CM: G47.33  ICD-9-CM: 327.23  11/15/2018 - Present        Inadequate sleep hygiene ICD-10-CM: S71.475  ICD-9-CM: 307.49  11/15/2018 - Present        C. difficile colitis ICD-10-CM: A04.72  ICD-9-CM: 008.45  7/25/2018 - Present    Overview Signed 7/25/2018 10:01 AM by Ajay Stovall MD     2018             HAP (hospital-acquired pneumonia) ICD-10-CM: J18.9, Y95  ICD-9-CM: 028  5/16/2018 - Present        Acute respiratory failure ([facility] ICD-10-CM: J96.00  ICD-9-CM: 518.81  5/14/2018 - Present        Chronic combined systolic and diastolic congestive heart failure (HCC) (Chronic) ICD-10-CM: I50.42  ICD-9-CM: 428.42, 428.0  5/14/2018 - Present    Overview Signed 11/9/2019  5:54 PM by Rubens Norton MD     EF 45-50%              Paroxysmal A-fib [facility]) (Chronic) ICD-10-CM: I48.0  ICD-9-CM: 427.31  5/14/2018 - Present        LBBB (left bundle branch block) ICD-10-CM: I44.7  ICD-9-CM: 426.3  5/14/2018 - Present        Leukocytosis ICD-10-CM: T03.559  ICD-9-CM: 288.60  5/14/2018 - Present        Medicare annual wellness visit, subsequent (Chronic) ICD-10-CM: Z00.00  ICD-9-CM: V70.0  4/1/2016 - Present        Hypertension, essential (Chronic) ICD-10-CM: I10  ICD-9-CM: 401.9  1/13/2016 - Present        RESOLVED: Lactic acidosis ICD-10-CM: E87.2  ICD-9-CM: 276.2  11/10/2019 - 11/11/2019        RESOLVED: SAMSON (acute kidney injury) ([facility] ICD-10-CM: N17.9  ICD-9-CM: 584.9  11/10/2019 - 11/11/2019        RESOLVED: Acute respiratory failure with hypoxia [facility]) ICD-10-CM: J96.01  ICD-9-CM: 518.81  11/9/2019 - 11/10/2019              Physical Debilitation s/p Acute Pulm Edema/Respiratory failure/acute heart failure     Continue daily physician medical management:  S/p Acute Hypoxic Resp failure- Incentive spirometer every hour while awake  -s/p HCAP; chest xray neg in f/u . Reported hx of MARISOL/ ? CPAP  11/28 - stable pulmonary exam. SaO2 good on RA.     DVT risk / DVT Prophylaxis-  on Eliquis.   11/28 - no s/s on acute DVT.      Wound Care: - left upper chest pacermaker implantation sight; healing well     Afib/flutter; s/p cardioversion.  The patient's device was interrogated prior to d/c to inpatient rehab showing PMT requiring extending out PVARP to 275 ms.    -HIRO 11/22 that was negative for thrombus   11/28 - HR/ BP in good range,. Continued sotalol, Eliquis.     Hypertension - BP uncontrolled, fluctuating, managed medically. Cont Norvasc and Zestril. -11/28 - controlled fairly.      CHF; 17L diuresis during acute hospital stay. Weigh daily. Strict I/o . Cont Lasix. Monitor renal fxn  -11/26 wt 221; monitor  -11/27 wt 217; no significant edema. Cont Lasix; do not believe I/Os accurate; has voided more than 2x in last 24 hrs  11/28 - continue lasix 40 bid. Monitor for decompensation. Monitor renal function.      Urinary retention/ neurogenic bladder - schedule voids q6-8 hrs. Check post-void residual as needed; In and Out catheterize if post-void residual is more than 400 cc.     bowel program - add miralax; prn colace, mom, colace     GERD - resume PPI. At times may need additional antacids, Maalox prn.          Time spent was 25 minutes with over 1/2 in direct patient care/examination, consultation and coordination of care.      Signed By: Bianca Ruffin MD     November 28, 2019

## 2019-11-29 NOTE — PROGRESS NOTES
Checked with patient for concerns, he requested for his prescriptions be sent to [facility].  Checked chart and his medications were sent to [facility].                                                               CASE MANAGEMENT DISCHARGE NOTE      Discharge Destination: Home patient discharged with Outpatient Therapy    Discharge Date:  11/30/2019     DME: [address]: None     Out Patient Facility:  [facility] Outpatient Therapy at [facility] for PT    STR: None

## 2019-11-29 NOTE — PROGRESS NOTES
PHYSICAL THERAPY DAILY NOTE  Time In: 1350  Time Out: 9257  Patient Seen For: PM;Balance activities;Gait training;Patient education;Transfer training    Subjective: Patient agreeable to second PT treatment of the day. DC'd tomorrow. Objective:  Patient up sitting in chair following OT treatment. SPT from sit to stand without AD and close SPV. Orientation Assessment :   Alert and age appropriately oriented. Affect/Behavior:   Appropriate and Cooperative. Basic Command Following:   intact. Safety/Judgment:   intact. Pain Present:   No.    Other (comment)(Fall Risk)  GROSS ASSESSMENT Daily Assessment    AROM: Within functional limits  Strength: Generally decreased, functional       BED/MAT MOBILITY Daily Assessment   Patient up sitting in chair         TRANSFERS Daily Assessment    Transfer Type: SPT without device  Transfer Assistance : 5 (Supervision/setup)  Sit to Stand Assistance: Supervision  Car Transfers: Not tested       GAIT Daily Assessment   Ambulation - Level of assistance: CGA  Interventions: Safety awareness training; Tactile cues; Verbal cues Amount of Assistance: 5 (Supervision/setup)  Distance (ft): 350 Feet (ft)  Assistive Device: Other (comment)(None)       COGNITION Daily Assessment   Communication: understand requests. Social Interaction: patient presents appropriate, cooperating and participates in treatment. Problem Solving: Verbal cues to teach techniques for completing tasks. Memory: Executing requests without distractions.    Communication:  Social Interaction:  Problem Solving:  Memory:     STEPS or STAIRS Daily Assessment    Steps/Stairs Ambulated (#): 20(in stairwell)  Level of Assist : 5 (Stand-by assistance)  Rail Use: Both       BALANCE Daily Assessment    Sitting - Static: Good (unsupported)  Sitting - Dynamic: Good (unsupported)  Standing - Static: Good  Standing - Dynamic : Impaired       WHEELCHAIR MOBILITY Daily Assessment   NT         LOWER EXTREMITY EXERCISES Daily Assessment    Extremity: Both  Exercise Type #1: Standing lower extremity strengthening  Sets Performed: 3  Reps Performed: 10  Level of Assist: Supervision  Exercise Type #2: Other (comment)(NuStep)  Sets Performed: (level 5)  Reps Performed: (10 minutes)  Level of Assist: Supervision          Assessment: Education:  Teaching Method:   Demonstration, Explanation. PT Impairments or Limitations:   Ambulation deficits, Balance deficits, Endurance deficits, Strength deficits, Transfer deficits. Rehab Potential Physical Therapy:   Good. Grossly no focal motor deficits noted. No rashes, no erythema. No calf tenderness BLE. Patient performed all given exercises listed. Patient was taken back to room. Left in sitting position in chair, call bell and necessities in reach. Nurse notified of completion of treatment. Plan of Care: The patient has shown the ability to tolerate and benefit from physical therapy daily in a comprehensive inpatient rehabilitation program.  Continue intensive Physical Therapy  to address bed mobility, transfers, ambulation, strengthening, balance, and endurance. Continue with plan of care and focus on goals.     Sommer Brooks, PTA  11/29/2019

## 2019-11-29 NOTE — PROGRESS NOTES
Sitting up in wheelchair watching football, anxious to be discharged tomorrow. Denies pain, call bell within reach. Hourly rounds completed this shift.

## 2019-11-29 NOTE — PROGRESS NOTES
OT Daily Note  Time In 1300   Time Out 1345     Pain: Patient had no complaint of pain. Functional Mobility      Score Comments   Sit to Stand 6: Independent I   Transfer Assist 6: Independent Transfer Type: SPT   Equipment: Rolling Walker   Comments: I   Pt used walker less than half the time. Strengthening   Pt engaged in various reaching out of base of support tasks and squatting during functional reach activities requiring increased time due to rest breaks, but no LOB. Pt completed the shoulder ladder x5 and pulleys to improve ROM in RUE due to rotator cuff injury. Pt admits to being non-compliant with his exercises. Pt completed 20  press with 3 lb dumbbell with RUE and 30 biceps curls with 5 lb dumbbell with BUE to improve activity tolerance for IADL. Self-Care   Pt toileted with I.      Education   Benefits of rehab     Plan: Continue with POC. Pt was left awaiting PTA De Espinoza OTR/L  11/29/2019

## 2019-11-30 VITALS
RESPIRATION RATE: 20 BRPM | OXYGEN SATURATION: 94 % | TEMPERATURE: 98.4 F | BODY MASS INDEX: 27.05 KG/M2 | WEIGHT: 216.4 LBS | HEART RATE: 67 BPM | DIASTOLIC BLOOD PRESSURE: 83 MMHG | SYSTOLIC BLOOD PRESSURE: 152 MMHG

## 2019-11-30 PROCEDURE — 97116 GAIT TRAINING THERAPY: CPT

## 2019-11-30 PROCEDURE — 74011250637 HC RX REV CODE- 250/637: Performed by: PHYSICAL MEDICINE & REHABILITATION

## 2019-11-30 PROCEDURE — 97535 SELF CARE MNGMENT TRAINING: CPT

## 2019-11-30 PROCEDURE — 97530 THERAPEUTIC ACTIVITIES: CPT

## 2019-11-30 PROCEDURE — 97110 THERAPEUTIC EXERCISES: CPT

## 2019-11-30 RX ORDER — TEMAZEPAM 7.5 MG/1
15 CAPSULE ORAL
Qty: 10 CAP | Refills: 0 | Status: SHIPPED | OUTPATIENT
Start: 2019-11-30 | End: 2019-12-11

## 2019-11-30 RX ORDER — LISINOPRIL 10 MG/1
10 TABLET ORAL EVERY 12 HOURS
Qty: 60 TAB | Refills: 0 | Status: SHIPPED | OUTPATIENT
Start: 2019-11-30 | End: 2019-12-11 | Stop reason: SDUPTHER

## 2019-11-30 RX ORDER — SOTALOL HYDROCHLORIDE 120 MG/1
120 TABLET ORAL EVERY 12 HOURS
Qty: 60 TAB | Refills: 0 | Status: SHIPPED | OUTPATIENT
Start: 2019-11-30 | End: 2019-12-11 | Stop reason: SDUPTHER

## 2019-11-30 RX ORDER — AMLODIPINE BESYLATE 5 MG/1
5 TABLET ORAL DAILY
Qty: 30 TAB | Refills: 0 | Status: SHIPPED | OUTPATIENT
Start: 2019-12-01 | End: 2019-12-11 | Stop reason: SDUPTHER

## 2019-11-30 RX ORDER — FUROSEMIDE 40 MG/1
40 TABLET ORAL 2 TIMES DAILY
Qty: 180 TAB | Refills: 3 | Status: SHIPPED | OUTPATIENT
Start: 2019-11-30 | End: 2020-01-15 | Stop reason: SDUPTHER

## 2019-11-30 RX ADMIN — LISINOPRIL 10 MG: 5 TABLET ORAL at 08:30

## 2019-11-30 RX ADMIN — FUROSEMIDE 40 MG: 20 TABLET ORAL at 08:31

## 2019-11-30 RX ADMIN — SOTALOL HYDROCHLORIDE 120 MG: 80 TABLET ORAL at 08:31

## 2019-11-30 RX ADMIN — APIXABAN 5 MG: 5 TABLET, FILM COATED ORAL at 08:31

## 2019-11-30 RX ADMIN — AMLODIPINE BESYLATE 5 MG: 5 TABLET ORAL at 08:31

## 2019-11-30 NOTE — PROGRESS NOTES
OT DISCHARGE REPORT    Time In: 0745  Time Out: 5255  Mobility   Usual Performance Score Comments   Sit to Stand 6: Independent I   Transfer Assist 6: Independent Transfer Type: SPT   Equipment: N/A   Comments: I     Activities of Daily Living    Usual Performance Score Comments   Oral Hyigene 6: Independent I   Bathing 6: Independent Type of Shower: Shower  Position: Supported sitting and Standing PRN   Adaptive  Equipment: Tub Transfer Bench and Grab Bars  Comments: I   Upper Body  Dressing 6: Independent Items Applied: Pullover  Position: Unsupported Sitting  Comments: I   Lower Body Dressing 6: Independent Items Applied: Underwear and Pants with fasteners  Position: Supported sitting and Standing PRN  Adaptive Equipment: N/A  Comments: I   Donning/[place] Footwear 6: Independent Items Applied: Shoes with fasteners   Adaptive Equipment: N/A  Comments: I   Toilet Transfer 6: Independent Transfer Type: SPT   Equipment: N/A   Comments: I   Toileting Hygiene 6: Independent Output: Urine  Comments: I   Education  Discharge     Plan of Care: Please see Care Plan for progress towards goals during stay  Precautions at Discharge: N/A  Discharge Location: Home  Safety/Supervision Recommendations/Functional Level: Pt is I with all ADL  Family Training: Not indicated secondary to pt leaving I    Recommended Continuing Therapy: Not indicated  Residual Deficits: Activity tolerance and balance  Progress over LOS: Pt made significant gains in activity tolerance and balance allowing pt to return home I with ADL. Summary of Session: Pt was in w/c and agreeable to tx. Pt's performance with ADL is reflected in above chart. Pt was left in room with all needs within reach.      Faisal Dunne OT   11/30/2019

## 2019-11-30 NOTE — DISCHARGE INSTRUCTIONS
Patient Education        Deciding Between Electrical Cardioversion and Rate Control Medicines for Atrial Fibrillation  How can you decide between electrical cardioversion and rate control medicines for atrial fibrillation? What is atrial fibrillation? Atrial fibrillation (say \"AY-tree-davy idl-ggkf-YLB-shun\") is a kind of uneven heartbeat. It can make you feel lightheaded and dizzy. You may feel weak. It also can make you more likely to have a stroke. Electrical cardioversion can return your heart to a normal rhythm. First you'll get medicines to make you sleepy and control pain. Then your doctor will use patches to send an electric current to your heart. This resets the rhythm of your heart. Not everyone with atrial fibrillation needs this treatment. For some people, taking medicines may be better. Most people can live with an uneven heartbeat. It just has to be kept under control so the heart does not beat too fast.  Use this information to help you and your doctor decide which treatment to choose for atrial fibrillation. What are law points about this decision? · Electrical cardioversion can return your heart to a normal rhythm. But the problem can come back. The longer you have had atrial fibrillation, the more likely it is to come back after this treatment. · Cardioversion may not work as well when an uneven heartbeat is caused by another heart disease, such as heart failure. · If your symptoms bother you a lot, you may want to try cardioversion. But even if it works, you may still need to take blood thinners to prevent a stroke. · If you don't have symptoms, or if they don't bother you much, you can try medicines to slow your heart rate. And you can take blood thinners to prevent a stroke. · Cardioversion does have risks, such as stroke. Discuss the risks with your doctor. Make sure you understand them. · You may have more than one heart problem.  Cardioversion doesn't work as well if you have more than one heart problem. Why might you choose electrical cardioversion? · It restores the normal heart rhythm for most people. · The idea of having an electric shock does not bother you. · Your symptoms bother you a lot. · You have had atrial fibrillation just one time. · You do not have other heart problems. · You may not have to take as many medicines. Or you may not need to take them as long. Why might you choose rate-control medicines? · These medicines keep many people from having symptoms. · You prefer to take medicines rather than have an electric shock. · Your symptoms don't bother you much. · If these medicines don't work, you can still try electrical cardioversion. Your decision  Thinking about the facts and your feelings can help you make a decision that is right for you. Be sure you understand the benefits and risks of your options. And think about what else you need to do before you make the decision. Where can you learn more? Go to http://juan carlos-sherry.info/. Enter P613 in the search box to learn more about \"Deciding Between Electrical Cardioversion and Rate Control Medicines for Atrial Fibrillation. \"  Current as of: April 9, 2019  Content Version: 12.2  © 7567-2853 [employer]. Care instructions adapted under license by [employer] (which disclaims liability or warranty for this information). If you have questions about a medical condition or this instruction, always ask your healthcare professional. [place] [address] any warranty or liability for your use of this information.          DISCHARGE SUMMARY from Nurse    PATIENT INSTRUCTIONS:    After general anesthesia or intravenous sedation, for 24 hours or while taking prescription Narcotics:  · Limit your activities  · Do not drive and operate hazardous machinery  · Do not make important personal or business decisions  · Do  not drink alcoholic beverages  · If you have not urinated within 8 hours after discharge, please contact your surgeon on call. Report the following to your surgeon:  · Excessive pain, swelling, redness or odor of or around the surgical area  · Temperature over 100.5  · Nausea and vomiting lasting longer than 4 hours or if unable to take medications  · Any signs of decreased circulation or nerve impairment to extremity: change in color, persistent  numbness, tingling, coldness or increase pain      What to do at Home:      *  Please give a list of your current medications to your Primary Care Provider. *  Please update this list whenever your medications are discontinued, doses are      changed, or new medications (including over-the-counter products) are added. *  Please carry medication information at all times in case of emergency situations. These are general instructions for a healthy lifestyle:    No smoking/ No tobacco products/ Avoid exposure to second hand smoke  Surgeon General's Warning:  Quitting smoking now greatly reduces serious risk to your health. Obesity, smoking, and sedentary lifestyle greatly increases your risk for illness    A healthy diet, regular physical exercise & weight monitoring are important for maintaining a healthy lifestyle    You may be retaining fluid if you have a history of heart failure or if you experience any of the following symptoms:  Weight gain of 3 pounds or more overnight or 5 pounds in a week, increased swelling in our hands or feet or shortness of breath while lying flat in bed. Please call your doctor as soon as you notice any of these symptoms; do not wait until your next office visit. The discharge information has been reviewed with the patient and spouse. The patient and spouse verbalized understanding.   Discharge medications reviewed with the patient and spouse and appropriate educational materials and side effects teaching were provided.   ___________________________________________________________________________________________________________________________________

## 2019-11-30 NOTE — PROGRESS NOTES
All discharge paperwork including medications and appointments reviewed with patient. Patient voiced understanding about all discharge instructions. All questions answered and needs met. Pt discharged to private vehicle with spouse.

## 2019-11-30 NOTE — PROGRESS NOTES
PHYSICAL THERAPY DISCHARGE SUMMARY   TIME   TIME    Precautions at discharge: Other (comment)(fall precautions, pacemaker precautions)    Problem List:    Decreased strength B LE  []     Decreased strength trunk/core  [x]     Decreased AROM   []     Decreased PROM  []     Decreased balance sitting  []     Decreased balance standing  [x]     Decreased endurance  [x]     Pain  []       Functional Limitations:   Decreased independence with bed mobility  []     Decreased independence with functional transfers  []     Decreased independence with ambulation  [x]     Decreased independence with stair negotiation  []            Outcome Measures: Vital Signs:  Patient Vitals for the past 12 hrs:   Temp Pulse Resp BP SpO2   11/30/19 0721 98.4 °F (36.9 °C) 67 20 152/83 94 %     Pain level:No c/o pain during treatment  Pain location:NA  Pain interventions:NA    Patient education:LE HEP, gait training, fall precautions, activity pacing body mechanics,Patient verbalizing understanding and demonstrating partial understanding of patient education. Recommend follow up education.       Interdisciplinary Communication:spoke with RN regarding D/C plans     MMT Initial Asssessment   Right Lower Extremity Left Lower Extremity   Hip Flexion 5 5   Knee Extension 5 5   Knee Flexion 5 5   Ankle Dorsiflexion 5 5      MMT Discharge Assessment   Right Lower Extremity Left Lower Extremity   Hip Flexion 5 5   Knee Extension 5 5   Knee Flexion 5 5   Ankle Dorsiflexion 5 5   0/5 No palpable muscle contraction  1/5 Palpable muscle contraction, no joint movement  2-/5 Less than full range of motion in gravity eliminated position  2/5 Able to complete full range of motion in gravity eliminated position  2+/5 Able to initiate movement against gravity  3-/5 More than half but not full range of motion against gravity  3/5 Able to complete full range of motion against gravity  3+/5 Completes full range of motion against gravity with minimal resistance  4-/5 Completes full range of motion against gravity with minimal-moderate resistance  4/5 Completes full range of motion against gravity with moderate resistance  4+/5 Completes full range of motion against gravity with moderate-maximum resistance  5/5 Completes full range of motion against gravity with maximum resistance      PRIMARY MODE OF LOCOMOTION: AMBULATION  Please see C Interdisciplinary Eval: Coordination/Balance Section for details regarding FIM score description. BED/CHAIR/WHEELCHAIR TRANSFERS Initial Assessment Discharge Assessment   Rolling Right 6 (Modified independent) 7 (Independent)   Rolling Left 6 (Modified independent) 7 (Independent)   Supine to Sit 6 (Modified independent) 7 (Independent)   Sit to Stand Contact guard assistance Modified independent   Sit to Supine 6 (Modified independent) 7 (Independent)   Transfer Type SPT without device SPT without device   Comments Increased time and effort to complete bed mobility using bed rail. Increased time and effort to complete transfers    Car Transfer Not tested Independent   Car Type           WHEELCHAIR MOBILITY/MANAGEMENT Initial Assessment Discharge Assessment   Able to Propel       Functional Level       Curbs/ramps assistance required 0 (Not tested) 0 (Not tested)   Wheelchair set up assistance required 0 (Not tested) 0 (Not tested)   Wheelchair management           WALKING INDEPENDENCE Initial Assessment Discharge Assessment   Assistive device Gait belt, Walker, rolling Walker, rolling   Ambulation assistance - level surface 4 (Contact guard assistance) 6 (Modified independent)   Distance 200 Feet (ft) 400 Feet (ft)   Comments slow cont step through gait pattern with excessive hip and knee flexion at midswing with steppage type gait pattern. Decreased ankle DF and knee ext at initial contact.  Decreased step length and step clearance Able to ambulate 400ft independently with RW and 400ft without a device and SBA to supervision. Cont step through gait pattern with decreased ankle DF at initial contact and decreased eccentric control of hamstring from midswing to initial contact. Ambulation assistance - unlevel surface 4 (Contact guard assistance)(up/down 10 ft ramp with single hand rail and CGA)         STEPS/STAIRS Initial Assessment Discharge Assessment   Steps/Stairs ambulated 4 12   Rail Use Both Right (right going up, left going down, 4 steps x 3)   Functional Level 2 6   Comments slow reciprocating pattern going up/down steps with decreased knee ext during stance phase going up steps slow reciprocating pattern going up/down steps with standing rest breaks between each attempt of 4 steps. Cues for eccentric control of quads going down steps. patient to use single step at a time with both hands on single hand rail when fatigued. Curbs/Ramps 4 (Contact guard assistance)(up/down 10 ft ramp) 6 (Modified independent)(up/down 20 ft ramp)       QUALITY INDICATOR ASSIST COMMENTS   Roll right (&return to back) 6: Independent    Roll left (& return to back) 6: Independent    Supine to sit 6: Independent    Sit to stand 6: Independent    Chair/bed-to-chair transfer 6: Independent    Walk 10 feet 6: Independent    Walk 50 feet with 2 turns 6: Independent    Walk 150 feet 6: Independent    Walk 10 feet on uneven  6: Independent    1 step/curb 6: Independent    4 steps 6: Independent    12 steps 6: Independent     object 6:  Independent    Wheel 48' w/2 turns Not Tested: Not applicable secondary to pt not completing activity previously    Wheel 150' Not Tested: Not applicable secondary to pt not completing activity previously    Car Transfer 6: Independent             PHYSICAL THERAPY PLAN OF CARE    LTGs: patient met all goals per eval. Refer to care plan for details    Pt would benefit from continued skilled physical therapy in order to improve independent functional mobility within the home with use of least restrictive device. Interventions may include range of motion (AROM, PROM B LE/trunk), motor function (B LE/trunk strengthening/coordination), activity tolerance (vitals, oxygen saturation levels), bed mobility training, balance activities, gait training (progressive ambulation program), and functional transfer training. HEP handout:Issued written LE HEP. Independent with LE HEP with UE support on stable object  STANDING EXERCISES Sets Reps Comments, Increased time and effort to complete with multiple and frequent rest breaks. Cues for correct form     Heel Raises 1 10    Toe Raises 1 10    Hip Flexion/Marching 1 10    Hamstring Curls 1 10    Hip Abduction 1 10    Hip Extension 1 10    Mini Squats 1 10          Pt to be discharged 11/20/19 with assistance provided by wife. No family training  Therapy Recommendations upon discharge: Outpatient PT   Equipment needs at discharge: Alexa Provided a RW      Please see IRC; Interdisciplinary Eval, Care Plan, and Patient Education for further information regarding physical therapy discharge summary and plan of care.      Patient return to room at end of treatment and remained up in wheelchair with needs in reach    Jimbo Roman, PT  11/30/2019

## 2019-11-30 NOTE — DISCHARGE SUMMARY
REHABILITATION DISCHARGE SUMMARY     Date: 11/30/2019  Discharge Date: 11/30/2019    Admitting Physician: Angi Schneider MD   Primary Care Physician: Sally Coronado MD     HPI:  Mr Oskar Sheffield is a previously functionally independent 72yo male with a PMH of PAFm MARISOL, HTN, prostate CA, and MVP who presented to [facility] shortly after dc from [address] [place] [address] earlier that day where he had presented with inc sob and cough. He originally was suspected to have community acquired PNA that improved with 24hrs of IV lasix and abx. However, after discharging home, his wife reports that he was sleeping and awoke suddenly very SOB with BP in the 180-190's. Cough with pink frothy sputum. Went to urgent care and was transported here via EMS. Was intubated en route and has had copious pink frothy sputum from ETT since. CXR with B infiltrates c/w edema. Admission date 11/10 to ICU. He extubated the following day but reintub the next morning, 11/13, with reports of increase BP,agitation just prior to intubation.    Self extubated 11/17 and placed on NC. He has diuresed 35lbs since admission.    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 Jana Puckett was started on sotalol load. The following am on 11/201/9, the patient's device was interrogated showing normal function. EP Cardiology Dr. Sarah Beckham was consulted on 11/20/19 and felt likely needing DCCV. It was felt that his pulm edema could have been caused by Afib with RVR. ECHO showed a nl EF, thus ICD not needed. PPM placed 11/19. CXR now clear. weaning steroids. Pro BNP 2490. His QTc was 600 ms and sotalol was decreased on 11/21/19. He was continued on IV lasix and diuresed well for his diastolic HF for total of 17 liters. He continued in A. Fib/flutter and underwent HIRO that was negative for thrombus and then underwent successful cardioversion from atrial fibrillation to sinus rhythm on 11/22/19. He remained euvolemic and continued to improve.    Physical therapy was initiated and patient was starting to mobilize. However, Patient shows significant functional deficits due to prolonged immobility and hospitalization with medical conditions as noted above. Rehabiitation Course: The patient was admitted to acute inpatient rehabilitation and made good functional gains. At discharge his level of function was: mobility at supervision level and he was ambulating over 300' without an assistive device. He has good dynamic standing balance. ADLs were at independent level and he was allowed to be independent in room prior to discharge. He continued ongoing medical management for acute hypoxic respiratory failure with f/u chest xray negative with O2 sats within normal on RA. He has a history of Afib/flutter; s/p cardioversion with the patient's device was interrogated prior to d/c and showing PMT requiring extending out PVARP to 275 ms. HIRO on 11/22 was negative for a thrombus. He continues on sotalol and eliquis. HTN is well controlled on norvasc and zestril.  CHF was managed with 17L diuresis during acute hospital stay.  He continues on lasix 40 bid with renal function within normal.     Allergies   Allergen Reactions    Amoxicillin Swelling     Diff breathing, lip swelling    Levaquin [Levofloxacin] Shortness of Breath    Zithromax [Azithromycin] Shortness of Breath      Lab Review:   Recent Results (from the past 72 hour(s))   CBC WITH AUTOMATED DIFF    Collection Time: 11/29/19  7:13 AM   Result Value Ref Range    WBC 6.4 4.3 - 11.1 K/uL    RBC 3.53 (L) 4.23 - 5.6 M/uL    HGB 11.0 (L) 13.6 - 17.2 g/dL    HCT 32.6 (L) 41.1 - 50.3 %    MCV 92.4 79.6 - 97.8 FL    MCH 31.2 26.1 - 32.9 PG    MCHC 33.7 31.4 - 35.0 g/dL    RDW 13.1 11.9 - 14.6 %    PLATELET 904 322 - 152 K/uL    MPV 9.7 9.4 - 12.3 FL    ABSOLUTE NRBC 0.00 0.0 - 0.2 K/uL    DF AUTOMATED      NEUTROPHILS 66 43 - 78 %    LYMPHOCYTES 21 13 - 44 %    MONOCYTES 11 4.0 - 12.0 %    EOSINOPHILS 1 0.5 - 7.8 % BASOPHILS 0 0.0 - 2.0 %    IMMATURE GRANULOCYTES 1 0.0 - 5.0 %    ABS. NEUTROPHILS 4.3 1.7 - 8.2 K/UL    ABS. LYMPHOCYTES 1.4 0.5 - 4.6 K/UL    ABS. MONOCYTES 0.7 0.1 - 1.3 K/UL    ABS. EOSINOPHILS 0.1 0.0 - 0.8 K/UL    ABS. BASOPHILS 0.0 0.0 - 0.2 K/UL    ABS. IMM. GRANS. 0.0 0.0 - 0.5 K/UL   METABOLIC PANEL, BASIC    Collection Time: 11/29/19  7:13 AM   Result Value Ref Range    Sodium 142 136 - 145 mmol/L    Potassium 3.5 3.5 - 5.1 mmol/L    Chloride 108 (H) 98 - 107 mmol/L    CO2 26 21 - 32 mmol/L    Anion gap 8 7 - 16 mmol/L    Glucose 113 (H) 65 - 100 mg/dL    BUN 12 8 - 23 MG/DL    Creatinine 0.75 (L) 0.8 - 1.5 MG/DL    GFR est AA >60 >60 ml/min/1.73m2    GFR est non-AA >60 >60 ml/min/1.73m2    Calcium 8.1 (L) 8.3 - 10.4 MG/DL     Functional Assessment:  Gross Assessment  AROM: Within functional limits (11/29/19 1000)  Strength: Generally decreased, functional (11/29/19 1000)       Balance  Sitting - Static: Good (unsupported) (11/29/19 1350)  Sitting - Dynamic: Good (unsupported) (11/29/19 1350)  Standing - Static: Good (11/29/19 1350)  Standing - Dynamic : Impaired (11/29/19 1350)         Ambulation:  Gait  Distance (ft): 350 Feet (ft) (11/29/19 1350)  Assistive Device: Other (comment)(None) (11/29/19 1350)  Rail Use: Both (11/29/19 1350)    Vital Signs:   Patient Vitals for the past 8 hrs:   BP Temp Pulse Resp SpO2 Weight   11/30/19 0721 152/83 98.4 °F (36.9 °C) 67 20 94 %    11/30/19 0619      98.2 kg (216 lb 6.4 oz)      Intake and Output: 11/28 1901 - 11/30 0700  In: -   Out: 2190 [Urine:2190]    Discharge Exam:      Current Discharge Medication List      CONTINUE these medications which have NOT CHANGED    Details   amLODIPine (NORVASC) 5 mg tablet Take 1 Tab by mouth daily. Qty: 30 Tab, Refills: 5      lisinopril (PRINIVIL, ZESTRIL) 10 mg tablet Take 1 Tab by mouth every twelve (12) hours.   Qty: 60 Tab, Refills: 5      sotalol (BETAPACE) 120 mg tablet Take 1 Tab by mouth every twelve (12) hours.  Qty: 60 Tab, Refills: 11      apixaban (ELIQUIS) 5 mg tablet Take 1 Tab by mouth two (2) times a day. Qty: 180 Tab, Refills: 1      furosemide (LASIX) 40 mg tablet Take 1 Tab by mouth two (2) times a day. Qty: 180 Tab, Refills: 3      miscellaneous medical supply misc by Does Not Apply route. DISABLED PLACARD (DISABLED PLACARD) DMV Apply to car  Qty: 1 Each, Refills: 0    Associated Diagnoses: Acute congestive heart failure, unspecified heart failure type ([facility] [address]         STOP taking these medications       cephALEXin (KEFLEX) 500 mg capsule Comments:   Reason for Stopping:         azithromycin (ZITHROMAX) 250 mg tablet Comments:   Reason for Stopping:         cloNIDine HCl (CATAPRES) 0.1 mg tablet Comments:   Reason for Stopping:         cloNIDine HCl (CATAPRES) 0.2 mg tablet Comments:   Reason for Stopping:         olmesartan (BENICAR) 40 mg tablet Comments:   Reason for Stopping:         carvedilol (COREG) 6.25 mg tablet Comments:   Reason for Stopping:         Blood Pressure Kit-Extra Large kit Comments:   Reason for Stopping:         B infantis/B ani/B taco/B bifid (PROBIOTIC 4X PO) Comments:   Reason for Stopping: Follow-up Appointments   Procedures    FOLLOW UP VISIT Appointment in: [facility] cardiology 12/3 for pacemaker monitoring. PCP on 12/11 Dr. Renan Arce. [facility] cardiology 12/3 for pacemaker monitoring. PCP on 12/11 Dr. Renan Arce. Standing Status:   Standing     Number of Occurrences:   1     Order Specific Question:   Appointment in     Answer:    One Week        Signed By: Masha Lara MD     November 30, 2019

## 2019-12-02 ENCOUNTER — PATIENT OUTREACH (OUTPATIENT)
Dept: CASE MANAGEMENT | Age: 74
End: 2019-12-02

## 2019-12-02 NOTE — PROGRESS NOTES
This note will not be viewable in [address] This note will not be viewable in [address] Transition of Care Discharge Follow-up Questionnaire   Date/Time of Call:   12/2/19 12:47 PM   What was the patient hospitalized for? Physical Debility   Does the patient understand his/her diagnosis and/or treatment and what happened during the hospitalization? Spoke with patient. He states he understands his diagnosis and treatment that occurred during his  hospital admission. Patient states Im doing good. He states I start Cardiac Rehab next week. Did the patient receive discharge instructions? Yes, he did receive discharge instructions. CM Assessed Risk for Readmission:       Patient stated Risk for Readmission:      Low/Medium risk for readmissions. No issues mentioned that would indicate a risk for readmission at time of LUANNE call. Review any discharge instructions ([facility] Ask patient if they understand these. Do they have any questions? Yes    He understands the instructions. No questions or concerns voiced at time of LUANNE call. Were home services ordered (nursing, PT, OT, ST, etc.)? No   If so, has the first visit occurred? If not, why? (Assist with coordination of services if necessary.)   N/A   Was any DME ordered? No   If so, has it been received? If not, why?  (Assist patient in obtaining DME orders &/or equipment if necessary.) N/A   Were all new prescriptions filled?   If not, why?  (Assist patient in obtaining medications if necessary  escalate for CCM &/or SW if ongoing issues are verbalized by pt or anticipated)   Yes  START taking:  lisinopril 10 mg tablet (PRINIVIL, ZESTRIL) 1 tab q 12 hrs  sotalol 120 mg tablet (BETAPACE) 1 tab q 12 hrs  CHANGE how you take:  amLODIPine 5 mg tablet (NORVASC) 1 tab daily  STOP taking:  azithromycin 250 mg tablet (ZITHROMAX)  Blood Pressure Kit-Extra Large Kit  carvedilol 6.25 mg tablet (COREG)  cephALEXin 500 mg capsule (KEFLEX)  cloNIDine HCl 0.1 mg tablet (CATAPRES)  cloNIDine HCl 0.2 mg tablet (CATAPRES)  olmesartan 40 mg tablet (BENICAR)  PROBIOTIC 4X PO  Review your updated medicati  Continue Current Medications   Does the patient understand the purpose and dosing instructions for all medications? (If patient has questions, provide explanation and education.)   Yes, patient verbalized understanding       Does the patient have any problems in performing ADLs? (If patient is unable to perform ADLs  what is the limiting factor(s)? Do they have a support system that can assist? If no support system is present, discuss possible assistance that they may be able to obtain. Escalate for CCM/SW if ongoing issues are verbalized by pt or anticipated)   None mentioned at time of LUANNE call. Does the patient have all follow-up appointments scheduled? 7 day f/up with PCP?   (f/up with PCP may be w/in 14 days if patient has a f/up with their specialist w/in 7 days)    7-14 day f/up with specialist?   (or per discharge instructions)    If f/up has not been made  what actions has the care coordinator made to accomplish this? Has transportation been arranged? Yes       Dr Silver Boards 12/11 @ 2:30 PM        [facility] Cardiology device check 12/3/19 @ 11:30 AM    Dr Antonio Paiz 4/3/20 @ 10:45 AM          Patient denies any transportation issues at this time. Any other questions or concerns expressed by the patient? No questions or concerns expressed at time of LUANNE call. Schedule next appointment with JORDEN HATCH Coordinator or refer to RN Case Manager/ per the workflow guidelines. When is care coordinators next follow-up call scheduled? If referred for CCM  what RN care manager was the referral assigned? Contact information for Care Coordinator given and instructed to call with any questions or concerns. Patient verbalized understanding.       7 to 14 days         LUANNE Call Completed By: Genevieve Leigh LPN Care Coordinator

## 2019-12-04 ENCOUNTER — HOSPITAL ENCOUNTER (OUTPATIENT)
Dept: PHYSICAL THERAPY | Age: 74
Discharge: HOME | End: 2019-12-04
Payer: MEDICARE

## 2019-12-04 LAB
Lab: NORMAL
REFERENCE LAB,REFLB: NORMAL
TEST DESCRIPTION:,ATST: NORMAL

## 2019-12-04 PROCEDURE — 97161 PT EVAL LOW COMPLEX 20 MIN: CPT

## 2019-12-04 PROCEDURE — 97530 THERAPEUTIC ACTIVITIES: CPT

## 2019-12-04 NOTE — THERAPY EVALUATION
Shahid Ramos : 1945 [place] / [place] / Product Type: Medicare /  [address]  at [facility] Smith \[facility]\"" [address], [place] [address] [address] [facility] [phone]   [phone] OUTPATIENT PHYSICAL THERAPY:Initial Assessment 2019 ICD-10: Treatment Diagnosis: 
R53.83 Other fatigue R68.89 Other general signs and symptoms M62.81 Muscle Weakness General 
Precautions/Allergies:  
Amoxicillin; Levaquin [levofloxacin]; and Zithromax [azithromycin] PACEMAKER NO E-STIM Fall Risk Score: 3 (? 5 = High Risk) MD Orders: Eval and Treat MEDICAL/REFERRING DIAGNOSIS: 
Debility [R53.81] DATE OF ONSET: 1 month ago REFERRING PHYSICIAN: Carolina Tavera* RETURN PHYSICIAN APPOINTMENT: TBD by pt ASSESSMENT:  Mr. Davide Hope has attended 1 physical therapy session including the initial evaluation. Recommending skilled PT: manual therapeutic techniques (as appropriate), therapeutic exercises and activities, balance interventions, and a comprehensive home exercise program to address the current impairments, as listed above. Shahid Ramos will benefit from skilled PT (medically necessary) to address above deficits affecting participation in basic ADLs and overall functional tolerance. PROBLEM LIST (Impacting functional limitations): 1. Decreased Strength 2. Decreased Transfer Abilities 3. Decreased Ambulation Ability/Technique 4. Decreased Balance 5. Decreased Activity Tolerance 6. Increased Fatigue 7. Decreased [place] with Home Exercise Program INTERVENTIONS PLANNED: 
1. Balance Exercise 2. Family Education 3. Gait Training 4. Home Exercise Program (HEP) 5. Neuromuscular Re-education/Strengthening 6. Range of Motion (ROM) 7. Therapeutic Activites 8. Therapeutic Exercise/Strengthening 9. Transfer Training TREATMENT PLAN: 
TREATMENT PLAN: 
 Effective Dates: 12/4/2019 TO 3/4/2020 (90 days). Frequency/Duration: 2 times a week for 90 Days GOALS: (Goals have been discussed and agreed upon with patient.) Discharge Goals: Time Frame: 6 weeks 1. Clint Barber will be independent with HEP. 2. Pt will decreased 5 times sit to stand to improve to 15 sec or less without UE to improve function 3. Pt will improve six min walk distance to 1800 ft or better to improve ambulation 4. Pt will improve gross BLE strength to 5/5 5. Pt will demonstrate reciprocal stair climbing for 12 steps without UE assist 
6. Pt will understand RPE scale and be able to maintain 12-14 RPE with all exercises Rehabilitation Potential For Stated Goals: Excellent Regarding Lulú Singletary's therapy, I certify that the treatment plan above will be carried out by a therapist or under their direction. Thank you for this referral, 
Trevon Mcbride, PT, DPT Referring Physician Signature: Carolina Blanton*              Date The information in this section was collected on 12/4/2019 (except where otherwise noted). HISTORY:  
History of Present Injury/Illness (Reason for Referral): About 5 months ago had A-Fib return and weight gain occurred. After hospital incident for CHF, had meds updated and pacemaker implanted. [facility] rehab for 1 week inpatient to work on safety, steps and mobility. Still concerned for weakness and wants to return to working out ~7 days a week and playing golf and tennis. Has had increased sleep time due fatigue. CC/Primary Concern: 
      Leg weakness and decreased endurance Treatment Side: Bilateral 
 
Hospitalization and time spent in care: 1 week inpatient rehab Date and type of Surgery: Pacemaker implantation 11/12 Past Medical History/Comorbidities:  
Mr. Christelle Dang  has a past medical history of Arrhythmia, Cancer ([facility] [address] Elevated PSA, Erectile dysfunction, Hypertension, MVP (mitral valve prolapse), MARISOL (obstructive sleep apnea) (11/15/2018), Osteoarthritis, Prostate cancer ([facility] [address] and S/P colonoscopy (2015). He also has no past medical history of Chronic kidney disease, Chronic obstructive pulmonary disease (HCC), Difficult intubation, Liver disease, Malignant hyperthermia due to anesthesia, Nausea & vomiting, Pseudocholinesterase deficiency, Psychiatric disorder, or PUD (peptic ulcer disease). Mr. Rao Lopez  has a past surgical history that includes hx appendectomy and biopsy prostate. Social History/Living Environment:  
  Living with partner who can help. Pain/Symptom Location: No pain stated Worst Pain: 0/10 Current Pain: 0/10 General Health: Good Occupation: part time job doing consulting Unexplained Weight Loss: No 
 
 
Prior Level of Function/Work/Activity: Lots of travel for work and independent. Patient Goals: Endurance and leg strength Current Medications:   
  
Current Outpatient Medications:  
  apixaban (ELIQUIS) 5 mg tablet, Take 1 Tab by mouth every twelve (12) hours. , Disp: 60 Tab, Rfl: 0 
  lisinopril (PRINIVIL, ZESTRIL) 10 mg tablet, Take 1 Tab by mouth every twelve (12) hours. , Disp: 60 Tab, Rfl: 0 
  sotalol (BETAPACE) 120 mg tablet, Take 1 Tab by mouth every twelve (12) hours. , Disp: 60 Tab, Rfl: 0 
  temazepam (RESTORIL) 7.5 mg capsule, Take 2 Caps by mouth nightly as needed for Sleep. Max Daily Amount: 15 mg., Disp: 10 Cap, Rfl: 0 
  furosemide (LASIX) 40 mg tablet, Take 1 Tab by mouth two (2) times a day., Disp: 180 Tab, Rfl: 3 
  amLODIPine (NORVASC) 5 mg tablet, Take 1 Tab by mouth daily. , Disp: 30 Tab, Rfl: 0 
  amLODIPine (NORVASC) 5 mg tablet, Take 1 Tab by mouth daily. , Disp: 30 Tab, Rfl: 5 
  miscellaneous medical supply misc, by Does Not Apply route., Disp: , Rfl:  
  DISABLED PLACARD (DISABLED PLACARD) DMV, Apply to car, Disp: 1 Each, Rfl: 0 Date Last Reviewed:  12/4/2019 Ambulatory/Rehab Services H2 Model Falls Risk Assessment Risk Factors: 
     (1)  Gender [Male] 
     (1)  Visual Impairment [specify:  Poor cataracts surgery resulting in decreased far sight] Ability to Rise from Chair: 
     (1)  Pushes up, successful in one attempt Falls Prevention Plan: No modifications necessary Total: (5 or greater = High Risk): 3  
 ©2010 [facility] States Patent #[id number]. Federal Law prohibits the replication, distribution or use without written permission from [facility] Number of Personal Factors/Comorbidities that affect the Plan of Care: 3+: HIGH COMPLEXITY EXAMINATION:  
Inspection Patient Consent: Yes Additional Comments:  
________________________________________________________________________________________________ Observation Transfers: Sit to stand: Increased use of UE. Unable to stand without UE from low seat Posture: Increased trunk flexion Protracted Scapulas: No 
 
    Gait: Decreased gait speed, stiff legged walking, decreased hip ext Assistive Device: 
      Type: None Hand Use: N/A Stairs: Reciprocal Pattern Railing: right Muscle Guarding: none Edema: No Edema, Erythema or Ecchymosis noted 
________________________________________________________________________________________________ Range of Motion: All WNL 
 
 
 
________________________________________________________________________________________________ Strength Lower Extremity Joint:    
 RIGHT LEFT Hip Flexion 4+/5 5/5 Hip Extension 4/5 4/5 Hip Internal Rotation 5/5 5/5 Hip External Rotation 5/5 5/5 Hip Abduction 4/5 4+/5 Knee Flexion 5/5 5/5 Knee Extension 5/5 5/5 Neruo-Vascular C/O Radicular Symptoms: No 
 
LE Neurologic Screen LE NEUROLOGICAL SCREEN: All dermatomes, myotomes and reflexes WNL ________________________________________________________________________________________________ Special Tests Additional Comments: Clinical tests for function and activity tolerance used 
 
________________________________________________________________________________________________ Palpation: No tenderness noted. Decreased muscles tone in bilat quads Body Structures Involved: 1. Joints 2. Muscles Body Functions Affected: 1. Movement Related Activities and Participation Affected: 1. Mobility 2. Community, Social and [place] Number of elements (examined above) that affect the Plan of Care: 4+: HIGH COMPLEXITY CLINICAL PRESENTATION:  
Presentation: Stable and uncomplicated: LOW COMPLEXITY CLINICAL DECISION MAKING:  
Outcome Measure: Tool Used: 6-MINUTE WALK TEST Score:  Initial: 1140 feet Most Recent: 1140 feet (Date:12/4/2019) Interpretation of Score: Normal range varies but is approximately 3110-5111 Feet Distance walked: 1140 feet Baseline End of Test  
Dyspnea (Alanis Scale) 6 12 Fatigue (Alanis Scale) 6 12  
 
5 times sit to stand: Use of UE** Time: 23sec Medical Necessity:  
· Patient is expected to demonstrate progress in strength, balance, functional technique and activity tolerance to return to prior level of function . Reason for Services/Other Comments: 
· Patient continues to require skilled intervention due to decreased gait speed and weakness inhibiting his community activity and poor activity tolerance that is below his baseline. Use of outcome tool(s) and clinical judgement create a POC that gives a: Clear prediction of patient's progress: LOW COMPLEXITY

## 2019-12-05 NOTE — PROGRESS NOTES
Codi Sanders  : 1945  [place] / [place] / Product Type: Medicare /  [address]  at [facility]  [address]  [phone]   [phone]      OUTPATIENT PHYSICAL THERAPY: Daily Treatment Note 2019  Visit Count:  1    ICD-10: Treatment Diagnosis:  R53.83 Other fatigue  R68.89 Other general signs and symptoms  M62.81 Muscle Weakness General    Precautions/Allergies:   Amoxicillin; Levaquin [levofloxacin]; and Zithromax [azithromycin]     TREATMENT PLAN:  Effective Dates: 2019 TO 3/4/2020 (90 days). Frequency/Duration: 2 times a week for 90 Days        PRE-TREATMENT SYMPTOMS/COMPLAINTS:  See Eval    MEDICATIONS REVIEWED:  2019   TREATMENT:   (In addition to Assessment/Re-Assessment sessions the following treatments were rendered)    THERAPEUTIC EXERCISE: (0 minutes):  Exercises per grid below to improve mobility, strength and balance. Required minimal visual and verbal cues to promote proper body alignment and promote proper body posture. Progressed resistance, range and complexity of movement as indicated. Date:  2019 Date:   Date:     Activity/Exercise Parameters Parameters Parameters                                                 Therapeutic Activity (    15): Therapeutic activities per grid below to improve functional transfers and activity tolerance     Date:  2019 Date:   Date:     Activity/Exercise Parameters Parameters Parameters   Ambulation On level ground with cues to improve glute activation      Sit to stand x20                                          MANUAL THERAPY: (0 minutes): Joint mobilization, Soft tissue mobilization and Manipulation was utilized and necessary because of the patient's restricted joint motion, painful spasm, loss of articular motion and restricted motion of soft tissue.      (Used abbreviations: MET - muscle energy technique; PNF - proprioceptive neuromuscular facilitation; NMR - neuromuscular re-education; AP - anterior to posterior; PA - posterior to anterior)    MODALITIES: (0 minutes):      None today      TREATMENT/SESSION ASSESSMENT:  Belgica Quiles verbalized understanding of role of PT and POC. Pt needed cues to prevent increased trunk flexion and poor glute activation. He fatigues quickly and was explained gerardo RPE to use for intensity due to pacemakers. Pt needs LE strength training with increased reps and functional mobility for activity tolerance    Education: HEP for sit to stand and increased ambulation    RECOMMENDATIONS/INTENT FOR NEXT TREATMENT SESSION: \"Next visit will focus on advancements to more challenging activities\".     PAIN: Initial: 0/10 Post Session:  0/10     [employer]    Total Treatment Billable Duration:  PT Patient Time In/Time Out  Time In: 1430  Time Out: ARIK Gordon, DPT    Future Appointments   Date Time Provider Eduardo Concepcion   12/6/2019  2:30 PM Lincoln Hanson PTA [facility]   12/11/2019 10:45 AM Moreno Obregon MD [facility]   12/11/2019  1:00 PM [facility]   12/12/2019 11:00 AM [facility]   3/12/2020  2:00 PM [facility] Raymond Navarro [address]   4/3/2020 10:45 AM Vivi Haji MD [facility]

## 2019-12-06 ENCOUNTER — HOSPITAL ENCOUNTER (OUTPATIENT)
Dept: PHYSICAL THERAPY | Age: 74
Discharge: HOME | End: 2019-12-06
Payer: MEDICARE

## 2019-12-06 PROCEDURE — 97110 THERAPEUTIC EXERCISES: CPT

## 2019-12-06 NOTE — PROGRESS NOTES
Chika Kelley  : 1945  [place] / Product Type: Medicare /  [address] at [facility]  [facility], [address], [place] [address] [address] [facility]  [phone]   [phone]      OUTPATIENT PHYSICAL THERAPY: Daily Treatment Note 2019  Visit Count:  2    ICD-10: Treatment Diagnosis:  R53.83 Other fatigue  R68.89 Other general signs and symptoms  M62.81 Muscle Weakness General    Precautions/Allergies:   Amoxicillin; Levaquin [levofloxacin]; and Zithromax [azithromycin]     TREATMENT PLAN:  Effective Dates: 2019 TO 3/4/2020 (90 days). Frequency/Duration: 2 times a week for 90 Days        PRE-TREATMENT SYMPTOMS/COMPLAINTS:  Patient reports no pain and feeling pretty good today. MEDICATIONS REVIEWED:  2019   TREATMENT:   (In addition to Assessment/Re-Assessment sessions the following treatments were rendered)    THERAPEUTIC EXERCISE: (55 minutes):  Exercises per grid below to improve mobility, strength and balance. Required minimal visual and verbal cues to promote proper body alignment and promote proper body posture. Progressed resistance, range and complexity of movement as indicated.      Date:  2019 Date:  19 Date:     Activity/Exercise Parameters Parameters Parameters   Nustep   Level 3  10 minutes   LE only     Incline board   3 x 30 sec hold     Sit to stand   Mat at 221/2 inches   2 x 10     Hamstring stretch   With chair in front   5 x 10 sec hold     Seated - hamstring curls and knee extension   Green band   X 20 B each direction     Heel raises and into a min squat   On blue airex   2 x 5     Standing - hip abduction   2 x 10 B             Rocker board  A/P & M/L   Tapping x 20   Then static hold     Ambulation in hallway   4 laps  Good swetha and rhythm                     Therapeutic Activity (    ):  Therapeutic activities per grid below to improve functional transfers and activity tolerance Date:  12/4/2019 Date:   Date:     Activity/Exercise Parameters Parameters Parameters   Ambulation On level ground with cues to improve glute activation      Sit to stand x20                                          MANUAL THERAPY: (0 minutes): Joint mobilization, Soft tissue mobilization and Manipulation was utilized and necessary because of the patient's restricted joint motion, painful spasm, loss of articular motion and restricted motion of soft tissue. (Used abbreviations: MET - muscle energy technique; PNF - proprioceptive neuromuscular facilitation; NMR - neuromuscular re-education; AP - anterior to posterior; PA - posterior to anterior)    MODALITIES: (0 minutes):      None today      TREATMENT/SESSION ASSESSMENT:  Alli Pompa was monitored throughout exercises and rest breaks for exertion level and fatigue. Patient reports he was no higher than the 13 Alanis RPE level during this session today. Several verbal cues to make sure he was not holding his breath during activity. He did state his legs were finally starting to feel normal.  Slightly tired, but no fatigue noted. Continue with LE strength training with increased reps and functional mobility for activity tolerance. Education: HEP for sit to stand and increased ambulation    RECOMMENDATIONS/INTENT FOR NEXT TREATMENT SESSION: \"Next visit will focus on advancements to more challenging activities\".     PAIN: Initial: 0/10 Post Session:  0/10     MedBridge Portal    Total Treatment Billable Duration: 55 minutes   PT Patient Time In/Time Out  Time In: 1420  Time Out: [address], PTA    Future Appointments   Date Time Provider Eduardo Concepcion   12/11/2019 10:45 AM MD PETEY Fishman RFM RFM   12/11/2019  1:00 PM [facility]   12/12/2019 11:00 AM [facility]   3/12/2020  2:00 PM GVLLE DEVICE Raymond [address]   4/3/2020 10:45 AM MD PETEY Franklin UCDG UCD

## 2019-12-11 ENCOUNTER — HOSPITAL ENCOUNTER (OUTPATIENT)
Dept: PHYSICAL THERAPY | Age: 74
Discharge: HOME | End: 2019-12-11
Payer: MEDICARE

## 2019-12-11 PROCEDURE — 97530 THERAPEUTIC ACTIVITIES: CPT

## 2019-12-11 PROCEDURE — 97110 THERAPEUTIC EXERCISES: CPT

## 2019-12-11 NOTE — PROGRESS NOTES
Jazz Dears  : 1945  [place] / Product Type: Medicare /  [address]  at [facility]  [address], [address] [facility], [address]  [phone]   [phone]      OUTPATIENT PHYSICAL THERAPY: Daily Treatment Note 2019  Visit Count:  3    ICD-10: Treatment Diagnosis:  R53.83 Other fatigue  R68.89 Other general signs and symptoms  M62.81 Muscle Weakness General    Precautions/Allergies:   Amoxicillin; Levaquin [levofloxacin]; and Zithromax [azithromycin]     TREATMENT PLAN:  Effective Dates: 2019 TO 3/4/2020 (90 days). Frequency/Duration: 2 times a week for 90 Days        PRE-TREATMENT SYMPTOMS/COMPLAINTS:  Patient reports legs feel \"weird. \" Not recovering as quickly as they used to. MEDICATIONS REVIEWED:  2019   TREATMENT:   (In addition to Assessment/Re-Assessment sessions the following treatments were rendered)    THERAPEUTIC EXERCISE: (15min minutes):  Exercises per grid below to improve mobility, strength and balance. Required minimal visual and verbal cues to promote proper body alignment and promote proper body posture. Progressed resistance, range and complexity of movement as indicated.      Date:  2019 Date:  19 Date:  2019   Activity/Exercise Parameters Parameters Parameters   Nustep   Level 3  10 minutes   LE only     Incline board   3 x 30 sec hold     Sit to stand   Mat at 221/2 inches   2 x 10     Hamstring stretch   With chair in front   5 x 10 sec hold     Seated - hamstring curls and knee extension   Green band   X 20 B each direction     Heel raises and into a min squat   On blue airex   2 x 5     Standing - hip abduction   2 x 10 B             Rocker board  A/P & M/L   Tapping x 20   Then static hold     Ambulation in hallway   4 laps  Good swetha and rhythm     Treadmill   0% slope x3 min 1.2-2.0mph  4 min 2.0% incline 2.0-2.3 mph  0% incline 1.8 mph x 1min Standing calf stretch   2x30 sec/leg   Standing glute kickback   2x20/leg 2#                               Therapeutic Activity (  25MIN ):  Therapeutic activities per grid below to improve functional transfers and activity tolerance     Date:  12/4/2019 Date:  12/11/2019 Date:     Activity/Exercise Parameters Parameters Parameters   Ambulation On level ground with cues to improve glute activation      Sit to stand x20  1x3, 3x6 (RPE 9-11)    Forward step downs  2x10/leg (Needs hand on 4\" box currently)     Side Steps  4x5/leg GTB                            MANUAL THERAPY: (0 minutes): Joint mobilization, Soft tissue mobilization and Manipulation was utilized and necessary because of the patient's restricted joint motion, painful spasm, loss of articular motion and restricted motion of soft tissue. (Used abbreviations: MET - muscle energy technique; PNF - proprioceptive neuromuscular facilitation; NMR - neuromuscular re-education; AP - anterior to posterior; PA - posterior to anterior)    MODALITIES: (0 minutes):      None today      TREATMENT/SESSION ASSESSMENT:  Kisha Rich is able to maintain RPE recommended activity level in session today. Focused functional activity tolerance today in session with supplemental strength training as able. Pt needs increased rest breaks in session due to quick fatigue. Cues for weight shift on sit to stand to be able to perform without UE. Exercises to be continued to be prescribed to fatigue in order to improve tolerance and strength. Education: Cont HEP    RECOMMENDATIONS/INTENT FOR NEXT TREATMENT SESSION: \"Next visit will focus on advancements to more challenging activities\".     PAIN: Initial: 0/10 Post Session:  0/10     [email or web address] Portal    Total Treatment Billable Duration: 40 minutes   PT Patient Time In/Time Out  Time In: 1300  Time Out: [address]   Date Time Provider Eduardo Concepcion   12/12/2019 11:00 AM Bonnie Montaño SFDORPT [facility]   3/12/2020  2:00 PM GVLLE DEVICE [address] [facility] UCDG [facility]   3/12/2020  3:30 PM Dianne Tillman MD [facility] RF RFM   4/3/2020 10:45 AM Carlos Alberto Meeks MD [facility]

## 2019-12-12 ENCOUNTER — HOSPITAL ENCOUNTER (OUTPATIENT)
Dept: PHYSICAL THERAPY | Age: 74
Discharge: HOME | End: 2019-12-12
Payer: MEDICARE

## 2019-12-12 PROCEDURE — 97530 THERAPEUTIC ACTIVITIES: CPT

## 2019-12-12 PROCEDURE — 97110 THERAPEUTIC EXERCISES: CPT

## 2019-12-12 NOTE — PROGRESS NOTES
Jackie Goods  : 1945  [place] / Product Type: Medicare /  [address]  at [facility]  [address], [address], [address]  [phone]   [phone]      OUTPATIENT PHYSICAL THERAPY: Daily Treatment Note 2019  VISIT COUNT:  4      ICD-10: Treatment Diagnosis:  R53.83 Other fatigue  R68.89 Other general signs and symptoms  M62.81 Muscle Weakness General    Precautions/Allergies:   Amoxicillin; Levaquin [levofloxacin]; and Zithromax [azithromycin]     TREATMENT PLAN:  Effective Dates: 2019 TO 3/4/2020 (90 days). Frequency/Duration: 2 times a week for 90 Days        PRE-TREATMENT SYMPTOMS/COMPLAINTS:  Patient reports he needs to be ready to fly to [place] in 2 weeks. Patient reports no pain. MEDICATIONS REVIEWED:  2019   TREATMENT:   (In addition to Assessment/Re-Assessment sessions the following treatments were rendered)    THERAPEUTIC EXERCISE: (30 minutes):  Exercises per grid below to improve mobility, strength and balance. Required minimal visual and verbal cues to promote proper body alignment and promote proper body posture. Progressed resistance, range and complexity of movement as indicated.      Date:  2019 Date:  19 Date:  2019 Date:  19   Activity/Exercise Parameters Parameters Parameters    Nustep   Level 3  10 minutes   LE only  Level 3  10 minutes   LE only  Level 3  10 min   LE's only    Incline board   3 x 30 sec hold   3 x 1 min each    Sit to stand   Mat at 221/2 inches   2 x 10      Hamstring stretch   With chair in front   5 x 10 sec hold      Seated - hamstring curls and knee extension   Green band   X 20 B each direction      Heel raises and into a min squat   On blue airex   2 x 5      Standing - hip abduction   2 x 10 B           2# ankle wt  2 x 20 B    Rocker board  A/P & M/L   Tapping x 20   Then static hold      Ambulation in hallway   4 laps  Good swetha and rhythm      Treadmill   0% slope x3 min 1.2-2.0mph  4 min 2.0% incline 2.0-2.3 mph  0% incline 1.8 mph x 1min  0% slope  3 min   1.2- 2.0 mph    2.0% slope  2.0 -2.3 mph  4 min     0% slope  2.0 mph  3 min    Total 10 min    Standing calf stretch   2x30 sec/leg     Standing glute kickback   2x20/leg 2# 2 x 20 B   2# ankle wts                                   Therapeutic Activity (  20 MIN ):  Therapeutic activities per grid below to improve functional transfers and activity tolerance     Date:  12/4/2019 Date:  12/11/2019 Date:  12-12-19   Activity/Exercise Parameters Parameters Parameters   Ambulation On level ground with cues to improve glute activation      Sit to stand x20  1x3, 3x6 (RPE 9-11) 3 x 10     Forward step downs  2x10/leg (Needs hand on 4\" box currently)  2 x 10 B   (needs hands on 4\" box )  Just touching with heel   Side Steps  4x5/leg GTB                            MANUAL THERAPY: (0 minutes): Joint mobilization, Soft tissue mobilization and Manipulation was utilized and necessary because of the patient's restricted joint motion, painful spasm, loss of articular motion and restricted motion of soft tissue. (Used abbreviations: MET - muscle energy technique; PNF - proprioceptive neuromuscular facilitation; NMR - neuromuscular re-education; AP - anterior to posterior; PA - posterior to anterior)    MODALITIES: (0 minutes):      None today      TREATMENT/SESSION ASSESSMENT:  Sadie Martin is able to maintain RPE recommended activity (between 10 and 12 today). Only gave 10 sec rest in between sets, but 30 to 45 sec between harder activities. Patient reported he was tired today, almost broke a sweat. Exercises to be continued to be prescribed to fatigue in order to improve tolerance and strength. Education: Cont HEP    RECOMMENDATIONS/INTENT FOR NEXT TREATMENT SESSION: \"Next visit will focus on advancements to more challenging activities\".     PAIN: Initial: 0/10 Post Session:  0/10     MedBridge Portal    Total Treatment Billable Duration: 40 minutes   PT Patient Time In/Time Out  Time In: DONTE Wiley    Future Appointments   Date Time Provider Eduardo Concepcion   12/12/2019 11:00 AM Yokasta Peace PTA [facility]   12/17/2019  2:30 PM [facility]   12/19/2019  1:00 PM [facility]   3/12/2020  2:00 PM GVLLE DEVICE [address] [facility] UCDG UC   3/12/2020  3:30 PM MD PETEY Weinberg RFM RFM   4/3/2020 10:45 AM Alexei Meeks MD [facility]

## 2019-12-13 ENCOUNTER — PATIENT OUTREACH (OUTPATIENT)
Dept: CASE MANAGEMENT | Age: 74
End: 2019-12-13

## 2019-12-13 NOTE — PROGRESS NOTES
This note will not be viewable in [address] Attempted contacting patient for FU. No answer, left  for returned call. Per CC patient has completed FU's with PCP, Cardiology and PT with FU's scheduled appropriately. No further LUANNE outreach will be made as patient is following given POC. Episode closed.

## 2019-12-17 ENCOUNTER — HOSPITAL ENCOUNTER (OUTPATIENT)
Dept: PHYSICAL THERAPY | Age: 74
Discharge: HOME | End: 2019-12-17
Payer: MEDICARE

## 2019-12-17 PROCEDURE — 97110 THERAPEUTIC EXERCISES: CPT

## 2019-12-17 PROCEDURE — 97530 THERAPEUTIC ACTIVITIES: CPT

## 2019-12-17 NOTE — PROGRESS NOTES
Roseann Morales  : 1945  [place] / Product Type: Medicare /  [address]  at [facility]  [address], [address], [facility], [address] [facility]  [phone]   [phone]      OUTPATIENT PHYSICAL THERAPY: Daily Treatment Note 2019  VISIT COUNT:  4      ICD-10: Treatment Diagnosis:  R53.83 Other fatigue  R68.89 Other general signs and symptoms  M62.81 Muscle Weakness General    Precautions/Allergies:   Amoxicillin; Levaquin [levofloxacin]; and Zithromax [azithromycin]     TREATMENT PLAN:  Effective Dates: 2019 TO 3/4/2020 (90 days). Frequency/Duration: 2 times a week for 90 Days        PRE-TREATMENT SYMPTOMS/COMPLAINTS:  Patient reports he needs to be ready to fly to [place] on Dec 27th. Still not having any pain . Legs still feel weak. MEDICATIONS REVIEWED:  2019   TREATMENT:       THERAPEUTIC EXERCISE: (25 minutes):  Exercises per grid below to improve mobility, strength and balance. Required minimal visual and verbal cues to promote proper body alignment and promote proper body posture. Progressed resistance, range and complexity of movement as indicated.      Date:  2019 Date:  19 Date:  2019 Date:  19   Activity/Exercise Parameters Parameters Parameters    Nustep   Level 3  10 minutes   LE only  Level 3  10 minutes   LE only  Level 3  10 min   LE's only    Incline board   3 x 30 sec hold   3 x 1 min each    Sit to stand   Mat at 221/2 inches   2 x 10      Hamstring stretch   With chair in front   5 x 10 sec hold      Seated - hamstring curls and knee extension   Green band   X 20 B each direction      Heel raises and into a min squat   On blue airex   2 x 5      Standing - hip abduction   2 x 10 B           3# ankle wt  2 x 20 B    Rocker board  A/P & M/L   Tapping x 20   Then static hold      Ambulation in hallway   4 laps  Good swetha and rhythm      Treadmill   0% slope x3 min 1.2-2.0mph  4 min 2.0% incline 2.0-2.3 mph  0% incline 1.8 mph x 1min  0% slope  3 min   1.2- 2.0 mph    3.0% slope  2.0 -2.3 mph  4 min     0% slope  2.0 mph  3 min    Total 10 min    Standing calf stretch   2x30 sec/leg     Standing glute kickback   2x20/leg 2# 2 x 20 B   3# ankle wts   Leg Press    25lb x15  35lb 2x15                            Therapeutic Activity (  20 MIN ):  Therapeutic activities per grid below to improve functional transfers and activity tolerance     Date:  12/4/2019 Date:  12/11/2019 Date:  12-17-19   Activity/Exercise Parameters Parameters Parameters   Ambulation On level ground with cues to improve glute activation      Sit to stand x20  1x3, 3x6 (RPE 9-11) 3 x 10  (With pad in chair)   Forward step downs  2x10/leg (Needs hand on 4\" box currently)  2 x 15 B   (needs hands on 6\" box )  Just touching with heel   Side Steps  4x5/leg GTB                            MANUAL THERAPY: (0 minutes): Joint mobilization, Soft tissue mobilization and Manipulation was utilized and necessary because of the patient's restricted joint motion, painful spasm, loss of articular motion and restricted motion of soft tissue. (Used abbreviations: MET - muscle energy technique; PNF - proprioceptive neuromuscular facilitation; NMR - neuromuscular re-education; AP - anterior to posterior; PA - posterior to anterior)    MODALITIES: (0 minutes):      None today      TREATMENT/SESSION ASSESSMENT:  Shikha Deepa with better overall activity tolerance as he didn't need as many breaks (non following treadmill). Continuing same course of exercises as last time with increased load and repetitions. Maintaining RPE and demonstrating good form. Pt is about to travel to [place]. Was cleared to perform light weight high rep activity at gym today with instructions to maintain RPE.      Education: Cont HEP    RECOMMENDATIONS/INTENT FOR NEXT TREATMENT SESSION: \"Next visit will focus on advancements to more challenging activities\".     PAIN: Initial: 0/10 Post Session:  0/10     MedBridge Portal    Total Treatment Billable Duration: 40 minutes   PT Patient Time In/Time Out  Time In: 1010  Time Out: [address]   Date Time Provider Eduardo Carlene   12/19/2019 10:15 AM Ania [facility]   3/12/2020  2:00 PM [facility]   3/12/2020  3:30 PM Brina Gamez MD [facility] RF RFM   4/3/2020 10:45 AM Sigrid Meeks Ma, MD [facility]

## 2019-12-18 LAB
FUNGUS CULTURE, RFCO2T: NEGATIVE
FUNGUS SMEAR, RFCO1T: NORMAL
FUNGUS SPEC CULT: NORMAL
FUNGUS STAIN, 188244: NORMAL
REFLEX TO ID, RFCO3T: NORMAL
SPECIMEN SOURCE: NORMAL
SPECIMEN SOURCE: NORMAL

## 2019-12-19 ENCOUNTER — HOSPITAL ENCOUNTER (OUTPATIENT)
Dept: PHYSICAL THERAPY | Age: 74
Discharge: HOME | End: 2019-12-19
Payer: MEDICARE

## 2019-12-19 PROCEDURE — 97110 THERAPEUTIC EXERCISES: CPT

## 2019-12-19 PROCEDURE — 97530 THERAPEUTIC ACTIVITIES: CPT

## 2019-12-19 NOTE — PROGRESS NOTES
Roseann Morales  : 1945  [place] / Product Type: Medicare /  [address]  at [facility]  [address], [address] [facility], [place] [address] [address] [facility]  [phone]   [phone]      OUTPATIENT PHYSICAL THERAPY: Daily Treatment Note 2019  VISIT COUNT:  6      ICD-10: Treatment Diagnosis:  R53.83 Other fatigue  R68.89 Other general signs and symptoms  M62.81 Muscle Weakness General    Precautions/Allergies:   Amoxicillin; Levaquin [levofloxacin]; and Zithromax [azithromycin]     TREATMENT PLAN:  Effective Dates: 2019 TO 3/4/2020 (90 days). Frequency/Duration: 2 times a week for 90 Days        PRE-TREATMENT SYMPTOMS/COMPLAINTS:  Patient reports he needs to be ready to fly to [place] on Dec 27th. Not noticing leg weakness as much anymore. Fatigue is less noticeable too. LT quad feels \"pulled. \" Calves are a little sore. MEDICATIONS REVIEWED:  2019   TREATMENT:       THERAPEUTIC EXERCISE: (30 minutes):  Exercises per grid below to improve mobility, strength and balance. Required minimal visual and verbal cues to promote proper body alignment and promote proper body posture. Progressed resistance, range and complexity of movement as indicated.      Date:  2019 Date:  19 Date:  2019 Date:  19   Activity/Exercise Parameters Parameters Parameters    Nustep   Level 3  10 minutes   LE only  Level 3  10 minutes   LE only  Level 3  10 min   LE's only    Incline board   3 x 30 sec hold   3 x 1 min each    Sit to stand   Mat at 221/2 inches   2 x 10      Hamstring stretch   With chair in front   5 x 10 sec hold      Seated - hamstring curls and knee extension   Green band   X 20 B each direction      Heel raises and into a min squat   On blue airex   2 x 5      Standing - hip abduction   2 x 10 B           3# ankle wt  2 x 20 B    Rocker board  A/P & M/L   Tapping x 20   Then static hold      Ambulation in hallway   4 laps  Good swetha and rhythm      Treadmill   0% slope x3 min 1.2-2.0mph  4 min 2.0% incline 2.0-2.3 mph  0% incline 1.8 mph x 1min  0% slope  3 min   2.0-2.3 mph    3.0% slope  2.3-2.5 mph  4 min     0% slope  2.0 mph  3 min    Total 10 min    Standing calf stretch   2x30 sec/leg  4h22wms/leg   Standing glute kickback   2x20/leg 2# 2 x 20 B   3# ankle wts   Leg Press    25lb x15  35lb 2x15   Weighted Squat    2kg 3x10       2kg ball athletic stance 2x10/side              Therapeutic Activity (  15 MIN ):  Therapeutic activities per grid below to improve functional transfers and activity tolerance     Date:  12/4/2019 Date:  12/11/2019 Date:  12-17-19   Activity/Exercise Parameters Parameters Parameters   Ambulation On level ground with cues to improve glute activation      Sit to stand x20  1x3, 3x6 (RPE 9-11)    Forward step downs  2x10/leg (Needs hand on 4\" box currently)  2 x 10 B   (needs hands on 6\" box )  Just touching with heel   Side Steps  4x5/leg GTB 2x10/leg increased pace                           MANUAL THERAPY: (0 minutes): Joint mobilization, Soft tissue mobilization and Manipulation was utilized and necessary because of the patient's restricted joint motion, painful spasm, loss of articular motion and restricted motion of soft tissue. (Used abbreviations: MET - muscle energy technique; PNF - proprioceptive neuromuscular facilitation; NMR - neuromuscular re-education; AP - anterior to posterior; PA - posterior to anterior)    MODALITIES: (0 minutes):      None today      TREATMENT/SESSION ASSESSMENT:  Yordy Caputo is gaining endurance and strength gradually. Less rest breaks required today and more exercises introduced as well. Pt has continued UE and LE weakness and decreased activity tolerance. Pt educated on safety in football stadiums and airports during upcoming travel. Progressing well towards goals.  Will not be seen until first week of January due to upcoming travel. Education: Cont HEP    RECOMMENDATIONS/INTENT FOR NEXT TREATMENT SESSION: \"Next visit will focus on advancements to more challenging activities\".     PAIN: Initial: 0/10 Post Session:  0/10     MedBridge Portal    Total Treatment Billable Duration: 40 minutes   PT Patient Time In/Time Out  Time In: 1015  Time Out: [address], DPT    Future Appointments   Date Time Provider Eduardo Concepcion   1/9/2020  1:00 PM Jordy [facility]   3/12/2020  2:00 PM GVLLE DEVICE [address] [facility] UCDG UCD   3/12/2020  3:30 PM Dianne Tillman MD [facility] RFM RFM   4/3/2020 10:45 AM Carlos Alberto Meeks MD [facility]

## 2020-01-01 LAB
ACID FAST STN SPEC: NEGATIVE
MYCOBACTERIUM SPEC QL CULT: NEGATIVE
SPECIMEN PREPARATION: NORMAL
SPECIMEN SOURCE: NORMAL

## 2020-01-01 NOTE — PROGRESS NOTES
Reinforced Safety precautions: Call for assistance prior to activity, and use of nonskid socks before getting out of bed (OOB). Verbalized understanding of safety instructions. Hand held call-light within reach. Instructions regarding strict Intake and Output. Fluid intake restrictions. Patient voiced understanding. Urinal at bedside for measurement of UOP. Hydration by drinking small sips of liquid. Presently on fluid restrictions, maximum fluid intake of 1500 ml daily, voiced understanding. This note was copied from the mother's chart.  Met with Yazmin and her  today regarding breastfeeding. Listened to her history with their first infant that dropped weight after delivery and by the time they were at their infant's first follow-up appt.at the pediatrician, they were told that infant has not been transferring milk and infant's weight was too low. They were told if they do not start supplementing they will need to take infant to the hospital. Hence, parents have anxiety regarding wanting me to confirm that infant is transferring colostrum adequately. Asked parents what their feeding plan is for when they are at home. Both parents verbalized that they want to breastfeed, but want help with making sure that their infant is getting fed and the same scenario will not happen with this infant. I assisted Yazmin with latch and position techniques in football hold on her right side. As the right side was giving her more trouble with latching. Yazmin's nipples are everted, yet slightly on the shorter side, therefore I reinforced how to confirm infant is indeed transferring enough colostrum for the hours of life that he is. Infant latched well with help and there was audible swallowing. Both parents still appeared slightly unsure even after a good feeding. I asked them if they would like me to show them how to supplement appropriately at times that will not inhibit infant from breastfeeding and help infant to continue stimulating her breast so that her milk comes in. Parents verbalized relief and that they felt as though they needed to hear its ok to supplement even a little. Introduced a 10 ml oral syringe and demonstrated how to utilize it to entice infant to keep at breast, wake infant, or even supplement as they felt appropriate until her milk comes in. Parents verbalized relief, feeding went well with 20 minutes at breasts and 8 ml's of formula throughout the feeding. All questions and concerns addressed.  Prepared parents for 2nd night and what to expect. Reassurance given. To call for help when needed. Will follow up in am.

## 2020-01-09 ENCOUNTER — HOSPITAL ENCOUNTER (OUTPATIENT)
Dept: PHYSICAL THERAPY | Age: 75
Discharge: HOME | End: 2020-01-09
Payer: MEDICARE

## 2020-01-09 PROCEDURE — 97530 THERAPEUTIC ACTIVITIES: CPT

## 2020-01-09 NOTE — THERAPY DISCHARGE
Tamra Garcia  : 1945  [place] / [place] / Product Type: Medicare /  [address]  at [address] [facility] [address], [address] \[facility]", [address] [place] [address] [facility]  [phone]   [phone]        OUTPATIENT PHYSICAL THERAPY:Discharge, Daily Note 2020    ICD-10: Treatment Diagnosis:  R53.83 Other fatigue  R68.89 Other general signs and symptoms  M62.81 Muscle Weakness General  Precautions/Allergies:   Amoxicillin; Levaquin [levofloxacin]; and Zithromax [azithromycin]   PACEMAKER NO E-STIM  Fall Risk Score: 3 (? 5 = High Risk)  MD Orders: Eval and Treat MEDICAL/REFERRING DIAGNOSIS:  Debility [R53.81]   DATE OF ONSET: 1 month ago  REFERRING PHYSICIAN: Carolina Boothe*  RETURN PHYSICIAN APPOINTMENT: TBD by pt      ASSESSMENT:  Mr. Dhruv Krueger has attended 7 physical therapy session including the initial evaluation. Pt has good understanding of Alanis's scale and is very active/independent with HEP. Pt is gaining strenght/function without cues and is safe to be D/C to HEP. PROBLEM LIST (Impacting functional limitations):  1. Decreased Strength  2. Decreased Transfer Abilities  3. Decreased Ambulation Ability/Technique  4. Decreased Balance  5. Decreased Activity Tolerance  6. Increased Fatigue  7. Decreased [place] with Home Exercise Program INTERVENTIONS PLANNED:  1. Balance Exercise  2. Family Education  3. Gait Training  4. Home Exercise Program (HEP)  5. Neuromuscular Re-education/Strengthening  6. Range of Motion (ROM)  7. Therapeutic Activites  8. Therapeutic Exercise/Strengthening  9. Transfer Training   TREATMENT PLAN:  TREATMENT PLAN:  Effective Dates: 2019 TO 3/4/2020 (90 days). Frequency/Duration: 2 times a week for 90 Days  GOALS: (Goals have been discussed and agreed upon with patient.)  Discharge Goals: Time Frame: 6 weeks  1. Tamra Garcia will be independent with HEP.    2. Pt will decreased 5 times sit to stand to improve to 15 sec or less without UE to improve function  3. Pt will improve six min walk distance to 1800 ft or better to improve ambulation  4. Pt will improve gross BLE strength to 5/5  5. Pt will demonstrate reciprocal stair climbing for 12 steps without UE assist  6. Pt will understand RPE scale and be able to maintain 12-14 RPE with all exercises  Rehabilitation Potential For Stated Goals: Excellent  Regarding Kate Singletary's therapy, I certify that the treatment plan above will be carried out by a therapist or under their direction. Thank you for this referral,  Nicolette Turner, PT, DPT  Referring Physician Signature: Carolina Trammell*                                        Date                              The information in this section was collected on 01/09/2019 (except where otherwise noted). HISTORY:   History of Present Injury/Illness (Reason for Referral): About 5 months ago had A-Fib return and weight gain occurred. After hospital incident for CHF, had meds updated and pacemaker implanted. [facility] rehab for 1 week inpatient to work on safety, steps and mobility. Still concerned for weakness and wants to return to working out ~7 days a week and playing golf and tennis. Has had increased sleep time due fatigue. CC/Primary Concern:       Pt reports he is within his normal limits from prior to surgery. Travelled to [address] [place] [address] recently with no issues or problems. Navigating airports and steps easily.      Treatment Side: Bilateral     Hospitalization and time spent in care: 1 week inpatient rehab      Date and type of Surgery: Pacemaker implantation 11/12  Past Medical History/Comorbidities:   Mr. Kwabena Burns  has a past medical history of Arrhythmia, Cancer ([facility] [address] Elevated PSA, Erectile dysfunction, Hypertension, MVP (mitral valve prolapse), MARISOL (obstructive sleep apnea) (11/15/2018), Osteoarthritis, Prostate cancer ([facility] [address] and S/P colonoscopy (2015).  He also has no past medical history of Chronic kidney disease, Chronic obstructive pulmonary disease ([facility] [address] Difficult intubation, Liver disease, Malignant hyperthermia due to anesthesia, Nausea & vomiting, Pseudocholinesterase deficiency, Psychiatric disorder, or PUD (peptic ulcer disease). Mr. Elvira Lazar  has a past surgical history that includes hx appendectomy and biopsy prostate. Social History/Living Environment:     Living with partner who can help.      Pain/Symptom Location: No pain stated     Worst Pain: 0/10  Current Pain: 0/10     General Health: Good     Occupation: part time job doing consulting     Unexplained Weight Loss: No        Prior Level of Function/Work/Activity:  Lots of travel for work and independent.      Patient Goals: Endurance and leg strength     Current Medications:         Current Outpatient Medications:     apixaban (ELIQUIS) 5 mg tablet, Take 1 Tab by mouth every twelve (12) hours. , Disp: 60 Tab, Rfl: 0    lisinopril (PRINIVIL, ZESTRIL) 10 mg tablet, Take 1 Tab by mouth every twelve (12) hours. , Disp: 60 Tab, Rfl: 0    sotalol (BETAPACE) 120 mg tablet, Take 1 Tab by mouth every twelve (12) hours. , Disp: 60 Tab, Rfl: 0    temazepam (RESTORIL) 7.5 mg capsule, Take 2 Caps by mouth nightly as needed for Sleep. Max Daily Amount: 15 mg., Disp: 10 Cap, Rfl: 0    furosemide (LASIX) 40 mg tablet, Take 1 Tab by mouth two (2) times a day., Disp: 180 Tab, Rfl: 3    amLODIPine (NORVASC) 5 mg tablet, Take 1 Tab by mouth daily. , Disp: 30 Tab, Rfl: 0    amLODIPine (NORVASC) 5 mg tablet, Take 1 Tab by mouth daily. , Disp: 30 Tab, Rfl: 5    miscellaneous medical supply misc, by Does Not Apply route., Disp: , Rfl:     DISABLED PLACARD (DISABLED PLACARD) DMV, Apply to car, Disp: 1 Each, Rfl: 0   Date Last Reviewed:  12/4/2019                 Ambulatory/Rehab Services H2 Model Falls Risk Assessment     Risk Factors:       (1)  Gender [Male]       (1)  Visual Impairment [specify:  Poor cataracts surgery resulting in decreased far sight] Ability to Rise from Chair:       (1)  Pushes up, successful in one attempt     Falls Prevention Plan:       No modifications necessary   Total: (5 or greater = High Risk): 3      ©2010 [facility] [employer] States Patent #[id number]. Federal Law prohibits the replication, distribution or use without written permission from [facility] [place]          Number of Personal Factors/Comorbidities that affect the Plan of Care: 3+: HIGH COMPLEXITY   EXAMINATION:   Inspection         Patient Consent: Yes         Additional Comments:   ________________________________________________________________________________________________  Observation         Transfers: Sit to stand: Increased use of UE         Posture:  Increased trunk flexion         Protracted Scapulas: No         Gait: WNL         Assistive Device:        Type: None        Hand Use: N/A         Stairs: Reciprocal Pattern        Railing: right            Muscle Guarding: none         Edema: No Edema, Erythema or Ecchymosis noted  ________________________________________________________________________________________________  Range of Motion: All WNL           ________________________________________________________________________________________________  Strength          Lower Extremity  Joint:         RIGHT LEFT   Hip Flexion 4+/5 5/5   Hip Extension 4+/5 4+/5   Hip Internal Rotation 5/5 5/5   Hip External Rotation 5/5 5/5   Hip Abduction 4+/5 4+/5   Knee Flexion 5/5 5/5   Knee Extension 5/5 5/5         [facility]         C/O Radicular Symptoms: No     LE Neurologic Screen  LE NEUROLOGICAL SCREEN: All dermatomes, myotomes and reflexes WNL     ________________________________________________________________________________________________  Special Tests               Additional Comments: Clinical tests for function and activity tolerance used     ________________________________________________________________________________________________  Palpation: No tenderness noted         Outcome Measure: Tool Used: 6-MINUTE WALK TEST  Score:  Initial: 1140 feet Most Recent: 1680 feet (Date:2019)   Interpretation of Score: Normal range varies but is approximately 1486-4006 Feet  Distance walked: 1680 feet               Baseline End of Test   Dyspnea (Alanis Scale) 6 11   Fatigue (Alanis Scale) 6 11      5 times sit to stand: Use ose of UE       Time: 15sec             Irish Guevarahilary  : 1945  [place] / [place] / Product Type: Medicare /  [address] [place] [address]  at [facility] [address], [address]  [phone]   [phone]      OUTPATIENT PHYSICAL THERAPY: Daily Treatment Note 2020  VISIT COUNT:  6      ICD-10: Treatment Diagnosis:  R53.83 Other fatigue  R68.89 Other general signs and symptoms  M62.81 Muscle Weakness General    Precautions/Allergies:   Amoxicillin; Levaquin [levofloxacin]; and Zithromax [azithromycin]     TREATMENT PLAN:  Effective Dates: 2019 TO 3/4/2020 (90 days). Frequency/Duration: 2 times a week for 90 Days        PRE-TREATMENT SYMPTOMS/COMPLAINTS: See D/C       MEDICATIONS REVIEWED:  2020   TREATMENT:       THERAPEUTIC EXERCISE: (0 minutes):  Exercises per grid below to improve mobility, strength and balance. Required minimal visual and verbal cues to promote proper body alignment and promote proper body posture. Progressed resistance, range and complexity of movement as indicated.      Date:  2019 Date:  19 Date:  2019 Date:  19   Activity/Exercise Parameters Parameters Parameters    Nustep   Level 3  10 minutes   LE only  Level 3  10 minutes   LE only  Level 3  10 min   LE's only    Incline board   3 x 30 sec hold   3 x 1 min each    Sit to stand   Mat at 221/2 inches   2 x 10      Hamstring stretch With chair in front   5 x 10 sec hold      Seated - hamstring curls and knee extension   Green band   X 20 B each direction      Heel raises and into a min squat   On blue airex   2 x 5      Standing - hip abduction   2 x 10 B           3# ankle wt  2 x 20 B    Rocker board  A/P & M/L   Tapping x 20   Then static hold      Ambulation in hallway   4 laps  Good swetha and rhythm      Treadmill   0% slope x3 min 1.2-2.0mph  4 min 2.0% incline 2.0-2.3 mph  0% incline 1.8 mph x 1min  0% slope  3 min   2.0-2.3 mph    3.0% slope  2.3-2.5 mph  4 min     0% slope  2.0 mph  3 min    Total 10 min    Standing calf stretch   2x30 sec/leg  [id number]/leg   Standing glute kickback   2x20/leg 2# 2 x 20 B   3# ankle wts   Leg Press    25lb x15  35lb 2x15   Weighted Squat    2kg 3x10       2kg ball athletic stance 2x10/side              Therapeutic Activity (  10 MIN ):  Therapeutic activities per grid below to improve functional transfers and activity tolerance     Date:  12/4/2019 Date:  12/11/2019 Date:  01/09/2020   Activity/Exercise Parameters Parameters Parameters   Ambulation On level ground with cues to improve glute activation   On level ground to improve activity tolerance   Sit to stand x20  1x3, 3x6 (RPE 9-11) x30   Forward step downs  2x10/leg (Needs hand on 4\" box currently)     Side Steps  4x5/leg GTB    Stair Navigation   3x12                     MANUAL THERAPY: (0 minutes): Joint mobilization, Soft tissue mobilization and Manipulation was utilized and necessary because of the patient's restricted joint motion, painful spasm, loss of articular motion and restricted motion of soft tissue.      (Used abbreviations: MET - muscle energy technique; PNF - proprioceptive neuromuscular facilitation; NMR - neuromuscular re-education; AP - anterior to posterior; PA - posterior to anterior)    MODALITIES: (0 minutes):      None today      TREATMENT/SESSION ASSESSMENT:  Pt with some minor weakness noted and educated on as he transitions to gym based HEP. D/c today notes improved strength, improved function and satisfactory understanding of limitations. Pt performed stair training, sit to stand, and ambulation with minimal cues for step clearance, swetha and reciprocal patterns. Pt safe to d/c to HEP at this time.       Education: Cont HEP    RECOMMENDATIONS/INTENT FOR NEXT TREATMENT SESSION: D/C to HEP    PAIN: Initial: 0/10 Post Session:  0/10     [employer] [facility]    Total Treatment Billable Duration: 10 minutes    In: 1300  Out: 7400 Bartolo Glez DPT    Future Appointments   Date Time Provider Eduardo Concepcion   3/12/2020  2:00 PM [facility]ICE Raymond Navarro [address]   3/12/2020  3:30 PM Tenisha Rodas MD [facility]   3/17/2020  9:00 AM Jacky Pulliam MD [facility]

## 2020-11-19 ENCOUNTER — HOSPITAL ENCOUNTER (OUTPATIENT)
Dept: LAB | Age: 75
Discharge: HOME | End: 2020-11-19

## 2020-11-19 PROCEDURE — 88305 TISSUE EXAM BY PATHOLOGIST: CPT

## 2022-03-18 PROBLEM — Z72.821 INADEQUATE SLEEP HYGIENE: Status: ACTIVE | Noted: 2018-11-15
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
## 2022-03-18 PROBLEM — I50.9 ACUTE HEART FAILURE (HCC): Status: ACTIVE | Noted: 2019-11-10

## 2022-03-18 PROBLEM — J18.9 PNEUMONIA OF LOWER LOBE OF LUNG: Status: ACTIVE | Noted: 2019-11-10

## 2022-03-18 PROBLEM — J18.9 HAP (HOSPITAL-ACQUIRED PNEUMONIA): Status: ACTIVE | Noted: 2018-05-16

## 2022-03-18 PROBLEM — D72.829 LEUKOCYTOSIS: Status: ACTIVE | Noted: 2018-05-14

## 2022-03-18 PROBLEM — I50.42 CHRONIC COMBINED SYSTOLIC AND DIASTOLIC CONGESTIVE HEART FAILURE (HCC): Status: ACTIVE | Noted: 2018-05-14

## 2022-03-18 PROBLEM — J81.0 ACUTE PULMONARY EDEMA (HCC): Status: ACTIVE | Noted: 2019-11-10

## 2022-03-19 PROBLEM — R53.81 PHYSICAL DEBILITY: Status: ACTIVE | Noted: 2019-11-25

## 2022-03-19 PROBLEM — A04.72 C. DIFFICILE COLITIS: Status: ACTIVE | Noted: 2018-07-25

## 2022-03-19 PROBLEM — I48.0 PAROXYSMAL A-FIB (HCC): Status: ACTIVE | Noted: 2018-05-14

## 2022-03-19 PROBLEM — I44.7 LBBB (LEFT BUNDLE BRANCH BLOCK): Status: ACTIVE | Noted: 2018-05-14

## 2022-03-19 PROBLEM — J96.01 ACUTE RESPIRATORY FAILURE WITH HYPOXIA (HCC): Status: ACTIVE | Noted: 2019-11-10

## 2022-03-20 PROBLEM — J96.00 ACUTE RESPIRATORY FAILURE (HCC): Status: ACTIVE | Noted: 2018-05-14

## 2022-03-20 PROBLEM — G47.33 OSA (OBSTRUCTIVE SLEEP APNEA): Status: ACTIVE | Noted: 2018-11-15

## 2022-09-20 PROCEDURE — 93294 REM INTERROG EVL PM/LDLS PM: CPT | Performed by: INTERNAL MEDICINE

## 2022-09-20 PROCEDURE — 93296 REM INTERROG EVL PM/IDS: CPT | Performed by: INTERNAL MEDICINE

## 2022-10-18 ENCOUNTER — OFFICE VISIT (OUTPATIENT)
Dept: CARDIOLOGY CLINIC | Age: 77
End: 2022-10-18
Payer: MEDICARE

## 2022-10-18 VITALS
WEIGHT: 246 LBS | SYSTOLIC BLOOD PRESSURE: 138 MMHG | HEART RATE: 76 BPM | HEIGHT: 75 IN | BODY MASS INDEX: 30.59 KG/M2 | DIASTOLIC BLOOD PRESSURE: 80 MMHG

## 2022-10-18 DIAGNOSIS — I48.0 PAROXYSMAL ATRIAL FIBRILLATION (HCC): Primary | ICD-10-CM

## 2022-10-18 DIAGNOSIS — Z95.0 PRESENCE OF CARDIAC PACEMAKER: ICD-10-CM

## 2022-10-18 DIAGNOSIS — I10 ESSENTIAL (PRIMARY) HYPERTENSION: ICD-10-CM

## 2022-10-18 DIAGNOSIS — I50.22 CHRONIC SYSTOLIC (CONGESTIVE) HEART FAILURE (HCC): ICD-10-CM

## 2022-10-18 DIAGNOSIS — D68.69 HYPERCOAGULABILITY DUE TO ATRIAL FIBRILLATION (HCC): ICD-10-CM

## 2022-10-18 DIAGNOSIS — I48.91 HYPERCOAGULABILITY DUE TO ATRIAL FIBRILLATION (HCC): ICD-10-CM

## 2022-10-18 PROCEDURE — 1123F ACP DISCUSS/DSCN MKR DOCD: CPT | Performed by: INTERNAL MEDICINE

## 2022-10-18 PROCEDURE — 99214 OFFICE O/P EST MOD 30 MIN: CPT | Performed by: INTERNAL MEDICINE

## 2022-10-18 RX ORDER — ROSUVASTATIN CALCIUM 40 MG/1
40 TABLET, COATED ORAL EVERY EVENING
COMMUNITY

## 2022-10-18 RX ORDER — LISINOPRIL 20 MG/1
20 TABLET ORAL 2 TIMES DAILY
Qty: 90 TABLET | Refills: 3 | Status: SHIPPED | OUTPATIENT
Start: 2022-10-18

## 2022-10-18 NOTE — PROGRESS NOTES
[address] [place], [place]  [address], [address]  PHONE: [phone]    SUBJECTIVE: Mega Narvaez (1945) is a 76 y.o. male seen for a follow up visit regarding the following:   Specialty Problems          Cardiology Problems    Hypertension, essential        Chronic combined systolic and diastolic congestive heart failure (HCC)        LBBB (left bundle branch block)        Paroxysmal A-fib ([address]        Acute heart failure ([address]         Patient presents to the office for 6 month follow up. Patient reports he has been doing well with no issues of chest pain or shortness of breath. He occasionally checks his blood pressure at home and sees stable numbers. Past Medical History, Past Surgical History, Family history, Social History, and Medications were all reviewed with the patient today and updated as necessary.     Allergies   Allergen Reactions    Levofloxacin Shortness Of Breath    Amoxicillin Swelling     Diff breathing, lip swelling     Past Medical History:   Diagnosis Date    Arrhythmia     Cancer ([address]     basal cell    Elevated PSA     Erectile dysfunction     Hypertension     daily meds    MVP (mitral valve prolapse)     diagnosed many years ago, pt states does not see cardiologist, in youth    MARY (obstructive sleep apnea) 11/15/2018    Osteoarthritis     thumbs    Prostate cancer ([address]     S/P colonoscopy     due in      Past Surgical History:   Procedure Laterality Date    APPENDECTOMY      BIOPSY PROSTATE       Family History   Problem Relation Age of Onset    Prostate Cancer Sister     Heart Attack Maternal Uncle     Heart Disease Mother     Cancer Paternal Uncle     Stroke Father     Cancer Father         prostate      Social History     Tobacco Use    Smoking status: Former     Types: Cigarettes     Quit date: 1997     Years since quittin.1    Smokeless tobacco: Never   Substance Use Topics    Alcohol use: Yes       Current Outpatient Evelio Sanchez MD, [facility]  [phone]    BREAST IMPLANT POST-OPERATIVE INSTRUCTIONS      SURGICAL BRA:  You may be in a surgical bra following the procedure. This bra, or another soft, front-fastening sports bra (with no underwire) should be worn at all times except when showering for the first two weeks. You may wash the surgical bra if it gets soiled, but allow it to air-dry, do not place it in the dryer. BANDAGES:  If any bandages were placed during your surgery, they may be removed two days after surgery. There may be a small amount of oozing from the incisions for the first day or two. This is normal.  You may replace gauze bandages or pads as needed if this occurs. If drainage is significant or persistent, please call the office. If there is no spotting or drainage, then you do not need to place any more bandages. DO NOT use a heating pad or ice under any circumstances. SWELLING:  Mild to moderate swelling is normal after surgery and will usually resolve over a couple weeks. Excessive swelling, to the point where one side is much bigger and more obvious than the other side, is not normal, and may be a sign of bleeding underneath the skin. If excessive swelling occurs, it is usually within the first 24 hours after surgery. You should call your surgeon or the on-call doctor immediately if you experience excessive swelling. ACTIVITY:  Take it easy for the first several days. No cleaning, housework, or strenuous activity. Do not lift anything over 10 pounds, including children. You may resume non-vigorous activities at two weeks as instructed by your surgeon. You should avoid bench pressing, push-ups, and pec deck exercises if your implant(s) is (are) under the pectoralis muscle. BATHING:  You may shower two days after the surgery. No tub baths, hot tubs, or swimming for two weeks. Remove any gauze or other bandages before showering.   Your Medications:     amLODIPine (NORVASC) 10 MG tablet, Take 10 mg by mouth daily, Disp: , Rfl:     apixaban (ELIQUIS) 5 MG TABS tablet, Take 5 mg by mouth every 12 hours, Disp: , Rfl:     atorvastatin (LIPITOR) 40 MG tablet, Take 40 mg by mouth daily, Disp: , Rfl:     furosemide (LASIX) 40 MG tablet, Take 40 mg by mouth 2 times daily, Disp: , Rfl:     lisinopril (PRINIVIL;ZESTRIL) 20 MG tablet, Take 20 mg by mouth 2 times daily, Disp: , Rfl:     sotalol (BETAPACE) 120 MG tablet, Take 120 mg by mouth every 12 hours, Disp: , Rfl:     ROS:  Review of Systems - General ROS: negative for - chills, fatigue or fever  Hematological and Lymphatic ROS: negative for - bleeding problems, bruising or jaundice  Respiratory ROS: no cough, shortness of breath, or wheezing  Cardiovascular ROS: no chest pain or dyspnea on exertion  Gastrointestinal ROS: no abdominal pain, change in bowel habits, or black or bloody stools  Neurological ROS: no TIA or stroke symptoms  All other systems negative. Wt Readings from Last 3 Encounters:   03/15/22 248 lb (112.5 kg)   09/07/21 236 lb 3.2 oz (107.1 kg)   06/15/21 235 lb 3.2 oz (106.7 kg)     Temp Readings from Last 3 Encounters:   No data found for Temp     BP Readings from Last 3 Encounters:   03/15/22 126/70   09/07/21 (!) 148/64   06/15/21 120/60     Pulse Readings from Last 3 Encounters:   03/15/22 70   09/07/21 70   06/15/21 65       PHYSICAL EXAM:  There were no vitals taken for this visit.   Physical Examination: General appearance - alert, well appearing, and in no distress  Mental status - alert, oriented to person, place, and time  Eyes - pupils equal and reactive, extraocular eye movements intact  Neck/lymph - supple, no significant adenopathy  Chest/lungs - clear to auscultation, no wheezes, rales or rhonchi, symmetric air entry  Heart/CV - normal rate, regular rhythm, normal S1, S2, no murmurs, rubs, clicks or gallops  Abdomen/GI - soft, nontender, nondistended, no masses or incisions will usually be covered with skin glue. This can get wet in the shower. Simply pat everything dry after the shower. Skin glue will usually fall off on its own, but you may scrub it in the shower or peel it off starting one week after your surgery. MEDICATION:  You will receive prescriptions for an antibiotic and pain medication. Take the antibiotic until it is finished, and the pain medication as needed according to the directions. Do not drive while taking narcotic pain medication. Avoid aspirin for two weeks after surgery. You may take non-steroidal anti-inflammatories (e.g. Ibuprofen) starting the day after surgery. Ibuprofen can be taken instead of, or in addition to, your prescription pain medication, according to the directions on the bottle. FOLLOW-UP APPOINTMENT:      [***] Please call the office at [phone] during regular business hours to schedule an appointment on 1 week     [***] Your appointment is scheduled for ***, at ***    APPOINTMENT LOCATION:     [***] [facility]    [address] [place] [address], [address] [place] [address], [address] [place] [address]     [***] [address]    [address]    [place], [facility]      MARISEL Drain Instructions    PURPOSE:  You have had surgery during which a Niranjan-Abraham drain, or MARISEL drain, has been placed. A MARISEL drain is a rubber tube which goes under the skin and drains excess fluid during the healing process so that this fluid does not accumulate. There is a one-way valve which allows the fluid to collect in the bulb on the end of the drain. The drain is usually held in place by a single stitch. The color of the drainage can range from reddish to pink to straw-colored. CARE OF THE DRAIN:  Caring for the MARISEL drain is easy. First, protect the drain so that it does not get pulled inadvertently.   You may fasten them to your clothing with a safety pin through the floppy organomegaly  Musculoskeletal - no joint tenderness, deformity or swelling  Extremities - peripheral pulses normal, no pedal edema, no clubbing or cyanosis  Skin - normal coloration and turgor, no rashes, no suspicious skin lesions noted        Medications reviewed and questions answered    No results found for this or any previous visit (from the past 672 hour(s)). No results found for: CHOL, CHOLPOCT, CHOLX, CHLST, CHOLV, HDL, HDLPOC, HDLC, LDL, LDLC, VLDLC, VLDL, TGLX, TRIGL    ASSESSMENT and PLAN  Problem List Items Addressed This Visit    None  Visit Diagnoses       Paroxysmal atrial fibrillation (HCC)    -  Primary    Chronic systolic (congestive) heart failure (HCC)        Presence of cardiac pacemaker        Hypercoagulability due to atrial fibrillation ([facility]        Essential (primary) hypertension               HTN  - amlodipine 10mg  - lisinopril 20mg    Afib s/p pacemaker  - sotalol 120mg for rate control  - eliquis 5mg  - pacemaker in place and functioning well with device checks. HFpEF  - ACEI therapy in place  - lasix 40 mg    Continue meds as below    Current Outpatient Medications:     amLODIPine (NORVASC) 10 MG tablet, Take 10 mg by mouth daily, Disp: , Rfl:     apixaban (ELIQUIS) 5 MG TABS tablet, Take 5 mg by mouth every 12 hours, Disp: , Rfl:     atorvastatin (LIPITOR) 40 MG tablet, Take 40 mg by mouth daily, Disp: , Rfl:     furosemide (LASIX) 40 MG tablet, Take 40 mg by mouth 2 times daily, Disp: , Rfl:     lisinopril (PRINIVIL;ZESTRIL) 20 MG tablet, Take 20 mg by mouth 2 times daily, Disp: , Rfl:     sotalol (BETAPACE) 120 MG tablet, Take 120 mg by mouth every 12 hours, Disp: , Rfl:     David Davis  10/18/2022  7:45 AM    Pt is instructed to follow all appropriate cardiac risk factor recommendations and to be compliant with meds and testing. Instructed to follow up appropriately and seek urgent medical care if acute or unstable issues arise.  Results of some tests may be viewed thru tag on the bulb. DO NOT place a pin through either the drain tubing or the bulb itself. The drain works by maintaining a constant low pressure of suction when the bulb is in the collapsed (squeezed in) position. If the bulb will not maintain this collapsed position when it is recapped, please call the office, as the drain may not be working properly. MEASURING THE DRAINAGE:  Grasp the bulb in one hand and remove the cap with the other hand. This will cause the bulb to relax into a round shape. Holding the open end over a specimen cup, squirt the fluid into the cup by squeezing the bulb. Once the bulb is empty, squeeze it in (collapse it) with one hand, and replace the cap with your other hand. Then holding the measuring cup level, note how much fluid there is (in milliliters, mL), and record this number on the chart below. Discard the fluid in the toilet and rinse out the specimen cup. Note: your specimen cup may be in cubic centimeters (cc). 1 cc = 1 mL. REMOVAL:  The MARISEL drain will be removed in the office when the daily drainage is low enough. You may be asked to call the office with your measuring totals to determine if the drain(s) is (are) ready to be removed. Removal involves minimal discomfort without the need for anesthesia. The drain site in the skin heals on its own once the drain is removed without any further stitches. BRING THIS DRAINAGE LOG WITH YOU TO THE OFFICE. SHOWERING:  Dr. Jeffery Dunlap may give you permission to shower while you have one or more MARISEL drains in. Just let the water run over the drain sites and then pat them dry when you are done. Some patients like to place a long string necklace around their necks during showers to which they can safety pin the tag on the bulb to prevent it from dangling.   DO NOT take a tub bath, go swimming (pool/river/lake/ocean), or otherwise submerge your body in water before all MARISEL drains have been removed and you are released to MyChart but this does not substitute for follow up with MD. If follow up is not scheduled pt is instructed to call for follow up    I have personally seen and evaluated the patient and reviewed the students note and agree with the following assessment and plan and findings. I was present for and observed the key components of this note. Any appropriate additions or editing of the information have been done by me.     Tana Oro MD, [facility]  Cardiology swim.    THINGS TO WATCH FOR:  Please call the office immediately ([phone]) if you notice:   Sudden bright or dark red bleeding into the bulb or around the drain site in the skin   Dislodgment of the MARISEL drain or if the drain falls out   Spreading redness around the drain site in the skin   Cloudy or foul-smelling drainage in the bulb or around the drain site   Fever, shaking chills, excessive swelling, pain or discomfort   Any other concerns or questions      Date           #1 (AM)             #1 (PM)             Daily Total #1  (AM+PM)             #2 (AM)           #2 (PM)           Daily  Total  #2  (AM+PM)

## 2022-11-07 DIAGNOSIS — R00.1 BRADYCARDIA: ICD-10-CM

## 2022-11-07 DIAGNOSIS — I48.0 PAROXYSMAL A-FIB (HCC): ICD-10-CM

## 2022-11-07 DIAGNOSIS — Z95.0 PACEMAKER: ICD-10-CM

## 2022-11-07 DIAGNOSIS — I48.0 PAROXYSMAL A-FIB (HCC): Primary | ICD-10-CM

## 2022-12-16 NOTE — ROUTINE PROCESS
CHF teaching continues and wiil complete post prandial, will follow. -Continue home regimen  -DASH diet -Continue home regimen w/hold parameters  -DASH diet

## 2022-12-20 PROCEDURE — 93296 REM INTERROG EVL PM/IDS: CPT | Performed by: INTERNAL MEDICINE

## 2022-12-20 PROCEDURE — 93294 REM INTERROG EVL PM/LDLS PM: CPT | Performed by: INTERNAL MEDICINE

## 2023-02-02 DIAGNOSIS — Z95.0 PACEMAKER: ICD-10-CM

## 2023-02-02 DIAGNOSIS — R00.1 BRADYCARDIA: ICD-10-CM

## 2023-02-02 DIAGNOSIS — I48.0 PAROXYSMAL A-FIB (HCC): ICD-10-CM

## 2023-04-19 ENCOUNTER — OFFICE VISIT (OUTPATIENT)
Dept: CARDIOLOGY CLINIC | Age: 78
End: 2023-04-19
Payer: MEDICARE

## 2023-04-19 VITALS
HEIGHT: 75 IN | BODY MASS INDEX: 30.59 KG/M2 | WEIGHT: 246 LBS | HEART RATE: 72 BPM | SYSTOLIC BLOOD PRESSURE: 122 MMHG | DIASTOLIC BLOOD PRESSURE: 60 MMHG

## 2023-04-19 DIAGNOSIS — I48.0 PAROXYSMAL ATRIAL FIBRILLATION (HCC): ICD-10-CM

## 2023-04-19 DIAGNOSIS — I50.22 CHRONIC SYSTOLIC (CONGESTIVE) HEART FAILURE (HCC): Primary | ICD-10-CM

## 2023-04-19 DIAGNOSIS — Z95.0 PRESENCE OF CARDIAC PACEMAKER: ICD-10-CM

## 2023-04-19 PROCEDURE — 3078F DIAST BP <80 MM HG: CPT | Performed by: INTERNAL MEDICINE

## 2023-04-19 PROCEDURE — 3074F SYST BP LT 130 MM HG: CPT | Performed by: INTERNAL MEDICINE

## 2023-04-19 PROCEDURE — 99214 OFFICE O/P EST MOD 30 MIN: CPT | Performed by: INTERNAL MEDICINE

## 2023-04-19 PROCEDURE — 1123F ACP DISCUSS/DSCN MKR DOCD: CPT | Performed by: INTERNAL MEDICINE

## 2023-04-19 RX ORDER — OLMESARTAN MEDOXOMIL 40 MG/1
40 TABLET ORAL DAILY
COMMUNITY

## 2023-04-19 RX ORDER — AMLODIPINE BESYLATE 10 MG/1
10 TABLET ORAL DAILY
COMMUNITY

## 2023-04-19 NOTE — PROGRESS NOTES
[address] [place], [place]  [address], [address] [place], [place] [address]  [address]  PHONE: [phone]    SUBJECTIVE: Nicolle Fernandes (1945) is a 76 y.o. male seen for a follow up visit regarding the following:   Specialty Problems          Cardiology Problems    Hypertension, essential        Chronic combined systolic and diastolic congestive heart failure (HCC)        LBBB (left bundle branch block)        Paroxysmal A-fib ([address]        Acute heart failure ([address]         Patient presents to the office for 6 month follow up. Patient reports he has been doing well with no issues of chest pain or shortness of breath. He occasionally checks his blood pressure at home and sees stable numbers. Patient had 1 episode of approximately 3-day stent of atrial fibrillation. But he has been mostly in sinus rhythm he is in a rhythm control treatment paradigm with sotalol he is maintaining his Eliquis. He is trying to wean himself off of lisinopril and onto an ARB due to high chronic dry cough that may be ACE inhibitor related. He has been active and playing golf and doing well with no exertional symptoms. Last echo shows normal ejection fraction. He has an upcoming trip to the beach for 3 days and then a 3 to 4-week stent in [facility]    Past Medical History, Past Surgical History, Family history, Social History, and Medications were all reviewed with the patient today and updated as necessary.     Allergies   Allergen Reactions    Levofloxacin Shortness Of Breath    Amoxicillin Swelling     Diff breathing, lip swelling     Past Medical History:   Diagnosis Date    Arrhythmia     Cancer ([address]     basal cell    Elevated PSA     Erectile dysfunction     Hypertension     daily meds    MVP (mitral valve prolapse)     diagnosed many years ago, pt states does not see cardiologist, in youth    MARY (obstructive sleep apnea) 11/15/2018    Osteoarthritis     thumbs    Prostate cancer ([address]     S/P colonoscopy 2015    due in 2020

## 2023-04-21 DIAGNOSIS — I48.0 PAROXYSMAL A-FIB (HCC): ICD-10-CM

## 2023-04-21 DIAGNOSIS — Z95.0 PACEMAKER: ICD-10-CM

## 2023-04-21 DIAGNOSIS — R00.1 BRADYCARDIA: ICD-10-CM

## 2023-06-06 DIAGNOSIS — Z95.0 PACEMAKER: ICD-10-CM

## 2023-06-06 DIAGNOSIS — I48.0 PAROXYSMAL A-FIB (HCC): Primary | ICD-10-CM

## 2023-06-21 RX ORDER — SOTALOL HYDROCHLORIDE 120 MG/1
120 TABLET ORAL EVERY 12 HOURS
Qty: 180 TABLET | Refills: 2 | Status: SHIPPED | OUTPATIENT
Start: 2023-06-21

## 2023-06-21 NOTE — TELEPHONE ENCOUNTER
Requested Prescriptions     Pending Prescriptions Disp Refills    sotalol (BETAPACE) 120 MG tablet 180 tablet 2     Sig: Take 1 tablet by mouth in the morning and 1 tablet in the evening.

## 2023-06-21 NOTE — TELEPHONE ENCOUNTER
MEDICATION REFILL REQUEST      Name of Medication:  sotalol   Dose:  120 mg  Frequency:  twice a day   Quantity:    Days' supply:  90      Pharmacy Name/Location:  [facility]/ [phone]

## 2023-06-22 PROCEDURE — 93296 REM INTERROG EVL PM/IDS: CPT | Performed by: INTERNAL MEDICINE

## 2023-06-22 PROCEDURE — 93294 REM INTERROG EVL PM/LDLS PM: CPT | Performed by: INTERNAL MEDICINE

## 2023-09-22 PROCEDURE — 93296 REM INTERROG EVL PM/IDS: CPT | Performed by: INTERNAL MEDICINE

## 2023-09-22 PROCEDURE — 93294 REM INTERROG EVL PM/LDLS PM: CPT | Performed by: INTERNAL MEDICINE

## 2023-11-06 ENCOUNTER — TELEPHONE (OUTPATIENT)
Age: 78
End: 2023-11-06

## 2023-11-07 ENCOUNTER — OFFICE VISIT (OUTPATIENT)
Age: 78
End: 2023-11-07
Payer: MEDICARE

## 2023-11-07 VITALS
DIASTOLIC BLOOD PRESSURE: 60 MMHG | SYSTOLIC BLOOD PRESSURE: 136 MMHG | BODY MASS INDEX: 31.83 KG/M2 | WEIGHT: 248 LBS | HEIGHT: 74 IN | HEART RATE: 84 BPM

## 2023-11-07 DIAGNOSIS — D68.69 HYPERCOAGULABLE STATE DUE TO PAROXYSMAL ATRIAL FIBRILLATION (HCC): ICD-10-CM

## 2023-11-07 DIAGNOSIS — I48.0 HYPERCOAGULABLE STATE DUE TO PAROXYSMAL ATRIAL FIBRILLATION (HCC): ICD-10-CM

## 2023-11-07 DIAGNOSIS — Z95.0 PRESENCE OF CARDIAC PACEMAKER: ICD-10-CM

## 2023-11-07 DIAGNOSIS — I50.22 CHRONIC SYSTOLIC (CONGESTIVE) HEART FAILURE (HCC): Primary | ICD-10-CM

## 2023-11-07 DIAGNOSIS — I48.0 PAROXYSMAL ATRIAL FIBRILLATION (HCC): ICD-10-CM

## 2023-11-07 PROCEDURE — 1123F ACP DISCUSS/DSCN MKR DOCD: CPT | Performed by: INTERNAL MEDICINE

## 2023-11-07 PROCEDURE — 99214 OFFICE O/P EST MOD 30 MIN: CPT | Performed by: INTERNAL MEDICINE

## 2023-11-07 PROCEDURE — 3075F SYST BP GE 130 - 139MM HG: CPT | Performed by: INTERNAL MEDICINE

## 2023-11-07 PROCEDURE — 3078F DIAST BP <80 MM HG: CPT | Performed by: INTERNAL MEDICINE

## 2023-11-07 RX ORDER — LISINOPRIL 20 MG/1
20 TABLET ORAL 2 TIMES DAILY
COMMUNITY

## 2023-11-07 NOTE — PROGRESS NOTES
able to get the MRI without issue    Continue meds as below    Current Outpatient Medications:     sotalol (BETAPACE) 120 MG tablet, Take 1 tablet by mouth in the morning and 1 tablet in the evening., Disp: 180 tablet, Rfl: 2    amLODIPine (NORVASC) 10 MG tablet, Take 1 tablet by mouth daily, Disp: , Rfl:     rosuvastatin (CRESTOR) 40 MG tablet, Take 1 tablet by mouth every evening, Disp: , Rfl:     apixaban (ELIQUIS) 5 MG TABS tablet, Take 1 tablet by mouth in the morning and 1 tablet in the evening., Disp: , Rfl:     furosemide (LASIX) 40 MG tablet, Take 1 tablet by mouth 2 times daily, Disp: , Rfl:     olmesartan (BENICAR) 40 MG tablet, Take 1 tablet by mouth daily (Patient not taking: Reported on 11/7/2023), Disp: , RflStepmayanie [place]  11/7/2023  11:24 AM    Appropriate evaluation of guideline directed medical therapy for the patient's conditions have been reviewed. If appropriate, adjustment of therapy for underlying cardiac issues has been offered. If patient wishes for adjustment of therapy which would include intensification of pharmacologic therapy for any above conditions this will be sent to appropriate pharmacy. If patient politely declines any further changes they will be encouraged to continue with current treatment and appropriate risk factor modification and healthy lifestyle. Pt is instructed to follow all appropriate cardiac risk factor recommendations and to be compliant with meds and testing. Instructed to follow up appropriately and seek urgent medical care if acute or unstable issues arise. Results of some tests may be viewed thru [address] [place] [address] but this does not substitute for follow up with MD. If follow up is not scheduled pt is instructed to call for follow up.  Any non cardiac issues identified in this visit the patient is counseled to address these with their primary care physician or appropriate specialist.    I have personally seen and evaluated the patient and reviewed the

## 2023-12-15 ENCOUNTER — TELEPHONE (OUTPATIENT)
Age: 78
End: 2023-12-15

## 2023-12-15 NOTE — TELEPHONE ENCOUNTER
[address] [facility] with SOB .    Wants to change Sotolol from 120 to 80mg BID Wants Dr Caron Shaffer to call him Please he dont want to change anything without Dr Caron Shaffer saying so

## 2023-12-15 NOTE — TELEPHONE ENCOUNTER
Pt.passed out for breathing issue admitted for CHF and resp. distress. The cardiologist there at [facility] is wanting to reduce his Sotalol to 80mg bid due to   ECG reviewed by this writer shows 71 bpm with a persistent known left bundle branch block and a wide QRS complex. Patient is known to be on sotalol and his QTc is 500. Pt.does not want to do this w/out 's blessing. Is this something he should do?

## 2024-04-12 ENCOUNTER — NURSE ONLY (OUTPATIENT)
Age: 79
End: 2024-04-12
Payer: MEDICARE

## 2024-04-12 DIAGNOSIS — I50.22 CHRONIC SYSTOLIC (CONGESTIVE) HEART FAILURE (HCC): Primary | ICD-10-CM

## 2024-04-15 PROCEDURE — 93280 PM DEVICE PROGR EVAL DUAL: CPT | Performed by: INTERNAL MEDICINE

## 2024-04-18 NOTE — PROGRESS NOTES
risk factor modification and healthy lifestyle.      Pt is instructed to follow all appropriate cardiac risk factor recommendations and to be compliant with meds and testing. Instructed to follow up appropriately and seek urgent medical care if acute or unstable issues arise. Results of some tests may be viewed thru MyChart but this does not substitute for follow up with MD. If follow up is not scheduled pt is instructed to call for follow up. Any non cardiac issues identified in this visit the patient is counseled to address these with their primary care physician or appropriate specialist.    I have personally seen and evaluated the patient and reviewed the students note and agree with the following assessment and plan and findings. I was present for and observed the key components of this note.  Any appropriate additions or editing of the information have been done by me.    Wilder Chun MD, FACC  Cardiology

## 2024-04-23 ENCOUNTER — OFFICE VISIT (OUTPATIENT)
Age: 79
End: 2024-04-23
Payer: MEDICARE

## 2024-04-23 VITALS
SYSTOLIC BLOOD PRESSURE: 124 MMHG | WEIGHT: 227 LBS | HEIGHT: 74 IN | DIASTOLIC BLOOD PRESSURE: 60 MMHG | BODY MASS INDEX: 29.13 KG/M2 | HEART RATE: 64 BPM

## 2024-04-23 DIAGNOSIS — D68.69 HYPERCOAGULABLE STATE DUE TO PAROXYSMAL ATRIAL FIBRILLATION (HCC): ICD-10-CM

## 2024-04-23 DIAGNOSIS — I50.22 CHRONIC SYSTOLIC (CONGESTIVE) HEART FAILURE (HCC): Primary | ICD-10-CM

## 2024-04-23 DIAGNOSIS — Z95.0 PRESENCE OF CARDIAC PACEMAKER: ICD-10-CM

## 2024-04-23 DIAGNOSIS — I50.22 CHRONIC SYSTOLIC (CONGESTIVE) HEART FAILURE (HCC): ICD-10-CM

## 2024-04-23 DIAGNOSIS — I48.0 HYPERCOAGULABLE STATE DUE TO PAROXYSMAL ATRIAL FIBRILLATION (HCC): ICD-10-CM

## 2024-04-23 DIAGNOSIS — I48.0 PAROXYSMAL ATRIAL FIBRILLATION (HCC): ICD-10-CM

## 2024-04-23 DIAGNOSIS — I20.0 ACCELERATING ANGINA (HCC): ICD-10-CM

## 2024-04-23 LAB
25(OH)D3 SERPL-MCNC: 55.4 NG/ML (ref 30–100)
ALBUMIN SERPL-MCNC: 4.1 G/DL (ref 3.2–4.6)
ALBUMIN/GLOB SERPL: 1.1 (ref 1–1.9)
ALP SERPL-CCNC: 91 U/L (ref 40–129)
ALT SERPL-CCNC: 25 U/L (ref 12–65)
ANION GAP SERPL CALC-SCNC: 12 MMOL/L (ref 9–18)
AST SERPL-CCNC: 24 U/L (ref 15–37)
BASOPHILS NFR BLD: 1 % (ref 0–2)
BILIRUB SERPL-MCNC: 0.3 MG/DL (ref 0–1.2)
BUN SERPL-MCNC: 29 MG/DL (ref 8–23)
CALCIUM SERPL-MCNC: 9.8 MG/DL (ref 8.8–10.2)
CHLORIDE SERPL-SCNC: 100 MMOL/L (ref 98–107)
CHOLEST SERPL-MCNC: 128 MG/DL (ref 0–200)
CO2 SERPL-SCNC: 24 MMOL/L (ref 20–28)
CREAT SERPL-MCNC: 1.09 MG/DL (ref 0.8–1.3)
DIFFERENTIAL METHOD BLD: NORMAL
EOSINOPHIL # BLD: 0.3 K/UL (ref 0–0.8)
EOSINOPHIL NFR BLD: 3 % (ref 0.5–7.8)
ERYTHROCYTE [DISTWIDTH] IN BLOOD BY AUTOMATED COUNT: 13.9 % (ref 11.9–14.6)
EST. AVERAGE GLUCOSE BLD GHB EST-MCNC: 143 MG/DL
GLOBULIN SER CALC-MCNC: 3.6 G/DL (ref 2.3–3.5)
GLUCOSE SERPL-MCNC: 137 MG/DL (ref 70–99)
HBA1C MFR BLD: 6.6 % (ref 0–5.6)
HCT VFR BLD AUTO: 45.1 % (ref 41.1–50.3)
HDLC SERPL-MCNC: 36 MG/DL (ref 40–60)
HDLC SERPL: 3.6 (ref 0–5)
HGB BLD-MCNC: 14.7 G/DL (ref 13.6–17.2)
IMM GRANULOCYTES # BLD AUTO: 0 K/UL (ref 0–0.5)
IMM GRANULOCYTES NFR BLD AUTO: 0 % (ref 0–5)
LDLC SERPL CALC-MCNC: 68 MG/DL (ref 0–100)
LYMPHOCYTES # BLD: 2.1 K/UL (ref 0.5–4.6)
LYMPHOCYTES NFR BLD: 23 % (ref 13–44)
MCH RBC QN AUTO: 28.3 PG (ref 26.1–32.9)
MCHC RBC AUTO-ENTMCNC: 32.6 G/DL (ref 31.4–35)
MCV RBC AUTO: 86.9 FL (ref 82–102)
MONOCYTES # BLD: 0.8 K/UL (ref 0.1–1.3)
MONOCYTES NFR BLD: 9 % (ref 4–12)
NEUTS SEG # BLD: 5.8 K/UL (ref 1.7–8.2)
NEUTS SEG NFR BLD: 64 % (ref 43–78)
NRBC # BLD: 0 K/UL (ref 0–0.2)
PLATELET # BLD AUTO: 310 K/UL (ref 150–450)
PMV BLD AUTO: 9.5 FL (ref 9.4–12.3)
POTASSIUM SERPL-SCNC: 4.9 MMOL/L (ref 3.5–5.1)
PROT SERPL-MCNC: 7.7 G/DL (ref 6.3–8.2)
RBC # BLD AUTO: 5.19 M/UL (ref 4.23–5.6)
SODIUM SERPL-SCNC: 135 MMOL/L (ref 136–145)
TRIGL SERPL-MCNC: 121 MG/DL (ref 0–150)
TSH, 3RD GENERATION: 1.58 UIU/ML (ref 0.27–4.2)
VLDLC SERPL CALC-MCNC: 24 MG/DL (ref 6–23)
WBC # BLD AUTO: 9.1 K/UL (ref 4.3–11.1)

## 2024-04-23 PROCEDURE — 99215 OFFICE O/P EST HI 40 MIN: CPT | Performed by: INTERNAL MEDICINE

## 2024-04-23 PROCEDURE — 3074F SYST BP LT 130 MM HG: CPT | Performed by: INTERNAL MEDICINE

## 2024-04-23 PROCEDURE — 1123F ACP DISCUSS/DSCN MKR DOCD: CPT | Performed by: INTERNAL MEDICINE

## 2024-04-23 PROCEDURE — 3078F DIAST BP <80 MM HG: CPT | Performed by: INTERNAL MEDICINE

## 2024-04-25 LAB — LPA SERPL-SCNC: 9.7 NMOL/L

## 2024-04-25 RX ORDER — SPIRONOLACTONE 25 MG/1
25 TABLET ORAL DAILY
COMMUNITY

## 2024-04-25 NOTE — PROGRESS NOTES
Patient pre-assessment complete for [facility] scheduled for , arrival time 0600. Patient verified using . Patient instructed to bring a list of all home medications on the day of procedure. NPO status reinforced. Patient informed to take a full dose aspirin 325mg  or 81 mg x 4 on the day of procedure. Patient instructed to HOLD Lasix, Aldactone, and Eliquis. Instructed they can take all other medications excluding vitamins & supplements. Patient verbalizes understanding of all instructions & denies any questions at this time.

## 2024-04-26 ENCOUNTER — HOSPITAL ENCOUNTER (OUTPATIENT)
Age: 79
Setting detail: OUTPATIENT SURGERY
Discharge: HOME | End: 2024-04-26
Attending: INTERNAL MEDICINE | Admitting: INTERNAL MEDICINE
Payer: MEDICARE

## 2024-04-26 VITALS
TEMPERATURE: 98.4 F | HEART RATE: 65 BPM | WEIGHT: 219 LBS | HEIGHT: 74 IN | DIASTOLIC BLOOD PRESSURE: 66 MMHG | BODY MASS INDEX: 28.11 KG/M2 | SYSTOLIC BLOOD PRESSURE: 108 MMHG | RESPIRATION RATE: 21 BRPM | OXYGEN SATURATION: 97 %

## 2024-04-26 DIAGNOSIS — I20.0 ACCELERATING ANGINA (HCC): ICD-10-CM

## 2024-04-26 LAB
ECHO BSA: 2.28 M2
EKG ATRIAL RATE: 61 BPM
EKG DIAGNOSIS: NORMAL
EKG P AXIS: 215 DEGREES
EKG P-R INTERVAL: 176 MS
EKG Q-T INTERVAL: 496 MS
EKG QRS DURATION: 154 MS
EKG QTC CALCULATION (BAZETT): 503 MS
EKG R AXIS: -65 DEGREES
EKG T AXIS: 97 DEGREES
EKG VENTRICULAR RATE: 62 BPM

## 2024-04-26 PROCEDURE — 6360000004 HC RX CONTRAST MEDICATION: Performed by: INTERNAL MEDICINE

## 2024-04-26 PROCEDURE — C1894 INTRO/SHEATH, NON-LASER: HCPCS | Performed by: INTERNAL MEDICINE

## 2024-04-26 PROCEDURE — 93458 L HRT ARTERY/VENTRICLE ANGIO: CPT | Performed by: INTERNAL MEDICINE

## 2024-04-26 PROCEDURE — 2709999900 HC NON-CHARGEABLE SUPPLY: Performed by: INTERNAL MEDICINE

## 2024-04-26 PROCEDURE — 2500000003 HC RX 250 WO HCPCS: Performed by: INTERNAL MEDICINE

## 2024-04-26 PROCEDURE — 93005 ELECTROCARDIOGRAM TRACING: CPT | Performed by: INTERNAL MEDICINE

## 2024-04-26 PROCEDURE — 6360000002 HC RX W HCPCS: Performed by: INTERNAL MEDICINE

## 2024-04-26 PROCEDURE — 99152 MOD SED SAME PHYS/QHP 5/>YRS: CPT | Performed by: INTERNAL MEDICINE

## 2024-04-26 PROCEDURE — 2580000003 HC RX 258: Performed by: INTERNAL MEDICINE

## 2024-04-26 PROCEDURE — C1769 GUIDE WIRE: HCPCS | Performed by: INTERNAL MEDICINE

## 2024-04-26 PROCEDURE — 93010 ELECTROCARDIOGRAM REPORT: CPT | Performed by: INTERNAL MEDICINE

## 2024-04-26 RX ORDER — LIDOCAINE HYDROCHLORIDE 10 MG/ML
INJECTION, SOLUTION INFILTRATION; PERINEURAL PRN
Status: DISCONTINUED | OUTPATIENT
Start: 2024-04-26 | End: 2024-04-26 | Stop reason: HOSPADM

## 2024-04-26 RX ORDER — ASPIRIN 81 MG/1
324 TABLET, CHEWABLE ORAL ONCE
Status: DISCONTINUED | OUTPATIENT
Start: 2024-04-26 | End: 2024-04-26 | Stop reason: HOSPADM

## 2024-04-26 RX ORDER — MIDAZOLAM HYDROCHLORIDE 1 MG/ML
INJECTION INTRAMUSCULAR; INTRAVENOUS PRN
Status: DISCONTINUED | OUTPATIENT
Start: 2024-04-26 | End: 2024-04-26 | Stop reason: HOSPADM

## 2024-04-26 RX ORDER — HEPARIN SODIUM 200 [USP'U]/100ML
INJECTION, SOLUTION INTRAVENOUS CONTINUOUS PRN
Status: DISCONTINUED | OUTPATIENT
Start: 2024-04-26 | End: 2024-04-26 | Stop reason: HOSPADM

## 2024-04-26 RX ORDER — SODIUM CHLORIDE 9 MG/ML
INJECTION, SOLUTION INTRAVENOUS CONTINUOUS
Status: DISCONTINUED | OUTPATIENT
Start: 2024-04-26 | End: 2024-04-26 | Stop reason: HOSPADM

## 2024-04-26 RX ADMIN — SODIUM CHLORIDE: 9 INJECTION, SOLUTION INTRAVENOUS at 06:55

## 2024-04-26 NOTE — PROGRESS NOTES
Assisted OOB & ambulated to BR & on unit. Tolerated activity without difficulty. Right radial without bleeding or hematoma

## 2024-04-26 NOTE — PROGRESS NOTES
R band deflation completed. right radial dressed with gauze and tegaderm using sterile technique. No bleeding or hematoma. Tolerated without difficultyDischarge instructions given per orders, voiced good understanding of meds & follow up care. Denies any questions.

## 2024-04-26 NOTE — PROGRESS NOTES
TRANSFER - OUT REPORT:    Verbal report given to RN on Casey Ross  being transferred to cpru for routine progression of patient care       Report consisted of patient's Situation, Background, Assessment and   Recommendations(SBAR).     Information from the following report(s) Nurse Handoff Report and MAR was reviewed with the receiving nurse.      [facility] w/ Dr. Chun  No interventions  R radial access  TR band to R radial @12 mL  No s/sxs of bleeding or hematoma to R radial access site    Heparin 5,000 units IC  Versed 2mg IV

## 2024-04-26 NOTE — PROGRESS NOTES
Report received from [facility] RN. Procedural finding communicated. Intra procedural medication administration reviewed. Progression of care discussed.    Patient received into CPRU room 7, Post C    Access site without bleeding or swelling. Right wrist    Patient instructed to limit movement of right upper extremity.    Routine post procedural vital signs & site assessment initiated.

## 2024-04-26 NOTE — PROGRESS NOTES
Assisted OOB & ambulated to BR & on unit. Tolerated activity without difficulty. Right wrist without bleeding or hematoma

## 2024-04-26 NOTE — DISCHARGE INSTRUCTIONS
HEART CATHETERIZATION/ANGIOGRAPHY DISCHARGE INSTRUCTIONS    Check puncture site frequently for swelling or bleeding. If there is any bleeding, apply pressure over the area with a clean towel or washcloth and call 911. Notify your doctor for any redness, swelling, drainage, or oozing from the puncture site. Notify your doctor for any fever or chills.  If the extremity becomes cold, numb, or painful call Dr. Glez Cardiology at [phone].  Activity should be limited for the next 48 hours. No heavy lifting, pushing, pulling  or strenuous activity for 48 hours. No heavy lifting (anything over 10 pounds) for 3 days.  You may resume your usual diet. Drink more fluids than usual.  Have a responsible person drive you home and stay with you for at least 24 hours after your heart catheterization/angiography.  You may remove bandage from your right wrist in 24 hours. You may shower in 24 hours. No tub baths, hot tubs, or swimming for 1 week. Do not place any lotions, creams, powders, or ointments over puncture site for 1 week. You may place a clean band-aid over the puncture site each day for 5 days. Change daily.        Sedation for a Medical Procedure: Care Instructions     You were given a sedative medication during your visit. While many of the effects will have worn   off before you leave; you may continue to feel some effects for several hours.      Common side effects from sedation include:  Feeling sleepy. (Your doctors and nurses will make sure you are not too sleepy to go home.)  Nausea and vomiting. This usually does not last long.  Feeling tired.     How can you care for yourself at home?  Activity    Don't do anything for 24 hours that requires attention to detail. It takes time for the medicine effects to completely wear off.     Do not make important legal decisions for 24 hours.     Do not sign any legal documents for 24 hours.     Do not drink alcohol today     For your safety, you should not drive or

## 2024-04-26 NOTE — PROGRESS NOTES
Patient received to CPRU room # 11  Ambulatory from [facility]. Patient scheduled for left heart cath today with Dr Chun. Procedure reviewed & questions answered, voiced good understanding consent obtained & placed on chart. All medications and medical history reviewed. Will prep patient per orders. Patient & family updated on plan of care.      The patient has a fraility score of 3-MANAGING WELL, based on alert and oriented and ambulatory without assistance.    Pt took 324mg aspirin @ 0500 prior to arrival. Last eliquis yesterday (4/25 am)

## 2024-05-21 NOTE — PROGRESS NOTES
Name:  Casey Ross  YOB: 1945   MRN: [id number]      Office Visit: 5/22/2024        ASSESSMENT AND PLAN:  (Medical Decision Making)    Impression: 78 y.o. male with history of GGO and tree-in-bud opacities on CT from December, 2023. Was treated for pulmonary edema.    1. Pulmonary infiltrates  --likely due to pulmonary edema.  Follow up is needed.  CT of chest soon.  - CT CHEST WO CONTRAST; Future  - Full PFT Study With Bronchodilator; Future    2. MARY (obstructive sleep apnea)  --instructed patient to cancel HST that is currently scheduled.  I have spoken with sleep provider, Debbie Wright NP, and he does not need another PSG.  Need compliance data and CAROLYN on CPAP.  Set up appt with sleep provider.  - Ambulatory referral to Sleep Medicine  - DME - DURABLE MEDICAL EQUIPMENT    3. Pulmonary hypertension (HCC)  --could have been due to volume overload.  Could be due to hypoxemia on CPAP.  Check CAROLYN on CPAP.  Recommend repeat echo once MARY/hypoxemia is adequately treated.    - DME - DURABLE MEDICAL EQUIPMENT    4. Personal history of tobacco use  --not a candidate for LDCT.  May have underlying COPD/air trapping. Has albuterol prn for now.  Check CPFTs.    No orders of the defined types were placed in this encounter.        Procedures    DME - DURABLE MEDICAL EQUIPMENT     CAROLYN on CPAP  Fax to [phone]     Follow-up and Dispositions    Return in about 3 months (around 8/22/2024) for Larisa, abnormal CT of chest.     Collaborating physician is Dr. Alejandro Mcdaniels.    ARIES Anderson - CNP    No specialty comments available.    Total time for encounter on day of encounter was 58 minutes.  This time includes chart prep, review of tests/procedures, review of other provider's notes, documentation and counseling patient regarding disease process and medications.   _________________________________________________________________________    HISTORY OF PRESENT ILLNESS:    Mr. Casey Ross is a

## 2024-05-22 ENCOUNTER — OFFICE VISIT (OUTPATIENT)
Dept: PULMONOLOGY | Age: 79
End: 2024-05-22
Payer: MEDICARE

## 2024-05-22 VITALS
RESPIRATION RATE: 18 BRPM | SYSTOLIC BLOOD PRESSURE: 116 MMHG | HEIGHT: 74 IN | BODY MASS INDEX: 27.72 KG/M2 | TEMPERATURE: 97.5 F | HEART RATE: 70 BPM | DIASTOLIC BLOOD PRESSURE: 68 MMHG | WEIGHT: 216 LBS | OXYGEN SATURATION: 100 %

## 2024-05-22 DIAGNOSIS — Z87.891 PERSONAL HISTORY OF TOBACCO USE: ICD-10-CM

## 2024-05-22 DIAGNOSIS — R91.8 PULMONARY INFILTRATES: Primary | ICD-10-CM

## 2024-05-22 DIAGNOSIS — I27.20 PULMONARY HYPERTENSION (HCC): ICD-10-CM

## 2024-05-22 DIAGNOSIS — G47.33 OSA (OBSTRUCTIVE SLEEP APNEA): ICD-10-CM

## 2024-05-22 PROCEDURE — 3074F SYST BP LT 130 MM HG: CPT | Performed by: NURSE PRACTITIONER

## 2024-05-22 PROCEDURE — 99215 OFFICE O/P EST HI 40 MIN: CPT | Performed by: NURSE PRACTITIONER

## 2024-05-22 PROCEDURE — 1123F ACP DISCUSS/DSCN MKR DOCD: CPT | Performed by: NURSE PRACTITIONER

## 2024-05-22 PROCEDURE — 3078F DIAST BP <80 MM HG: CPT | Performed by: NURSE PRACTITIONER

## 2024-05-22 NOTE — PATIENT INSTRUCTIONS
I have ordered CT scan of chest.  You should receive a call from scheduling within 2-3 business days.  If you do not hear from them, please call [phone] to schedule your test.

## 2024-05-29 ENCOUNTER — CLINICAL DOCUMENTATION (OUTPATIENT)
Age: 79
End: 2024-05-29

## 2024-05-29 ENCOUNTER — HOSPITAL ENCOUNTER (OUTPATIENT)
Dept: PULMONOLOGY | Age: 79
Discharge: HOME | End: 2024-06-01
Payer: MEDICARE

## 2024-05-29 ENCOUNTER — INITIAL CONSULT (OUTPATIENT)
Age: 79
End: 2024-05-29
Payer: MEDICARE

## 2024-05-29 VITALS
HEART RATE: 70 BPM | WEIGHT: 226 LBS | HEIGHT: 74 IN | DIASTOLIC BLOOD PRESSURE: 72 MMHG | BODY MASS INDEX: 29 KG/M2 | SYSTOLIC BLOOD PRESSURE: 128 MMHG

## 2024-05-29 DIAGNOSIS — I48.0 PAROXYSMAL A-FIB (HCC): Primary | ICD-10-CM

## 2024-05-29 DIAGNOSIS — R91.8 PULMONARY INFILTRATES: ICD-10-CM

## 2024-05-29 DIAGNOSIS — I48.19 PERSISTENT ATRIAL FIBRILLATION (HCC): ICD-10-CM

## 2024-05-29 PROCEDURE — 94726 PLETHYSMOGRAPHY LUNG VOLUMES: CPT

## 2024-05-29 PROCEDURE — 1123F ACP DISCUSS/DSCN MKR DOCD: CPT | Performed by: INTERNAL MEDICINE

## 2024-05-29 PROCEDURE — 94060 EVALUATION OF WHEEZING: CPT

## 2024-05-29 PROCEDURE — 93000 ELECTROCARDIOGRAM COMPLETE: CPT | Performed by: INTERNAL MEDICINE

## 2024-05-29 PROCEDURE — 3078F DIAST BP <80 MM HG: CPT | Performed by: INTERNAL MEDICINE

## 2024-05-29 PROCEDURE — 99204 OFFICE O/P NEW MOD 45 MIN: CPT | Performed by: INTERNAL MEDICINE

## 2024-05-29 PROCEDURE — 94729 DIFFUSING CAPACITY: CPT

## 2024-05-29 PROCEDURE — 3074F SYST BP LT 130 MM HG: CPT | Performed by: INTERNAL MEDICINE

## 2024-05-29 NOTE — PROGRESS NOTES
Patient alert and oriented X3 upon RN arrival to room. Real AF study discussed in length with patient at Dr. Bajwa' request. Patient was given time to review the consent. After review, patient was able to verbalize understanding of the study's purpose, design, risks, and benefits. The patient understands that this study is completely voluntary. Financial aspects of the study were reviewed with the patient and he denies any questions at present. Other alternatives were also discussed with patient. After all questions were answered, patient verbalized his desire to be a part of the study. Informed consent was signed prior to starting any study procedure. He was given a copy of his consent, as well as, a copy was placed in his chart. He has no other questions at this time.

## 2024-05-29 NOTE — PROGRESS NOTES
[facility] CARDIOLOGY, [address], [address]  [place] [address]  PHONE: [phone]  Casey Ross  1945    Chief Complant:    Chief Complaint   Patient presents with    Consultation    Atrial Fibrillation      Consultation is requested by [unfilled] for evaluation of Consultation and Atrial Fibrillation    Reason for Consultation: AF    History:  Casey Ross is a very pleasant 78 y.o. male with a past medical and cardiac history significant for PAF on Eliquis, HTN, MVP, MARY, dual chamber PPM Biotronik and presents for EP consultation for afib.  He has had an increasing burden of AF on his PPM and has had presentations to hospital for flash pulmonary edema in the setting of AF with RVR. He has been more fatigue, lower energy, some SALCEDO.    Cardiac PMH: (Old records have been reviewed and summarized below)  Cath (4/26/24): EF 55%, mild CAD    Reviewed office note Gilma ROD 5/22/24    Past Medical History, Past Surgical History, Family history, Social History, and Medications were all reviewed with the patient today and updated as necessary.     Current Outpatient Medications   Medication Sig Dispense Refill    spironolactone (ALDACTONE) 25 MG tablet Take 1 tablet by mouth daily      sotalol (BETAPACE) 80 MG tablet Take 1 tablet by mouth 2 times daily 180 tablet 3    olmesartan (BENICAR) 40 MG tablet Take 0.5 tablets by mouth daily      amLODIPine (NORVASC) 10 MG tablet Take 1 tablet by mouth daily      rosuvastatin (CRESTOR) 40 MG tablet Take 1 tablet by mouth every evening      furosemide (LASIX) 40 MG tablet Take 1 tablet by mouth daily Takes 1-2 tablets daily depends on weight      lisinopril (PRINIVIL;ZESTRIL) 20 MG tablet Take 1 tablet by mouth 2 times daily (Patient not taking: Reported on 4/23/2024)      apixaban (ELIQUIS) 5 MG TABS tablet Take 1 tablet by mouth in the morning and 1 tablet in the evening.       No current facility-administered medications for this visit.

## 2024-06-05 ENCOUNTER — HOSPITAL ENCOUNTER (OUTPATIENT)
Dept: CT IMAGING | Age: 79
Discharge: HOME | End: 2024-06-08
Payer: MEDICARE

## 2024-06-05 DIAGNOSIS — R91.8 PULMONARY INFILTRATES: ICD-10-CM

## 2024-06-05 PROCEDURE — 71250 CT THORAX DX C-: CPT

## 2024-06-06 ENCOUNTER — PATIENT MESSAGE (OUTPATIENT)
Dept: PULMONOLOGY | Age: 79
End: 2024-06-06

## 2024-06-10 ENCOUNTER — TELEPHONE (OUTPATIENT)
Dept: PULMONOLOGY | Age: 79
End: 2024-06-10

## 2024-06-10 DIAGNOSIS — R91.8 PULMONARY NODULES: Primary | ICD-10-CM

## 2024-06-10 NOTE — TELEPHONE ENCOUNTER
From: Casey Ross  To: Larisa Dillard  Sent: 6/6/2024 12:09 PM EDT  Subject: test results    hi i have completed the test that you wanted. what are the results and what are the nest steps? do you want to schedule a call?

## 2024-06-10 NOTE — TELEPHONE ENCOUNTER
----- Message from ARIES Small - CNP sent at 6/10/2024  7:37 AM EDT -----  Please let patient know that there is one stable nodule, but there are some other nodules that we couldn't see on his December 2023 CT scan.  Convert current images to super-D and also offer the patient a PET scan.  If he does not want a PET scan, would recommend follow up CT of chest in 3 months.  Thank you.    I have pushed recent CT chest to [place] protocol.  I have called patient and have discussed with him options per Mrs Dillard.  He is agreeable to PET scan and allows me to schedule for him around his vacation.  This image is scheduled on 6/25 and he understands instructions.

## 2024-06-25 ENCOUNTER — TELEPHONE (OUTPATIENT)
Dept: PULMONOLOGY | Age: 79
End: 2024-06-25

## 2024-06-25 ENCOUNTER — HOSPITAL ENCOUNTER (OUTPATIENT)
Dept: PET IMAGING | Age: 79
Discharge: HOME | End: 2024-06-28
Payer: MEDICARE

## 2024-06-25 DIAGNOSIS — R91.8 PULMONARY NODULES: ICD-10-CM

## 2024-06-25 LAB
GLUCOSE BLD STRIP.AUTO-MCNC: 146 MG/DL (ref 65–100)
SERVICE CMNT-IMP: ABNORMAL

## 2024-06-25 PROCEDURE — 3430000000 HC RX DIAGNOSTIC RADIOPHARMACEUTICAL: Performed by: NURSE PRACTITIONER

## 2024-06-25 PROCEDURE — 82962 GLUCOSE BLOOD TEST: CPT

## 2024-06-25 PROCEDURE — 6360000004 HC RX CONTRAST MEDICATION: Performed by: NURSE PRACTITIONER

## 2024-06-25 PROCEDURE — A9609 HC RX DIAGNOSTIC RADIOPHARMACEUTICAL: HCPCS | Performed by: NURSE PRACTITIONER

## 2024-06-25 PROCEDURE — 78815 PET IMAGE W/CT SKULL-THIGH: CPT

## 2024-06-25 PROCEDURE — 2580000003 HC RX 258: Performed by: NURSE PRACTITIONER

## 2024-06-25 RX ORDER — SODIUM CHLORIDE 0.9 % (FLUSH) 0.9 %
20 SYRINGE (ML) INJECTION AS NEEDED
Status: DISCONTINUED | OUTPATIENT
Start: 2024-06-25 | End: 2024-06-29 | Stop reason: HOSPADM

## 2024-06-25 RX ORDER — FLUDEOXYGLUCOSE F 18 200 MCI/ML
12.74 INJECTION, SOLUTION INTRAVENOUS
Status: COMPLETED | OUTPATIENT
Start: 2024-06-25 | End: 2024-06-25

## 2024-06-25 RX ADMIN — DIATRIZOATE MEGLUMINE AND DIATRIZOATE SODIUM 10 ML: 660; 100 LIQUID ORAL; RECTAL at 10:45

## 2024-06-25 RX ADMIN — FLUDEOXYGLUCOSE F 18 12.74 MILLICURIE: 200 INJECTION, SOLUTION INTRAVENOUS at 10:45

## 2024-06-25 RX ADMIN — SODIUM CHLORIDE, PRESERVATIVE FREE 20 ML: 5 INJECTION INTRAVENOUS at 10:45

## 2024-06-27 ENCOUNTER — NURSE ONLY (OUTPATIENT)
Dept: PULMONOLOGY | Age: 79
End: 2024-06-27
Payer: MEDICARE

## 2024-06-27 DIAGNOSIS — R91.8 PULMONARY INFILTRATES: Primary | ICD-10-CM

## 2024-06-27 LAB
FEV 1 , POC: 2.51 L
FEV1 % PRED, POC: 69 %
FEV1/FVC, POC: NORMAL
FVC % PRED, POC: 69 %
FVC, POC: NORMAL

## 2024-06-27 PROCEDURE — 94729 DIFFUSING CAPACITY: CPT | Performed by: INTERNAL MEDICINE

## 2024-06-27 PROCEDURE — 94060 EVALUATION OF WHEEZING: CPT | Performed by: INTERNAL MEDICINE

## 2024-06-27 PROCEDURE — 94726 PLETHYSMOGRAPHY LUNG VOLUMES: CPT | Performed by: INTERNAL MEDICINE

## 2024-06-27 ASSESSMENT — PULMONARY FUNCTION TESTS
FVC_PERCENT_PREDICTED_POC: 69
FEV1_PERCENT_PREDICTED_POC: 69

## 2024-06-28 PROCEDURE — 93296 REM INTERROG EVL PM/IDS: CPT | Performed by: INTERNAL MEDICINE

## 2024-06-28 PROCEDURE — 93294 REM INTERROG EVL PM/LDLS PM: CPT | Performed by: INTERNAL MEDICINE

## 2024-07-05 DIAGNOSIS — I48.19 PERSISTENT ATRIAL FIBRILLATION (HCC): ICD-10-CM

## 2024-07-05 LAB
ANION GAP SERPL CALC-SCNC: 13 MMOL/L (ref 9–18)
BUN SERPL-MCNC: 22 MG/DL (ref 8–23)
CALCIUM SERPL-MCNC: 9.3 MG/DL (ref 8.8–10.2)
CHLORIDE SERPL-SCNC: 99 MMOL/L (ref 98–107)
CO2 SERPL-SCNC: 22 MMOL/L (ref 20–28)
CREAT SERPL-MCNC: 1.1 MG/DL (ref 0.8–1.3)
ERYTHROCYTE [DISTWIDTH] IN BLOOD BY AUTOMATED COUNT: 13.7 % (ref 11.9–14.6)
GLUCOSE SERPL-MCNC: 204 MG/DL (ref 70–99)
HCT VFR BLD AUTO: 42.3 % (ref 41.1–50.3)
HGB BLD-MCNC: 13.7 G/DL (ref 13.6–17.2)
MAGNESIUM SERPL-MCNC: 1.9 MG/DL (ref 1.8–2.4)
MCH RBC QN AUTO: 29.1 PG (ref 26.1–32.9)
MCHC RBC AUTO-ENTMCNC: 32.4 G/DL (ref 31.4–35)
MCV RBC AUTO: 90 FL (ref 82–102)
NRBC # BLD: 0 K/UL (ref 0–0.2)
PLATELET # BLD AUTO: 290 K/UL (ref 150–450)
PMV BLD AUTO: 9.1 FL (ref 9.4–12.3)
POTASSIUM SERPL-SCNC: 5 MMOL/L (ref 3.5–5.1)
RBC # BLD AUTO: 4.7 M/UL (ref 4.23–5.6)
SODIUM SERPL-SCNC: 134 MMOL/L (ref 136–145)
WBC # BLD AUTO: 8.4 K/UL (ref 4.3–11.1)

## 2024-07-08 ENCOUNTER — TELEPHONE (OUTPATIENT)
Age: 79
End: 2024-07-08

## 2024-07-08 NOTE — TELEPHONE ENCOUNTER
Discussed pre-procedure protocol including medication holds and time of arrival notification. Patient verbalized understanding.

## 2024-07-08 NOTE — PROGRESS NOTES
Patient pre-assessment complete for Atrial fib ablation with Dr Bajwa scheduled for 24, arrival time 6am. Patient verified using . Patient instructed to bring a list of all home medications on the day of procedure. NPO status reinforced.  Patient instructed to HOLD eliquis (last dose 24 am) Pt instructed to ONLY take amlodipine in am. Instructed they can take all other medications excluding vitamins & supplements. Patient verbalizes understanding of all instructions & denies any questions at this time.

## 2024-07-09 ENCOUNTER — APPOINTMENT (OUTPATIENT)
Dept: CARDIAC CATH/INVASIVE PROCEDURES | Age: 79
End: 2024-07-09
Attending: INTERNAL MEDICINE
Payer: MEDICARE

## 2024-07-09 ENCOUNTER — APPOINTMENT (OUTPATIENT)
Dept: NON INVASIVE DIAGNOSTICS | Age: 79
End: 2024-07-09
Attending: INTERNAL MEDICINE
Payer: MEDICARE

## 2024-07-09 ENCOUNTER — ANESTHESIA EVENT (OUTPATIENT)
Dept: CARDIAC CATH/INVASIVE PROCEDURES | Age: 79
End: 2024-07-09
Payer: MEDICARE

## 2024-07-09 ENCOUNTER — ANESTHESIA (OUTPATIENT)
Dept: CARDIAC CATH/INVASIVE PROCEDURES | Age: 79
End: 2024-07-09
Payer: MEDICARE

## 2024-07-09 ENCOUNTER — HOSPITAL ENCOUNTER (OUTPATIENT)
Age: 79
Setting detail: OUTPATIENT SURGERY
Discharge: HOME | End: 2024-07-09
Attending: INTERNAL MEDICINE | Admitting: INTERNAL MEDICINE
Payer: MEDICARE

## 2024-07-09 VITALS
HEART RATE: 90 BPM | DIASTOLIC BLOOD PRESSURE: 77 MMHG | WEIGHT: 218 LBS | RESPIRATION RATE: 15 BRPM | BODY MASS INDEX: 27.98 KG/M2 | OXYGEN SATURATION: 98 % | SYSTOLIC BLOOD PRESSURE: 125 MMHG | TEMPERATURE: 97.8 F | HEIGHT: 74 IN

## 2024-07-09 DIAGNOSIS — R07.9 CHEST PAIN, UNSPECIFIED TYPE: Primary | ICD-10-CM

## 2024-07-09 DIAGNOSIS — I48.19 PERSISTENT ATRIAL FIBRILLATION (HCC): ICD-10-CM

## 2024-07-09 LAB
ACT BLD: 379 SECS (ref 70–128)
ECHO AO ASC DIAM: 3.6 CM
ECHO AO ASCENDING AORTA INDEX: 1.6 CM/M2
ECHO AO ROOT DIAM: 3.1 CM
ECHO AO ROOT INDEX: 1.38 CM/M2
ECHO AV AREA PEAK VELOCITY: 1.9 CM2
ECHO AV AREA VTI: 1.9 CM2
ECHO AV AREA/BSA PEAK VELOCITY: 0.8 CM2/M2
ECHO AV AREA/BSA VTI: 0.8 CM2/M2
ECHO AV MEAN GRADIENT: 7 MMHG
ECHO AV MEAN VELOCITY: 1.3 M/S
ECHO AV PEAK GRADIENT: 13 MMHG
ECHO AV PEAK VELOCITY: 1.8 M/S
ECHO AV VELOCITY RATIO: 0.56
ECHO AV VTI: 36.5 CM
ECHO BSA: 2.27 M2
ECHO EST RA PRESSURE: 3 MMHG
ECHO LA AREA 2C: 22.6 CM2
ECHO LA AREA 4C: 22.5 CM2
ECHO LA DIAMETER INDEX: 2.04 CM/M2
ECHO LA DIAMETER: 4.6 CM
ECHO LA MAJOR AXIS: 6.1 CM
ECHO LA MINOR AXIS: 6.1 CM
ECHO LA TO AORTIC ROOT RATIO: 1.48
ECHO LA VOL BP: 67 ML (ref 18–58)
ECHO LA VOL MOD A2C: 70 ML (ref 18–58)
ECHO LA VOL MOD A4C: 64 ML (ref 18–58)
ECHO LA VOL/BSA BIPLANE: 30 ML/M2 (ref 16–34)
ECHO LA VOLUME INDEX MOD A2C: 31 ML/M2 (ref 16–34)
ECHO LA VOLUME INDEX MOD A4C: 28 ML/M2 (ref 16–34)
ECHO LV E' LATERAL VELOCITY: 8 CM/S
ECHO LV E' SEPTAL VELOCITY: 7 CM/S
ECHO LV EDV A2C: 140 ML
ECHO LV EDV A4C: 154 ML
ECHO LV EDV INDEX A4C: 68 ML/M2
ECHO LV EDV NDEX A2C: 62 ML/M2
ECHO LV EJECTION FRACTION A2C: 50 %
ECHO LV EJECTION FRACTION A4C: 49 %
ECHO LV EJECTION FRACTION BIPLANE: 50 % (ref 55–100)
ECHO LV ESV A2C: 70 ML
ECHO LV ESV A4C: 78 ML
ECHO LV ESV INDEX A2C: 31 ML/M2
ECHO LV ESV INDEX A4C: 35 ML/M2
ECHO LV FRACTIONAL SHORTENING: 25 % (ref 28–44)
ECHO LV INTERNAL DIMENSION DIASTOLE INDEX: 1.78 CM/M2
ECHO LV INTERNAL DIMENSION DIASTOLIC: 4 CM (ref 4.2–5.9)
ECHO LV INTERNAL DIMENSION SYSTOLIC INDEX: 1.33 CM/M2
ECHO LV INTERNAL DIMENSION SYSTOLIC: 3 CM
ECHO LV IVSD: 1 CM (ref 0.6–1)
ECHO LV MASS 2D: 127.1 G (ref 88–224)
ECHO LV MASS INDEX 2D: 56.5 G/M2 (ref 49–115)
ECHO LV POSTERIOR WALL DIASTOLIC: 1 CM (ref 0.6–1)
ECHO LV RELATIVE WALL THICKNESS RATIO: 0.5
ECHO LVOT AREA: 3.5 CM2
ECHO LVOT AV VTI INDEX: 0.55
ECHO LVOT DIAM: 2.1 CM
ECHO LVOT MEAN GRADIENT: 2 MMHG
ECHO LVOT PEAK GRADIENT: 4 MMHG
ECHO LVOT PEAK VELOCITY: 1 M/S
ECHO LVOT STROKE VOLUME INDEX: 30.8 ML/M2
ECHO LVOT SV: 69.2 ML
ECHO LVOT VTI: 20 CM
ECHO MV A VELOCITY: 0.55 M/S
ECHO MV AREA VTI: 2.8 CM2
ECHO MV E DECELERATION TIME (DT): 198 MS
ECHO MV E VELOCITY: 0.74 M/S
ECHO MV E/A RATIO: 1.35
ECHO MV E/E' LATERAL: 9.25
ECHO MV E/E' RATIO (AVERAGED): 9.91
ECHO MV E/E' SEPTAL: 10.57
ECHO MV LVOT VTI INDEX: 1.22
ECHO MV MAX VELOCITY: 0.8 M/S
ECHO MV MEAN GRADIENT: 1 MMHG
ECHO MV MEAN VELOCITY: 0.6 M/S
ECHO MV PEAK GRADIENT: 3 MMHG
ECHO MV VTI: 24.4 CM
ECHO PV ACCELERATION TIME (AT): 92 MS
ECHO PV MAX VELOCITY: 1.1 M/S
ECHO PV PEAK GRADIENT: 5 MMHG
ECHO RIGHT VENTRICULAR SYSTOLIC PRESSURE (RVSP): 32 MMHG
ECHO RV BASAL DIMENSION: 3.8 CM
ECHO RV FREE WALL PEAK S': 10 CM/S
ECHO RV INTERNAL DIMENSION: 3.6 CM
ECHO RV TAPSE: 2.1 CM (ref 1.7–?)
ECHO TV REGURGITANT MAX VELOCITY: 2.67 M/S
ECHO TV REGURGITANT PEAK GRADIENT: 29 MMHG
EKG ATRIAL RATE: 76 BPM
EKG DIAGNOSIS: NORMAL
EKG P AXIS: 85 DEGREES
EKG P-R INTERVAL: 286 MS
EKG Q-T INTERVAL: 436 MS
EKG QRS DURATION: 150 MS
EKG QTC CALCULATION (BAZETT): 490 MS
EKG R AXIS: 56 DEGREES
EKG T AXIS: 225 DEGREES
EKG VENTRICULAR RATE: 76 BPM

## 2024-07-09 PROCEDURE — 7100000001 HC PACU RECOVERY - ADDTL 15 MIN: Performed by: INTERNAL MEDICINE

## 2024-07-09 PROCEDURE — 3700000000 HC ANESTHESIA ATTENDED CARE: Performed by: INTERNAL MEDICINE

## 2024-07-09 PROCEDURE — 2709999900 HC NON-CHARGEABLE SUPPLY: Performed by: INTERNAL MEDICINE

## 2024-07-09 PROCEDURE — C1759 CATH, INTRA ECHOCARDIOGRAPHY: HCPCS | Performed by: INTERNAL MEDICINE

## 2024-07-09 PROCEDURE — 6360000002 HC RX W HCPCS: Performed by: NURSE ANESTHETIST, CERTIFIED REGISTERED

## 2024-07-09 PROCEDURE — 93655 ICAR CATH ABLTJ DSCRT ARRHYT: CPT | Performed by: INTERNAL MEDICINE

## 2024-07-09 PROCEDURE — C1894 INTRO/SHEATH, NON-LASER: HCPCS | Performed by: INTERNAL MEDICINE

## 2024-07-09 PROCEDURE — 6360000004 HC RX CONTRAST MEDICATION: Performed by: INTERNAL MEDICINE

## 2024-07-09 PROCEDURE — 3700000001 HC ADD 15 MINUTES (ANESTHESIA): Performed by: INTERNAL MEDICINE

## 2024-07-09 PROCEDURE — C8929 TTE W OR WO FOL WCON,DOPPLER: HCPCS

## 2024-07-09 PROCEDURE — C1760 CLOSURE DEV, VASC: HCPCS | Performed by: INTERNAL MEDICINE

## 2024-07-09 PROCEDURE — 2580000003 HC RX 258: Performed by: INTERNAL MEDICINE

## 2024-07-09 PROCEDURE — C1732 CATH, EP, DIAG/ABL, 3D/VECT: HCPCS | Performed by: INTERNAL MEDICINE

## 2024-07-09 PROCEDURE — C1893 INTRO/SHEATH, FIXED,NON-PEEL: HCPCS | Performed by: INTERNAL MEDICINE

## 2024-07-09 PROCEDURE — 93622 COMP EP EVAL L VENTR PAC&REC: CPT | Performed by: INTERNAL MEDICINE

## 2024-07-09 PROCEDURE — 2580000003 HC RX 258: Performed by: NURSE ANESTHETIST, CERTIFIED REGISTERED

## 2024-07-09 PROCEDURE — 93306 TTE W/DOPPLER COMPLETE: CPT | Performed by: INTERNAL MEDICINE

## 2024-07-09 PROCEDURE — C1730 CATH, EP, 19 OR FEW ELECT: HCPCS | Performed by: INTERNAL MEDICINE

## 2024-07-09 PROCEDURE — 2500000003 HC RX 250 WO HCPCS: Performed by: NURSE ANESTHETIST, CERTIFIED REGISTERED

## 2024-07-09 PROCEDURE — 93005 ELECTROCARDIOGRAM TRACING: CPT | Performed by: INTERNAL MEDICINE

## 2024-07-09 PROCEDURE — 93657 TX L/R ATRIAL FIB ADDL: CPT | Performed by: INTERNAL MEDICINE

## 2024-07-09 PROCEDURE — 7100000000 HC PACU RECOVERY - FIRST 15 MIN: Performed by: INTERNAL MEDICINE

## 2024-07-09 PROCEDURE — 85347 COAGULATION TIME ACTIVATED: CPT

## 2024-07-09 PROCEDURE — 2720000010 HC SURG SUPPLY STERILE: Performed by: INTERNAL MEDICINE

## 2024-07-09 PROCEDURE — 6360000002 HC RX W HCPCS: Performed by: INTERNAL MEDICINE

## 2024-07-09 PROCEDURE — 93312 ECHO TRANSESOPHAGEAL: CPT

## 2024-07-09 PROCEDURE — 93656 COMPRE EP EVAL ABLTJ ATR FIB: CPT | Performed by: INTERNAL MEDICINE

## 2024-07-09 RX ORDER — HALOPERIDOL 5 MG/ML
1 INJECTION INTRAMUSCULAR
Status: DISCONTINUED | OUTPATIENT
Start: 2024-07-09 | End: 2024-07-09 | Stop reason: HOSPADM

## 2024-07-09 RX ORDER — PROCHLORPERAZINE EDISYLATE 5 MG/ML
5 INJECTION INTRAMUSCULAR; INTRAVENOUS
Status: DISCONTINUED | OUTPATIENT
Start: 2024-07-09 | End: 2024-07-09 | Stop reason: HOSPADM

## 2024-07-09 RX ORDER — SUCRALFATE 1 G/1
1 TABLET ORAL 4 TIMES DAILY
Qty: 120 TABLET | Refills: 0 | Status: SHIPPED | OUTPATIENT
Start: 2024-07-09

## 2024-07-09 RX ORDER — HEPARIN SODIUM 1000 [USP'U]/ML
INJECTION, SOLUTION INTRAVENOUS; SUBCUTANEOUS PRN
Status: DISCONTINUED | OUTPATIENT
Start: 2024-07-09 | End: 2024-07-09 | Stop reason: SDUPTHER

## 2024-07-09 RX ORDER — SODIUM CHLORIDE, SODIUM LACTATE, POTASSIUM CHLORIDE, CALCIUM CHLORIDE 600; 310; 30; 20 MG/100ML; MG/100ML; MG/100ML; MG/100ML
INJECTION, SOLUTION INTRAVENOUS CONTINUOUS
Status: DISCONTINUED | OUTPATIENT
Start: 2024-07-09 | End: 2024-07-09 | Stop reason: HOSPADM

## 2024-07-09 RX ORDER — IPRATROPIUM BROMIDE AND ALBUTEROL SULFATE 2.5; .5 MG/3ML; MG/3ML
1 SOLUTION RESPIRATORY (INHALATION)
Status: DISCONTINUED | OUTPATIENT
Start: 2024-07-09 | End: 2024-07-09 | Stop reason: HOSPADM

## 2024-07-09 RX ORDER — CEFAZOLIN SODIUM 1 G/3ML
INJECTION, POWDER, FOR SOLUTION INTRAMUSCULAR; INTRAVENOUS PRN
Status: DISCONTINUED | OUTPATIENT
Start: 2024-07-09 | End: 2024-07-09 | Stop reason: SDUPTHER

## 2024-07-09 RX ORDER — NALOXONE HYDROCHLORIDE 0.4 MG/ML
INJECTION, SOLUTION INTRAMUSCULAR; INTRAVENOUS; SUBCUTANEOUS PRN
Status: DISCONTINUED | OUTPATIENT
Start: 2024-07-09 | End: 2024-07-09 | Stop reason: HOSPADM

## 2024-07-09 RX ORDER — DEXAMETHASONE SODIUM PHOSPHATE 4 MG/ML
INJECTION, SOLUTION INTRA-ARTICULAR; INTRALESIONAL; INTRAMUSCULAR; INTRAVENOUS; SOFT TISSUE PRN
Status: DISCONTINUED | OUTPATIENT
Start: 2024-07-09 | End: 2024-07-09 | Stop reason: SDUPTHER

## 2024-07-09 RX ORDER — SODIUM CHLORIDE 0.9 % (FLUSH) 0.9 %
5-40 SYRINGE (ML) INJECTION EVERY 12 HOURS SCHEDULED
Status: DISCONTINUED | OUTPATIENT
Start: 2024-07-09 | End: 2024-07-09

## 2024-07-09 RX ORDER — PROTAMINE SULFATE 10 MG/ML
INJECTION, SOLUTION INTRAVENOUS PRN
Status: DISCONTINUED | OUTPATIENT
Start: 2024-07-09 | End: 2024-07-09 | Stop reason: SDUPTHER

## 2024-07-09 RX ORDER — PANTOPRAZOLE SODIUM 40 MG/1
40 TABLET, DELAYED RELEASE ORAL
Qty: 60 TABLET | Refills: 0 | Status: SHIPPED | OUTPATIENT
Start: 2024-07-09

## 2024-07-09 RX ORDER — COLCHICINE 0.6 MG/1
0.6 TABLET ORAL DAILY
Qty: 30 TABLET | Refills: 0 | Status: SHIPPED | OUTPATIENT
Start: 2024-07-09

## 2024-07-09 RX ORDER — HEPARIN SODIUM AND DEXTROSE 5000; 5 [USP'U]/100ML; G/100ML
INJECTION INTRAVENOUS CONTINUOUS PRN
Status: DISCONTINUED | OUTPATIENT
Start: 2024-07-09 | End: 2024-07-09 | Stop reason: SDUPTHER

## 2024-07-09 RX ORDER — SODIUM CHLORIDE 0.9 % (FLUSH) 0.9 %
5-40 SYRINGE (ML) INJECTION PRN
Status: DISCONTINUED | OUTPATIENT
Start: 2024-07-09 | End: 2024-07-09 | Stop reason: HOSPADM

## 2024-07-09 RX ORDER — SODIUM CHLORIDE, SODIUM LACTATE, POTASSIUM CHLORIDE, CALCIUM CHLORIDE 600; 310; 30; 20 MG/100ML; MG/100ML; MG/100ML; MG/100ML
INJECTION, SOLUTION INTRAVENOUS CONTINUOUS PRN
Status: DISCONTINUED | OUTPATIENT
Start: 2024-07-09 | End: 2024-07-09 | Stop reason: SDUPTHER

## 2024-07-09 RX ORDER — HYDROMORPHONE HYDROCHLORIDE 2 MG/ML
0.5 INJECTION, SOLUTION INTRAMUSCULAR; INTRAVENOUS; SUBCUTANEOUS EVERY 5 MIN PRN
Status: DISCONTINUED | OUTPATIENT
Start: 2024-07-09 | End: 2024-07-09 | Stop reason: HOSPADM

## 2024-07-09 RX ORDER — LIDOCAINE HYDROCHLORIDE 20 MG/ML
INJECTION, SOLUTION EPIDURAL; INFILTRATION; INTRACAUDAL; PERINEURAL PRN
Status: DISCONTINUED | OUTPATIENT
Start: 2024-07-09 | End: 2024-07-09 | Stop reason: SDUPTHER

## 2024-07-09 RX ORDER — SODIUM CHLORIDE 9 MG/ML
INJECTION, SOLUTION INTRAVENOUS PRN
Status: DISCONTINUED | OUTPATIENT
Start: 2024-07-09 | End: 2024-07-09 | Stop reason: HOSPADM

## 2024-07-09 RX ORDER — ROCURONIUM BROMIDE 10 MG/ML
INJECTION, SOLUTION INTRAVENOUS PRN
Status: DISCONTINUED | OUTPATIENT
Start: 2024-07-09 | End: 2024-07-09 | Stop reason: SDUPTHER

## 2024-07-09 RX ORDER — PROPOFOL 10 MG/ML
INJECTION, EMULSION INTRAVENOUS PRN
Status: DISCONTINUED | OUTPATIENT
Start: 2024-07-09 | End: 2024-07-09 | Stop reason: SDUPTHER

## 2024-07-09 RX ORDER — LIDOCAINE HYDROCHLORIDE 10 MG/ML
1 INJECTION, SOLUTION INFILTRATION; PERINEURAL
Status: DISCONTINUED | OUTPATIENT
Start: 2024-07-09 | End: 2024-07-09 | Stop reason: HOSPADM

## 2024-07-09 RX ORDER — ADENOSINE 3 MG/ML
INJECTION, SOLUTION INTRAVENOUS PRN
Status: DISCONTINUED | OUTPATIENT
Start: 2024-07-09 | End: 2024-07-09 | Stop reason: HOSPADM

## 2024-07-09 RX ORDER — ONDANSETRON 2 MG/ML
INJECTION INTRAMUSCULAR; INTRAVENOUS PRN
Status: DISCONTINUED | OUTPATIENT
Start: 2024-07-09 | End: 2024-07-09 | Stop reason: SDUPTHER

## 2024-07-09 RX ORDER — OXYCODONE HYDROCHLORIDE 5 MG/1
5 TABLET ORAL
Status: DISCONTINUED | OUTPATIENT
Start: 2024-07-09 | End: 2024-07-09 | Stop reason: HOSPADM

## 2024-07-09 RX ADMIN — PROPOFOL 200 MG: 10 INJECTION, EMULSION INTRAVENOUS at 10:48

## 2024-07-09 RX ADMIN — ROCURONIUM BROMIDE 10 MG: 10 INJECTION, SOLUTION INTRAVENOUS at 11:23

## 2024-07-09 RX ADMIN — HEPARIN SODIUM 8500 UNITS: 1000 INJECTION INTRAVENOUS; SUBCUTANEOUS at 11:16

## 2024-07-09 RX ADMIN — LIDOCAINE HYDROCHLORIDE 60 MG: 20 INJECTION, SOLUTION EPIDURAL; INFILTRATION; INTRACAUDAL; PERINEURAL at 10:48

## 2024-07-09 RX ADMIN — SUGAMMADEX 200 MG: 100 INJECTION, SOLUTION INTRAVENOUS at 12:09

## 2024-07-09 RX ADMIN — PROTAMINE SULFATE 20 MG: 10 INJECTION, SOLUTION INTRAVENOUS at 12:10

## 2024-07-09 RX ADMIN — PROTAMINE SULFATE 20 MG: 10 INJECTION, SOLUTION INTRAVENOUS at 12:11

## 2024-07-09 RX ADMIN — CEFAZOLIN SODIUM 2 G: 1 INJECTION, POWDER, FOR SOLUTION INTRAMUSCULAR; INTRAVENOUS at 11:09

## 2024-07-09 RX ADMIN — ONDANSETRON 4 MG: 2 INJECTION INTRAMUSCULAR; INTRAVENOUS at 11:48

## 2024-07-09 RX ADMIN — HEPARIN SODIUM AND DEXTROSE 40 UNITS/KG/HR: 5000; 5 INJECTION INTRAVENOUS at 11:16

## 2024-07-09 RX ADMIN — SODIUM CHLORIDE, SODIUM LACTATE, POTASSIUM CHLORIDE, AND CALCIUM CHLORIDE: 600; 310; 30; 20 INJECTION, SOLUTION INTRAVENOUS at 10:34

## 2024-07-09 RX ADMIN — HEPARIN SODIUM 8500 UNITS: 1000 INJECTION INTRAVENOUS; SUBCUTANEOUS at 11:12

## 2024-07-09 RX ADMIN — ROCURONIUM BROMIDE 50 MG: 10 INJECTION, SOLUTION INTRAVENOUS at 10:48

## 2024-07-09 RX ADMIN — PROTAMINE SULFATE 20 MG: 10 INJECTION, SOLUTION INTRAVENOUS at 12:12

## 2024-07-09 RX ADMIN — SODIUM CHLORIDE, PRESERVATIVE FREE 0.3 ML: 5 INJECTION INTRAVENOUS at 11:01

## 2024-07-09 RX ADMIN — PROTAMINE SULFATE 20 MG: 10 INJECTION, SOLUTION INTRAVENOUS at 12:09

## 2024-07-09 RX ADMIN — DEXAMETHASONE SODIUM PHOSPHATE 4 MG: 4 INJECTION INTRA-ARTICULAR; INTRALESIONAL; INTRAMUSCULAR; INTRAVENOUS; SOFT TISSUE at 11:48

## 2024-07-09 RX ADMIN — PROTAMINE SULFATE 20 MG: 10 INJECTION, SOLUTION INTRAVENOUS at 12:08

## 2024-07-09 ASSESSMENT — PAIN DESCRIPTION - LOCATION: LOCATION: BACK

## 2024-07-09 ASSESSMENT — PAIN SCALES - GENERAL
PAINLEVEL_OUTOF10: 0
PAINLEVEL_OUTOF10: 5

## 2024-07-09 NOTE — PROGRESS NOTES
TRANSFER - IN REPORT:    Verbal report received from NEERAJ Carbajal on Casey Ross being received from PACU for routine post-op      Report consisted of patient’s Situation, Background, Assessment and Recommendations(SBAR).     Information from the following report(s) Procedure Summary was reviewed with the receiving nurse.    Opportunity for questions and clarification was provided.      Assessment completed upon patient’s arrival to unit and care assumed.

## 2024-07-09 NOTE — PROGRESS NOTES
Patient received to CPRU room # 9  Ambulatory from [facility] [place]. Patient scheduled for A fib ablation today with Dr Bajwa. Procedure reviewed & questions answered, voiced good understanding consent obtained & placed on chart. All medications and medical history reviewed. Will prep patient per orders. Patient & family updated on plan of care.      The patient has a fraility score of 3-MANAGING WELL, based on ambulation.

## 2024-07-09 NOTE — ANESTHESIA PRE PROCEDURE
Department of Anesthesiology  Preprocedure Note       Name:  Casey Ross   Age:  79 y.o.  :  1945                                          MRN:  [id number]         Date:  2024      Surgeon: Surgeon(s):  Albert Bajwa MD    Procedure: Procedure(s):  Ablation A-fib w complete ep study    Medications prior to admission:   Prior to Admission medications    Medication Sig Start Date End Date Taking? Authorizing Provider   spironolactone (ALDACTONE) 25 MG tablet Take 1 tablet by mouth daily    Ymail Ackerman MD   sotalol (BETAPACE) 80 MG tablet Take 1 tablet by mouth 2 times daily 23   Wilder Chun MD   olmesartan (BENICAR) 40 MG tablet Take 0.5 tablets by mouth daily    Yamil Ackerman MD   amLODIPine (NORVASC) 10 MG tablet Take 1 tablet by mouth daily    Yamil Ackerman MD   rosuvastatin (CRESTOR) 40 MG tablet Take 1 tablet by mouth every evening    Yamil Ackerman MD   apixaban (ELIQUIS) 5 MG TABS tablet Take 1 tablet by mouth in the morning and 1 tablet in the evening. 20   Automatic Reconciliation, Ar   furosemide (LASIX) 40 MG tablet Take 1 tablet by mouth daily Takes 1-2 tablets daily depends on weight 20   Automatic Reconciliation, Ar       Current medications:    Current Facility-Administered Medications   Medication Dose Route Frequency Provider Last Rate Last Admin   • lidocaine 1 % injection 1 mL  1 mL IntraDERmal Once PRN Landon Grewal MD       • lactated ringers IV soln infusion   IntraVENous Continuous Landon Grewal MD       • sodium chloride flush 0.9 % injection 5-40 mL  5-40 mL IntraVENous PRN Landon Grewal MD       • 0.9 % sodium chloride infusion   IntraVENous PRN Landon Grewal MD       • lactated ringers IV soln infusion   IntraVENous Continuous Albert Bajwa MD           Allergies:    Allergies   Allergen Reactions   • Levofloxacin Shortness Of Breath   • Amoxicillin Swelling     Diff breathing, lip swelling

## 2024-07-09 NOTE — DISCHARGE INSTRUCTIONS
the triage nurse.     11 - Call office with any questions.     12 - Relax (no heavy lifting/working) for next 48-72 hours.     13 - Reduced activity for 1-2 weeks. Walking ok, driving a car ok after 72 hours. Would avoid being outside in the hottest part of the day. Moderate to intense exercise should be avoided until further direction by Dr. Bajwa.     14 -Most patients can have mild flu like symptoms post AFib ablation which usually improves over the course of 1-2 weeks. If there are alarming symptoms such as near fainting, fevers, chills or worsening shortness of breath or chest pain this should prompt a call to our office. If an emergency, call 911.     15 - Office follow up in 1 month or as needed.      Sedation for a Medical Procedure: Care Instructions     You were given a sedative medication during your visit. While many of the effects will have worn   off before you leave; you may continue to feel some effects for several hours.      Common side effects from sedation include:  Feeling sleepy. (Your doctors and nurses will make sure you are not too sleepy to go home.)  Nausea and vomiting. This usually does not last long.  Feeling tired.     How can you care for yourself at home?  Activity    Don't do anything for 24 hours that requires attention to detail. It takes time for the medicine effects to completely wear off.     Do not make important legal decisions for 24 hours.     Do not sign any legal documents for 24 hours.     Do not drink alcohol today     For your safety, you should not drive or operate heavy machinery for the remainder of the day     Rest when you feel tired. Getting enough sleep will help you recover.      Diet    You can eat your normal diet, unless your doctor gives you other instructions. If your stomach is upset, try clear liquids and bland, low-fat foods like plain toast or rice.     Drink plenty of fluids (unless your doctor tells you not to).

## 2024-07-09 NOTE — H&P
[facility] Cardiology/Electrophyiology Consult                Date of  Admission: No admission date for patient encounter.     CC/Reason for admission: afib ablation     Casey Ross is a 79 y.o. male admitted for Persistent atrial fibrillation (HCC) [I48.19].      78 y.o. male with a past medical and cardiac history significant for PAF on Eliquis, HTN, MVP, MARY, dual chamber PPM Biotronik and presents for scheduled ablation for afib.  He has had an increasing burden of AF on his PPM and has had presentations to hospital for flash pulmonary edema in the setting of AF with RVR. He has been more fatigue, lower energy, some SALCEDO.     Cardiac PMH: (Old records have been reviewed and summarized below)    Patient Active Problem List   Diagnosis    Pneumonia of lower lobe of lung    Acute heart failure (HCC)    Chronic combined systolic and diastolic congestive heart failure (HCC)    Leukocytosis    HAP (hospital-acquired pneumonia)    Inadequate sleep hygiene    Acute pulmonary edema (HCC)    LBBB (left bundle branch block)    C. difficile colitis    Acute respiratory failure with hypoxia (HCC)    Paroxysmal A-fib (HCC)    Physical debility    Medicare annual wellness visit, subsequent    Hypertension, essential    Acute respiratory failure (HCC)    MARY (obstructive sleep apnea)    Accelerating angina (HCC)    Persistent atrial fibrillation (HCC)       Past Medical History:   Diagnosis Date    Arrhythmia     Cancer (HCC)     basal cell    Elevated PSA     Erectile dysfunction     Hypertension     daily meds    MVP (mitral valve prolapse)     diagnosed many years ago, pt states does not see cardiologist, in youth    MARY (obstructive sleep apnea) 11/15/2018    Osteoarthritis     thumbs    Prostate cancer (HCC)     S/P colonoscopy 2015    due in 2020      Past Surgical History:   Procedure Laterality Date    APPENDECTOMY      BIOPSY PROSTATE      CARDIAC PROCEDURE N/A 4/26/2024    Left heart cath / coronary angiography

## 2024-07-09 NOTE — PERIOP NOTE
TRANSFER - OUT REPORT:    Verbal report given to CATH LAB RN on Casey Ross  being transferred to cath lab for routine post-op       Report consisted of patient’s Situation, Background, Assessment and   Recommendations(SBAR).     Information from the following report(s) Nurse Handoff Report, Surgery Report, Intake/Output, Recent Results, Med Rec Status, Cardiac Rhythm Sinus Rhythm, Neuro Assessment, and Event Log was reviewed with the receiving nurse.    Lines:   Peripheral IV 07/09/24 Distal;Right;Anterior Cephalic (Active)   Site Assessment Clean, dry & intact 07/09/24 1256   Line Status Infusing 07/09/24 1256   Line Care Connections checked and tightened 07/09/24 1256   Phlebitis Assessment No symptoms 07/09/24 1256   Infiltration Assessment 0 07/09/24 1256   Alcohol Cap Used No 07/09/24 1256   Dressing Status Clean, dry & intact 07/09/24 1256   Dressing Type Transparent 07/09/24 1256       Peripheral IV 07/09/24 Distal;Left;Posterior Forearm (Active)   Site Assessment Clean, dry & intact 07/09/24 1256   Line Status Infusing 07/09/24 1256   Line Care Connections checked and tightened 07/09/24 1256   Phlebitis Assessment No symptoms 07/09/24 1256   Infiltration Assessment 0 07/09/24 1256   Alcohol Cap Used No 07/09/24 1256   Dressing Status Clean, dry & intact 07/09/24 1256   Dressing Type Transparent 07/09/24 1256        Opportunity for questions and clarification was provided.      Patient transported with:   Registered Nurse    VTE prophylaxis orders have been written for Casey Ross.

## 2024-07-09 NOTE — PROGRESS NOTES
Assisted OOB & ambulated to BR & on unit. Tolerated activity without difficulty. Bilateral groins without bleeding or hematoma

## 2024-07-09 NOTE — PROGRESS NOTES
Discharge instructions given per orders, voiced good understanding of bilateral groin site care, medications & follow up care. Denies any questions

## 2024-07-09 NOTE — ANESTHESIA POSTPROCEDURE EVALUATION
Department of Anesthesiology  Postprocedure Note    Patient: Casey Ross  MRN: [id number]  YOB: 1945  Date of evaluation: 7/9/2024    Procedure Summary       Date: 07/09/24 Room / Location: D EP 2 ALL EVENTS / [facility] CARDIAC CATH LAB    Anesthesia Start: 1028 Anesthesia Stop: 1224    Procedure: Ablation A-fib w complete ep study Diagnosis:       Persistent atrial fibrillation (HCC)      (Persistent atrial fibrillation (HCC) [I48.19])    Providers: Albert Bajwa MD Responsible Provider: Aiden Queen MD    Anesthesia Type: general ASA Status: 3            Anesthesia Type: No value filed.    Allison Phase I: Allison Score: 10    Allison Phase II:      Anesthesia Post Evaluation    Patient location during evaluation: PACU  Patient participation: complete - patient participated  Level of consciousness: awake and alert  Airway patency: patent  Nausea & Vomiting: no nausea and no vomiting  Cardiovascular status: hemodynamically stable  Respiratory status: acceptable, nonlabored ventilation and spontaneous ventilation  Hydration status: euvolemic  Comments: /70   Pulse 71   Temp 97.8 °F (36.6 °C)   Resp 13   Ht 1.88 m (6' 2\")   Wt 98.9 kg (218 lb)   SpO2 98%   BMI 27.99 kg/m²     Multimodal analgesia pain management approach  Pain management: adequate and satisfactory to patient    There were no known notable events for this encounter.  
beer

## 2024-07-09 NOTE — PROGRESS NOTES
Patient received to CPRU room # 11  Ambulatory from [facility]. Patient scheduled for Afib ablation today with Dr Bajwa. Procedure reviewed & questions answered, voiced good understanding consent obtained & placed on chart. All medications and medical history reviewed. Will prep patient per orders. Patient & family updated on plan of care.      The patient has a fraility score of 3-MANAGING WELL, based on ambulation.

## 2024-07-31 RX ORDER — COLCHICINE 0.6 MG/1
0.6 TABLET ORAL DAILY
Qty: 90 TABLET | Refills: 1 | OUTPATIENT
Start: 2024-07-31

## 2024-07-31 RX ORDER — PANTOPRAZOLE SODIUM 40 MG/1
80 TABLET, DELAYED RELEASE ORAL
Qty: 180 TABLET | Refills: 1 | OUTPATIENT
Start: 2024-07-31

## 2024-08-15 ENCOUNTER — OFFICE VISIT (OUTPATIENT)
Age: 79
End: 2024-08-15
Payer: MEDICARE

## 2024-08-15 VITALS
DIASTOLIC BLOOD PRESSURE: 70 MMHG | HEIGHT: 74 IN | BODY MASS INDEX: 29.03 KG/M2 | SYSTOLIC BLOOD PRESSURE: 128 MMHG | HEART RATE: 71 BPM | WEIGHT: 226.2 LBS

## 2024-08-15 DIAGNOSIS — I44.7 LBBB (LEFT BUNDLE BRANCH BLOCK): ICD-10-CM

## 2024-08-15 DIAGNOSIS — I48.0 PAROXYSMAL A-FIB (HCC): Primary | ICD-10-CM

## 2024-08-15 DIAGNOSIS — I10 HYPERTENSION, ESSENTIAL: ICD-10-CM

## 2024-08-15 PROCEDURE — 93000 ELECTROCARDIOGRAM COMPLETE: CPT | Performed by: NURSE PRACTITIONER

## 2024-08-15 PROCEDURE — 99214 OFFICE O/P EST MOD 30 MIN: CPT | Performed by: NURSE PRACTITIONER

## 2024-08-15 PROCEDURE — 3078F DIAST BP <80 MM HG: CPT | Performed by: NURSE PRACTITIONER

## 2024-08-15 PROCEDURE — 3074F SYST BP LT 130 MM HG: CPT | Performed by: NURSE PRACTITIONER

## 2024-08-15 PROCEDURE — 1123F ACP DISCUSS/DSCN MKR DOCD: CPT | Performed by: NURSE PRACTITIONER

## 2024-08-15 NOTE — PROGRESS NOTES
ND  Neuro: Alert and oriented    Medical problems and test results were reviewed with the patient today.     No results found for any visits on 08/15/24.    EKG:  (EKG has been independently visualized by me with interpretation below)  Sinus Rhythm -First degree A-V block -occasional ectopic ventricular beat    Isis = 274  -Left bundle branch block.  Qtc 460    Casey was seen today for paroxysmal a-fib (hcc).    Diagnoses and all orders for this visit:    Paroxysmal A-fib (HCC)  -     EKG 12 Lead    Hypertension, essential    LBBB (left bundle branch block)        ASSESSMENT and PLAN  1. Persistent atrial fibrillation failing sotalol therapy:   - s/p ablation  - in SR today  - Device check shows intermittent episodes of afib lasting less than 5 minutes, AF burden 0%.  - continue Sotalol 80 mg BID, Qtc stable 463    2. CVA protection:   - eliquis 5mg Q12H    3. HTN:   - controlled, cont adactone 25mg, benicar 40mg, norvasc 10mg    4. LBBB  - stable     Patient has been instructed and agrees to call our office with any issues or other concerns related to their cardiac condition(s) and/or complaint(s).    No follow-up provider specified.      ARIES Alan - CNP    08/15/24  8:24 AM

## 2024-08-22 NOTE — PROGRESS NOTES
Name:  Casey Ross  YOB: 1945   MRN: [id number]      Office Visit: 8/26/2024       Assessment & Plan    (Medical Decision Making)    Impression: 79 y.o. male     1. Pulmonary fibrosis (HCC)  --mild fibrotic changes in the lung apices.  Discussed role of Ofev in the future, but at this point, his symptoms are improved and disease is mild.      2. Pulmonary nodules  --two 7 mm nodules in RUL, minimal activity on PET, max SUV 2.6--decreasing in size.  CT planned for December.  Scheduling information given to patient.    3. Pulmonary hypertension (HCC)  --mild--?  Related to atrial fib.    4. Chronic combined systolic and diastolic congestive heart failure (HCC)  --followed by cardiology    5. MARY (obstructive sleep apnea)  --reports compliance--HST currently on hold until he has follow up in sleep clinic.      Follow-up and Dispositions    Return in about 6 months (around 2/26/2025) for early February, Larisa, lung nodule, CT prior to appt.     Collaborating physician is Dr. Bradly Negron.    ADS    Larisa Dillard, ARIES - [facility]    _________________________________________________________________________    HISTORY OF PRESENT ILLNESS:    Mr. Casey Ross is a 79 y.o. male who is seen at [facility] for  Pulmonary infiltrates     The patient is an 79 year old male who is seen for follow up of pulmonary infiltrates.  He has a history of basal cell skin cancer, ED, HTN, MARY, and prostate cancer.  Was on vacation in [place] in December, 2023.  Woke up one morning and felt he couldn't breathe.  He heard \"crackling noises\" in his chest, and then he lost consciousness.  EMS was summoned and transported to hospital.  Was placed on BiPAP on arrival.  Had recent CT of chest done 12/15/23 which demonstrated scattered tree-in-bud opacities and GGO.  Was treated for pulmonary edema and was discharged.  Echo was done at that time which revealed RVSP of 48 mm Hg.       Had another episode in March

## 2024-08-26 ENCOUNTER — OFFICE VISIT (OUTPATIENT)
Dept: PULMONOLOGY | Age: 79
End: 2024-08-26
Payer: MEDICARE

## 2024-08-26 VITALS
HEIGHT: 74 IN | SYSTOLIC BLOOD PRESSURE: 126 MMHG | RESPIRATION RATE: 18 BRPM | HEART RATE: 68 BPM | DIASTOLIC BLOOD PRESSURE: 74 MMHG | TEMPERATURE: 97.2 F | WEIGHT: 227 LBS | OXYGEN SATURATION: 100 % | BODY MASS INDEX: 29.13 KG/M2

## 2024-08-26 DIAGNOSIS — R91.8 PULMONARY NODULES: ICD-10-CM

## 2024-08-26 DIAGNOSIS — I27.20 PULMONARY HYPERTENSION (HCC): ICD-10-CM

## 2024-08-26 DIAGNOSIS — J84.10 PULMONARY FIBROSIS (HCC): Primary | ICD-10-CM

## 2024-08-26 DIAGNOSIS — I50.42 CHRONIC COMBINED SYSTOLIC AND DIASTOLIC CONGESTIVE HEART FAILURE (HCC): ICD-10-CM

## 2024-08-26 DIAGNOSIS — G47.33 OSA (OBSTRUCTIVE SLEEP APNEA): ICD-10-CM

## 2024-08-26 PROCEDURE — 3078F DIAST BP <80 MM HG: CPT | Performed by: NURSE PRACTITIONER

## 2024-08-26 PROCEDURE — 1123F ACP DISCUSS/DSCN MKR DOCD: CPT | Performed by: NURSE PRACTITIONER

## 2024-08-26 PROCEDURE — 3074F SYST BP LT 130 MM HG: CPT | Performed by: NURSE PRACTITIONER

## 2024-08-26 PROCEDURE — 99214 OFFICE O/P EST MOD 30 MIN: CPT | Performed by: NURSE PRACTITIONER

## 2024-08-26 NOTE — PATIENT INSTRUCTIONS
I have ordered CT scan of chest due in December.  You should receive a call from scheduling within 2-3 business days.  If you do not hear from them, please call [phone] to schedule your test.

## 2024-09-10 ENCOUNTER — OFFICE VISIT (OUTPATIENT)
Age: 79
End: 2024-09-10
Payer: MEDICARE

## 2024-09-10 VITALS
WEIGHT: 230 LBS | HEART RATE: 98 BPM | SYSTOLIC BLOOD PRESSURE: 120 MMHG | BODY MASS INDEX: 28.6 KG/M2 | HEIGHT: 75 IN | DIASTOLIC BLOOD PRESSURE: 60 MMHG

## 2024-09-10 DIAGNOSIS — I10 HYPERTENSION, ESSENTIAL: ICD-10-CM

## 2024-09-10 DIAGNOSIS — I48.0 PAROXYSMAL ATRIAL FIBRILLATION (HCC): ICD-10-CM

## 2024-09-10 DIAGNOSIS — Z95.0 PRESENCE OF CARDIAC PACEMAKER: ICD-10-CM

## 2024-09-10 DIAGNOSIS — R01.1 HEART MURMUR: ICD-10-CM

## 2024-09-10 DIAGNOSIS — I48.0 PAROXYSMAL A-FIB (HCC): Primary | ICD-10-CM

## 2024-09-10 DIAGNOSIS — I50.22 CHRONIC SYSTOLIC (CONGESTIVE) HEART FAILURE (HCC): ICD-10-CM

## 2024-09-10 PROCEDURE — 3074F SYST BP LT 130 MM HG: CPT | Performed by: INTERNAL MEDICINE

## 2024-09-10 PROCEDURE — 99214 OFFICE O/P EST MOD 30 MIN: CPT | Performed by: INTERNAL MEDICINE

## 2024-09-10 PROCEDURE — 3078F DIAST BP <80 MM HG: CPT | Performed by: INTERNAL MEDICINE

## 2024-09-10 PROCEDURE — 1123F ACP DISCUSS/DSCN MKR DOCD: CPT | Performed by: INTERNAL MEDICINE

## 2024-09-10 RX ORDER — ALBUTEROL SULFATE 90 UG/1
2 AEROSOL, METERED RESPIRATORY (INHALATION) EVERY 6 HOURS PRN
COMMUNITY

## 2024-11-05 ENCOUNTER — HOSPITAL ENCOUNTER (INPATIENT)
Age: 79
LOS: 1 days | Discharge: HOME | DRG: 189 | End: 2024-11-06
Attending: EMERGENCY MEDICINE | Admitting: FAMILY MEDICINE
Payer: MEDICARE

## 2024-11-05 ENCOUNTER — APPOINTMENT (OUTPATIENT)
Dept: GENERAL RADIOLOGY | Age: 79
DRG: 189 | End: 2024-11-05
Payer: MEDICARE

## 2024-11-05 DIAGNOSIS — J96.01 ACUTE RESPIRATORY FAILURE WITH HYPOXIA: Primary | ICD-10-CM

## 2024-11-05 DIAGNOSIS — J18.9 PNEUMONIA OF BOTH LUNGS DUE TO INFECTIOUS ORGANISM, UNSPECIFIED PART OF LUNG: ICD-10-CM

## 2024-11-05 PROBLEM — B34.8 RHINOVIRUS INFECTION: Status: ACTIVE | Noted: 2024-11-05

## 2024-11-05 PROBLEM — R09.89 PULMONARY VASCULAR CONGESTION: Status: ACTIVE | Noted: 2024-11-05

## 2024-11-05 LAB
ALBUMIN SERPL-MCNC: 3.3 G/DL (ref 3.2–4.6)
ALBUMIN/GLOB SERPL: 0.7 (ref 1–1.9)
ALP SERPL-CCNC: 104 U/L (ref 40–129)
ALT SERPL-CCNC: 19 U/L (ref 8–55)
ANION GAP SERPL CALC-SCNC: 12 MMOL/L (ref 7–16)
ARTERIAL PATENCY WRIST A: ABNORMAL
AST SERPL-CCNC: 26 U/L (ref 15–37)
B PERT DNA SPEC QL NAA+PROBE: NOT DETECTED
BASE EXCESS BLDV CALC-SCNC: 1.6 MMOL/L
BASOPHILS # BLD: 0.1 K/UL (ref 0–0.2)
BASOPHILS NFR BLD: 0 % (ref 0–2)
BDY SITE: ABNORMAL
BILIRUB SERPL-MCNC: 0.3 MG/DL (ref 0–1.2)
BORDETELLA PARAPERTUSSIS BY PCR: NOT DETECTED
BUN SERPL-MCNC: 18 MG/DL (ref 8–23)
C PNEUM DNA SPEC QL NAA+PROBE: NOT DETECTED
CALCIUM SERPL-MCNC: 9 MG/DL (ref 8.8–10.2)
CHLORIDE SERPL-SCNC: 98 MMOL/L (ref 98–107)
CO2 SERPL-SCNC: 23 MMOL/L (ref 20–29)
CREAT SERPL-MCNC: 1 MG/DL (ref 0.8–1.3)
DIFFERENTIAL METHOD BLD: ABNORMAL
EOSINOPHIL # BLD: 0.1 K/UL (ref 0–0.8)
EOSINOPHIL NFR BLD: 1 % (ref 0.5–7.8)
ERYTHROCYTE [DISTWIDTH] IN BLOOD BY AUTOMATED COUNT: 13.3 % (ref 11.9–14.6)
FLUAV SUBTYP SPEC NAA+PROBE: NOT DETECTED
FLUBV RNA SPEC QL NAA+PROBE: NOT DETECTED
GLOBULIN SER CALC-MCNC: 4.6 G/DL (ref 2.3–3.5)
GLUCOSE SERPL-MCNC: 163 MG/DL (ref 70–99)
HADV DNA SPEC QL NAA+PROBE: NOT DETECTED
HCO3 BLDV-SCNC: 25.7 MMOL/L (ref 23–28)
HCOV 229E RNA SPEC QL NAA+PROBE: NOT DETECTED
HCOV HKU1 RNA SPEC QL NAA+PROBE: NOT DETECTED
HCOV NL63 RNA SPEC QL NAA+PROBE: NOT DETECTED
HCOV OC43 RNA SPEC QL NAA+PROBE: NOT DETECTED
HCT VFR BLD AUTO: 41.7 % (ref 41.1–50.3)
HGB BLD-MCNC: 14 G/DL (ref 13.6–17.2)
HMPV RNA SPEC QL NAA+PROBE: NOT DETECTED
HPIV1 RNA SPEC QL NAA+PROBE: NOT DETECTED
HPIV2 RNA SPEC QL NAA+PROBE: NOT DETECTED
HPIV3 RNA SPEC QL NAA+PROBE: NOT DETECTED
HPIV4 RNA SPEC QL NAA+PROBE: NOT DETECTED
IMM GRANULOCYTES # BLD AUTO: 0.1 K/UL (ref 0–0.5)
IMM GRANULOCYTES NFR BLD AUTO: 0 % (ref 0–5)
LACTATE SERPL-SCNC: 1.4 MMOL/L (ref 0.5–2)
LYMPHOCYTES # BLD: 1.7 K/UL (ref 0.5–4.6)
LYMPHOCYTES NFR BLD: 12 % (ref 13–44)
M PNEUMO DNA SPEC QL NAA+PROBE: NOT DETECTED
MCH RBC QN AUTO: 28.6 PG (ref 26.1–32.9)
MCHC RBC AUTO-ENTMCNC: 33.6 G/DL (ref 31.4–35)
MCV RBC AUTO: 85.3 FL (ref 82–102)
MONOCYTES # BLD: 1.2 K/UL (ref 0.1–1.3)
MONOCYTES NFR BLD: 9 % (ref 4–12)
NEUTS SEG # BLD: 10.6 K/UL (ref 1.7–8.2)
NEUTS SEG NFR BLD: 78 % (ref 43–78)
NRBC # BLD: 0 K/UL (ref 0–0.2)
NT PRO BNP: 205 PG/ML (ref 0–450)
PCO2 BLDV: 38.3 MMHG (ref 41–51)
PH BLDV: 7.44 (ref 7.32–7.42)
PLATELET # BLD AUTO: 335 K/UL (ref 150–450)
PMV BLD AUTO: 9 FL (ref 9.4–12.3)
PO2 BLDV: 47 MMHG
POTASSIUM SERPL-SCNC: 4.9 MMOL/L (ref 3.5–5.1)
PROCALCITONIN SERPL-MCNC: 0.09 NG/ML (ref 0–0.1)
PROT SERPL-MCNC: 7.9 G/DL (ref 6.3–8.2)
RBC # BLD AUTO: 4.89 M/UL (ref 4.23–5.6)
RESPIRATORY RATE, POC: 14 (ref 5–40)
RSV RNA SPEC QL NAA+PROBE: NOT DETECTED
RV+EV RNA SPEC QL NAA+PROBE: DETECTED
SAO2 % BLDV: 83.7 % (ref 65–88)
SARS-COV-2 RNA RESP QL NAA+PROBE: NOT DETECTED
SERVICE CMNT-IMP: ABNORMAL
SODIUM SERPL-SCNC: 132 MMOL/L (ref 136–145)
SPECIMEN TYPE: ABNORMAL
TROPONIN T SERPL HS-MCNC: 13 NG/L (ref 0–22)
TROPONIN T SERPL HS-MCNC: 9 NG/L (ref 0–22)
WBC # BLD AUTO: 13.8 K/UL (ref 4.3–11.1)

## 2024-11-05 PROCEDURE — 2700000000 HC OXYGEN THERAPY PER DAY

## 2024-11-05 PROCEDURE — 94640 AIRWAY INHALATION TREATMENT: CPT

## 2024-11-05 PROCEDURE — 6370000000 HC RX 637 (ALT 250 FOR IP): Performed by: EMERGENCY MEDICINE

## 2024-11-05 PROCEDURE — 93005 ELECTROCARDIOGRAM TRACING: CPT | Performed by: EMERGENCY MEDICINE

## 2024-11-05 PROCEDURE — 83605 ASSAY OF LACTIC ACID: CPT

## 2024-11-05 PROCEDURE — 2580000003 HC RX 258: Performed by: EMERGENCY MEDICINE

## 2024-11-05 PROCEDURE — 0202U NFCT DS 22 TRGT SARS-COV-2: CPT

## 2024-11-05 PROCEDURE — 83880 ASSAY OF NATRIURETIC PEPTIDE: CPT

## 2024-11-05 PROCEDURE — 85025 COMPLETE CBC W/AUTO DIFF WBC: CPT

## 2024-11-05 PROCEDURE — 71045 X-RAY EXAM CHEST 1 VIEW: CPT

## 2024-11-05 PROCEDURE — 84484 ASSAY OF TROPONIN QUANT: CPT

## 2024-11-05 PROCEDURE — 96365 THER/PROPH/DIAG IV INF INIT: CPT

## 2024-11-05 PROCEDURE — 36415 COLL VENOUS BLD VENIPUNCTURE: CPT

## 2024-11-05 PROCEDURE — 6360000002 HC RX W HCPCS: Performed by: EMERGENCY MEDICINE

## 2024-11-05 PROCEDURE — 80053 COMPREHEN METABOLIC PANEL: CPT

## 2024-11-05 PROCEDURE — 84145 PROCALCITONIN (PCT): CPT

## 2024-11-05 PROCEDURE — 87040 BLOOD CULTURE FOR BACTERIA: CPT

## 2024-11-05 PROCEDURE — 94761 N-INVAS EAR/PLS OXIMETRY MLT: CPT

## 2024-11-05 PROCEDURE — 1100000000 HC RM PRIVATE

## 2024-11-05 PROCEDURE — 82803 BLOOD GASES ANY COMBINATION: CPT

## 2024-11-05 PROCEDURE — 94762 N-INVAS EAR/PLS OXIMTRY CONT: CPT

## 2024-11-05 PROCEDURE — 99285 EMERGENCY DEPT VISIT HI MDM: CPT

## 2024-11-05 RX ORDER — GUAIFENESIN 600 MG/1
1200 TABLET, EXTENDED RELEASE ORAL 2 TIMES DAILY
Status: DISCONTINUED | OUTPATIENT
Start: 2024-11-05 | End: 2024-11-06 | Stop reason: HOSPADM

## 2024-11-05 RX ORDER — CETIRIZINE HYDROCHLORIDE 10 MG/1
5 TABLET ORAL DAILY
Status: DISCONTINUED | OUTPATIENT
Start: 2024-11-06 | End: 2024-11-06 | Stop reason: HOSPADM

## 2024-11-05 RX ORDER — AMLODIPINE BESYLATE 5 MG/1
5 TABLET ORAL DAILY
Status: DISCONTINUED | OUTPATIENT
Start: 2024-11-06 | End: 2024-11-06 | Stop reason: HOSPADM

## 2024-11-05 RX ORDER — PREDNISONE 20 MG/1
40 TABLET ORAL DAILY
Status: DISCONTINUED | OUTPATIENT
Start: 2024-11-06 | End: 2024-11-06 | Stop reason: HOSPADM

## 2024-11-05 RX ORDER — ALBUTEROL SULFATE 90 UG/1
2 INHALANT RESPIRATORY (INHALATION) EVERY 6 HOURS PRN
Status: DISCONTINUED | OUTPATIENT
Start: 2024-11-05 | End: 2024-11-05 | Stop reason: CLARIF

## 2024-11-05 RX ORDER — FUROSEMIDE 40 MG/1
40 TABLET ORAL DAILY
Status: DISCONTINUED | OUTPATIENT
Start: 2024-11-06 | End: 2024-11-06 | Stop reason: HOSPADM

## 2024-11-05 RX ORDER — ACETAMINOPHEN 325 MG/1
650 TABLET ORAL EVERY 6 HOURS PRN
Status: DISCONTINUED | OUTPATIENT
Start: 2024-11-05 | End: 2024-11-06 | Stop reason: HOSPADM

## 2024-11-05 RX ORDER — SODIUM CHLORIDE 0.9 % (FLUSH) 0.9 %
5-40 SYRINGE (ML) INJECTION EVERY 12 HOURS SCHEDULED
Status: DISCONTINUED | OUTPATIENT
Start: 2024-11-05 | End: 2024-11-06 | Stop reason: HOSPADM

## 2024-11-05 RX ORDER — ONDANSETRON 2 MG/ML
4 INJECTION INTRAMUSCULAR; INTRAVENOUS EVERY 6 HOURS PRN
Status: DISCONTINUED | OUTPATIENT
Start: 2024-11-05 | End: 2024-11-06 | Stop reason: HOSPADM

## 2024-11-05 RX ORDER — SODIUM CHLORIDE 9 MG/ML
INJECTION, SOLUTION INTRAVENOUS PRN
Status: DISCONTINUED | OUTPATIENT
Start: 2024-11-05 | End: 2024-11-06 | Stop reason: HOSPADM

## 2024-11-05 RX ORDER — POLYETHYLENE GLYCOL 3350 17 G/17G
17 POWDER, FOR SOLUTION ORAL DAILY PRN
Status: DISCONTINUED | OUTPATIENT
Start: 2024-11-05 | End: 2024-11-06 | Stop reason: HOSPADM

## 2024-11-05 RX ORDER — SPIRONOLACTONE 25 MG/1
25 TABLET ORAL DAILY
Status: DISCONTINUED | OUTPATIENT
Start: 2024-11-06 | End: 2024-11-06 | Stop reason: HOSPADM

## 2024-11-05 RX ORDER — ACETAMINOPHEN 650 MG/1
650 SUPPOSITORY RECTAL EVERY 6 HOURS PRN
Status: DISCONTINUED | OUTPATIENT
Start: 2024-11-05 | End: 2024-11-06 | Stop reason: HOSPADM

## 2024-11-05 RX ORDER — ROSUVASTATIN CALCIUM 20 MG/1
40 TABLET, COATED ORAL EVERY EVENING
Status: DISCONTINUED | OUTPATIENT
Start: 2024-11-05 | End: 2024-11-06 | Stop reason: HOSPADM

## 2024-11-05 RX ORDER — ONDANSETRON 4 MG/1
4 TABLET, ORALLY DISINTEGRATING ORAL EVERY 8 HOURS PRN
Status: DISCONTINUED | OUTPATIENT
Start: 2024-11-05 | End: 2024-11-06 | Stop reason: HOSPADM

## 2024-11-05 RX ORDER — SOTALOL HYDROCHLORIDE 80 MG/1
80 TABLET ORAL 2 TIMES DAILY
Status: DISCONTINUED | OUTPATIENT
Start: 2024-11-05 | End: 2024-11-06 | Stop reason: HOSPADM

## 2024-11-05 RX ORDER — LOSARTAN POTASSIUM 50 MG/1
50 TABLET ORAL DAILY
Status: DISCONTINUED | OUTPATIENT
Start: 2024-11-06 | End: 2024-11-06 | Stop reason: HOSPADM

## 2024-11-05 RX ORDER — IPRATROPIUM BROMIDE AND ALBUTEROL SULFATE 2.5; .5 MG/3ML; MG/3ML
1 SOLUTION RESPIRATORY (INHALATION)
Status: COMPLETED | OUTPATIENT
Start: 2024-11-05 | End: 2024-11-05

## 2024-11-05 RX ORDER — SODIUM CHLORIDE 0.9 % (FLUSH) 0.9 %
5-40 SYRINGE (ML) INJECTION PRN
Status: DISCONTINUED | OUTPATIENT
Start: 2024-11-05 | End: 2024-11-06 | Stop reason: HOSPADM

## 2024-11-05 RX ORDER — ALBUTEROL SULFATE 0.83 MG/ML
2.5 SOLUTION RESPIRATORY (INHALATION) EVERY 6 HOURS PRN
Status: DISCONTINUED | OUTPATIENT
Start: 2024-11-05 | End: 2024-11-06 | Stop reason: HOSPADM

## 2024-11-05 RX ADMIN — AZITHROMYCIN MONOHYDRATE 500 MG: 500 INJECTION, POWDER, LYOPHILIZED, FOR SOLUTION INTRAVENOUS at 21:36

## 2024-11-05 RX ADMIN — IPRATROPIUM BROMIDE AND ALBUTEROL SULFATE 1 DOSE: 2.5; .5 SOLUTION RESPIRATORY (INHALATION) at 20:31

## 2024-11-05 ASSESSMENT — ENCOUNTER SYMPTOMS
COUGH: 1
VOMITING: 0
SHORTNESS OF BREATH: 1
SORE THROAT: 0
SINUS PRESSURE: 0
NAUSEA: 0
WHEEZING: 1
RHINORRHEA: 1
ABDOMINAL PAIN: 0
CHEST TIGHTNESS: 0
BACK PAIN: 0

## 2024-11-05 ASSESSMENT — LIFESTYLE VARIABLES
HOW OFTEN DO YOU HAVE A DRINK CONTAINING ALCOHOL: NEVER
HOW MANY STANDARD DRINKS CONTAINING ALCOHOL DO YOU HAVE ON A TYPICAL DAY: PATIENT DOES NOT DRINK

## 2024-11-06 VITALS
HEIGHT: 74 IN | HEART RATE: 79 BPM | TEMPERATURE: 97.7 F | SYSTOLIC BLOOD PRESSURE: 123 MMHG | WEIGHT: 223.77 LBS | DIASTOLIC BLOOD PRESSURE: 67 MMHG | OXYGEN SATURATION: 92 % | BODY MASS INDEX: 28.72 KG/M2 | RESPIRATION RATE: 17 BRPM

## 2024-11-06 LAB
ANION GAP SERPL CALC-SCNC: 11 MMOL/L (ref 7–16)
BASOPHILS # BLD: 0 K/UL (ref 0–0.2)
BASOPHILS NFR BLD: 0 % (ref 0–2)
BUN SERPL-MCNC: 19 MG/DL (ref 8–23)
CALCIUM SERPL-MCNC: 8.6 MG/DL (ref 8.8–10.2)
CHLORIDE SERPL-SCNC: 98 MMOL/L (ref 98–107)
CO2 SERPL-SCNC: 24 MMOL/L (ref 20–29)
CREAT SERPL-MCNC: 0.97 MG/DL (ref 0.8–1.3)
DIFFERENTIAL METHOD BLD: ABNORMAL
EKG ATRIAL RATE: 82 BPM
EKG ATRIAL RATE: 92 BPM
EKG DIAGNOSIS: NORMAL
EKG DIAGNOSIS: NORMAL
EKG P AXIS: 79 DEGREES
EKG P AXIS: 89 DEGREES
EKG P-R INTERVAL: 227 MS
EKG P-R INTERVAL: 252 MS
EKG Q-T INTERVAL: 396 MS
EKG Q-T INTERVAL: 468 MS
EKG QRS DURATION: 159 MS
EKG QRS DURATION: 166 MS
EKG QTC CALCULATION (BAZETT): 490 MS
EKG QTC CALCULATION (BAZETT): 546 MS
EKG R AXIS: 23 DEGREES
EKG R AXIS: 52 DEGREES
EKG T AXIS: 102 DEGREES
EKG T AXIS: 98 DEGREES
EKG VENTRICULAR RATE: 82 BPM
EKG VENTRICULAR RATE: 92 BPM
EOSINOPHIL # BLD: 0 K/UL (ref 0–0.8)
EOSINOPHIL NFR BLD: 0 % (ref 0.5–7.8)
ERYTHROCYTE [DISTWIDTH] IN BLOOD BY AUTOMATED COUNT: 13.2 % (ref 11.9–14.6)
GLUCOSE BLD STRIP.AUTO-MCNC: 194 MG/DL (ref 65–100)
GLUCOSE SERPL-MCNC: 265 MG/DL (ref 70–99)
HCT VFR BLD AUTO: 38.7 % (ref 41.1–50.3)
HGB BLD-MCNC: 12.8 G/DL (ref 13.6–17.2)
IMM GRANULOCYTES # BLD AUTO: 0.1 K/UL (ref 0–0.5)
IMM GRANULOCYTES NFR BLD AUTO: 0 % (ref 0–5)
LYMPHOCYTES # BLD: 0.8 K/UL (ref 0.5–4.6)
LYMPHOCYTES NFR BLD: 7 % (ref 13–44)
MCH RBC QN AUTO: 28.5 PG (ref 26.1–32.9)
MCHC RBC AUTO-ENTMCNC: 33.1 G/DL (ref 31.4–35)
MCV RBC AUTO: 86.2 FL (ref 82–102)
MONOCYTES # BLD: 0.3 K/UL (ref 0.1–1.3)
MONOCYTES NFR BLD: 2 % (ref 4–12)
NEUTS SEG # BLD: 10.5 K/UL (ref 1.7–8.2)
NEUTS SEG NFR BLD: 91 % (ref 43–78)
NRBC # BLD: 0 K/UL (ref 0–0.2)
PLATELET # BLD AUTO: 258 K/UL (ref 150–450)
PMV BLD AUTO: 8.8 FL (ref 9.4–12.3)
POTASSIUM SERPL-SCNC: 4.4 MMOL/L (ref 3.5–5.1)
RBC # BLD AUTO: 4.49 M/UL (ref 4.23–5.6)
SERVICE CMNT-IMP: ABNORMAL
SODIUM SERPL-SCNC: 134 MMOL/L (ref 136–145)
WBC # BLD AUTO: 11.6 K/UL (ref 4.3–11.1)

## 2024-11-06 PROCEDURE — 5A09357 ASSISTANCE WITH RESPIRATORY VENTILATION, LESS THAN 24 CONSECUTIVE HOURS, CONTINUOUS POSITIVE AIRWAY PRESSURE: ICD-10-PCS | Performed by: STUDENT IN AN ORGANIZED HEALTH CARE EDUCATION/TRAINING PROGRAM

## 2024-11-06 PROCEDURE — 94660 CPAP INITIATION&MGMT: CPT

## 2024-11-06 PROCEDURE — 36415 COLL VENOUS BLD VENIPUNCTURE: CPT

## 2024-11-06 PROCEDURE — 94760 N-INVAS EAR/PLS OXIMETRY 1: CPT

## 2024-11-06 PROCEDURE — 93005 ELECTROCARDIOGRAM TRACING: CPT | Performed by: FAMILY MEDICINE

## 2024-11-06 PROCEDURE — 6370000000 HC RX 637 (ALT 250 FOR IP): Performed by: FAMILY MEDICINE

## 2024-11-06 PROCEDURE — 85025 COMPLETE CBC W/AUTO DIFF WBC: CPT

## 2024-11-06 PROCEDURE — 82962 GLUCOSE BLOOD TEST: CPT

## 2024-11-06 PROCEDURE — 94761 N-INVAS EAR/PLS OXIMETRY MLT: CPT

## 2024-11-06 PROCEDURE — 6360000002 HC RX W HCPCS: Performed by: FAMILY MEDICINE

## 2024-11-06 PROCEDURE — 2580000003 HC RX 258: Performed by: FAMILY MEDICINE

## 2024-11-06 PROCEDURE — 80048 BASIC METABOLIC PNL TOTAL CA: CPT

## 2024-11-06 PROCEDURE — 93010 ELECTROCARDIOGRAM REPORT: CPT | Performed by: INTERNAL MEDICINE

## 2024-11-06 RX ORDER — GUAIFENESIN 600 MG/1
1200 TABLET, EXTENDED RELEASE ORAL 2 TIMES DAILY
Qty: 28 TABLET | Refills: 0 | Status: SHIPPED | OUTPATIENT
Start: 2024-11-06 | End: 2024-11-13

## 2024-11-06 RX ORDER — CEFDINIR 300 MG/1
300 CAPSULE ORAL 2 TIMES DAILY
Qty: 8 CAPSULE | Refills: 0 | Status: SHIPPED | OUTPATIENT
Start: 2024-11-06 | End: 2024-11-10

## 2024-11-06 RX ORDER — LANOLIN ALCOHOL/MO/W.PET/CERES
10 CREAM (GRAM) TOPICAL NIGHTLY PRN
COMMUNITY

## 2024-11-06 RX ORDER — CETIRIZINE HYDROCHLORIDE 5 MG/1
5 TABLET ORAL DAILY
Qty: 30 TABLET | Refills: 2 | Status: SHIPPED | OUTPATIENT
Start: 2024-11-07 | End: 2025-02-05

## 2024-11-06 RX ORDER — LANOLIN ALCOHOL/MO/W.PET/CERES
10 CREAM (GRAM) TOPICAL NIGHTLY PRN
Status: DISCONTINUED | OUTPATIENT
Start: 2024-11-06 | End: 2024-11-06 | Stop reason: HOSPADM

## 2024-11-06 RX ORDER — PREDNISONE 20 MG/1
40 TABLET ORAL DAILY
Qty: 8 TABLET | Refills: 0 | Status: SHIPPED | OUTPATIENT
Start: 2024-11-07 | End: 2024-11-11

## 2024-11-06 RX ADMIN — APIXABAN 5 MG: 5 TABLET, FILM COATED ORAL at 00:33

## 2024-11-06 RX ADMIN — SOTALOL HYDROCHLORIDE 80 MG: 80 TABLET ORAL at 08:30

## 2024-11-06 RX ADMIN — SODIUM CHLORIDE, PRESERVATIVE FREE 10 ML: 5 INJECTION INTRAVENOUS at 08:34

## 2024-11-06 RX ADMIN — Medication 10.5 MG: at 03:42

## 2024-11-06 RX ADMIN — GUAIFENESIN 1200 MG: 600 TABLET ORAL at 08:31

## 2024-11-06 RX ADMIN — SOTALOL HYDROCHLORIDE 80 MG: 80 TABLET ORAL at 00:33

## 2024-11-06 RX ADMIN — ROSUVASTATIN CALCIUM 40 MG: 20 TABLET, FILM COATED ORAL at 00:33

## 2024-11-06 RX ADMIN — AMLODIPINE BESYLATE 5 MG: 5 TABLET ORAL at 08:31

## 2024-11-06 RX ADMIN — GUAIFENESIN 1200 MG: 600 TABLET ORAL at 00:33

## 2024-11-06 RX ADMIN — FUROSEMIDE 40 MG: 40 TABLET ORAL at 12:11

## 2024-11-06 RX ADMIN — CETIRIZINE HYDROCHLORIDE 5 MG: 10 TABLET, FILM COATED ORAL at 08:31

## 2024-11-06 RX ADMIN — SPIRONOLACTONE 25 MG: 25 TABLET ORAL at 08:31

## 2024-11-06 RX ADMIN — LOSARTAN POTASSIUM 50 MG: 50 TABLET, FILM COATED ORAL at 08:30

## 2024-11-06 RX ADMIN — APIXABAN 5 MG: 5 TABLET, FILM COATED ORAL at 08:31

## 2024-11-06 RX ADMIN — WATER 1000 MG: 1 INJECTION INTRAMUSCULAR; INTRAVENOUS; SUBCUTANEOUS at 00:33

## 2024-11-06 RX ADMIN — PREDNISONE 40 MG: 20 TABLET ORAL at 08:30

## 2024-11-06 RX ADMIN — SODIUM CHLORIDE, PRESERVATIVE FREE 10 ML: 5 INJECTION INTRAVENOUS at 00:34

## 2024-11-06 NOTE — ED TRIAGE NOTES
Patient arrives from home via EMS with cc SOB. Patient 77% on RA upon arrival. C/o upper respiratory infection x5 days, states his symptoms got worse after taking a \"cough pill\". Duoneb and 125mg solumedrol received en route, sat improved and now on 4L via nc. Patient notes history of flash pulmonary edema. A&O x4.

## 2024-11-06 NOTE — PROGRESS NOTES
11/06/24 1246   Resting (Room Air)   SpO2 92   HR 79   During Walk (Room Air)   SpO2 95   HR 98   After Walk   SpO2 93   HR 87   Does the Patient Qualify for Home O2 No

## 2024-11-06 NOTE — DISCHARGE INSTRUCTIONS
swelling in your face or throat) or a severe skin reaction (fever, sore throat, burning eyes, skin pain, red or purple skin rash with blistering and peeling).  Call your doctor at once if you have:  severe stomach pain, diarrhea that is watery or bloody (even if it occurs months after your last dose);  fever, chills, body aches, flu symptoms;  pale skin, easy bruising, unusual bleeding;  seizure (convulsions);  fever, weakness, confusion;  dark colored urine, jaundice (yellowing of the skin or eyes); or  kidney problems --little or no urination, swelling in your feet or ankles, feeling tired or short of breath.  Common side effects may include:  nausea, vomiting, stomach pain, diarrhea;  vaginal itching or discharge;  headache; or  rash (including diaper rash in an infant taking liquid cefdinir.  This is not a complete list of side effects and others may occur. Call your doctor for medical advice about side effects. You may report side effects to FDA at 5-289-FDA-1127.  What other drugs will affect cefdinir?  Tell your doctor about all your other medicines, especially:  probenecid; or  vitamin or mineral supplements that contain iron.  This list is not complete. Other drugs may affect cefdinir, including prescription and over-the-counter medicines, vitamins, and herbal products. Not all possible drug interactions are listed here.  Where can I get more information?  Your pharmacist can provide more information about cefdinir.  Remember, keep this and all other medicines out of the reach of children, never share your medicines with others, and use this medication only for the indication prescribed.   Every effort has been made to ensure that the information provided by [employer] is accurate, up-to-date, and complete, but no guarantee is made to that effect. Drug information contained herein may be time sensitive. [employer] information has been compiled for use by healthcare practitioners and

## 2024-11-06 NOTE — DISCHARGE SUMMARY
room by emergency room physician as he was still having significant rhonchi.    Following treatment in the emergency room with antibiotics and steroids patient improved significantly.  At time of exam this morning patient was on room air and states that he feels back to his baseline.  Patient states he has been ambulating around the room.  Patient was able to ambulate without drop in oxygen prior to discharge.  Patient's QTc was noted to be prolonged and he is on sotalol.  Discussed with cardiology and recommended holding sotalol on 11/6 and restarting on 11/7 as QTc was likely prolonged from initial dose of azithromycin.  Patient does follow closely with cardiology and recommended to continue following closely at time of discharge.  Patient also recommended to follow-up with primary care physician at time of discharge.  Patient medically stable for discharge on 11/6 and requesting to be discharged.    Patient evaluated at bedside and resting comfortably in bed on room air at time of exam.  Patient very eager to be discharged today.  He states he has ambulated without difficulty.  Plan to discharge today and have close follow-up with primary care physician as well as cardiology.  Patient will be discharged with steroids and antibiotics to complete course.  Patient agrees with plan as above.    Disposition: Home  Diet: ADULT DIET; Regular; Low Fat/Low Chol/High Fiber/2 gm Na  Code Status: Full Code    Follow Ups:  Follow-up Information       Follow up With Specialties Details Why Contact Info    Augustus Mendez MD Internal Medicine Follow up in 1 week(s)  [address] [place] [id number] [phone]                  Time spent in patient discharge and coordination 43 minutes.      Follow up labs/diagnostics (ultimately defer to outpatient provider):  Defer to Follow-up Provider    Plan was discussed with Patient.  All questions answered.  Patient was stable at time of discharge.  Instructions given to

## 2024-11-06 NOTE — PROGRESS NOTES
4 Eyes Skin Assessment     NAME:  Casey Ross  YOB: 1945  MEDICAL RECORD NUMBER:  [id number]    The patient is being assessed for  Admission    I agree that at least one RN has performed a thorough Head to Toe Skin Assessment on the patient. ALL assessment sites listed below have been assessed.      Areas assessed by both nurses:    Head, Face, Ears, Shoulders, Back, Chest, Arms, Elbows, Hands, Sacrum. Buttock, Coccyx, Ischium, and Legs. Feet and Heels        Does the Patient have a Wound? No noted wound(s)       Aly Prevention initiated by RN: No  Wound Care Orders initiated by RN: No    Pressure Injury (Stage 3,4, Unstageable, DTI, NWPT, and Complex wounds) if present, place Wound referral order by RN under : No    New Ostomies, if present place, Ostomy referral order under : No     Nurse 1 eSignature: Electronically signed by SHAUNA HOUSE RN on 11/6/24 at 1:55 AM EST    **SHARE this note so that the co-signing nurse can place an eSignature**    Nurse 2 eSignature: Electronically signed by Case Pace RN on 11/6/24 at 1:57 AM EST

## 2024-11-06 NOTE — ED PROVIDER NOTES
[employer] Emergency Department Provider Note                   PCP:                Augustus Mendez MD               Age: 79 y.o.      Sex: male     MEDICAL DECISION MAKING  Complexity of Problems Addressed:   1 or more acute illness/injury that poses a threat to life or bodily function    Data Reviewed and Analyzed:  Category 1:    I have reviewed outside records from an external source for any pertinent PMH, ED visits, primary care visits, specialist visits, labs, EKG, and/or radiologic studies.    Category 2:     ED EKG Interpretation  EKG was interpreted in the absence of a cardiologist.    Rate: Rate: Normal  EKG Interpretation: EKG Interpretation: sinus rhythm, no evidence of arrhythmia and left bundle branch block  ST Segments: Nonspecific ST segments - NO STEMI      I independently ordered and reviewed the labs.    I have reviewed the Radiologist interpretation of the radiologic studies. My independent interpretation of the chest x-ray is as vascular congestion and bilateral opacities.  My medical decision making regarding the radiologic studies is based on the interpretation report of the board certified Radiologist.            The patient was admitted and I have discussed patient management with the admitting provider.    Category 3:     Discussion of management or test interpretation:    MDM  Number of Diagnoses or Management Options  Acute respiratory failure with hypoxia  Diagnosis management comments: Patient who has been having 5 days of cough and cold symptoms presenting via EMS for shortness of breath.  He was found to be hypoxic at 77% with EMS and placed on 4 L nasal cannula oxygen.  He was also given Solu-Medrol and DuoNeb.  He arrived with oxygen saturation in the low 90s and not in respiratory distress.  He did not need intubation or BiPAP.  Patient was given another DuoNeb for rhonchi.  He is VBG showed no acidosis or hypercapnia.  His chest x-ray shows vascular congestion with interstitial  Problem: Patient Care Overview  Goal: Plan of Care Review  Outcome: Ongoing (interventions implemented as appropriate)   02/13/19 2096   Coping/Psychosocial   Plan of Care Reviewed With patient   OTHER   Outcome Summary Small amt of NG drainage tonight patient continues to complain constantly,treated with Dilaudid 0.5mg IV every 2h and Phenergan every 4h for complaints of nausea and abdominal discomfort,VSS monitor shows SR.will continue to monitor        Problem: Pain, Acute (Adult)  Goal: Acceptable Pain Control/Comfort Level  Outcome: Ongoing (interventions implemented as appropriate)      Problem: Skin Injury Risk (Adult)  Goal: Skin Health and Integrity  Outcome: Ongoing (interventions implemented as appropriate)      Problem: Fall Risk (Adult)  Goal: Absence of Fall  Outcome: Ongoing (interventions implemented as appropriate)

## 2024-11-06 NOTE — CARE COORDINATION
Independent   Homemaking Assistance Independent   Ambulation Assistance Independent   Transfer Assistance Independent   Active  Yes   Occupation Retired   Discharge Planning   Type of Residence House   Living Arrangements Spouse/Significant Other   Current Services Prior To Admission None   Potential Assistance Needed N/A   DME Ordered? No   Potential Assistance Purchasing Medications No   Type of Home Care Services None   Patient expects to be discharged to: House   Services At/After Discharge   Transition of Care Consult (CM Consult) Discharge Planning   Services At/After Discharge None    Resource Information Provided? No   Mode of Transport at Discharge Other (see comment)  (Family)   Confirm Follow Up Transport Family

## 2024-11-06 NOTE — H&P
Time: 11/05/24  7:30 PM   Result Value Ref Range    Procalcitonin 0.09 0.00 - 0.10 ng/mL   Brain Natriuretic Peptide    Collection Time: 11/05/24  7:30 PM   Result Value Ref Range    NT Pro- 0 - 450 PG/ML   Troponin    Collection Time: 11/05/24  7:30 PM   Result Value Ref Range    Troponin T 9.0 0 - 22 ng/L   Respiratory Panel, Molecular, with COVID-19 (Restricted: peds pts or suitable admitted adults)    Collection Time: 11/05/24  7:55 PM    Specimen: Nasopharyngeal   Result Value Ref Range    Adenovirus by PCR NOT DETECTED NOTDET      Coronavirus 229E by PCR NOT DETECTED NOTDET      Coronavirus HKU1 by PCR NOT DETECTED NOTDET      Coronavirus NL63 by PCR NOT DETECTED NOTDET      Coronavirus OC43 by PCR NOT DETECTED NOTDET      SARS-CoV-2, PCR NOT DETECTED NOTDET      Human Metapneumovirus by PCR NOT DETECTED NOTDET      Rhinovirus Enterovirus PCR Detected (A) NOTDET      Influenza A by PCR NOT DETECTED NOTDET      Influenza B PCR NOT DETECTED NOTDET      Parainfluenza 1 PCR NOT DETECTED NOTDET      Parainfluenza 2 PCR NOT DETECTED NOTDET      Parainfluenza 3 PCR NOT DETECTED NOTDET      Parainfluenza 4 PCR NOT DETECTED NOTDET      Respiratory Syncytial Virus by PCR NOT DETECTED NOTDET      Bordetella parapertussis by PCR NOT DETECTED NOTDET      Bordetella pertussis by PCR NOT DETECTED NOTDET      Chlamydophila Pneumonia PCR NOT DETECTED NOTDET      Mycoplasma pneumo by PCR NOT DETECTED NOTDET     Venous Blood Gas, POC    Collection Time: 11/05/24  8:37 PM   Result Value Ref Range    PH, VENOUS (POC) 7.44 (H) 7.32 - 7.42      PCO2, [place], POC 38.3 (L) 41 - 51 MMHG    PO2, VENOUS (POC) 47 mmHg    HCO3, Venous 25.7 23 - 28 MMOL/L    SO2, VENOUS (POC) 83.7 65 - 88 %    BASE EXCESS, VENOUS (POC) 1.6 mmol/L    POC Magdaleno's Test NOT APPLICABLE      Respiratory Rate 14      Site CENTRAL LINE      Specimen type: VENOUS BLOOD      Performed by: Geronimo    Troponin    Collection Time: 11/05/24  9:18 PM   Result

## 2024-11-06 NOTE — ED NOTES
TRANSFER - OUT REPORT:    Verbal report given to Sunshine PICKARD on Casey Ross  being transferred to [facility] for routine progression of patient care       Report consisted of patient's Situation, Background, Assessment and   Recommendations(SBAR).     Information from the following report(s) Nurse Handoff Report, ED Encounter Summary, and ED SBAR was reviewed with the receiving nurse.    Carrier Fall Assessment:    Presents to emergency department  because of falls (Syncope, seizure, or loss of consciousness): No  Age > 70: Yes  Altered Mental Status, Intoxication with alcohol or substance confusion (Disorientation, impaired judgment, poor safety awaremess, or inability to follow instructions): No  Impaired Mobility: Ambulates or transfers with assistive devices or assistance; Unable to ambulate or transer.: Yes  Nursing Judgement: Yes          Lines:   Peripheral IV 11/05/24 Right Antecubital (Active)   Site Assessment Clean, dry & intact 11/05/24 1927   Line Status Blood return noted;Normal saline locked;Flushed 11/05/24 1927   Phlebitis Assessment No symptoms 11/05/24 1927   Infiltration Assessment 0 11/05/24 1927   Dressing Status Clean, dry & intact 11/05/24 1927   Dressing Type Transparent 11/05/24 1927        Opportunity for questions and clarification was provided.      Patient transported with:  O2 @ 4lpm and Registered Nurse          Howard, Gregg, RN  11/05/24 [id number]

## 2024-11-06 NOTE — PROGRESS NOTES
TRANSFER - IN REPORT:    Verbal report received from Gregg PICKARD  on Casey Ross being received from [facility] (ED) for routine progression of care.     Report consisted of patient’s Situation, Background, Assessment and Recommendations(SBAR).     Information from the following report(s) SBAR was reviewed. Opportunity for questions and clarification was provided.      Assessment completed upon patient’s arrival to unit and care assumed.     Patient received to room 414. Patient connected to monitor and assessment completed. Plan of care reviewed. Patient oriented to room and call light. Patient aware to use call light to communicate any chest pain or needs.

## 2024-11-11 ENCOUNTER — TELEPHONE (OUTPATIENT)
Dept: PULMONOLOGY | Age: 79
End: 2024-11-11

## 2024-11-11 NOTE — TELEPHONE ENCOUNTER
Patient is in [facility], probably released today . He was rushed there due to Pulmonary  fibrosis . They told him to contact his pulmonary provider

## 2024-11-11 NOTE — TELEPHONE ENCOUNTER
Faxed request to [facility] to obtain 11/9/2024 CT angio.  I have spoken with patient and have scheduled him on 11/20 at 3 pm to see Mrs Dillard.

## 2024-11-20 ENCOUNTER — OFFICE VISIT (OUTPATIENT)
Dept: PULMONOLOGY | Age: 79
End: 2024-11-20

## 2024-11-20 VITALS
OXYGEN SATURATION: 98 % | HEART RATE: 71 BPM | SYSTOLIC BLOOD PRESSURE: 120 MMHG | TEMPERATURE: 97.7 F | HEIGHT: 74 IN | DIASTOLIC BLOOD PRESSURE: 74 MMHG | BODY MASS INDEX: 28.8 KG/M2 | WEIGHT: 224.4 LBS | RESPIRATION RATE: 18 BRPM

## 2024-11-20 DIAGNOSIS — R91.8 GROUND GLASS OPACITY PRESENT ON IMAGING OF LUNG: ICD-10-CM

## 2024-11-20 DIAGNOSIS — I27.20 PULMONARY HYPERTENSION (HCC): ICD-10-CM

## 2024-11-20 DIAGNOSIS — J84.10 PULMONARY FIBROSIS (HCC): Primary | ICD-10-CM

## 2024-11-20 DIAGNOSIS — R91.8 PULMONARY NODULES: ICD-10-CM

## 2024-11-20 DIAGNOSIS — G47.33 OSA (OBSTRUCTIVE SLEEP APNEA): ICD-10-CM

## 2024-11-20 DIAGNOSIS — I50.42 CHRONIC COMBINED SYSTOLIC AND DIASTOLIC CONGESTIVE HEART FAILURE (HCC): ICD-10-CM

## 2024-11-20 NOTE — PATIENT INSTRUCTIONS
I have ordered CT scan for late February.  You should receive a call from scheduling by mid January.  If you do not hear from them, please call [phone] to schedule your test.

## 2024-11-20 NOTE — PROGRESS NOTES
uptake. Stable mild degenerative changes in the  visualized spine.    OTHER FINDINGS: None.    Impression  Clustered and scattered micronodules in the right lung, some of which are mildly  avid, probably infectious/inflammatory. Attention on follow-up to ensure  resolution.            Electronically signed by Darion Kennedy    PFTs:        No data to display              Results for orders placed or performed in visit on 06/27/24   AMB POC SPIROMETRY W/BRONCHODILATOR    Collection Time: 06/27/24 12:00 AM   Result Value Ref Range    FEV 1 , POC 2.51 L    FEV1 % Pred POC 69 %    FVC, POC 3.38 L     FVC % Pred POC 69 %    FEV1/FVC, POC 74%     No results found for this or any previous visit.    FeNO: No results found for this or any previous visit.  FeNO and Likelihood of Eosinophilic Asthma   Unlikely Intermediate Likely   <25 ppb 25-50 ppb >50ppb     Exercise Oximetry:    Echo:   MACIEJ (PRN CONTRAST/BUBBLE/3D) 07/09/2024    Interpretation Summary    Left Ventricle: Normal left ventricular systolic function with a visually estimated EF of 55 - 60%. Normal wall motion.    Right Ventricle: Not well visualized. Right ventricle size is normal.    Aortic Valve: Trileaflet valve.    Left Atrium: No left atrial appendage thrombus noted.    Image quality is excellent.    [facility] Reference Info:                                                                                                                Immunization History   Administered Date(s) Administered    COVID-19, PFIZER PURPLE top, DILUTE for use, (age 12 y+), 30mcg/0.3mL 01/18/2021, 02/04/2021, 11/11/2021    Influenza, FLUAD, (age 65 y+), IM, Quadv, 0.5mL 09/13/2023    Influenza, FLUAD, (age 65 y+), IM, Trivalent PF, 0.5mL 12/11/2019    Influenza, FLUZONE High Dose (age 65 y+), IM, Quadv, 0.7mL 11/11/2020, 09/13/2022, 10/01/2023    Influenza, FLUZONE High Dose, (age 65 y+), IM, Trivalent PF, 0.5mL 11/11/2016, 11/02/2018, 11/11/2021, 09/13/2024    PPD Test 11/20/2019

## 2024-12-16 ENCOUNTER — OFFICE VISIT (OUTPATIENT)
Age: 79
End: 2024-12-16
Payer: MEDICARE

## 2024-12-16 VITALS
WEIGHT: 230 LBS | HEIGHT: 74 IN | DIASTOLIC BLOOD PRESSURE: 68 MMHG | BODY MASS INDEX: 29.52 KG/M2 | HEART RATE: 71 BPM | SYSTOLIC BLOOD PRESSURE: 128 MMHG

## 2024-12-16 DIAGNOSIS — I10 HYPERTENSION, ESSENTIAL: ICD-10-CM

## 2024-12-16 DIAGNOSIS — Z95.0 PRESENCE OF CARDIAC PACEMAKER: ICD-10-CM

## 2024-12-16 DIAGNOSIS — I48.0 PAROXYSMAL A-FIB (HCC): Primary | ICD-10-CM

## 2024-12-16 DIAGNOSIS — I50.22 CHRONIC SYSTOLIC (CONGESTIVE) HEART FAILURE (HCC): ICD-10-CM

## 2024-12-16 PROCEDURE — 1123F ACP DISCUSS/DSCN MKR DOCD: CPT | Performed by: INTERNAL MEDICINE

## 2024-12-16 PROCEDURE — 3074F SYST BP LT 130 MM HG: CPT | Performed by: INTERNAL MEDICINE

## 2024-12-16 PROCEDURE — 93000 ELECTROCARDIOGRAM COMPLETE: CPT | Performed by: INTERNAL MEDICINE

## 2024-12-16 PROCEDURE — 3078F DIAST BP <80 MM HG: CPT | Performed by: INTERNAL MEDICINE

## 2024-12-16 PROCEDURE — 99214 OFFICE O/P EST MOD 30 MIN: CPT | Performed by: INTERNAL MEDICINE

## 2024-12-16 PROCEDURE — 1159F MED LIST DOCD IN RCRD: CPT | Performed by: INTERNAL MEDICINE

## 2024-12-16 PROCEDURE — 1126F AMNT PAIN NOTED NONE PRSNT: CPT | Performed by: INTERNAL MEDICINE

## 2024-12-16 RX ORDER — SOTALOL HYDROCHLORIDE 80 MG/1
80 TABLET ORAL 2 TIMES DAILY
Qty: 180 TABLET | Refills: 3 | Status: SHIPPED | OUTPATIENT
Start: 2024-12-16

## 2024-12-16 RX ORDER — MONTELUKAST SODIUM 10 MG/1
10 TABLET ORAL EVERY EVENING
COMMUNITY
Start: 2024-12-03

## 2024-12-16 RX ORDER — FLUTICASONE FUROATE 100 UG/1
POWDER RESPIRATORY (INHALATION)
COMMUNITY
Start: 2024-12-12

## 2024-12-16 RX ORDER — IPRATROPIUM BROMIDE 42 UG/1
SPRAY, METERED NASAL
COMMUNITY
Start: 2024-12-12

## 2024-12-16 NOTE — PROGRESS NOTES
the patient is counseled to address these with their primary care physician or appropriate specialist.    I have personally seen and evaluated the patient and reviewed the students note and agree with the following assessment and plan and findings. I was present for and observed the key components of this note.  Any appropriate additions or editing of the information have been done by me.    Wilder Chun MD, FACC  Cardiology

## 2025-02-04 ENCOUNTER — TELEPHONE (OUTPATIENT)
Dept: PULMONOLOGY | Age: 80
End: 2025-02-04

## 2025-02-04 NOTE — TELEPHONE ENCOUNTER
Called patient and left VM letting him know we had to reschedule his appointment as the provider will not be in office. Left new appointment info in VM. Sending [employer] message and VM

## 2025-02-11 ENCOUNTER — NURSE ONLY (OUTPATIENT)
Dept: PULMONOLOGY | Age: 80
End: 2025-02-11

## 2025-02-11 DIAGNOSIS — J84.10 PULMONARY FIBROSIS (HCC): Primary | ICD-10-CM

## 2025-02-11 NOTE — PROGRESS NOTES
Patient came in for CPFTs but has been sick the last week and is currently on antibiotics. Rescheduled patient before next appt with Larisa Dillard NP

## 2025-02-13 ENCOUNTER — HOSPITAL ENCOUNTER (OUTPATIENT)
Dept: CT IMAGING | Age: 80
Discharge: HOME | End: 2025-02-16
Payer: MEDICARE

## 2025-02-13 DIAGNOSIS — R91.8 PULMONARY NODULES: ICD-10-CM

## 2025-02-13 DIAGNOSIS — R91.8 GROUND GLASS OPACITY PRESENT ON IMAGING OF LUNG: ICD-10-CM

## 2025-02-13 PROCEDURE — 71250 CT THORAX DX C-: CPT

## 2025-02-25 ENCOUNTER — OFFICE VISIT (OUTPATIENT)
Dept: SLEEP MEDICINE | Age: 80
End: 2025-02-25
Payer: MEDICARE

## 2025-02-25 VITALS
WEIGHT: 228 LBS | HEIGHT: 75 IN | RESPIRATION RATE: 14 BRPM | BODY MASS INDEX: 28.35 KG/M2 | HEART RATE: 71 BPM | DIASTOLIC BLOOD PRESSURE: 82 MMHG | OXYGEN SATURATION: 96 % | SYSTOLIC BLOOD PRESSURE: 134 MMHG

## 2025-02-25 DIAGNOSIS — G47.00 PERSISTENT DISORDER OF INITIATING OR MAINTAINING SLEEP: ICD-10-CM

## 2025-02-25 DIAGNOSIS — R06.83 SNORING: ICD-10-CM

## 2025-02-25 DIAGNOSIS — G47.33 OSA (OBSTRUCTIVE SLEEP APNEA): Primary | ICD-10-CM

## 2025-02-25 DIAGNOSIS — G47.8 NON-RESTORATIVE SLEEP: ICD-10-CM

## 2025-02-25 PROCEDURE — 1123F ACP DISCUSS/DSCN MKR DOCD: CPT | Performed by: NURSE PRACTITIONER

## 2025-02-25 PROCEDURE — 3079F DIAST BP 80-89 MM HG: CPT | Performed by: NURSE PRACTITIONER

## 2025-02-25 PROCEDURE — 1160F RVW MEDS BY RX/DR IN RCRD: CPT | Performed by: NURSE PRACTITIONER

## 2025-02-25 PROCEDURE — G2211 COMPLEX E/M VISIT ADD ON: HCPCS | Performed by: NURSE PRACTITIONER

## 2025-02-25 PROCEDURE — 1159F MED LIST DOCD IN RCRD: CPT | Performed by: NURSE PRACTITIONER

## 2025-02-25 PROCEDURE — 99203 OFFICE O/P NEW LOW 30 MIN: CPT | Performed by: NURSE PRACTITIONER

## 2025-02-25 PROCEDURE — 3075F SYST BP GE 130 - 139MM HG: CPT | Performed by: NURSE PRACTITIONER

## 2025-02-25 ASSESSMENT — SLEEP AND FATIGUE QUESTIONNAIRES
HOW LIKELY ARE YOU TO NOD OFF OR FALL ASLEEP WHEN YOU ARE A PASSENGER IN A CAR FOR AN HOUR WITHOUT A BREAK: SLIGHT CHANCE OF DOZING
HOW LIKELY ARE YOU TO NOD OFF OR FALL ASLEEP IN A CAR, WHILE STOPPED FOR A FEW MINUTES IN TRAFFIC: WOULD NEVER DOZE
HOW LIKELY ARE YOU TO NOD OFF OR FALL ASLEEP WHILE LYING DOWN TO REST IN THE AFTERNOON WHEN CIRCUMSTANCES PERMIT: HIGH CHANCE OF DOZING
HOW LIKELY ARE YOU TO NOD OFF OR FALL ASLEEP WHILE SITTING AND READING: WOULD NEVER DOZE
HOW LIKELY ARE YOU TO NOD OFF OR FALL ASLEEP WHILE SITTING INACTIVE IN A PUBLIC PLACE: WOULD NEVER DOZE
HOW LIKELY ARE YOU TO NOD OFF OR FALL ASLEEP WHILE SITTING AND TALKING TO SOMEONE: WOULD NEVER DOZE
ESS TOTAL SCORE: 6
HOW LIKELY ARE YOU TO NOD OFF OR FALL ASLEEP WHILE WATCHING TV: WOULD NEVER DOZE
HOW LIKELY ARE YOU TO NOD OFF OR FALL ASLEEP WHILE SITTING QUIETLY AFTER LUNCH WITHOUT ALCOHOL: MODERATE CHANCE OF DOZING

## 2025-02-25 NOTE — PROGRESS NOTES
[facility]  [address]  [place] [address]  [phone]    Patient Name:  Casey Ross  YOB: 1945      Office Visit 2/25/2025    CHIEF COMPLAINT:    Chief Complaint   Patient presents with    Sleep Apnea       HISTORY OF PRESENT ILLNESS:      The patient presents in outpatient consultation at the request of self referral for management of obstructive sleep apnea.  Significant PMH of hypertension, mitral valve prolapse, MARY on CPAP, CHF and arrhythmia.     The diagnostic polysomnography was notable for an apnea hypopnea index of 27.8 and lowest oxygen saturation of 82%. He is prescribed cpap therapy with a humidifier set at 10 cm with a full face mask. Most recent download reveals AHI on PAP therapy is 3.2, leak is median 0.2 and 13.0 at 95th percentile and the hourly usage is 9 hours 23 minutes nightly. The overall use is 3387 hours with days greater than four hours at 360/365. The patient is compliant with the Pap therapy and is feeling better as a result.     He was last seen in sleep medicine in 2021.  Reports that he has been doing well with his CPAP and was just never scheduled a follow-up appointment.  States that his CPAP machine recently started giving him the end of motor life warning.  His compliance with CPAP is excellent.  He reports that prior to being diagnosed with sleep apnea his wife was continually complaining about him snoring and stated that he would stop breathing while he was sleeping.  He was also having problems with awakening during the night and his heart will be racing.  States that all of those symptoms have been controlled since starting CPAP.  He denies ever having any symptoms of restless leg syndrome.  States that he has had several hospitalizations over the last year due to pneumonia and he is followed in our pulmonary office as well.  He denies any other medical changes.  Reports that his weight has consistently been

## 2025-02-25 NOTE — PATIENT INSTRUCTIONS
Continue CPAP 10 cm H2O with nightly compliance  Replacement CPAP machine and supplies ordered  Recommendations as above  Follow-up in 4 months or sooner if needed

## 2025-03-04 NOTE — PROGRESS NOTES
05/19/2011    TDaP, ADACEL (age 10y-64y), BOOSTRIX (age 10y+), IM, 0.5mL 05/05/2022    Zoster Live (Zostavax) 12/16/2014     Past Medical History:   Diagnosis Date    Arrhythmia     Cancer (HCC)     basal cell    Elevated PSA     Erectile dysfunction     Hypertension     daily meds    MVP (mitral valve prolapse)     diagnosed many years ago, pt states does not see cardiologist, in youth    MARY (obstructive sleep apnea) 11/15/2018    Osteoarthritis     thumbs    Prostate cancer (HCC)     S/P colonoscopy 2015    due in 2020        Tobacco Use      Smoking status: Former        Years: 1 pack/day for 33.6 years (33.6 ttl pk-yrs)        Types: Cigarettes        Start date: 1964      Smokeless tobacco: Never    Allergies   Allergen Reactions    Benzonatate Shortness Of Breath    Levofloxacin Shortness Of Breath    Amoxicillin Swelling     Diff breathing, lip swelling    Remdesivir Other (See Comments)     Kidney injury     Current Outpatient Medications   Medication Instructions    albuterol (PROVENTIL) 2.5 mg, Nebulization, EVERY 4 HOURS PRN    albuterol sulfate HFA (VENTOLIN HFA) 108 (90 Base) MCG/ACT inhaler 2 puffs, Inhalation, EVERY 6 HOURS PRN    amLODIPine (NORVASC) 5 mg, Oral, DAILY    apixaban (ELIQUIS) 5 mg, Oral, EVERY 12 HOURS    ARNUITY ELLIPTA 100 MCG/ACT AEPB INHALE 1 PUFF (100 MCG) BY INHALATION ROUTE ONCE DAILY AT THE SAME TIME EACH DAY    fluticasone-umeclidin-vilant (TRELEGY ELLIPTA) 100-62.5-25 MCG/ACT AEPB inhaler 1 puff, Inhalation, DAILY    furosemide (LASIX) 40 mg, Oral, DAILY, Takes 1-2 tablets daily depends on weight     ipratropium (ATROVENT) 0.06 % nasal spray SPRAY 2 SPRAYS (84 MCG) IN EACH NOSTRIL BY INTRANASAL ROUTE 3 TIMES PER DAY AS NEEDED    melatonin 10 mg, Oral, NIGHTLY PRN    Misc. Devices (CPAP MACHINE) MISC Does not apply, qhs    montelukast (SINGULAIR) 10 mg, Oral, EVERY EVENING    olmesartan (BENICAR) 20 mg, Oral, DAILY    rosuvastatin (CRESTOR) 40 mg, Oral, EVERY EVENING

## 2025-03-06 ENCOUNTER — NURSE ONLY (OUTPATIENT)
Dept: PULMONOLOGY | Age: 80
End: 2025-03-06

## 2025-03-06 DIAGNOSIS — J84.10 PULMONARY FIBROSIS (HCC): Primary | ICD-10-CM

## 2025-03-06 LAB
FEF25-27, POC: 1.45 L/S
FET, POC: NORMAL
FEV 1 , POC: 2.35 L
FEV1/FVC, POC: NORMAL
FVC, POC: NORMAL
LUNG AGE, POC: NORMAL
PEF, POC: 7.36 L/S

## 2025-03-06 NOTE — PROGRESS NOTES
CPFT complete.   Quality 131: Pain Assessment And Follow-Up: Pain assessment using a standardized tool is documented as negative, no follow-up plan required Detail Level: Detailed Quality 130: Documentation Of Current Medications In The Medical Record: Current Medications Documented Additional Notes: patient states pain as negatives, and on scale of 0-10 the level is 0.

## 2025-03-07 ENCOUNTER — OFFICE VISIT (OUTPATIENT)
Dept: PULMONOLOGY | Age: 80
End: 2025-03-07
Payer: MEDICARE

## 2025-03-07 VITALS
RESPIRATION RATE: 19 BRPM | WEIGHT: 233.5 LBS | OXYGEN SATURATION: 99 % | SYSTOLIC BLOOD PRESSURE: 130 MMHG | BODY MASS INDEX: 29.97 KG/M2 | DIASTOLIC BLOOD PRESSURE: 76 MMHG | HEART RATE: 75 BPM | HEIGHT: 74 IN | TEMPERATURE: 97 F

## 2025-03-07 DIAGNOSIS — I50.42 CHRONIC COMBINED SYSTOLIC AND DIASTOLIC CONGESTIVE HEART FAILURE (HCC): ICD-10-CM

## 2025-03-07 DIAGNOSIS — R91.8 PULMONARY INFILTRATES: ICD-10-CM

## 2025-03-07 DIAGNOSIS — I48.91 ATRIAL FIBRILLATION, UNSPECIFIED TYPE (HCC): ICD-10-CM

## 2025-03-07 DIAGNOSIS — R06.09 DYSPNEA ON EXERTION: ICD-10-CM

## 2025-03-07 DIAGNOSIS — J44.9 COPD, MODERATE (HCC): Primary | ICD-10-CM

## 2025-03-07 DIAGNOSIS — R91.8 PULMONARY NODULES: ICD-10-CM

## 2025-03-07 DIAGNOSIS — G47.33 OSA (OBSTRUCTIVE SLEEP APNEA): ICD-10-CM

## 2025-03-07 PROCEDURE — 94664 DEMO&/EVAL PT USE INHALER: CPT | Performed by: NURSE PRACTITIONER

## 2025-03-07 PROCEDURE — 99214 OFFICE O/P EST MOD 30 MIN: CPT | Performed by: NURSE PRACTITIONER

## 2025-03-07 PROCEDURE — 3075F SYST BP GE 130 - 139MM HG: CPT | Performed by: NURSE PRACTITIONER

## 2025-03-07 PROCEDURE — 1123F ACP DISCUSS/DSCN MKR DOCD: CPT | Performed by: NURSE PRACTITIONER

## 2025-03-07 PROCEDURE — 1159F MED LIST DOCD IN RCRD: CPT | Performed by: NURSE PRACTITIONER

## 2025-03-07 PROCEDURE — 1126F AMNT PAIN NOTED NONE PRSNT: CPT | Performed by: NURSE PRACTITIONER

## 2025-03-07 PROCEDURE — 3078F DIAST BP <80 MM HG: CPT | Performed by: NURSE PRACTITIONER

## 2025-03-07 PROCEDURE — G2211 COMPLEX E/M VISIT ADD ON: HCPCS | Performed by: NURSE PRACTITIONER

## 2025-03-07 RX ORDER — ALBUTEROL SULFATE 0.83 MG/ML
2.5 SOLUTION RESPIRATORY (INHALATION) EVERY 4 HOURS PRN
Qty: 360 ML | Refills: 11 | Status: SHIPPED | OUTPATIENT
Start: 2025-03-07

## 2025-03-07 RX ORDER — ALBUTEROL SULFATE 0.83 MG/ML
2.5 SOLUTION RESPIRATORY (INHALATION) EVERY 4 HOURS PRN
COMMUNITY
End: 2025-03-07 | Stop reason: SDUPTHER

## 2025-03-07 RX ORDER — DOXYCYCLINE 100 MG/1
CAPSULE ORAL
COMMUNITY
Start: 2025-03-03 | End: 2025-03-07 | Stop reason: ALTCHOICE

## 2025-03-07 RX ORDER — FLUTICASONE FUROATE, UMECLIDINIUM BROMIDE AND VILANTEROL TRIFENATATE 100; 62.5; 25 UG/1; UG/1; UG/1
1 POWDER RESPIRATORY (INHALATION) DAILY
Qty: 1 EACH | Refills: 11 | Status: SHIPPED | OUTPATIENT
Start: 2025-03-07

## 2025-03-07 NOTE — PATIENT INSTRUCTIONS
I have ordered CT of chest due in August.  You should receive a call from scheduling by late July.  If you do not hear from them, please call [phone] to schedule your test.     Treatment Plan:  Discontinue Arnuity inhaler.  Start Trelegy inhaler, 1 puff daily, and rinse mouth after use.  Use albuterol via nebulizer up to four times daily, especially in the morning.  Continue using CPAP machine nightly.  Maintain current diuretics (Lasix and spironolactone).  Monitor weight and limit salt intake.     Follow-Up Instructions:  Use Trelegy inhaler once daily and rinse mouth after use.  Use albuterol nebulizer up to four times daily, particularly in the morning.  Continue using CPAP machine nightly.  Maintain a healthy weight and limit salt intake.  Repeat CT scan in August 2025 to monitor lung nodules. Radiology will contact you in late July 2025 to schedule the scan. If not contacted, use the provided radiology contact information.  Follow up with your cardiologist as needed for atrial fibrillation monitoring.     Additional Notes:  A sample of Trelegy will be provided, and a prescription will be sent to [facility] pharmacy.  If you experience any new or worsening symptoms, contact your healthcare provider immediately.

## 2025-03-10 ENCOUNTER — PATIENT MESSAGE (OUTPATIENT)
Dept: PULMONOLOGY | Age: 80
End: 2025-03-10

## 2025-05-27 ENCOUNTER — CLINICAL SUPPORT (OUTPATIENT)
Age: 80
End: 2025-05-27
Payer: MEDICARE

## 2025-05-27 DIAGNOSIS — I49.5 SICK SINUS SYNDROME (HCC): ICD-10-CM

## 2025-05-27 DIAGNOSIS — I44.7 LBBB (LEFT BUNDLE BRANCH BLOCK): Primary | ICD-10-CM

## 2025-05-27 PROCEDURE — 93280 PM DEVICE PROGR EVAL DUAL: CPT | Performed by: INTERNAL MEDICINE

## 2025-06-27 PROCEDURE — 93294 REM INTERROG EVL PM/LDLS PM: CPT | Performed by: INTERNAL MEDICINE

## 2025-06-27 PROCEDURE — 93296 REM INTERROG EVL PM/IDS: CPT | Performed by: INTERNAL MEDICINE

## 2025-08-07 ENCOUNTER — HOSPITAL ENCOUNTER (OUTPATIENT)
Dept: CT IMAGING | Age: 80
Discharge: HOME | End: 2025-08-09
Payer: MEDICARE

## 2025-08-07 DIAGNOSIS — R91.8 PULMONARY NODULES: ICD-10-CM

## 2025-08-07 DIAGNOSIS — R91.8 PULMONARY INFILTRATES: ICD-10-CM

## 2025-08-07 PROCEDURE — 71250 CT THORAX DX C-: CPT

## 2025-08-26 ASSESSMENT — SLEEP AND FATIGUE QUESTIONNAIRES
HOW LIKELY ARE YOU TO NOD OFF OR FALL ASLEEP WHILE SITTING AND READING: WOULD NEVER DOZE
ESS TOTAL SCORE: 3
HOW LIKELY ARE YOU TO NOD OFF OR FALL ASLEEP IN A CAR, WHILE STOPPED FOR A FEW MINUTES IN TRAFFIC: WOULD NEVER DOZE
HOW LIKELY ARE YOU TO NOD OFF OR FALL ASLEEP WHILE LYING DOWN TO REST IN THE AFTERNOON WHEN CIRCUMSTANCES PERMIT: HIGH CHANCE OF DOZING
HOW LIKELY ARE YOU TO NOD OFF OR FALL ASLEEP WHEN YOU ARE A PASSENGER IN A CAR FOR AN HOUR WITHOUT A BREAK: WOULD NEVER DOZE
HOW LIKELY ARE YOU TO NOD OFF OR FALL ASLEEP IN A CAR, WHILE STOPPED FOR A FEW MINUTES IN TRAFFIC: WOULD NEVER DOZE
HOW LIKELY ARE YOU TO NOD OFF OR FALL ASLEEP WHILE SITTING INACTIVE IN A PUBLIC PLACE: WOULD NEVER DOZE
HOW LIKELY ARE YOU TO NOD OFF OR FALL ASLEEP WHILE LYING DOWN TO REST IN THE AFTERNOON WHEN CIRCUMSTANCES PERMIT: HIGH CHANCE OF DOZING
HOW LIKELY ARE YOU TO NOD OFF OR FALL ASLEEP WHILE SITTING INACTIVE IN A PUBLIC PLACE: WOULD NEVER DOZE
HOW LIKELY ARE YOU TO NOD OFF OR FALL ASLEEP WHILE SITTING QUIETLY AFTER LUNCH WITHOUT ALCOHOL: WOULD NEVER DOZE
HOW LIKELY ARE YOU TO NOD OFF OR FALL ASLEEP WHILE SITTING QUIETLY AFTER LUNCH WITHOUT ALCOHOL: WOULD NEVER DOZE
HOW LIKELY ARE YOU TO NOD OFF OR FALL ASLEEP WHILE SITTING AND TALKING TO SOMEONE: WOULD NEVER DOZE
HOW LIKELY ARE YOU TO NOD OFF OR FALL ASLEEP WHILE SITTING AND TALKING TO SOMEONE: WOULD NEVER DOZE
HOW LIKELY ARE YOU TO NOD OFF OR FALL ASLEEP WHILE WATCHING TV: WOULD NEVER DOZE
HOW LIKELY ARE YOU TO NOD OFF OR FALL ASLEEP WHILE WATCHING TV: WOULD NEVER DOZE
HOW LIKELY ARE YOU TO NOD OFF OR FALL ASLEEP WHILE SITTING AND READING: WOULD NEVER DOZE
HOW LIKELY ARE YOU TO NOD OFF OR FALL ASLEEP WHEN YOU ARE A PASSENGER IN A CAR FOR AN HOUR WITHOUT A BREAK: WOULD NEVER DOZE

## 2025-09-02 ENCOUNTER — OFFICE VISIT (OUTPATIENT)
Dept: SLEEP MEDICINE | Age: 80
End: 2025-09-02
Payer: MEDICARE

## 2025-09-02 VITALS
SYSTOLIC BLOOD PRESSURE: 118 MMHG | RESPIRATION RATE: 19 BRPM | OXYGEN SATURATION: 94 % | HEART RATE: 60 BPM | TEMPERATURE: 97.4 F | DIASTOLIC BLOOD PRESSURE: 74 MMHG | WEIGHT: 237 LBS | BODY MASS INDEX: 30.42 KG/M2 | HEIGHT: 74 IN

## 2025-09-02 DIAGNOSIS — E66.811 OBESITY (BMI 30.0-34.9): ICD-10-CM

## 2025-09-02 DIAGNOSIS — G47.33 OSA (OBSTRUCTIVE SLEEP APNEA): Primary | ICD-10-CM

## 2025-09-02 PROCEDURE — 1160F RVW MEDS BY RX/DR IN RCRD: CPT | Performed by: NURSE PRACTITIONER

## 2025-09-02 PROCEDURE — 99213 OFFICE O/P EST LOW 20 MIN: CPT | Performed by: NURSE PRACTITIONER

## 2025-09-02 PROCEDURE — 1123F ACP DISCUSS/DSCN MKR DOCD: CPT | Performed by: NURSE PRACTITIONER

## 2025-09-02 PROCEDURE — 3074F SYST BP LT 130 MM HG: CPT | Performed by: NURSE PRACTITIONER

## 2025-09-02 PROCEDURE — 3078F DIAST BP <80 MM HG: CPT | Performed by: NURSE PRACTITIONER

## 2025-09-02 PROCEDURE — G2211 COMPLEX E/M VISIT ADD ON: HCPCS | Performed by: NURSE PRACTITIONER

## 2025-09-02 PROCEDURE — 1159F MED LIST DOCD IN RCRD: CPT | Performed by: NURSE PRACTITIONER

## 2025-09-02 RX ORDER — CHOLECALCIFEROL (VITAMIN D3) 25 MCG
CAPSULE ORAL
COMMUNITY

## (undated) DEVICE — INTRODUCER SHTH CARDIAGUIDE FCL20100

## (undated) DEVICE — OCTARAY 2-2-2-2-2 F CURVE

## (undated) DEVICE — VASCADE MVP SYSTEM CLOSURE 6-12 FR VEN VASC

## (undated) DEVICE — CATHETER REPROC ELCTRPHSLGY 10FR DIA 90CML DGNSTC ULTRSND F

## (undated) DEVICE — PATCH REF EXT FOR CARTO 3 SYS (EA = 6 PACKS)

## (undated) DEVICE — BRONCHOSCOPY PACK: Brand: MEDLINE INDUSTRIES, INC.

## (undated) DEVICE — STERILE (15.2 TAPERED TO 7.6 X 183CM) POLYETHYLENE ACCORDION-FOLDED COVER FOR USE WITH SIEMENS ACUNAV ULTRASOUND CATHETER FAMILY CONNECTOR: Brand: SWIFTLINK TRANSDUCER COVER

## (undated) DEVICE — PINNACLE TIF INTRODUCER SHEATH: Brand: PINNACLE

## (undated) DEVICE — CATHETER EP 7FR L115CM 2-8-2MM SPC TIP 2MM 10 ELECTRD FJ

## (undated) DEVICE — TUBING PMP FOR CARTO SYS SMARTABLATE

## (undated) DEVICE — CATHETER 5FR CORDIS PIG 145DEG 110CM

## (undated) DEVICE — VASC-BAND REG

## (undated) DEVICE — GLIDESHEATH SLENDER STAINLESS STEEL KIT: Brand: GLIDESHEATH SLENDER

## (undated) DEVICE — GUIDEWIRE 035IN 210CM PTFE COAT FIX COR J TIP 15MM FIRM BODY

## (undated) DEVICE — PINNACLE INTRODUCER SHEATH: Brand: PINNACLE

## (undated) DEVICE — CONNECTOR VENT EL DBL SWVL 15M 22M 15F

## (undated) DEVICE — CATHETER ABLAT 8FR L115CM 1-6-2MM SPC TIP 3.5MM FJ CRV

## (undated) DEVICE — Device: Brand: NRG TRANSSEPTAL NEEDLE

## (undated) DEVICE — SYR 50ML SLIP TIP NSAF LF STRL --

## (undated) DEVICE — PRESSURE MONITORING SET: Brand: TRUWAVE

## (undated) DEVICE — CATHETER COR DIAG 4.0 5FR 110CM 2 SIDE H

## (undated) DEVICE — SINGLE USE SUCTION VALVE MAJ-209: Brand: SINGLE USE SUCTION VALVE (STERILE)

## (undated) DEVICE — SINGLE USE BIOPSY VALVE MAJ-210: Brand: SINGLE USE BIOPSY VALVE (STERILE)

## (undated) DEVICE — DECANAV

## (undated) DEVICE — 18G NG KIT WITH 96IN PROBE COVER (10 PK): Brand: SITE-RITE